# Patient Record
Sex: FEMALE | Race: WHITE | NOT HISPANIC OR LATINO | Employment: PART TIME | URBAN - METROPOLITAN AREA
[De-identification: names, ages, dates, MRNs, and addresses within clinical notes are randomized per-mention and may not be internally consistent; named-entity substitution may affect disease eponyms.]

---

## 2017-02-15 ENCOUNTER — APPOINTMENT (OUTPATIENT)
Dept: GENERAL RADIOLOGY | Facility: HOSPITAL | Age: 37
End: 2017-02-15

## 2017-02-15 PROCEDURE — 71101 X-RAY EXAM UNILAT RIBS/CHEST: CPT | Performed by: FAMILY MEDICINE

## 2017-03-23 ENCOUNTER — OFFICE VISIT (OUTPATIENT)
Dept: FAMILY MEDICINE CLINIC | Facility: CLINIC | Age: 37
End: 2017-03-23

## 2017-03-23 VITALS
RESPIRATION RATE: 16 BRPM | WEIGHT: 200 LBS | OXYGEN SATURATION: 98 % | TEMPERATURE: 98.6 F | DIASTOLIC BLOOD PRESSURE: 70 MMHG | HEART RATE: 62 BPM | BODY MASS INDEX: 28 KG/M2 | HEIGHT: 71 IN | SYSTOLIC BLOOD PRESSURE: 120 MMHG

## 2017-03-23 DIAGNOSIS — R91.8 ABNORMALITY OF LUNG ON CXR: Primary | ICD-10-CM

## 2017-03-23 DIAGNOSIS — Z00.00 LABORATORY EXAMINATION ORDERED AS PART OF A ROUTINE GENERAL MEDICAL EXAMINATION: ICD-10-CM

## 2017-03-23 PROCEDURE — 99213 OFFICE O/P EST LOW 20 MIN: CPT | Performed by: PHYSICIAN ASSISTANT

## 2017-03-23 PROCEDURE — 71020 XR CHEST PA AND LATERAL: CPT | Performed by: PHYSICIAN ASSISTANT

## 2017-03-23 PROCEDURE — 90715 TDAP VACCINE 7 YRS/> IM: CPT | Performed by: PHYSICIAN ASSISTANT

## 2017-03-23 PROCEDURE — 90471 IMMUNIZATION ADMIN: CPT | Performed by: PHYSICIAN ASSISTANT

## 2017-03-23 NOTE — PROGRESS NOTES
Subjective   Sierra Mao is a 36 y.o. female.     History of Present Illness   Sierra Mao 36 y.o. female who presents today for f/u CXR with ribs from urgent care and then saw ortho.  Noted ? Lymph nodes and ?mass in lunges on spine Xray.  I only have report from 10-16-08 of CXR PA and lateral done here and was read with large calcified azygous level benign granuloma and very small lateral right upper love parenchymal granuloma.  Report from Urgent Care 2-15-17 with note of right upper lobe calcified granuloma and large calcified node in right paratracheal region.  She did not have lateral view.  I plan PA and lateral CXR today.  She is smoker.  No wheezing or SOA.  No coughing or weight loss.  No night sweats.  she has a history of There is no problem list on file for this patient.  .      I will update labs and vaccine  Out of pneumovax    X-Ray  Interpretation report in house X-rays that I personally viewed    Relevant Clinical Issues/Diagnoses/Indications:  Abn CXR          Clinical Findings:  I see the hilar node area calcification and tiny nodule right mid upper lobe; no mass, no cardiomeg          Comparative Data:  Report only          Date of Previous X-ray:    Change on current X-ray      The following portions of the patient's history were reviewed and updated as appropriate: allergies, current medications, past family history, past medical history, past social history, past surgical history and problem list.    Review of Systems   Constitutional: Positive for unexpected weight change. Negative for activity change and appetite change.   HENT: Negative for nosebleeds and trouble swallowing.    Eyes: Negative for pain and visual disturbance.   Respiratory: Negative for chest tightness, shortness of breath and wheezing.    Cardiovascular: Negative for chest pain and palpitations.   Gastrointestinal: Negative for abdominal pain and blood in stool.   Endocrine: Negative.    Genitourinary: Negative for  difficulty urinating and hematuria.   Musculoskeletal: Negative for joint swelling.   Skin: Negative for color change and rash.   Allergic/Immunologic: Positive for environmental allergies.   Neurological: Positive for speech difficulty and headaches. Negative for syncope.   Hematological: Negative for adenopathy.   Psychiatric/Behavioral: Negative for agitation and confusion. The patient is nervous/anxious.    All other systems reviewed and are negative.      Objective   Physical Exam   Constitutional: She is oriented to person, place, and time. She appears well-developed and well-nourished. No distress.   HENT:   Head: Normocephalic and atraumatic.   Right Ear: External ear normal.   Left Ear: External ear normal.   Nose: Nose normal.   Mouth/Throat: Oropharynx is clear and moist. No oropharyngeal exudate.   Fluid bilat   Eyes: Conjunctivae and EOM are normal. Pupils are equal, round, and reactive to light. No scleral icterus.   Neck: Normal range of motion. Neck supple. No thyromegaly present.   Cardiovascular: Normal rate, regular rhythm, normal heart sounds and intact distal pulses.    No murmur heard.  Pulmonary/Chest: Effort normal and breath sounds normal. No respiratory distress. She has no wheezes. She has no rales.   Abdominal: Soft.   Musculoskeletal: Normal range of motion. She exhibits no deformity.   Lymphadenopathy:     She has no cervical adenopathy.   Neurological: She is alert and oriented to person, place, and time. Coordination normal.   Skin: Skin is warm and dry.   Psychiatric: She has a normal mood and affect. Her behavior is normal. Judgment and thought content normal.   Nursing note and vitals reviewed.      Assessment/Plan   Sierra was seen today for back pain and follow-up.    Diagnoses and all orders for this visit:    Abnormality of lung on CXR  -     XR Chest PA & Lateral    Laboratory examination ordered as part of a routine general medical examination  -     Tdap Vaccine Greater Than  or Equal To 6yo IM  -     Comprehensive metabolic panel  -     Lipid panel  -     CBC and Differential  -     TSH  -     Vitamin D 25 Hydroxy  -     HIV-1 / O / 2 Ag / Antibody 4th Generation  -     T4, Free  -     Hepatitis Panel, Acute

## 2017-03-23 NOTE — PATIENT INSTRUCTIONS
Stop smoking    Get results on Chest Xray    Low glycemic index diet  Exercise 30 minutes most days of the week  Make sure you get results on any labs or tests we ordered today  We discussed medications and how to take them as prescribed  Sleep 6-8 hours each night if possible  If you have not signed up for [employer], please activate your code ASAP from your After Visit Summary today    LDL goal <100  LDL goal if heart disease <70  HDL goal >60  Triglyceride goal <150  BP goal =<130/80  Fasting glucose <100    Stop smoking

## 2017-03-29 LAB
25(OH)D3+25(OH)D2 SERPL-MCNC: 23.6 NG/ML (ref 30–100)
ALBUMIN SERPL-MCNC: 4.7 G/DL (ref 3.5–5.2)
ALBUMIN/GLOB SERPL: 2.1 G/DL
ALP SERPL-CCNC: 46 U/L (ref 39–117)
ALT SERPL-CCNC: 14 U/L (ref 1–33)
AST SERPL-CCNC: 14 U/L (ref 1–32)
BASOPHILS # BLD AUTO: 0.02 10*3/MM3 (ref 0–0.2)
BASOPHILS NFR BLD AUTO: 0.3 % (ref 0–1.5)
BILIRUB SERPL-MCNC: 0.4 MG/DL (ref 0.1–1.2)
BUN SERPL-MCNC: 10 MG/DL (ref 6–20)
BUN/CREAT SERPL: 11.8 (ref 7–25)
CALCIUM SERPL-MCNC: 9.5 MG/DL (ref 8.6–10.5)
CHLORIDE SERPL-SCNC: 103 MMOL/L (ref 98–107)
CHOLEST SERPL-MCNC: 190 MG/DL (ref 0–200)
CO2 SERPL-SCNC: 25.9 MMOL/L (ref 22–29)
CREAT SERPL-MCNC: 0.85 MG/DL (ref 0.57–1)
EOSINOPHIL # BLD AUTO: 0.22 10*3/MM3 (ref 0–0.7)
EOSINOPHIL NFR BLD AUTO: 2.9 % (ref 0.3–6.2)
ERYTHROCYTE [DISTWIDTH] IN BLOOD BY AUTOMATED COUNT: 13.4 % (ref 11.7–13)
GLOBULIN SER CALC-MCNC: 2.2 GM/DL
GLUCOSE SERPL-MCNC: 161 MG/DL (ref 65–99)
HAV IGM SERPL QL IA: NEGATIVE
HBV CORE IGM SERPL QL IA: NEGATIVE
HBV SURFACE AG SERPL QL IA: NEGATIVE
HCT VFR BLD AUTO: 42.9 % (ref 35.6–45.5)
HCV AB S/CO SERPL IA: 0.1 S/CO RATIO (ref 0–0.9)
HDLC SERPL-MCNC: 42 MG/DL (ref 40–60)
HGB BLD-MCNC: 14.5 G/DL (ref 11.9–15.5)
HIV 1+2 AB+HIV1 P24 AG SERPL QL IA: NON REACTIVE
IMM GRANULOCYTES # BLD: 0 10*3/MM3 (ref 0–0.03)
IMM GRANULOCYTES NFR BLD: 0 % (ref 0–0.5)
LDLC SERPL CALC-MCNC: 119 MG/DL (ref 0–100)
LYMPHOCYTES # BLD AUTO: 2 10*3/MM3 (ref 0.9–4.8)
LYMPHOCYTES NFR BLD AUTO: 26.1 % (ref 19.6–45.3)
MCH RBC QN AUTO: 31.3 PG (ref 26.9–32)
MCHC RBC AUTO-ENTMCNC: 33.8 G/DL (ref 32.4–36.3)
MCV RBC AUTO: 92.5 FL (ref 80.5–98.2)
MONOCYTES # BLD AUTO: 0.47 10*3/MM3 (ref 0.2–1.2)
MONOCYTES NFR BLD AUTO: 6.1 % (ref 5–12)
NEUTROPHILS # BLD AUTO: 4.95 10*3/MM3 (ref 1.9–8.1)
NEUTROPHILS NFR BLD AUTO: 64.6 % (ref 42.7–76)
PLATELET # BLD AUTO: 224 10*3/MM3 (ref 140–500)
POTASSIUM SERPL-SCNC: 4.8 MMOL/L (ref 3.5–5.2)
PROT SERPL-MCNC: 6.9 G/DL (ref 6–8.5)
RBC # BLD AUTO: 4.64 10*6/MM3 (ref 3.9–5.2)
SODIUM SERPL-SCNC: 141 MMOL/L (ref 136–145)
T4 FREE SERPL-MCNC: 1.25 NG/DL (ref 0.93–1.7)
TRIGL SERPL-MCNC: 147 MG/DL (ref 0–150)
TSH SERPL DL<=0.005 MIU/L-ACNC: 3.4 MIU/ML (ref 0.27–4.2)
VLDLC SERPL CALC-MCNC: 29.4 MG/DL (ref 5–40)
WBC # BLD AUTO: 7.66 10*3/MM3 (ref 4.5–10.7)

## 2017-04-01 LAB
HBA1C MFR BLD: 6.24 % (ref 4.8–5.6)
WRITTEN AUTHORIZATION: NORMAL

## 2017-04-10 ENCOUNTER — OFFICE VISIT (OUTPATIENT)
Dept: FAMILY MEDICINE CLINIC | Facility: CLINIC | Age: 37
End: 2017-04-10

## 2017-04-10 VITALS
HEIGHT: 71 IN | OXYGEN SATURATION: 98 % | DIASTOLIC BLOOD PRESSURE: 70 MMHG | SYSTOLIC BLOOD PRESSURE: 110 MMHG | BODY MASS INDEX: 28 KG/M2 | WEIGHT: 200 LBS | HEART RATE: 72 BPM | RESPIRATION RATE: 16 BRPM | TEMPERATURE: 98.8 F

## 2017-04-10 DIAGNOSIS — R73.01 IMPAIRED FASTING GLUCOSE: Primary | ICD-10-CM

## 2017-04-10 PROCEDURE — 99213 OFFICE O/P EST LOW 20 MIN: CPT | Performed by: PHYSICIAN ASSISTANT

## 2017-04-10 RX ORDER — METFORMIN HYDROCHLORIDE 500 MG/1
TABLET, EXTENDED RELEASE ORAL
Qty: 90 TABLET | Refills: 1 | Status: SHIPPED | OUTPATIENT
Start: 2017-04-10 | End: 2017-08-03 | Stop reason: SDUPTHER

## 2017-04-10 NOTE — PROGRESS NOTES
Subjective   Sierra Mao is a 36 y.o. female.     History of Present Illness   Sierra Mao 36 y.o. female who presents today for   Lab Results   Component Value Date    HGBA1C 6.24 (H) 03/28/2017       she has a history of There is no problem list on file for this patient.    Lab Results   Component Value Date    BUN 10 03/28/2017    CREATININE 0.85 03/28/2017    EGFRIFNONA 76 03/28/2017    EGFRIFAFRI 92 03/28/2017    BCR 11.8 03/28/2017    K 4.8 03/28/2017    CO2 25.9 03/28/2017    CALCIUM 9.5 03/28/2017    PROTENTOTREF 6.9 03/28/2017    ALBUMIN 4.70 03/28/2017    LABIL2 2.1 03/28/2017    AST 14 03/28/2017    ALT 14 03/28/2017     Then I added a1C and was impaired.  She does smoke  Mom has DMII    We talked Metformin and MOA; insulin resistance      2013 AHA (American Heart Association) Cholesterol Risk Ratio Score Goal is <=7.5% and your score is:  3.0%    Can still repeat CXR 6mos;  It was neg and node calcified    The following portions of the patient's history were reviewed and updated as appropriate: allergies, current medications, past family history, past medical history, past social history, past surgical history and problem list.    Review of Systems   Constitutional: Negative for activity change, appetite change and unexpected weight change.   HENT: Negative for nosebleeds and trouble swallowing.    Eyes: Negative for pain and visual disturbance.   Respiratory: Negative for chest tightness, shortness of breath and wheezing.    Cardiovascular: Negative for chest pain and palpitations.   Gastrointestinal: Negative for abdominal pain and blood in stool.   Endocrine: Negative.    Genitourinary: Negative for difficulty urinating and hematuria.   Musculoskeletal: Negative for joint swelling.   Skin: Negative for color change and rash.   Allergic/Immunologic: Negative.    Neurological: Negative for syncope and speech difficulty.   Hematological: Negative for adenopathy.   Psychiatric/Behavioral: Negative  for agitation and confusion.   All other systems reviewed and are negative.      Objective   Physical Exam   Constitutional: She is oriented to person, place, and time. She appears well-developed and well-nourished. No distress.   HENT:   Head: Normocephalic and atraumatic.   Eyes: Conjunctivae and EOM are normal. Pupils are equal, round, and reactive to light. Right eye exhibits no discharge. Left eye exhibits no discharge. No scleral icterus.   Neck: Normal range of motion. Neck supple. No tracheal deviation present. No thyromegaly present.   Cardiovascular: Normal rate, regular rhythm, normal heart sounds, intact distal pulses and normal pulses.  Exam reveals no gallop.    No murmur heard.  Pulmonary/Chest: Effort normal and breath sounds normal. No respiratory distress. She has no wheezes. She has no rales.   Musculoskeletal: Normal range of motion.   Neurological: She is alert and oriented to person, place, and time. She exhibits normal muscle tone. Coordination normal.   Skin: Skin is warm. No rash noted. No erythema. No pallor.   Psychiatric: She has a normal mood and affect. Her behavior is normal. Judgment and thought content normal.   Nursing note and vitals reviewed.      Assessment/Plan   Problems Addressed this Visit     None      Visit Diagnoses     Impaired fasting glucose    -  Primary    Relevant Orders    Comprehensive Metabolic Panel    Hemoglobin A1c

## 2017-04-10 NOTE — PATIENT INSTRUCTIONS
Low glycemic index diet  Exercise 30 minutes most days of the week  Make sure you get results on any labs or tests we ordered today  We discussed medications and how to take them as prescribed  Sleep 6-8 hours each night if possible  If you have not signed up for [employer], please activate your code ASAP from your After Visit Summary today    LDL goal <100  LDL goal if heart disease <70  HDL goal >60  Triglyceride goal <150  BP goal =<130/80  Fasting glucose <100    Labs July 5 or after

## 2017-06-14 ENCOUNTER — OFFICE VISIT (OUTPATIENT)
Dept: FAMILY MEDICINE CLINIC | Facility: CLINIC | Age: 37
End: 2017-06-14

## 2017-06-14 VITALS
BODY MASS INDEX: 27.3 KG/M2 | HEIGHT: 71 IN | WEIGHT: 195 LBS | TEMPERATURE: 98.8 F | DIASTOLIC BLOOD PRESSURE: 72 MMHG | SYSTOLIC BLOOD PRESSURE: 110 MMHG | RESPIRATION RATE: 16 BRPM | HEART RATE: 59 BPM | OXYGEN SATURATION: 98 %

## 2017-06-14 DIAGNOSIS — B34.9 VIRAL SYNDROME: Primary | ICD-10-CM

## 2017-06-14 DIAGNOSIS — M54.50 ACUTE BILATERAL LOW BACK PAIN WITHOUT SCIATICA: ICD-10-CM

## 2017-06-14 PROBLEM — J06.9 ACUTE URI: Status: ACTIVE | Noted: 2017-06-14

## 2017-06-14 PROCEDURE — 99213 OFFICE O/P EST LOW 20 MIN: CPT | Performed by: PHYSICIAN ASSISTANT

## 2017-06-14 RX ORDER — TIZANIDINE 4 MG/1
4 TABLET ORAL EVERY 8 HOURS PRN
Qty: 60 TABLET | Refills: 1 | Status: SHIPPED | OUTPATIENT
Start: 2017-06-14 | End: 2017-08-02

## 2017-06-14 NOTE — PROGRESS NOTES
Subjective   Sierra Mao is a 37 y.o. female.     History of Present Illness   Sierra Mao 37 y.o. female who presents for evaluation of ?viral syndrome. Symptoms include dry mouth, coated tongued,sore throat to swallow, fatigue, h/a left temple.  Feeling some better today, no h/a yet this PM.  Has allergy drg also..  Onset of symptoms was 10 days ago, gradually improving since that time. Patient denies shortness of breath, wheezing.   Evaluation to date: none Treatment to date:  Advil.   Was ? Fever at onset with sweats    Clear drg    Holding Metformin  Had tongue sore and is going away    Wait to see if improves  Still having back pain at night and going on for weeks;  Only at night and any bed  Pain to move  No motor or sensory C/o's  No pain down legs  The following portions of the patient's history were reviewed and updated as appropriate: allergies, current medications, past family history, past medical history, past social history, past surgical history and problem list.    Review of Systems   Constitutional: Negative for activity change, appetite change and unexpected weight change.   HENT: Positive for mouth sores, sinus pressure and sore throat. Negative for nosebleeds and trouble swallowing.    Eyes: Negative for pain and visual disturbance.   Respiratory: Negative for chest tightness, shortness of breath and wheezing.    Cardiovascular: Negative for chest pain and palpitations.   Gastrointestinal: Negative for abdominal pain and blood in stool.   Endocrine: Negative.    Genitourinary: Negative for difficulty urinating and hematuria.   Musculoskeletal: Positive for back pain and neck stiffness. Negative for joint swelling.   Skin: Negative for color change and rash.   Allergic/Immunologic: Negative.    Neurological: Positive for headaches. Negative for syncope and speech difficulty.   Hematological: Negative for adenopathy.   Psychiatric/Behavioral: Negative for agitation and confusion.   All other  systems reviewed and are negative.      Objective   Physical Exam   Constitutional: She is oriented to person, place, and time. She appears well-developed and well-nourished.  Non-toxic appearance. No distress.   HENT:   Head: Normocephalic and atraumatic. Hair is normal.   Right Ear: External ear normal. No drainage, swelling or tenderness. Tympanic membrane is retracted.   Left Ear: External ear normal. No drainage, swelling or tenderness. Tympanic membrane is retracted.   Nose: Mucosal edema present. No epistaxis.   Mouth/Throat: Uvula is midline and mucous membranes are normal. No oral lesions. No uvula swelling. Posterior oropharyngeal erythema present. No oropharyngeal exudate.   White coating on tongue   Eyes: Conjunctivae and EOM are normal. Pupils are equal, round, and reactive to light. Right eye exhibits no discharge. Left eye exhibits no discharge. No scleral icterus.   Neck: Normal range of motion. Neck supple.   Cardiovascular: Normal rate, regular rhythm and normal heart sounds.  Exam reveals no gallop.    No murmur heard.  Pulmonary/Chest: Effort normal and breath sounds normal. No stridor. No respiratory distress. She has no wheezes. She has no rales. She exhibits no tenderness.   Abdominal: Soft. There is no tenderness.   Musculoskeletal: Normal range of motion. She exhibits no tenderness.   Lymphadenopathy:     She has no cervical adenopathy.   Neurological: She is alert and oriented to person, place, and time. She displays normal reflexes. She exhibits normal muscle tone. Coordination normal.   Skin: Skin is warm and dry. No rash noted. She is not diaphoretic.   Psychiatric: She has a normal mood and affect. Her behavior is normal. Judgment and thought content normal.   Nursing note and vitals reviewed.      Assessment/Plan   Problems Addressed this Visit     None      Visit Diagnoses     Viral syndrome    -  Primary    Acute bilateral low back pain without sciatica

## 2017-06-14 NOTE — PATIENT INSTRUCTIONS
For throat pain:  Can gargle with 1/2 Maalox and 1/2 Benadryl liquid ---mix, gargle and spit Take Tylenol for fever or aches  Rest as needed  Call not better in 7 days  Increase fluids  Call if worse  Can use generic Afrin nasal spray up to 5 days for nasal congestion  Finish antibiotic course of therapy      Warned patient that this medication can cause drowsiness and impair them operating machinery, including driving a car.  Caution is advised.

## 2017-07-03 ENCOUNTER — RESULTS ENCOUNTER (OUTPATIENT)
Dept: FAMILY MEDICINE CLINIC | Facility: CLINIC | Age: 37
End: 2017-07-03

## 2017-07-03 DIAGNOSIS — R73.01 IMPAIRED FASTING GLUCOSE: ICD-10-CM

## 2017-08-02 ENCOUNTER — OFFICE VISIT (OUTPATIENT)
Dept: FAMILY MEDICINE CLINIC | Facility: CLINIC | Age: 37
End: 2017-08-02

## 2017-08-02 VITALS
BODY MASS INDEX: 26.32 KG/M2 | RESPIRATION RATE: 16 BRPM | TEMPERATURE: 99.2 F | OXYGEN SATURATION: 98 % | HEART RATE: 79 BPM | DIASTOLIC BLOOD PRESSURE: 70 MMHG | HEIGHT: 71 IN | WEIGHT: 188 LBS | SYSTOLIC BLOOD PRESSURE: 110 MMHG

## 2017-08-02 DIAGNOSIS — R73.9 HYPERGLYCEMIA: Primary | ICD-10-CM

## 2017-08-02 DIAGNOSIS — E11.65 TYPE 2 DIABETES MELLITUS WITH HYPERGLYCEMIA, WITHOUT LONG-TERM CURRENT USE OF INSULIN (HCC): ICD-10-CM

## 2017-08-02 PROCEDURE — 99213 OFFICE O/P EST LOW 20 MIN: CPT | Performed by: PHYSICIAN ASSISTANT

## 2017-08-02 NOTE — PROGRESS NOTES
Subjective   Sierra Mao is a 37 y.o. female.     History of Present Illness   Sierra Mao 37 y.o. female who presents today for f/u on glucose.  She saw me a April and labs showed elevated glucose and I added A1C and was:    Lab Results   Component Value Date    HGBA1C 6.24 (H) 03/28/2017   we talked about pre-diabetes and mom is DMII;  She started on Metformin  She has been checking her glucose on a glucometer and lowest was 180 random  Some readings 400s;  Average in 300s  Her weight is down;  Not drinking or voiding more  She increased to two Metformin 9 days ago and tolerating it.    She is down 7lbs and less candy  Less sweets  Not watching calories   she has a history of     Need more labs to eval and go from here    294 here  264 her machine    She could not void    Patient Active Problem List   Diagnosis   (none) - all problems resolved or deleted   .      She had coffee this AM with sugar and cream;; she cannot come back totally fasting for over a week  The following portions of the patient's history were reviewed and updated as appropriate: allergies, current medications, past family history, past medical history, past social history, past surgical history and problem list.    Review of Systems   Constitutional: Positive for unexpected weight change. Negative for activity change and appetite change.   HENT: Positive for tinnitus. Negative for nosebleeds and trouble swallowing.    Eyes: Negative for pain and visual disturbance.   Respiratory: Negative for chest tightness, shortness of breath and wheezing.    Cardiovascular: Negative for chest pain and palpitations.   Gastrointestinal: Negative for abdominal pain and blood in stool.   Endocrine: Negative.    Genitourinary: Negative for difficulty urinating and hematuria.   Musculoskeletal: Positive for back pain and neck stiffness. Negative for joint swelling.   Skin: Negative for color change and rash.   Allergic/Immunologic: Positive for  environmental allergies.   Neurological: Negative for syncope and speech difficulty.   Hematological: Negative for adenopathy.   Psychiatric/Behavioral: Negative for agitation and confusion.   All other systems reviewed and are negative.      Objective   Physical Exam   Constitutional: She is oriented to person, place, and time. She appears well-developed and well-nourished. No distress.   HENT:   Head: Normocephalic and atraumatic.   Eyes: Conjunctivae and EOM are normal. Pupils are equal, round, and reactive to light. Right eye exhibits no discharge. Left eye exhibits no discharge. No scleral icterus.   Neck: Normal range of motion. Neck supple. No tracheal deviation present. No thyromegaly present.   Cardiovascular: Normal rate, regular rhythm, normal heart sounds, intact distal pulses and normal pulses.  Exam reveals no gallop.    No murmur heard.  Pulmonary/Chest: Effort normal and breath sounds normal. No respiratory distress. She has no wheezes. She has no rales.   Musculoskeletal: Normal range of motion.   Neurological: She is alert and oriented to person, place, and time. She exhibits normal muscle tone. Coordination normal.   Skin: Skin is warm. No rash noted. No erythema. No pallor.   Psychiatric: She has a normal mood and affect. Her behavior is normal. Judgment and thought content normal.   Nursing note and vitals reviewed.      Assessment/Plan   Problems Addressed this Visit     None      Visit Diagnoses     Hyperglycemia    -  Primary    Relevant Orders    POCT Glucose

## 2017-08-02 NOTE — PATIENT INSTRUCTIONS
Low glycemic index diet  Exercise 30 minutes most days of the week  Make sure you get results on any labs or tests we ordered today  We discussed medications and how to take them as prescribed  Sleep 6-8 hours each night if possible  If you have not signed up for [email or web address], please activate your code ASAP from your After Visit Summary today    LDL goal <100  LDL goal if heart disease <70  HDL goal >60  Triglyceride goal <150  BP goal =<130/80  Fasting glucose <100

## 2017-08-03 DIAGNOSIS — E11.65 TYPE 2 DIABETES MELLITUS WITH HYPERGLYCEMIA, WITHOUT LONG-TERM CURRENT USE OF INSULIN (HCC): Primary | ICD-10-CM

## 2017-08-03 LAB
ALBUMIN SERPL-MCNC: 5 G/DL (ref 3.5–5.2)
ALBUMIN/GLOB SERPL: 1.9 G/DL
ALP SERPL-CCNC: 45 U/L (ref 39–117)
ALT SERPL-CCNC: 28 U/L (ref 1–33)
AST SERPL-CCNC: 19 U/L (ref 1–32)
BILIRUB SERPL-MCNC: 0.6 MG/DL (ref 0.1–1.2)
BUN SERPL-MCNC: 9 MG/DL (ref 6–20)
BUN/CREAT SERPL: 11.1 (ref 7–25)
C PEPTIDE SERPL-MCNC: 2.1 NG/ML (ref 1.1–4.4)
CALCIUM SERPL-MCNC: 9.5 MG/DL (ref 8.6–10.5)
CHLORIDE SERPL-SCNC: 98 MMOL/L (ref 98–107)
CO2 SERPL-SCNC: 23.1 MMOL/L (ref 22–29)
CREAT SERPL-MCNC: 0.81 MG/DL (ref 0.57–1)
GLOBULIN SER CALC-MCNC: 2.7 GM/DL
GLUCOSE SERPL-MCNC: 259 MG/DL (ref 65–99)
HBA1C MFR BLD: 7.84 % (ref 4.8–5.6)
POTASSIUM SERPL-SCNC: 4.7 MMOL/L (ref 3.5–5.2)
PROT SERPL-MCNC: 7.7 G/DL (ref 6–8.5)
SODIUM SERPL-SCNC: 137 MMOL/L (ref 136–145)

## 2017-08-03 RX ORDER — METFORMIN HYDROCHLORIDE 500 MG/1
TABLET, EXTENDED RELEASE ORAL
Qty: 360 TABLET | Refills: 1 | Status: SHIPPED | OUTPATIENT
Start: 2017-08-03 | End: 2018-02-01 | Stop reason: SDUPTHER

## 2017-08-31 ENCOUNTER — RESULTS ENCOUNTER (OUTPATIENT)
Dept: FAMILY MEDICINE CLINIC | Facility: CLINIC | Age: 37
End: 2017-08-31

## 2017-08-31 DIAGNOSIS — E11.65 TYPE 2 DIABETES MELLITUS WITH HYPERGLYCEMIA, WITHOUT LONG-TERM CURRENT USE OF INSULIN (HCC): ICD-10-CM

## 2018-01-08 ENCOUNTER — PRIOR AUTHORIZATION (OUTPATIENT)
Dept: FAMILY MEDICINE CLINIC | Facility: CLINIC | Age: 38
End: 2018-01-08

## 2018-01-08 NOTE — TELEPHONE ENCOUNTER
PA request for Jardiance Denied; patient has to have documented trial and failure to both plan alternatives Invokana and Farxiga first; please advise patient

## 2018-01-17 ENCOUNTER — OFFICE VISIT (OUTPATIENT)
Dept: FAMILY MEDICINE CLINIC | Facility: CLINIC | Age: 38
End: 2018-01-17

## 2018-01-17 VITALS
HEIGHT: 71 IN | TEMPERATURE: 98.7 F | BODY MASS INDEX: 22.82 KG/M2 | SYSTOLIC BLOOD PRESSURE: 110 MMHG | OXYGEN SATURATION: 100 % | RESPIRATION RATE: 16 BRPM | DIASTOLIC BLOOD PRESSURE: 62 MMHG | HEART RATE: 79 BPM | WEIGHT: 163 LBS

## 2018-01-17 DIAGNOSIS — E78.2 MIXED HYPERLIPIDEMIA: ICD-10-CM

## 2018-01-17 DIAGNOSIS — E11.65 TYPE 2 DIABETES MELLITUS WITH HYPERGLYCEMIA, WITHOUT LONG-TERM CURRENT USE OF INSULIN (HCC): Primary | ICD-10-CM

## 2018-01-17 PROCEDURE — 99214 OFFICE O/P EST MOD 30 MIN: CPT | Performed by: PHYSICIAN ASSISTANT

## 2018-01-17 NOTE — PROGRESS NOTES
Subjective   Sierra Mao is a 37 y.o. female.     History of Present Illness     Sierra Mao 37 y.o. female who presents today for routine follow up check and medication refills.  she has a history of   Patient Active Problem List   Diagnosis   • Type 2 diabetes mellitus with hyperglycemia, without long-term current use of insulin   .  Since the last visit, she has overall felt tired.  She has DMII and is here to discuss recent abnormal labs.  she has been compliant with current medications have reviewed them.  The patient denies medication side effects.    Results for orders placed or performed in visit on 08/02/17   Comprehensive Metabolic Panel   Result Value Ref Range    Glucose 259 (H) 65 - 99 mg/dL    BUN 9 6 - 20 mg/dL    Creatinine 0.81 0.57 - 1.00 mg/dL    eGFR Non African Am 80 >60 mL/min/1.73    eGFR African Am 97 >60 mL/min/1.73    BUN/Creatinine Ratio 11.1 7.0 - 25.0    Sodium 137 136 - 145 mmol/L    Potassium 4.7 3.5 - 5.2 mmol/L    Chloride 98 98 - 107 mmol/L    Total CO2 23.1 22.0 - 29.0 mmol/L    Calcium 9.5 8.6 - 10.5 mg/dL    Total Protein 7.7 6.0 - 8.5 g/dL    Albumin 5.00 3.50 - 5.20 g/dL    Globulin 2.7 gm/dL    A/G Ratio 1.9 g/dL    Total Bilirubin 0.6 0.1 - 1.2 mg/dL    Alkaline Phosphatase 45 39 - 117 U/L    AST (SGOT) 19 1 - 32 U/L    ALT (SGPT) 28 1 - 33 U/L   C-Peptide   Result Value Ref Range    C-Peptide 2.1 1.1 - 4.4 ng/mL   Hemoglobin A1c   Result Value Ref Range    Hemoglobin A1C 7.84 (H) 4.80 - 5.60 %       Eating better; weight is down; not exercising  Weight was 200 in April and now is 163:  Loss of 37 labs    Last A1C 7.84  Glucose about 200;  It can still be 400  Need to get update on labs and if glucose still high, will need to see Endocrine  She still smokes  Has blurred vision  March 2017 A1C 6.24  Lab Results   Component Value Date    HGBA1C 7.84 (H) 08/02/2017     I did talk about starting a statin and recommend this; she wants to wait on labs  She is having polyuria  and polydipsia    Glucose this   She is on 3 Metformin daily  Trouble with concentrating    The following portions of the patient's history were reviewed and updated as appropriate: allergies, current medications, past family history, past medical history, past social history, past surgical history and problem list.    Review of Systems   Constitutional: Positive for fatigue and unexpected weight change. Negative for activity change and appetite change.   HENT: Negative for nosebleeds and trouble swallowing.    Eyes: Negative for pain and visual disturbance.   Respiratory: Negative for chest tightness, shortness of breath and wheezing.    Cardiovascular: Negative for chest pain and palpitations.   Gastrointestinal: Negative for abdominal pain and blood in stool.   Endocrine: Positive for polydipsia and polyuria.   Genitourinary: Negative for difficulty urinating and hematuria.   Musculoskeletal: Positive for back pain. Negative for joint swelling.   Skin: Negative for color change and rash.   Allergic/Immunologic: Negative.    Neurological: Positive for dizziness and light-headedness. Negative for syncope and speech difficulty.   Hematological: Negative for adenopathy.   Psychiatric/Behavioral: Positive for decreased concentration. Negative for agitation and confusion.   All other systems reviewed and are negative.      Objective   Physical Exam   Constitutional: She is oriented to person, place, and time. She appears well-developed and well-nourished. No distress.   HENT:   Head: Normocephalic and atraumatic.   Eyes: Conjunctivae and EOM are normal. Pupils are equal, round, and reactive to light. Right eye exhibits no discharge. Left eye exhibits no discharge. No scleral icterus.   Neck: Normal range of motion. Neck supple. No tracheal deviation present. No thyromegaly present.   Cardiovascular: Normal rate, regular rhythm, normal heart sounds, intact distal pulses and normal pulses.  Exam reveals no gallop.     No murmur heard.  Pulmonary/Chest: Effort normal and breath sounds normal. No respiratory distress. She has no wheezes. She has no rales.   Musculoskeletal: Normal range of motion.   Neurological: She is alert and oriented to person, place, and time. She exhibits normal muscle tone. Coordination normal.   Skin: Skin is warm. No rash noted. No erythema. No pallor.   Psychiatric: She has a normal mood and affect. Her behavior is normal. Judgment and thought content normal.   Nursing note and vitals reviewed.      Assessment/Plan   Sierra was seen today for weight loss.    Diagnoses and all orders for this visit:    Type 2 diabetes mellitus with hyperglycemia, without long-term current use of insulin  -     Comprehensive metabolic panel  -     Lipid panel  -     CBC and Differential  -     TSH  -     T4, Free  -     MicroAlbumin, Urine, Random - Urine, Clean Catch  -     Vitamin B12  -     Folate  -     Hemoglobin A1c  -     Vitamin D 25 Hydroxy    Mixed hyperlipidemia  -     Comprehensive metabolic panel  -     Lipid panel  -     CBC and Differential  -     TSH  -     T4, Free  -     MicroAlbumin, Urine, Random - Urine, Clean Catch  -     Vitamin B12  -     Folate  -     Hemoglobin A1c  -     Vitamin D 25 Hydroxy

## 2018-01-17 NOTE — PATIENT INSTRUCTIONS
Low glycemic index diet  Exercise 30 minutes most days of the week  Make sure you get results on any labs or tests we ordered today  We discussed medications and how to take them as prescribed  Sleep 6-8 hours each night if possible  If you have not signed up for [employer], please activate your code ASAP from your After Visit Summary today    LDL goal <100  LDL goal if heart disease <70  HDL goal >60  Triglyceride goal <150  BP goal =<130/80  Fasting glucose <100

## 2018-01-18 LAB
25(OH)D3+25(OH)D2 SERPL-MCNC: 33.4 NG/ML (ref 30–100)
ALBUMIN SERPL-MCNC: 4.9 G/DL (ref 3.5–5.2)
ALBUMIN/GLOB SERPL: 1.8 G/DL
ALP SERPL-CCNC: 50 U/L (ref 39–117)
ALT SERPL-CCNC: 18 U/L (ref 1–33)
AST SERPL-CCNC: 25 U/L (ref 1–32)
BASOPHILS # BLD AUTO: 0.03 10*3/MM3 (ref 0–0.2)
BASOPHILS NFR BLD AUTO: 0.3 % (ref 0–1.5)
BILIRUB SERPL-MCNC: 0.5 MG/DL (ref 0.1–1.2)
BUN SERPL-MCNC: 9 MG/DL (ref 6–20)
BUN/CREAT SERPL: 11.7 (ref 7–25)
CALCIUM SERPL-MCNC: 9.5 MG/DL (ref 8.6–10.5)
CHLORIDE SERPL-SCNC: 95 MMOL/L (ref 98–107)
CHOLEST SERPL-MCNC: 212 MG/DL (ref 0–200)
CO2 SERPL-SCNC: 19.8 MMOL/L (ref 22–29)
CREAT SERPL-MCNC: 0.77 MG/DL (ref 0.57–1)
EOSINOPHIL # BLD AUTO: 0.27 10*3/MM3 (ref 0–0.7)
EOSINOPHIL NFR BLD AUTO: 2.6 % (ref 0.3–6.2)
ERYTHROCYTE [DISTWIDTH] IN BLOOD BY AUTOMATED COUNT: 14 % (ref 11.7–13)
FOLATE SERPL-MCNC: >20 NG/ML (ref 4.78–24.2)
GLOBULIN SER CALC-MCNC: 2.8 GM/DL
GLUCOSE SERPL-MCNC: 172 MG/DL (ref 65–99)
HBA1C MFR BLD: 8.65 % (ref 4.8–5.6)
HCT VFR BLD AUTO: 48.5 % (ref 35.6–45.5)
HDLC SERPL-MCNC: 40 MG/DL (ref 40–60)
HGB BLD-MCNC: 16.3 G/DL (ref 11.9–15.5)
IMM GRANULOCYTES # BLD: 0.02 10*3/MM3 (ref 0–0.03)
IMM GRANULOCYTES NFR BLD: 0.2 % (ref 0–0.5)
LDLC SERPL CALC-MCNC: 136 MG/DL (ref 0–100)
LYMPHOCYTES # BLD AUTO: 2 10*3/MM3 (ref 0.9–4.8)
LYMPHOCYTES NFR BLD AUTO: 19 % (ref 19.6–45.3)
MCH RBC QN AUTO: 31.7 PG (ref 26.9–32)
MCHC RBC AUTO-ENTMCNC: 33.6 G/DL (ref 32.4–36.3)
MCV RBC AUTO: 94.4 FL (ref 80.5–98.2)
MICROALBUMIN UR-MCNC: <3 UG/ML
MONOCYTES # BLD AUTO: 0.66 10*3/MM3 (ref 0.2–1.2)
MONOCYTES NFR BLD AUTO: 6.3 % (ref 5–12)
NEUTROPHILS # BLD AUTO: 7.54 10*3/MM3 (ref 1.9–8.1)
NEUTROPHILS NFR BLD AUTO: 71.6 % (ref 42.7–76)
PLATELET # BLD AUTO: 231 10*3/MM3 (ref 140–500)
POTASSIUM SERPL-SCNC: 5.3 MMOL/L (ref 3.5–5.2)
PROT SERPL-MCNC: 7.7 G/DL (ref 6–8.5)
RBC # BLD AUTO: 5.14 10*6/MM3 (ref 3.9–5.2)
SODIUM SERPL-SCNC: 138 MMOL/L (ref 136–145)
T4 FREE SERPL-MCNC: 1.29 NG/DL (ref 0.93–1.7)
TRIGL SERPL-MCNC: 180 MG/DL (ref 0–150)
TSH SERPL DL<=0.005 MIU/L-ACNC: 2.14 MIU/ML (ref 0.27–4.2)
VIT B12 SERPL-MCNC: 928 PG/ML (ref 211–946)
VLDLC SERPL CALC-MCNC: 36 MG/DL (ref 5–40)
WBC # BLD AUTO: 10.52 10*3/MM3 (ref 4.5–10.7)

## 2018-02-01 ENCOUNTER — OFFICE VISIT (OUTPATIENT)
Dept: ENDOCRINOLOGY | Age: 38
End: 2018-02-01

## 2018-02-01 VITALS
DIASTOLIC BLOOD PRESSURE: 64 MMHG | SYSTOLIC BLOOD PRESSURE: 108 MMHG | WEIGHT: 161.8 LBS | HEIGHT: 71 IN | RESPIRATION RATE: 16 BRPM | BODY MASS INDEX: 22.65 KG/M2

## 2018-02-01 DIAGNOSIS — R53.82 CHRONIC FATIGUE: ICD-10-CM

## 2018-02-01 DIAGNOSIS — E78.5 DYSLIPIDEMIA: ICD-10-CM

## 2018-02-01 DIAGNOSIS — E55.9 VITAMIN D DEFICIENCY: ICD-10-CM

## 2018-02-01 DIAGNOSIS — E11.9 TYPE 2 DIABETES MELLITUS WITHOUT COMPLICATION, WITHOUT LONG-TERM CURRENT USE OF INSULIN (HCC): Primary | ICD-10-CM

## 2018-02-01 PROCEDURE — 99204 OFFICE O/P NEW MOD 45 MIN: CPT | Performed by: INTERNAL MEDICINE

## 2018-02-01 RX ORDER — ROSUVASTATIN CALCIUM 20 MG/1
20 TABLET, COATED ORAL DAILY
Qty: 30 TABLET | Refills: 5 | Status: SHIPPED | OUTPATIENT
Start: 2018-02-01 | End: 2018-07-10 | Stop reason: SINTOL

## 2018-02-01 RX ORDER — METFORMIN HYDROCHLORIDE 500 MG/1
TABLET, EXTENDED RELEASE ORAL
Qty: 360 TABLET | Refills: 1 | Status: SHIPPED | OUTPATIENT
Start: 2018-02-01 | End: 2018-10-22 | Stop reason: SDUPTHER

## 2018-02-01 RX ORDER — DAPAGLIFLOZIN 10 MG/1
TABLET, FILM COATED ORAL
Qty: 30 TABLET | Refills: 5 | Status: SHIPPED | OUTPATIENT
Start: 2018-02-01 | End: 2018-08-15 | Stop reason: SDUPTHER

## 2018-02-01 RX ORDER — ERGOCALCIFEROL 1.25 MG/1
50000 CAPSULE ORAL WEEKLY
Qty: 13 CAPSULE | Refills: 3 | Status: SHIPPED | OUTPATIENT
Start: 2018-02-01 | End: 2019-02-01

## 2018-02-01 RX ORDER — BROMOCRIPTINE MESYLATE 0.8 MG/1
TABLET ORAL
Qty: 180 TABLET | Refills: 3 | Status: SHIPPED | OUTPATIENT
Start: 2018-02-01 | End: 2019-02-08

## 2018-02-01 RX ORDER — DULAGLUTIDE 1.5 MG/.5ML
1 INJECTION, SOLUTION SUBCUTANEOUS WEEKLY
Qty: 4 PEN | Refills: 5 | Status: SHIPPED | OUTPATIENT
Start: 2018-02-01 | End: 2018-05-15

## 2018-02-01 NOTE — PROGRESS NOTES
Subjective   Sierra Mao is a 37 y.o. female seen as a new patient for DM2, hyperlipidemia. She is checking BG twice a day. No BG readings. She is having weight loss of almost 40 lbs since 05/2017 and vision changes.     History of Present Illness this is a 37-year-old female known patient with type II diabetes since May 2017 and still blood glucose is not under control.  Very strong family history of type II diabetes and also history of thyroid problem in her father.  She also has lost close to 40 pounds since her diagnosis.  She complains of being very tired and listless and every time blood glucose goes up she notices blurriness of her eyesight.       No Known Allergies    Current Outpatient Prescriptions:   •  Empagliflozin (JARDIANCE) 10 MG tablet, Take  by mouth., Disp: , Rfl:   •  metFORMIN ER (GLUCOPHAGE-XR) 500 MG 24 hr tablet, 4 PO daily with food and MVI for DMII, Disp: 360 tablet, Rfl: 1      The following portions of the patient's history were reviewed and updated as appropriate: allergies, current medications, past family history, past medical history, past social history, past surgical history and problem list.    Review of Systems   Constitutional: Positive for unexpected weight change.   HENT: Negative.    Eyes: Positive for visual disturbance.   Respiratory: Negative.    Cardiovascular: Negative.    Gastrointestinal: Negative.    Endocrine: Negative.    Genitourinary: Negative.    Musculoskeletal: Negative.    Skin: Negative.    Allergic/Immunologic: Negative.    Neurological: Negative.    Hematological: Negative.    Psychiatric/Behavioral: Negative.        Objective   Physical Exam   Constitutional: She is oriented to person, place, and time. She appears well-developed and well-nourished. No distress.   HENT:   Head: Normocephalic and atraumatic.   Right Ear: External ear normal.   Left Ear: External ear normal.   Nose: Nose normal.   Mouth/Throat: Oropharynx is clear and moist. No  oropharyngeal exudate.   Eyes: Conjunctivae and EOM are normal. Pupils are equal, round, and reactive to light. Right eye exhibits no discharge. Left eye exhibits no discharge. No scleral icterus.   Neck: Normal range of motion. Neck supple. No JVD present. No tracheal deviation present. No thyromegaly present.   Cardiovascular: Normal rate, regular rhythm, normal heart sounds, intact distal pulses and normal pulses.  Exam reveals no gallop and no friction rub.    No murmur heard.  Pulmonary/Chest: Effort normal and breath sounds normal. No stridor. No respiratory distress. She has no wheezes. She has no rales. She exhibits no tenderness.   Abdominal: Soft. Bowel sounds are normal. She exhibits no distension and no mass. There is no tenderness. There is no rebound and no guarding. No hernia.   Musculoskeletal: Normal range of motion. She exhibits no edema, tenderness or deformity.   Lymphadenopathy:     She has no cervical adenopathy.   Neurological: She is alert and oriented to person, place, and time. She has normal reflexes. She displays normal reflexes. No cranial nerve deficit. She exhibits normal muscle tone. Coordination normal.   Skin: Skin is warm and dry. No rash noted. She is not diaphoretic. No erythema. No pallor.   Psychiatric: She has a normal mood and affect. Her behavior is normal. Judgment and thought content normal.   Nursing note and vitals reviewed.        Assessment/Plan   Diagnoses and all orders for this visit:    Type 2 diabetes mellitus without complication, without long-term current use of insulin  -     Comprehensive Metabolic Panel  -     C-Peptide  -     ACTH  -     Cortisol  -     Glutamic Acid Decarboxylase  -     T3, Free; Future  -     T4 & TSH ([facility]); Future  -     T4, Free; Future  -     Thyroglobulin With Anti-TG; Future  -     Uric Acid; Future  -     Vitamin D 25 Hydroxy; Future  -     Comprehensive Metabolic Panel; Future  -     C-Peptide; Future  -     Hemoglobin A1c;  Future  -     Lipid Panel; Future  -     MicroAlbumin, Urine, Random - Urine, Clean Catch; Future    Dyslipidemia  -     Comprehensive Metabolic Panel  -     C-Peptide  -     ACTH  -     Cortisol  -     Glutamic Acid Decarboxylase  -     T3, Free; Future  -     T4 & TSH ([facility]); Future  -     T4, Free; Future  -     Thyroglobulin With Anti-TG; Future  -     Uric Acid; Future  -     Vitamin D 25 Hydroxy; Future  -     Comprehensive Metabolic Panel; Future  -     C-Peptide; Future  -     Hemoglobin A1c; Future  -     Lipid Panel; Future  -     MicroAlbumin, Urine, Random - Urine, Clean Catch; Future    Vitamin D deficiency  -     Comprehensive Metabolic Panel  -     C-Peptide  -     ACTH  -     Cortisol  -     Glutamic Acid Decarboxylase  -     T3, Free; Future  -     T4 & TSH ([facility]); Future  -     T4, Free; Future  -     Thyroglobulin With Anti-TG; Future  -     Uric Acid; Future  -     Vitamin D 25 Hydroxy; Future  -     Comprehensive Metabolic Panel; Future  -     C-Peptide; Future  -     Hemoglobin A1c; Future  -     Lipid Panel; Future  -     MicroAlbumin, Urine, Random - Urine, Clean Catch; Future    Chronic fatigue  -     Comprehensive Metabolic Panel  -     C-Peptide  -     ACTH  -     Cortisol  -     Glutamic Acid Decarboxylase  -     T3, Free; Future  -     T4 & TSH ([facility]); Future  -     T4, Free; Future  -     Thyroglobulin With Anti-TG; Future  -     Uric Acid; Future  -     Vitamin D 25 Hydroxy; Future  -     Comprehensive Metabolic Panel; Future  -     C-Peptide; Future  -     Hemoglobin A1c; Future  -     Lipid Panel; Future  -     MicroAlbumin, Urine, Random - Urine, Clean Catch; Future    Other orders  -     TRULICITY 1.5 MG/0.5ML solution pen-injector; Inject 1 pen under the skin 1 (One) Time Per Week.  -     FARXIGA 10 MG tablet; 1 tablet every morning  -     CYCLOSET 0.8 MG tablet; 1 tablet Q AM for 7 days, each week as one tablet until 6 every morning.  -     rosuvastatin (CRESTOR) 20 MG  tablet; Take 1 tablet by mouth Daily.  -     metFORMIN ER (GLUCOPHAGE-XR) 500 MG 24 hr tablet; 4 PO daily with food and MVI for DMII  -     ergocalciferol (DRISDOL) 51319 units capsule; Take 1 capsule by mouth 1 (One) Time Per Week.              in summary I saw and examined this 37-year-old female for above-mentioned problems.  I reviewed her laboratory evaluations of 03/28/2017 as well as 08/02/2017 and 01/17/2018 and at this time we will go ahead and ask her to continue her metformin and will start her on Trulicity 0.05 mg once a week for 2 weeks and then increase it to 1.5 mg weekly.  Additionally am increasing her  Farxiga to 10 mg every morning and is start her on Cycloset 0.8 mg every morning I also gave her dosing card as to how increase her dose.  Finally I am is starting her on Crestor 20 mg daily and vitamin D 50,000 units once weekly.  This office visit including 50% of the time spent on patient counseling lasted 60 minutes.  She will see Ms. Graciacodie Morgan in 4 months or sooner if needed with laboratory evaluation prior to each office visit.

## 2018-02-06 LAB
ACTH PLAS-MCNC: 10.9 PG/ML (ref 7.2–63.3)
ALBUMIN SERPL-MCNC: 4.7 G/DL (ref 3.5–5.2)
ALBUMIN/GLOB SERPL: 1.7 G/DL
ALP SERPL-CCNC: 44 U/L (ref 39–117)
ALT SERPL-CCNC: 16 U/L (ref 1–33)
AST SERPL-CCNC: 13 U/L (ref 1–32)
BILIRUB SERPL-MCNC: 0.4 MG/DL (ref 0.1–1.2)
BUN SERPL-MCNC: 12 MG/DL (ref 6–20)
BUN/CREAT SERPL: 17.4 (ref 7–25)
C PEPTIDE SERPL-MCNC: 1.6 NG/ML (ref 1.1–4.4)
CALCIUM SERPL-MCNC: 9.6 MG/DL (ref 8.6–10.5)
CHLORIDE SERPL-SCNC: 96 MMOL/L (ref 98–107)
CO2 SERPL-SCNC: 20.5 MMOL/L (ref 22–29)
CORTIS SERPL-MCNC: 6.8 UG/DL
CREAT SERPL-MCNC: 0.69 MG/DL (ref 0.57–1)
GAD65 AB SER IA-ACNC: 822.7 U/ML (ref 0–5)
GFR SERPLBLD CREATININE-BSD FMLA CKD-EPI: 117 ML/MIN/1.73
GFR SERPLBLD CREATININE-BSD FMLA CKD-EPI: 96 ML/MIN/1.73
GLOBULIN SER CALC-MCNC: 2.7 GM/DL
GLUCOSE SERPL-MCNC: 359 MG/DL (ref 65–99)
POTASSIUM SERPL-SCNC: 4.5 MMOL/L (ref 3.5–5.2)
PROT SERPL-MCNC: 7.4 G/DL (ref 6–8.5)
SODIUM SERPL-SCNC: 135 MMOL/L (ref 136–145)

## 2018-03-26 ENCOUNTER — OFFICE VISIT (OUTPATIENT)
Dept: FAMILY MEDICINE CLINIC | Facility: CLINIC | Age: 38
End: 2018-03-26

## 2018-03-26 VITALS
HEIGHT: 71 IN | TEMPERATURE: 98.6 F | SYSTOLIC BLOOD PRESSURE: 109 MMHG | OXYGEN SATURATION: 96 % | RESPIRATION RATE: 18 BRPM | WEIGHT: 149 LBS | HEART RATE: 102 BPM | BODY MASS INDEX: 20.86 KG/M2 | DIASTOLIC BLOOD PRESSURE: 71 MMHG

## 2018-03-26 DIAGNOSIS — N63.12 MASS OF UPPER INNER QUADRANT OF RIGHT BREAST: Primary | ICD-10-CM

## 2018-03-26 PROCEDURE — 99213 OFFICE O/P EST LOW 20 MIN: CPT | Performed by: NURSE PRACTITIONER

## 2018-03-26 NOTE — PROGRESS NOTES
Subjective   Sierra Mao is a 37 y.o. female.     History of Present Illness   Patient presents to office for right breast mass she first noticed 4 days ago.  States she felt it while washing in the shower and that it is not tender. She is uncertain how long it has been present as she does not do regular self breast exams and has lost 50 lbs recently.  Denies nipple retraction or discharge. Denies skin changes.  Has not noticed any other similar masses. Has never had mammogram. Denies first degree relative with breast cancer.   The following portions of the patient's history were reviewed and updated as appropriate: allergies, current medications, past family history, past medical history, past social history, past surgical history and problem list.    Review of Systems   Respiratory: Negative for shortness of breath.    Cardiovascular: Negative for chest pain and palpitations.   Skin: Negative for color change and rash.   Psychiatric/Behavioral: Negative for behavioral problems.       Objective   Physical Exam   Constitutional: She is oriented to person, place, and time. She appears well-developed and well-nourished.   Pulmonary/Chest: Effort normal. Right breast exhibits mass. Right breast exhibits no inverted nipple, no nipple discharge, no skin change and no tenderness. Left breast exhibits no inverted nipple, no mass, no nipple discharge, no skin change and no tenderness. There is no breast swelling.       approx 1.5 cm firm, non fluctuant, non tender mass noted to upper inner quadrant of right breast.    Genitourinary: No breast tenderness, discharge or bleeding.   Neurological: She is alert and oriented to person, place, and time.   Psychiatric: She has a normal mood and affect. Judgment normal.   Nursing note and vitals reviewed.      Assessment/Plan   Sierra was seen today for breast mass.    Diagnoses and all orders for this visit:    Mass of upper inner quadrant of right breast  -     Mammo Diagnostic  Bilateral With CAD  -     US breast right limited

## 2018-04-04 ENCOUNTER — HOSPITAL ENCOUNTER (OUTPATIENT)
Dept: MAMMOGRAPHY | Facility: HOSPITAL | Age: 38
Discharge: HOME | End: 2018-04-04
Admitting: NURSE PRACTITIONER

## 2018-04-04 ENCOUNTER — HOSPITAL ENCOUNTER (OUTPATIENT)
Dept: ULTRASOUND IMAGING | Facility: HOSPITAL | Age: 38
Discharge: HOME | End: 2018-04-04

## 2018-04-04 PROCEDURE — 76642 ULTRASOUND BREAST LIMITED: CPT

## 2018-04-04 PROCEDURE — 77066 DX MAMMO INCL CAD BI: CPT

## 2018-04-09 ENCOUNTER — TELEPHONE (OUTPATIENT)
Dept: FAMILY MEDICINE CLINIC | Facility: CLINIC | Age: 38
End: 2018-04-09

## 2018-04-09 DIAGNOSIS — N63.10 BREAST MASS, RIGHT: Primary | ICD-10-CM

## 2018-04-09 DIAGNOSIS — R92.8 ABNORMAL ULTRASOUND OF BREAST: ICD-10-CM

## 2018-04-16 ENCOUNTER — HOSPITAL ENCOUNTER (OUTPATIENT)
Dept: ULTRASOUND IMAGING | Facility: HOSPITAL | Age: 38
Discharge: HOME | End: 2018-04-16
Admitting: NURSE PRACTITIONER

## 2018-04-16 VITALS
BODY MASS INDEX: 20.02 KG/M2 | WEIGHT: 143 LBS | HEART RATE: 94 BPM | HEIGHT: 71 IN | OXYGEN SATURATION: 100 % | TEMPERATURE: 99.7 F | SYSTOLIC BLOOD PRESSURE: 116 MMHG | DIASTOLIC BLOOD PRESSURE: 67 MMHG | RESPIRATION RATE: 16 BRPM

## 2018-04-16 DIAGNOSIS — IMO0002 MASS: ICD-10-CM

## 2018-04-16 PROCEDURE — 88305 TISSUE EXAM BY PATHOLOGIST: CPT | Performed by: NURSE PRACTITIONER

## 2018-04-16 PROCEDURE — A4648 IMPLANTABLE TISSUE MARKER: HCPCS

## 2018-04-16 RX ORDER — LIDOCAINE HYDROCHLORIDE 10 MG/ML
10 INJECTION, SOLUTION INFILTRATION; PERINEURAL ONCE
Status: COMPLETED | OUTPATIENT
Start: 2018-04-16 | End: 2018-04-16

## 2018-04-16 RX ORDER — LIDOCAINE HYDROCHLORIDE AND EPINEPHRINE 10; 10 MG/ML; UG/ML
10 INJECTION, SOLUTION INFILTRATION; PERINEURAL ONCE
Status: COMPLETED | OUTPATIENT
Start: 2018-04-16 | End: 2018-04-16

## 2018-04-16 RX ADMIN — LIDOCAINE HYDROCHLORIDE 10 ML: 10 INJECTION, SOLUTION INFILTRATION; PERINEURAL at 10:50

## 2018-04-16 RX ADMIN — LIDOCAINE HYDROCHLORIDE AND EPINEPHRINE 10 ML: 10; 10 INJECTION, SOLUTION INFILTRATION; PERINEURAL at 11:11

## 2018-04-16 NOTE — NURSING NOTE
Education sheet and consent reviewed and signed.  Patient having a lot of back pain which is chronic and usually managed with Ibuprophen 800 mg every morning.  Unable to use due to biopsy.

## 2018-04-16 NOTE — NURSING NOTE
"Discharge instructions reviewed and signed.  Ice pack in bra.  Patient tearful.  No pain just, \"mentally drained\" per patient.  VSS and sight dry.  "

## 2018-04-17 LAB
CYTO UR: NORMAL
LAB AP CASE REPORT: NORMAL
LAB AP CLINICAL INFORMATION: NORMAL
Lab: NORMAL
PATH REPORT.FINAL DX SPEC: NORMAL
PATH REPORT.GROSS SPEC: NORMAL

## 2018-05-25 ENCOUNTER — RESULTS ENCOUNTER (OUTPATIENT)
Dept: ENDOCRINOLOGY | Age: 38
End: 2018-05-25

## 2018-05-25 ENCOUNTER — TELEPHONE (OUTPATIENT)
Dept: FAMILY MEDICINE CLINIC | Facility: CLINIC | Age: 38
End: 2018-05-25

## 2018-05-25 DIAGNOSIS — E11.9 TYPE 2 DIABETES MELLITUS WITHOUT COMPLICATION, WITHOUT LONG-TERM CURRENT USE OF INSULIN (HCC): ICD-10-CM

## 2018-05-25 DIAGNOSIS — E78.5 DYSLIPIDEMIA: ICD-10-CM

## 2018-05-25 DIAGNOSIS — R53.82 CHRONIC FATIGUE: ICD-10-CM

## 2018-05-25 DIAGNOSIS — E55.9 VITAMIN D DEFICIENCY: ICD-10-CM

## 2018-05-25 NOTE — TELEPHONE ENCOUNTER
Pt aware of appointment time and date.   Can you add them to Dr. Swain' schedule? It won't let me overbook.   Thank you!

## 2018-05-25 NOTE — TELEPHONE ENCOUNTER
----- Message from Stephania Swain MD sent at 5/25/2018  1:23 PM EDT -----  Regarding: appt  Could you get pt in for appt with me 5/31/18 at 10 AM please?

## 2018-05-31 ENCOUNTER — OFFICE VISIT (OUTPATIENT)
Dept: FAMILY MEDICINE CLINIC | Facility: CLINIC | Age: 38
End: 2018-05-31

## 2018-05-31 VITALS
OXYGEN SATURATION: 98 % | SYSTOLIC BLOOD PRESSURE: 110 MMHG | HEART RATE: 90 BPM | BODY MASS INDEX: 19.33 KG/M2 | WEIGHT: 138.1 LBS | HEIGHT: 71 IN | TEMPERATURE: 98.2 F | RESPIRATION RATE: 18 BRPM | DIASTOLIC BLOOD PRESSURE: 60 MMHG

## 2018-05-31 DIAGNOSIS — R73.9 HYPERGLYCEMIA: Primary | ICD-10-CM

## 2018-05-31 DIAGNOSIS — R41.840 POOR CONCENTRATION: ICD-10-CM

## 2018-05-31 DIAGNOSIS — R63.4 WEIGHT LOSS: ICD-10-CM

## 2018-05-31 DIAGNOSIS — E11.65 TYPE 2 DIABETES MELLITUS WITH HYPERGLYCEMIA, WITHOUT LONG-TERM CURRENT USE OF INSULIN (HCC): ICD-10-CM

## 2018-05-31 LAB
GLUCOSE BLDC GLUCOMTR-MCNC: 302 MG/DL (ref 70–130)
HBA1C MFR BLD: 10.6 %

## 2018-05-31 PROCEDURE — 83036 HEMOGLOBIN GLYCOSYLATED A1C: CPT | Performed by: INTERNAL MEDICINE

## 2018-05-31 PROCEDURE — 82962 GLUCOSE BLOOD TEST: CPT | Performed by: INTERNAL MEDICINE

## 2018-05-31 PROCEDURE — 99215 OFFICE O/P EST HI 40 MIN: CPT | Performed by: INTERNAL MEDICINE

## 2018-06-02 LAB
25(OH)D3+25(OH)D2 SERPL-MCNC: 37.1 NG/ML (ref 30–100)
ALBUMIN SERPL-MCNC: 4.8 G/DL (ref 3.5–5.2)
ALBUMIN/GLOB SERPL: 2.1 G/DL
ALP SERPL-CCNC: 44 U/L (ref 39–117)
ALT SERPL-CCNC: 13 U/L (ref 1–33)
APPEARANCE UR: CLEAR
AST SERPL-CCNC: 9 U/L (ref 1–32)
BACTERIA #/AREA URNS HPF: NORMAL /HPF
BASOPHILS # BLD AUTO: 0.02 10*3/MM3 (ref 0–0.2)
BASOPHILS NFR BLD AUTO: 0.2 % (ref 0–1.5)
BILIRUB SERPL-MCNC: 0.3 MG/DL (ref 0.1–1.2)
BILIRUB UR QL STRIP: NEGATIVE
BUN SERPL-MCNC: 12 MG/DL (ref 6–20)
BUN/CREAT SERPL: 19 (ref 7–25)
CALCIUM SERPL-MCNC: 9.8 MG/DL (ref 8.6–10.5)
CHLORIDE SERPL-SCNC: 95 MMOL/L (ref 98–107)
CO2 SERPL-SCNC: 18.5 MMOL/L (ref 22–29)
COLOR UR: YELLOW
CREAT SERPL-MCNC: 0.63 MG/DL (ref 0.57–1)
EOSINOPHIL # BLD AUTO: 0.15 10*3/MM3 (ref 0–0.7)
EOSINOPHIL NFR BLD AUTO: 1.8 % (ref 0.3–6.2)
EPI CELLS #/AREA URNS HPF: NORMAL /HPF
ERYTHROCYTE [DISTWIDTH] IN BLOOD BY AUTOMATED COUNT: 14 % (ref 11.7–13)
GFR SERPLBLD CREATININE-BSD FMLA CKD-EPI: 107 ML/MIN/1.73
GFR SERPLBLD CREATININE-BSD FMLA CKD-EPI: 130 ML/MIN/1.73
GLOBULIN SER CALC-MCNC: 2.3 GM/DL
GLUCOSE SERPL-MCNC: 236 MG/DL (ref 65–99)
GLUCOSE UR QL: ABNORMAL
HBA1C MFR BLD: 10.08 % (ref 4.8–5.6)
HCT VFR BLD AUTO: 43 % (ref 35.6–45.5)
HGB BLD-MCNC: 14.6 G/DL (ref 11.9–15.5)
HGB UR QL STRIP: NEGATIVE
IMM GRANULOCYTES # BLD: 0.02 10*3/MM3 (ref 0–0.03)
IMM GRANULOCYTES NFR BLD: 0.2 % (ref 0–0.5)
KETONES UR QL STRIP: ABNORMAL
LEUKOCYTE ESTERASE UR QL STRIP: NEGATIVE
LYMPHOCYTES # BLD AUTO: 1.5 10*3/MM3 (ref 0.9–4.8)
LYMPHOCYTES NFR BLD AUTO: 18.4 % (ref 19.6–45.3)
MCH RBC QN AUTO: 32.1 PG (ref 26.9–32)
MCHC RBC AUTO-ENTMCNC: 34 G/DL (ref 32.4–36.3)
MCV RBC AUTO: 94.5 FL (ref 80.5–98.2)
MICRO URNS: ABNORMAL
MICRO URNS: ABNORMAL
MONOCYTES # BLD AUTO: 0.6 10*3/MM3 (ref 0.2–1.2)
MONOCYTES NFR BLD AUTO: 7.4 % (ref 5–12)
MUCOUS THREADS URNS QL MICRO: PRESENT /HPF
NEUTROPHILS # BLD AUTO: 5.89 10*3/MM3 (ref 1.9–8.1)
NEUTROPHILS NFR BLD AUTO: 72.2 % (ref 42.7–76)
NITRITE UR QL STRIP: NEGATIVE
PH UR STRIP: 5 [PH] (ref 5–7.5)
PLATELET # BLD AUTO: 207 10*3/MM3 (ref 140–500)
POTASSIUM SERPL-SCNC: 4.4 MMOL/L (ref 3.5–5.2)
PROT SERPL-MCNC: 7.1 G/DL (ref 6–8.5)
PROT UR QL STRIP: NEGATIVE
RBC # BLD AUTO: 4.55 10*6/MM3 (ref 3.9–5.2)
RBC #/AREA URNS HPF: NORMAL /HPF
SODIUM SERPL-SCNC: 134 MMOL/L (ref 136–145)
SP GR UR: >=1.03 (ref 1–1.03)
T3FREE SERPL-MCNC: 2.1 PG/ML (ref 2–4.4)
T4 FREE SERPL-MCNC: 1.09 NG/DL (ref 0.93–1.7)
THYROGLOB AB SERPL-ACNC: <1 IU/ML (ref 0–0.9)
THYROPEROXIDASE AB SERPL-ACNC: 15 IU/ML (ref 0–34)
TSH RECEP AB SER-ACNC: <0.5 IU/L (ref 0–1.75)
TSH SERPL DL<=0.005 MIU/L-ACNC: 2.57 MIU/ML (ref 0.27–4.2)
TSI ACT/NOR SER: <0.1 IU/L (ref 0–0.55)
URINALYSIS REFLEX: ABNORMAL
UROBILINOGEN UR STRIP-MCNC: 0.2 MG/DL (ref 0.2–1)
WBC # BLD AUTO: 8.16 10*3/MM3 (ref 4.5–10.7)
WBC #/AREA URNS HPF: NORMAL /HPF

## 2018-06-08 ENCOUNTER — TELEPHONE (OUTPATIENT)
Dept: FAMILY MEDICINE CLINIC | Facility: CLINIC | Age: 38
End: 2018-06-08

## 2018-06-08 NOTE — TELEPHONE ENCOUNTER
If her premeal blood sugars are less than 180, we will stick with the tresiba and the oral medications until she sees endo in a few weeks.  Are her ketones improving?  Has she gained any weight this week?

## 2018-06-08 NOTE — TELEPHONE ENCOUNTER
Pt has not picked up the tresiba- she said that she was going to get that today or this weekend.   Can you please send this to [facility]

## 2018-06-08 NOTE — TELEPHONE ENCOUNTER
Has gained some weight- up to 144,   Ketones in the middle of the test strip- not nearly as high as they were

## 2018-06-08 NOTE — TELEPHONE ENCOUNTER
tresiba sent to [facility].  Has she been taking the tresiba?  Also what dosage is she now on and what are her am FBS and meal time sugars?

## 2018-06-08 NOTE — TELEPHONE ENCOUNTER
24units-tresiba    Fasting blood sugar was under 150's  But still having spikes after eating the highest its been 472( and just one time) usually staying through 200-300

## 2018-06-09 NOTE — TELEPHONE ENCOUNTER
That's 6 pound weight gain which is good.  Course our scales are different but sounds like she is improving.  I would focus on PREMEAL sugars at this time and FBS in the am and we will base her insulin need on those numbers not the POST MEAL readings.

## 2018-06-22 ENCOUNTER — TELEPHONE (OUTPATIENT)
Dept: FAMILY MEDICINE CLINIC | Facility: CLINIC | Age: 38
End: 2018-06-22

## 2018-06-22 NOTE — TELEPHONE ENCOUNTER
Pharmacy said that patient needs rx for needles for the tresiba sent to [facility] so insurance will cover it 100%

## 2018-06-22 NOTE — TELEPHONE ENCOUNTER
Can you look on the carton and tell me the size of the tresiba needles and I will send it in for her.

## 2018-06-26 NOTE — TELEPHONE ENCOUNTER
There isn't an order for 32 gauge.  Can you have the pharmacy fax us a refill request or I can hand write it but there will be a name to the needle usually.

## 2018-07-10 ENCOUNTER — OFFICE VISIT (OUTPATIENT)
Dept: FAMILY MEDICINE CLINIC | Facility: CLINIC | Age: 38
End: 2018-07-10

## 2018-07-10 VITALS
OXYGEN SATURATION: 97 % | SYSTOLIC BLOOD PRESSURE: 110 MMHG | HEART RATE: 74 BPM | RESPIRATION RATE: 18 BRPM | WEIGHT: 145.3 LBS | HEIGHT: 71 IN | DIASTOLIC BLOOD PRESSURE: 60 MMHG | BODY MASS INDEX: 20.34 KG/M2

## 2018-07-10 DIAGNOSIS — R41.840 CONCENTRATION DEFICIT: Primary | ICD-10-CM

## 2018-07-10 DIAGNOSIS — E11.65 TYPE 2 DIABETES MELLITUS WITH HYPERGLYCEMIA, WITHOUT LONG-TERM CURRENT USE OF INSULIN (HCC): ICD-10-CM

## 2018-07-10 LAB — HBA1C MFR BLD: 8.1 %

## 2018-07-10 PROCEDURE — 83036 HEMOGLOBIN GLYCOSYLATED A1C: CPT | Performed by: INTERNAL MEDICINE

## 2018-07-10 PROCEDURE — 36416 COLLJ CAPILLARY BLOOD SPEC: CPT | Performed by: INTERNAL MEDICINE

## 2018-07-10 PROCEDURE — 99214 OFFICE O/P EST MOD 30 MIN: CPT | Performed by: INTERNAL MEDICINE

## 2018-07-10 NOTE — PROGRESS NOTES
Subjective   Sierra Mao is a 38 y.o. female who comes in today for   Chief Complaint   Patient presents with   • Diabetes   .    History of Present Illness   FBS in 140's at  Highest and sometimes lower.  Had a 56 one morning that woke her up with shaking. Came up with eating fruit.  When checks her sugars before meals are around 130.  Post prandial blood sugar around 220 about an hour after she eats.  No more ketones in urine and that is making her feel better and appetite is good.  She has gained 7 pounds back from her weight loss.  Sees Dr. Arnold next week.  Brain fog is still there.  Notices that she is forgetful and loses focus and zones out while people are talking to her.  Can't sit still and trying to listen and will start taking notes and then she notices that she is making a list of other things she needs to do.  Wanting to get this checked.  Isn't drinking as much as was and only one time nocturia vs every 2 hours.  She is on 24 units tresiba per day and is affordable for her.    The following portions of the patient's history were reviewed and updated as appropriate: allergies, current medications, past family history, past medical history, past social history, past surgical history and problem list.    Review of Systems   Constitutional: Negative for unexpected weight change (has gained weight back).   Psychiatric/Behavioral: Positive for decreased concentration.       Vitals:    07/10/18 1603   BP: 110/60   Pulse: 74   Resp: 18   SpO2: 97%       Objective   Physical Exam   Constitutional: She is oriented to person, place, and time. She appears well-developed and well-nourished.   HENT:   Head: Normocephalic and atraumatic.   Right Ear: External ear normal.   Left Ear: External ear normal.   Mouth/Throat: Oropharynx is clear and moist.   Eyes: Conjunctivae are normal.   Neck: Neck supple.   Cardiovascular: Normal rate, regular rhythm and normal heart sounds.    No bruits   Pulmonary/Chest: Effort  normal and breath sounds normal. No respiratory distress. She has no wheezes. She has no rales.   Abdominal: Soft. Bowel sounds are normal. She exhibits no distension and no mass. There is no tenderness.   Lymphadenopathy:     She has no cervical adenopathy.   Neurological: She is alert and oriented to person, place, and time.   Skin: Skin is warm.   Psychiatric: She has a normal mood and affect. Her behavior is normal. Judgment and thought content normal.   Nursing note and vitals reviewed.      Assessment/Plan   Sierra was seen today for diabetes.    Diagnoses and all orders for this visit:    Concentration deficit  -     Ambulatory Referral to Psychology    Type 2 diabetes mellitus with hyperglycemia, without long-term current use of insulin (CMS/[facility])  -     POCT glycated hemoglobin, total      a1c is down 2% in 6 weeks.  F/u endo next week  HL untreated at this time.  She wants to avoid labs with me due to cost and will let me know what FLP looks like next week with endo  Will try lipitor next and not restart crestor due to myalgias  Get her eye exam record from optometrist.  Per her report, her eyes were neg in the spring  Refer to psych for ADD testing  Continue tresiba, MTF and Farxiga for now             I have asked for the patient to return to clinic in 6month(s).

## 2018-08-15 RX ORDER — DAPAGLIFLOZIN 10 MG/1
TABLET, FILM COATED ORAL
Qty: 30 TABLET | Refills: 4 | Status: SHIPPED | OUTPATIENT
Start: 2018-08-15 | End: 2019-02-08

## 2018-10-22 RX ORDER — METFORMIN HYDROCHLORIDE 500 MG/1
TABLET, EXTENDED RELEASE ORAL
Qty: 360 TABLET | Refills: 0 | Status: SHIPPED | OUTPATIENT
Start: 2018-10-22 | End: 2019-02-08

## 2018-10-22 RX ORDER — METFORMIN HYDROCHLORIDE 500 MG/1
TABLET, EXTENDED RELEASE ORAL
Qty: 360 TABLET | Refills: 0 | OUTPATIENT
Start: 2018-10-22

## 2019-01-25 RX ORDER — DAPAGLIFLOZIN 10 MG/1
TABLET, FILM COATED ORAL
Qty: 90 TABLET | Refills: 3 | OUTPATIENT
Start: 2019-01-25

## 2019-01-25 RX ORDER — DAPAGLIFLOZIN 10 MG/1
1 TABLET, FILM COATED ORAL EVERY MORNING
Qty: 30 TABLET | Refills: 4 | OUTPATIENT
Start: 2019-01-25

## 2019-02-08 ENCOUNTER — OFFICE VISIT (OUTPATIENT)
Dept: FAMILY MEDICINE CLINIC | Facility: CLINIC | Age: 39
End: 2019-02-08

## 2019-02-08 VITALS
WEIGHT: 145 LBS | HEART RATE: 75 BPM | DIASTOLIC BLOOD PRESSURE: 62 MMHG | SYSTOLIC BLOOD PRESSURE: 114 MMHG | OXYGEN SATURATION: 100 % | HEIGHT: 71 IN | TEMPERATURE: 98.4 F | BODY MASS INDEX: 20.3 KG/M2

## 2019-02-08 DIAGNOSIS — R41.840 INATTENTION: Primary | ICD-10-CM

## 2019-02-08 DIAGNOSIS — F41.9 ANXIETY: ICD-10-CM

## 2019-02-08 PROCEDURE — 99214 OFFICE O/P EST MOD 30 MIN: CPT | Performed by: INTERNAL MEDICINE

## 2019-02-08 RX ORDER — CITALOPRAM 20 MG/1
20 TABLET ORAL DAILY
Qty: 90 TABLET | Refills: 0 | Status: SHIPPED | OUTPATIENT
Start: 2019-02-08 | End: 2019-05-08 | Stop reason: SDUPTHER

## 2019-02-08 RX ORDER — INSULIN ASPART 100 [IU]/ML
INJECTION, SOLUTION INTRAVENOUS; SUBCUTANEOUS
COMMUNITY
Start: 2019-01-17 | End: 2019-10-29 | Stop reason: HOSPADM

## 2019-02-08 NOTE — PROGRESS NOTES
Subjective   Sierra Mao is a 38 y.o. female who comes in today for   Chief Complaint   Patient presents with   • Labs Only     Discuss lab results    • Anxiety   • Fatigue   .    History of Present Illness   Here for fu on anxiety and fatigue.  Last year was dx with DM and was between a type 1 and 2.  Recently stopped farxiga and metformin and started her on fast acting insulin novolog and has always been on tresiba at night.  Had the psychological testing done in Nov and f/u with her in January 2019.  ADHD was ruled out and dysthymia with anxious tendencies was diagnosed.  He recommended psychotherapy and yoga.  She later had an ER visit due to anxiety attack.  Thought she was having a heart attack.  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  Still feels like she is struggling with forgetting where she puts things but states she is somewhat ocd about the way her house and desk need to be.  Remotes have to go a certain way or it will bother her.  She has things organized so so and has to be that way before she can move onto next task.  Some of these issues are causing trouble with relationships at home and work.    Trouble reading and comprehending.  Feels like isn't good at task completion and that is getting worse.  Forgetful.    The following portions of the patient's history were reviewed and updated as appropriate: allergies, current medications, past family history, past medical history, past social history, past surgical history and problem list.    Review of Systems   Constitutional: Negative for unexpected weight change.   Psychiatric/Behavioral: Positive for agitation and decreased concentration. Negative for confusion, dysphoric mood and sleep disturbance. The patient is nervous/anxious.        Vitals:    02/08/19 0950   BP: 114/62   Pulse: 75   Temp: 98.4 °F (36.9 °C)   SpO2: 100%       Objective   Physical Exam   Constitutional: She is oriented to  person, place, and time. She appears well-developed and well-nourished.   HENT:   Head: Normocephalic and atraumatic.   Right Ear: External ear normal.   Left Ear: External ear normal.   Mouth/Throat: Oropharynx is clear and moist.   Eyes: Conjunctivae are normal.   Neck: Neck supple.   Cardiovascular: Normal rate, regular rhythm and normal heart sounds.   No bruits   Pulmonary/Chest: Effort normal and breath sounds normal. No respiratory distress. She has no wheezes. She has no rales.   Abdominal: Soft. Bowel sounds are normal. She exhibits no distension and no mass. There is no tenderness.   Lymphadenopathy:     She has no cervical adenopathy.   Neurological: She is alert and oriented to person, place, and time.   Skin: Skin is warm.   Psychiatric: She has a normal mood and affect. Her behavior is normal. Judgment and thought content normal.   Nursing note and vitals reviewed.        Current Outpatient Medications:   •  insulin degludec (TRESIBA FLEXTOUCH) 100 UNIT/ML solution pen-injector injection, Inject 20 Units under the skin Daily., Disp: 3 mL, Rfl: 3  •  NOVOLOG FLEXPEN 100 UNIT/ML solution pen-injector sc pen, , Disp: , Rfl:   •  citalopram (CELEXA) 20 MG tablet, Take 1 tablet by mouth Daily., Disp: 90 tablet, Rfl: 0    Assessment/Plan   Sierra was seen today for labs only, anxiety and fatigue.    Diagnoses and all orders for this visit:    Inattention  -     Ambulatory Referral to Psychology    Anxiety    Other orders  -     citalopram (CELEXA) 20 MG tablet; Take 1 tablet by mouth Daily.    Sierra is not at peace with the psychologist's assessment in the fall.  She denies depression or dysthymia.  She admits to anxiety and that ER visit was related to anxiety however she feels it is related to her distractability, poor task completion and OCD type tendencies with the house and her office.  I did relay that OCD is a form of anxiety and that anxiety can cause inattention and forgetfulness.  I think she  would benefit from a second opinion and therefore I have sent her to another psychologist.  I can't prescribe stimulants to her at this time without a proper diagnosis.  I do want her to try celexa and if it greatly reduces her sx's, we can stop there.  RTC 8 weeks    Total time spent coordinating care and reviewing psych records, over 25 minutes           I have asked for the patient to return to clinic in 6month(s).

## 2019-03-29 ENCOUNTER — OFFICE VISIT (OUTPATIENT)
Dept: FAMILY MEDICINE CLINIC | Facility: CLINIC | Age: 39
End: 2019-03-29

## 2019-03-29 VITALS
HEART RATE: 83 BPM | BODY MASS INDEX: 19.65 KG/M2 | HEIGHT: 71 IN | OXYGEN SATURATION: 98 % | TEMPERATURE: 98.2 F | WEIGHT: 140.4 LBS | SYSTOLIC BLOOD PRESSURE: 116 MMHG | DIASTOLIC BLOOD PRESSURE: 72 MMHG

## 2019-03-29 DIAGNOSIS — F41.9 ANXIETY: Primary | ICD-10-CM

## 2019-03-29 DIAGNOSIS — H65.02 ACUTE SEROUS OTITIS MEDIA OF LEFT EAR, RECURRENCE NOT SPECIFIED: ICD-10-CM

## 2019-03-29 PROCEDURE — 99214 OFFICE O/P EST MOD 30 MIN: CPT | Performed by: INTERNAL MEDICINE

## 2019-03-29 RX ORDER — AMOXICILLIN 875 MG/1
875 TABLET, COATED ORAL 2 TIMES DAILY
Qty: 20 TABLET | Refills: 0 | Status: SHIPPED | OUTPATIENT
Start: 2019-03-29 | End: 2019-07-09

## 2019-03-29 RX ORDER — FLUTICASONE PROPIONATE 50 MCG
2 SPRAY, SUSPENSION (ML) NASAL DAILY
Qty: 16 G | Refills: 0 | Status: SHIPPED | OUTPATIENT
Start: 2019-03-29 | End: 2021-09-07

## 2019-03-29 RX ORDER — FLASH GLUCOSE SENSOR
KIT MISCELLANEOUS
COMMUNITY
Start: 2019-02-13 | End: 2019-07-09

## 2019-03-29 RX ORDER — FLASH GLUCOSE SCANNING READER
EACH MISCELLANEOUS
COMMUNITY
Start: 2019-02-13 | End: 2019-07-09

## 2019-03-29 NOTE — PROGRESS NOTES
Subjective   Sierra Mao is a 38 y.o. female who comes in today for   Chief Complaint   Patient presents with   • Med Management     checking on meds   • Earache     Left ear drainage.   .    History of Present Illness   Here for f/u on celexa 20mg for 6 weeks for treatment of anxiety.  She has some OCD tendencies as well as some focus and memory challenges.  Overall she  Feels a little bit happier and less anxious if things don't go exactly right.  Hasn't had her second psych evaluation yet due to cost and was hoping that celexa helped.  She has not noticed an improvement in her memory or focus.  She also states she still has some of the OCD tendencies of making sure everything is in the right place in her living room as well as in her office.  She does seem much less anxious today in the exam room.    The following portions of the patient's history were reviewed and updated as appropriate: allergies, current medications, past family history, past medical history, past social history, past surgical history and problem list.    Review of Systems   Constitutional: Negative for unexpected weight change.   HENT:        Her ear on the left feels like it might be clogged or like there is pressure.   Psychiatric/Behavioral:        Less anxiety       Vitals:    03/29/19 0819   BP: 116/72   Pulse: 83   Temp: 98.2 °F (36.8 °C)   SpO2: 98%       Objective   Physical Exam   Constitutional: She is oriented to person, place, and time. She appears well-developed and well-nourished.   HENT:   Head: Normocephalic and atraumatic.   Right Ear: External ear normal.   Mouth/Throat: Oropharynx is clear and moist.   Dull left TM with apparent fluid behind the membrane   Eyes: Conjunctivae are normal.   Neck: Neck supple.   Cardiovascular: Normal rate, regular rhythm and normal heart sounds.   No bruits   Pulmonary/Chest: Effort normal and breath sounds normal. No respiratory distress. She has no wheezes. She has no rales.   Abdominal:  Soft. Bowel sounds are normal. She exhibits no distension and no mass. There is no tenderness.   Lymphadenopathy:     She has no cervical adenopathy.   Neurological: She is alert and oriented to person, place, and time.   Skin: Skin is warm.   Psychiatric: She has a normal mood and affect. Her behavior is normal. Judgment and thought content normal.   Nursing note and vitals reviewed.        Current Outpatient Medications:   •  citalopram (CELEXA) 20 MG tablet, Take 1 tablet by mouth Daily., Disp: 90 tablet, Rfl: 0  •  Continuous Blood Gluc  (FREESTYLE CUATE 14 DAY READER) device, , Disp: , Rfl:   •  Continuous Blood Gluc Sensor (FREESTYLE CUATE 14 DAY SENSOR) misc, , Disp: , Rfl:   •  insulin degludec (TRESIBA FLEXTOUCH) 100 UNIT/ML solution pen-injector injection, Inject 20 Units under the skin Daily., Disp: 3 mL, Rfl: 3  •  NOVOLOG FLEXPEN 100 UNIT/ML solution pen-injector sc pen, , Disp: , Rfl:   •  amoxicillin (AMOXIL) 875 MG tablet, Take 1 tablet by mouth 2 (Two) Times a Day., Disp: 20 tablet, Rfl: 0  •  fluticasone (FLONASE) 50 MCG/ACT nasal spray, 2 sprays into the nostril(s) as directed by provider Daily., Disp: 16 g, Rfl: 0    Assessment/Plan   Sierra was seen today for med management and earache.    Diagnoses and all orders for this visit:    Anxiety    Other orders  -     amoxicillin (AMOXIL) 875 MG tablet; Take 1 tablet by mouth 2 (Two) Times a Day.  -     fluticasone (FLONASE) 50 MCG/ACT nasal spray; 2 sprays into the nostril(s) as directed by provider Daily.        mucinex D 12 hour once in am  flonase qd  Amoxil if needed and symptoms not improved in 3-5 days.  Increase celexa to 30 mg then 40 mg qd and if no improvement, then consider straterra or intuniv.  Based on my limited exam and evaluation she seems much less anxious and there is no apparent irritability.  I am going to see if hot increase in the Celexa will help her focus and memory.  If this does not seem to help her, I think the  next step would be to try a non-stimulant since she has not had her evaluation confirming ADD by a psychologist.  Just of note the first psychology evaluation said that she did not have ADD           I have asked for the patient to return to clinic in 6month(s).

## 2019-05-08 RX ORDER — CITALOPRAM 20 MG/1
20 TABLET ORAL DAILY
Qty: 90 TABLET | Refills: 0 | Status: SHIPPED | OUTPATIENT
Start: 2019-05-08 | End: 2019-08-29 | Stop reason: SDUPTHER

## 2019-05-29 RX ORDER — INSULIN DEGLUDEC INJECTION 100 U/ML
INJECTION, SOLUTION SUBCUTANEOUS
Qty: 3 ML | Refills: 2 | Status: SHIPPED | OUTPATIENT
Start: 2019-05-29 | End: 2019-10-29 | Stop reason: HOSPADM

## 2019-05-29 NOTE — TELEPHONE ENCOUNTER
Patient is needing this insulin. PT states you gave her this not her endocrinologist. She already got her fast acting insulin.

## 2019-07-09 ENCOUNTER — OFFICE VISIT (OUTPATIENT)
Dept: FAMILY MEDICINE CLINIC | Facility: CLINIC | Age: 39
End: 2019-07-09

## 2019-07-09 VITALS
TEMPERATURE: 98.7 F | HEART RATE: 87 BPM | WEIGHT: 141.5 LBS | DIASTOLIC BLOOD PRESSURE: 54 MMHG | SYSTOLIC BLOOD PRESSURE: 104 MMHG | OXYGEN SATURATION: 99 % | BODY MASS INDEX: 19.81 KG/M2 | HEIGHT: 71 IN

## 2019-07-09 DIAGNOSIS — R92.2 DENSE BREAST TISSUE: Primary | ICD-10-CM

## 2019-07-09 DIAGNOSIS — Z01.419 ENCOUNTER FOR GYNECOLOGICAL EXAMINATION WITH PAPANICOLAOU SMEAR OF CERVIX: ICD-10-CM

## 2019-07-09 DIAGNOSIS — Z00.00 WELL ADULT EXAM: ICD-10-CM

## 2019-07-09 PROBLEM — R92.30 DENSE BREAST TISSUE: Status: ACTIVE | Noted: 2019-07-09

## 2019-07-09 PROCEDURE — 99395 PREV VISIT EST AGE 18-39: CPT | Performed by: INTERNAL MEDICINE

## 2019-07-09 NOTE — PROGRESS NOTES
Subjective   Sierra Mao is a 39 y.o. female who comes in today for   Chief Complaint   Patient presents with   • Gynecologic Exam     Pap smear   .    History of Present Illness   Here for CPE with pap smear.  Diagnostic mammo and u/ done in 4/2018 and right breast biopsy was done to an area on mammo.  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  Dr. miramontes is endo and did labs recently.  Would prefer to avoid labs today.   Cycles are heavy but regular.  Heavy cycles for 5 days or so.  Stopped the BCP about 2 years ago and her cycles got  Heavier then.   had a vasectomy.  She does feel better taking the Celexa 20 mg daily.  She thinks this has helped her anxiety.  She continues to feel that she may have ADD despite the psychological testing that said she did not.  She does have attention issues and focus issues.  She was not able to afford a second opinion with a different psychologist.  She does see her endocrinologist regularly.  The following portions of the patient's history were reviewed and updated as appropriate: allergies, current medications, past family history, past medical history, past social history, past surgical history and problem list.    Review of Systems   Endocrine:        Sugars running higher than goal due to being without her short acting insulin    Genitourinary:        Heavy cycles but are montly.  Normal cbc 10/2018   Psychiatric/Behavioral: The patient is nervous/anxious (anxiety is improved with celexa).        Vitals:    07/09/19 1002   BP: 104/54   Pulse: 87   Temp: 98.7 °F (37.1 °C)   SpO2: 99%       Objective   Physical Exam   Constitutional: She is oriented to person, place, and time. She appears well-developed and well-nourished.   HENT:   Head: Normocephalic and atraumatic.   Right Ear: External ear normal.   Left Ear: External ear normal.   Mouth/Throat: Oropharynx is clear and moist.   Eyes: Conjunctivae are normal.   Neck: Neck supple.   Cardiovascular: Normal rate, regular  rhythm and normal heart sounds.   No bruits   Pulmonary/Chest: Effort normal and breath sounds normal. No respiratory distress. She has no wheezes. She has no rales. Right breast exhibits no inverted nipple, no mass, no nipple discharge, no skin change and no tenderness. Left breast exhibits no inverted nipple, no mass, no nipple discharge, no skin change and no tenderness.   Very dense breast tissue bilaterally without focal nodule.  Exam is difficult due to density of breast tissue   Abdominal: Soft. Bowel sounds are normal. She exhibits no distension and no mass. There is no tenderness.   Genitourinary: Rectum normal and vagina normal. Rectal exam shows guaiac negative stool. Pelvic exam was performed with patient in the knee-chest position. No labial fusion. There is no rash, tenderness, lesion or injury on the right labia. There is no rash, tenderness, lesion or injury on the left labia. Uterus is not deviated, not enlarged, not fixed and not tender. Cervix exhibits no motion tenderness, no discharge and no friability. Right adnexum displays no mass, no tenderness and no fullness. Left adnexum displays no mass, no tenderness and no fullness.   Lymphadenopathy:     She has no cervical adenopathy.   Neurological: She is alert and oriented to person, place, and time.   Skin: Skin is warm and dry.   Psychiatric: She has a normal mood and affect. Her behavior is normal. Judgment and thought content normal.   Nursing note and vitals reviewed.        Current Outpatient Medications:   •  citalopram (CELEXA) 20 MG tablet, Take 1 tablet by mouth Daily., Disp: 90 tablet, Rfl: 0  •  fluticasone (FLONASE) 50 MCG/ACT nasal spray, 2 sprays into the nostril(s) as directed by provider Daily., Disp: 16 g, Rfl: 0  •  NOVOLOG FLEXPEN 100 UNIT/ML solution pen-injector sc pen, , Disp: , Rfl:   •  TRESIBA FLEXTOUCH 100 UNIT/ML solution pen-injector injection, INJECT 20 UNITS SUBCUTANEOUSLY DAILY, Disp: 3 mL, Rfl: 2    Assessment/Plan    Sierra was seen today for gynecologic exam.    Diagnoses and all orders for this visit:    Dense breast tissue  -     Mammo Diagnostic Bilateral With CAD; Future  -     US breast bilateral limited; Future    Encounter for gynecological examination with Papanicolaou smear of cervix    Well adult exam    She has had labs with her endocrinologist.  I do not know that she has had a CBC and I am concerned that she could be anemic based on her heavy cycles.  I have asked her to get an outpatient CBC with an iron level next week if when she sees her endocrinologist on Friday of this week they tell her that they have not checked this at their office.  I like to get a diagnostic mammogram and ultrasound due to the breast density.  She does have a family history on her father's side of breast cancer and on her mom's side of uterine cancer.  She also had breast biopsy last year in 2018 due to fibroadenoma  She will continue the Celexa 20 mg for anxiety.  I have suggested that she follow-up with Dr. Sandro Yap who is a psychiatrist in [facility] that takes insurance.  I have found that he is able to assess for possible ADD based on history and therefore she may not need further psychological testing.               I have asked for the patient to return to clinic in 6month(s).

## 2019-07-10 LAB
CONV .: NORMAL
CYTOLOGIST CVX/VAG CYTO: NORMAL
CYTOLOGY CVX/VAG DOC CYTO: NORMAL
CYTOLOGY CVX/VAG DOC THIN PREP: NORMAL
DX ICD CODE: NORMAL
HIV 1 & 2 AB SER-IMP: NORMAL
OTHER STN SPEC: NORMAL
STAT OF ADQ CVX/VAG CYTO-IMP: NORMAL

## 2019-07-16 ENCOUNTER — APPOINTMENT (OUTPATIENT)
Dept: MAMMOGRAPHY | Facility: HOSPITAL | Age: 39
End: 2019-07-16

## 2019-07-16 ENCOUNTER — HOSPITAL ENCOUNTER (OUTPATIENT)
Dept: ULTRASOUND IMAGING | Facility: HOSPITAL | Age: 39
End: 2019-07-16

## 2019-07-25 DIAGNOSIS — D72.829 LEUKOCYTOSIS, UNSPECIFIED TYPE: Primary | ICD-10-CM

## 2019-08-03 ENCOUNTER — RESULTS ENCOUNTER (OUTPATIENT)
Dept: FAMILY MEDICINE CLINIC | Facility: CLINIC | Age: 39
End: 2019-08-03

## 2019-08-03 DIAGNOSIS — D72.829 LEUKOCYTOSIS, UNSPECIFIED TYPE: ICD-10-CM

## 2019-08-03 LAB
BASOPHILS # BLD AUTO: 0.07 10*3/MM3 (ref 0–0.2)
BASOPHILS NFR BLD AUTO: 0.5 % (ref 0–1.5)
EOSINOPHIL # BLD AUTO: 0.22 10*3/MM3 (ref 0–0.4)
EOSINOPHIL NFR BLD AUTO: 1.6 % (ref 0.3–6.2)
ERYTHROCYTE [DISTWIDTH] IN BLOOD BY AUTOMATED COUNT: 13.2 % (ref 12.3–15.4)
HCT VFR BLD AUTO: 45.5 % (ref 34–46.6)
HGB BLD-MCNC: 14.6 G/DL (ref 12–15.9)
IMM GRANULOCYTES # BLD AUTO: 0.05 10*3/MM3 (ref 0–0.05)
IMM GRANULOCYTES NFR BLD AUTO: 0.4 % (ref 0–0.5)
LYMPHOCYTES # BLD AUTO: 1.58 10*3/MM3 (ref 0.7–3.1)
LYMPHOCYTES NFR BLD AUTO: 11.5 % (ref 19.6–45.3)
MCH RBC QN AUTO: 30.6 PG (ref 26.6–33)
MCHC RBC AUTO-ENTMCNC: 32.1 G/DL (ref 31.5–35.7)
MCV RBC AUTO: 95.4 FL (ref 79–97)
MONOCYTES # BLD AUTO: 0.83 10*3/MM3 (ref 0.1–0.9)
MONOCYTES NFR BLD AUTO: 6 % (ref 5–12)
NEUTROPHILS # BLD AUTO: 11.01 10*3/MM3 (ref 1.7–7)
NEUTROPHILS NFR BLD AUTO: 80 % (ref 42.7–76)
NRBC BLD AUTO-RTO: 0 /100 WBC (ref 0–0.2)
PLATELET # BLD AUTO: 286 10*3/MM3 (ref 140–450)
RBC # BLD AUTO: 4.77 10*6/MM3 (ref 3.77–5.28)
WBC # BLD AUTO: 13.76 10*3/MM3 (ref 3.4–10.8)

## 2019-08-07 DIAGNOSIS — M25.541 ARTHRALGIA OF BOTH HANDS: Primary | ICD-10-CM

## 2019-08-07 DIAGNOSIS — D72.829 LEUKOCYTOSIS, UNSPECIFIED TYPE: Primary | ICD-10-CM

## 2019-08-07 DIAGNOSIS — M25.542 ARTHRALGIA OF BOTH HANDS: Primary | ICD-10-CM

## 2019-08-07 LAB
Lab: NORMAL
SPECIMEN STATUS: NORMAL
WRITTEN AUTHORIZATION: NORMAL

## 2019-08-30 RX ORDER — CITALOPRAM 20 MG/1
TABLET ORAL
Qty: 90 TABLET | Refills: 1 | Status: SHIPPED | OUTPATIENT
Start: 2019-08-30 | End: 2020-02-24 | Stop reason: SDUPTHER

## 2019-09-18 ENCOUNTER — APPOINTMENT (OUTPATIENT)
Dept: OTHER | Facility: HOSPITAL | Age: 39
End: 2019-09-18

## 2019-09-18 ENCOUNTER — CONSULT (OUTPATIENT)
Dept: ONCOLOGY | Facility: CLINIC | Age: 39
End: 2019-09-18

## 2019-09-18 VITALS
WEIGHT: 146.1 LBS | TEMPERATURE: 97.9 F | DIASTOLIC BLOOD PRESSURE: 71 MMHG | RESPIRATION RATE: 16 BRPM | HEIGHT: 70 IN | SYSTOLIC BLOOD PRESSURE: 108 MMHG | BODY MASS INDEX: 20.92 KG/M2 | HEART RATE: 78 BPM | OXYGEN SATURATION: 100 %

## 2019-09-18 DIAGNOSIS — R53.82 CHRONIC FATIGUE: Primary | ICD-10-CM

## 2019-09-18 DIAGNOSIS — D72.9 NEUTROPHILIA: ICD-10-CM

## 2019-09-18 DIAGNOSIS — E55.9 VITAMIN D DEFICIENCY: Primary | ICD-10-CM

## 2019-09-18 DIAGNOSIS — M19.049 ARTHRITIS OF FINGER: ICD-10-CM

## 2019-09-18 LAB
BASOPHILS # BLD AUTO: 0.05 10*3/MM3 (ref 0–0.2)
BASOPHILS NFR BLD AUTO: 0.6 % (ref 0–1.5)
CHROMATIN AB SERPL-ACNC: <10 IU/ML (ref 0–14)
DEPRECATED RDW RBC AUTO: 43.1 FL (ref 37–54)
EOSINOPHIL # BLD AUTO: 0.41 10*3/MM3 (ref 0–0.4)
EOSINOPHIL NFR BLD AUTO: 5.1 % (ref 0.3–6.2)
ERYTHROCYTE [DISTWIDTH] IN BLOOD BY AUTOMATED COUNT: 13.2 % (ref 12.3–15.4)
ERYTHROCYTE [SEDIMENTATION RATE] IN BLOOD: 10 MM/HR (ref 0–20)
FERRITIN SERPL-MCNC: 47 NG/ML (ref 13–150)
HCT VFR BLD AUTO: 42.8 % (ref 34–46.6)
HGB BLD-MCNC: 15.1 G/DL (ref 12–15.9)
IMM GRANULOCYTES # BLD AUTO: 0.03 10*3/MM3 (ref 0–0.05)
IMM GRANULOCYTES NFR BLD AUTO: 0.4 % (ref 0–0.5)
IRON 24H UR-MRATE: 43 MCG/DL (ref 37–145)
IRON SATN MFR SERPL: 12 % (ref 20–50)
LYMPHOCYTES # BLD AUTO: 2.06 10*3/MM3 (ref 0.7–3.1)
LYMPHOCYTES NFR BLD AUTO: 25.7 % (ref 19.6–45.3)
MCH RBC QN AUTO: 31.1 PG (ref 26.6–33)
MCHC RBC AUTO-ENTMCNC: 35.3 G/DL (ref 31.5–35.7)
MCV RBC AUTO: 88.2 FL (ref 79–97)
MONOCYTES # BLD AUTO: 0.68 10*3/MM3 (ref 0.1–0.9)
MONOCYTES NFR BLD AUTO: 8.5 % (ref 5–12)
NEUTROPHILS # BLD AUTO: 4.79 10*3/MM3 (ref 1.7–7)
NEUTROPHILS NFR BLD AUTO: 59.7 % (ref 42.7–76)
NRBC BLD AUTO-RTO: 0 /100 WBC (ref 0–0.2)
PLATELET # BLD AUTO: 231 10*3/MM3 (ref 140–450)
PMV BLD AUTO: 9.8 FL (ref 6–12)
RBC # BLD AUTO: 4.85 10*6/MM3 (ref 3.77–5.28)
TIBC SERPL-MCNC: 361 MCG/DL (ref 298–536)
TRANSFERRIN SERPL-MCNC: 242 MG/DL (ref 200–360)
VIT B12 BLD-MCNC: 867 PG/ML (ref 211–946)
WBC NRBC COR # BLD: 8.02 10*3/MM3 (ref 3.4–10.8)

## 2019-09-18 PROCEDURE — 82607 VITAMIN B-12: CPT | Performed by: INTERNAL MEDICINE

## 2019-09-18 PROCEDURE — 99243 OFF/OP CNSLTJ NEW/EST LOW 30: CPT | Performed by: INTERNAL MEDICINE

## 2019-09-18 PROCEDURE — 85025 COMPLETE CBC W/AUTO DIFF WBC: CPT | Performed by: INTERNAL MEDICINE

## 2019-09-18 PROCEDURE — 86038 ANTINUCLEAR ANTIBODIES: CPT | Performed by: INTERNAL MEDICINE

## 2019-09-18 PROCEDURE — 84466 ASSAY OF TRANSFERRIN: CPT | Performed by: INTERNAL MEDICINE

## 2019-09-18 PROCEDURE — 36415 COLL VENOUS BLD VENIPUNCTURE: CPT

## 2019-09-18 PROCEDURE — 83921 ORGANIC ACID SINGLE QUANT: CPT | Performed by: INTERNAL MEDICINE

## 2019-09-18 PROCEDURE — 82728 ASSAY OF FERRITIN: CPT | Performed by: INTERNAL MEDICINE

## 2019-09-18 PROCEDURE — 85651 RBC SED RATE NONAUTOMATED: CPT | Performed by: INTERNAL MEDICINE

## 2019-09-18 PROCEDURE — 86431 RHEUMATOID FACTOR QUANT: CPT | Performed by: INTERNAL MEDICINE

## 2019-09-18 PROCEDURE — 83540 ASSAY OF IRON: CPT | Performed by: INTERNAL MEDICINE

## 2019-09-18 NOTE — PROGRESS NOTES
Subjective     REASON FOR CONSULTATION:  Provide an opinion on any further workup or treatment on:    Leukocytosis                       REQUESTING PHYSICIAN: Stephania Swain MD    RECORDS OBTAINED: Records of the patients history including those obtained from the referring provider were reviewed and summarized in detail.    HISTORY OF PRESENT ILLNESS:    Sierra Mao is a 39 y.o. patient who was referred for evaluation of leukocytosis.  Patient was diagnosed with diabetes 2 years ago.  It was initially diagnosed as type II but was found to represent type I and she is currently on insulin.  Patient had labs through her endocrinologist on 7/12/2019.  She was noted to have a WBC count of 13,800 with a neutrophil count of 10,600.  Labs were repeated on 8/2/2019 by her primary care physician showing persistence of the leukocytosis and neutrophilia.  WBC count was 13,700 and neutrophil count was 11,000.  Patient reports having symptoms of ear infection since June 2019.  She reports having allergies.  She was not placed on steroids recently.     Patient reports fatigue.  Started around the time she was diagnosed with diabetes.  She would feel tired despite having her blood glucose in the normal range    REVIEW OF SYSTEMS:  Review of Systems   Constitutional: Positive for fatigue and unexpected weight change. Negative for chills and fever.   HENT: Negative for mouth sores, nosebleeds, sore throat and voice change.    Eyes: Negative for visual disturbance.   Respiratory: Negative for cough and shortness of breath.    Cardiovascular: Negative for chest pain and leg swelling.   Gastrointestinal: Positive for constipation. Negative for abdominal pain, blood in stool, diarrhea, nausea and vomiting.        Hemorrhoids   Endocrine: Positive for cold intolerance.   Genitourinary: Negative for dysuria, frequency and hematuria.   Musculoskeletal: Positive for back pain and joint swelling. Negative for arthralgias.   Skin:  "Negative for rash.   Neurological: Positive for numbness and headaches. Negative for dizziness.   Hematological: Negative for adenopathy. Does not bruise/bleed easily.   Psychiatric/Behavioral: Negative for dysphoric mood. The patient is not nervous/anxious.        Past Medical History:   Diagnosis Date   • Anxiety    • Chronic fatigue    • Hyperlipidemia    • Leukocytosis    • Menorrhagia with regular cycle    • Seasonal allergies    • Type 2 diabetes mellitus (CMS/HCC)    • Vitamin D deficiency        Past Surgical History:   Procedure Laterality Date   • APPENDECTOMY         Social History     Socioeconomic History   • Marital status: Single     Spouse name: Not on file   • Number of children: Not on file   • Years of education: Not on file   • Highest education level: Not on file   Occupational History     Employer: [employer]   Tobacco Use   • Smoking status: Former Smoker     Packs/day: 0.50     Years: 12.00     Pack years: 6.00     Types: Cigarettes   • Smokeless tobacco: Never Used   Substance and Sexual Activity   • Alcohol use: Yes     Comment: social   • Drug use: No       Cancer-related family history includes Cancer in her maternal grandmother.    MEDICATIONS:    Current Outpatient Medications:   •  citalopram (CeleXA) 20 MG tablet, TAKE ONE TABLET BY MOUTH DAILY, Disp: 90 tablet, Rfl: 1  •  fluticasone (FLONASE) 50 MCG/ACT nasal spray, 2 sprays into the nostril(s) as directed by provider Daily., Disp: 16 g, Rfl: 0  •  NOVOLOG FLEXPEN 100 UNIT/ML solution pen-injector sc pen, , Disp: , Rfl:   •  TRESIBA FLEXTOUCH 100 UNIT/ML solution pen-injector injection, INJECT 20 UNITS SUBCUTANEOUSLY DAILY, Disp: 3 mL, Rfl: 2     ALLERGIES:  No Known Allergies     Objective   VITAL SIGNS:  Vitals:    09/18/19 0857   BP: 108/71   Pulse: 78   Resp: 16   Temp: 97.9 °F (36.6 °C)   TempSrc: Oral   SpO2: 100%   Weight: 66.3 kg (146 lb 1.6 oz)   Height: 178 cm (70.08\")  Comment: new ht   PainSc: 0-No pain       Wt " Readings from Last 3 Encounters:   09/18/19 66.3 kg (146 lb 1.6 oz)   07/09/19 64.2 kg (141 lb 8 oz)   03/29/19 63.7 kg (140 lb 6.4 oz)       PHYSICAL EXAMINATION  GENERAL:  The patient appears in good general condition, not in acute distress.  SKIN: Warm and dry. No skin rashes. No ecchymosis.  HEAD:  Normocephalic.  EYES:  No Jaundice. No Pallor. Pupils equal. EOMI.  NECK:  Supple with Good ROM.  Prominent submandibular salivary glands.  No Thyromegaly. No Masses.  LYMPHATICS:  No cervical or supraclavicular lymphadenopathy.  CHEST: Normal respiratory effort. Lungs clear to auscultation.   ABDOMEN:  Soft. No tenderness. No Hepatomegaly. No Splenomegaly. No masses.  EXTREMITIES:  No clubbing. No deforming arthritis in the hands.  Mild swelling of the metacarpophalangeal joints.    NEUROLOGICAL:  No Focal neurological deficits.         RESULT REVIEW:   Results from last 7 days   Lab Units 09/18/19  0844   WBC 10*3/mm3 8.02   NEUTROS ABS 10*3/mm3 4.79   HEMOGLOBIN g/dL 15.1   HEMATOCRIT % 42.8   PLATELETS 10*3/mm3 231       Lab Results   Component Value Date    FOLATE >20.00 01/17/2018     Lab Results   Component Value Date    [id number] 928 01/17/2018          Assessment/Plan   1.  Leukocytosis with neutrophilia identified on 7/12/2019.  Labs were repeated on 8/2/2019 and showed persistence of the leukocytosis and neutrophilia.  Repeat labs today showed normalization of count.  The differential count is normal with the exception of the eosinophil count that is minimally elevated at 0.41.  This is most consistent with a reactive process likely secondary to ear infection she has been dealing with since June 2019.  I reassured the patient about the results.    2.  Persistent fatigue.  She had B12 and folate in January 2018 that were normal.  The fatigue may be attributed to underlying iron deficiency or or possibly development of B12 deficiency.    3.  Arthritis in the fingers.  She has been having pain and stiffness  in the fingers especially the thumbs laterally.  She has stiffness in the morning that improves later in the day.    PLAN:    1.  I did not recommend additional work-up for the leukocytosis and neutrophilia.    2.  I will obtain ferritin, iron panel, repeat B12 and methylmalonic acid levels to evaluate the fatigue.    3.  I will obtain rheumatoid factor, FABRIZIO and ESR.    We will contact her with the results of the above labs. I did not schedule the patient for follow-up at our office.   She has an appointment with her primary care physician in the near future.        Richy Walker MD  09/18/19

## 2019-09-19 LAB — ANA SER QL: NEGATIVE

## 2019-09-22 LAB
Lab: NORMAL
METHYLMALONATE SERPL-SCNC: 114 NMOL/L (ref 0–378)

## 2019-09-23 ENCOUNTER — TELEPHONE (OUTPATIENT)
Dept: ONCOLOGY | Facility: CLINIC | Age: 39
End: 2019-09-23

## 2019-09-23 NOTE — TELEPHONE ENCOUNTER
Called and left message for the patient to call the office back to inform her iron is low.  Please ask her to start OTC ferrous sulfate 325 mg once a day and to take it for 6 months.  Testing for lupus and for rheumatoid arthritis was negative per Dr. Walker.    ----- Message from Richy Walker MD sent at 9/23/2019 11:16 AM EDT -----  Please inform patient that he iron is low. Please ask her to start OTC ferrous sulfate 325 mg once daily and to take it for 6 months. Testing for lupus and for rheumatoid arthritis was negative.    Thank you

## 2019-09-23 NOTE — TELEPHONE ENCOUNTER
Called and inform the patient that her iron is low.  Please ask her to start OTC ferrous sulfate 325 mg once a day and to take it for 6 months.  Testing for lupus and for rheumatoid arthritis was negative per Denise Garcia. Pt v/u      ----- Message from Richy Walker MD sent at 9/23/2019 11:16 AM EDT -----  Please inform patient that he iron is low. Please ask her to start OTC ferrous sulfate 325 mg once daily and to take it for 6 months. Testing for lupus and for rheumatoid arthritis was negative.    Thank you

## 2019-10-26 ENCOUNTER — APPOINTMENT (OUTPATIENT)
Dept: GENERAL RADIOLOGY | Facility: HOSPITAL | Age: 39
End: 2019-10-26

## 2019-10-26 ENCOUNTER — HOSPITAL ENCOUNTER (INPATIENT)
Facility: HOSPITAL | Age: 39
LOS: 3 days | Discharge: HOME | End: 2019-10-29
Attending: EMERGENCY MEDICINE | Admitting: INTERNAL MEDICINE

## 2019-10-26 DIAGNOSIS — E87.5 HYPERKALEMIA: ICD-10-CM

## 2019-10-26 DIAGNOSIS — E11.10 DIABETIC KETOACIDOSIS WITHOUT COMA ASSOCIATED WITH TYPE 2 DIABETES MELLITUS (HCC): Primary | ICD-10-CM

## 2019-10-26 DIAGNOSIS — E86.0 DEHYDRATION: ICD-10-CM

## 2019-10-26 DIAGNOSIS — D72.829 LEUKOCYTOSIS, UNSPECIFIED TYPE: ICD-10-CM

## 2019-10-26 LAB
ALBUMIN SERPL-MCNC: 5.3 G/DL (ref 3.5–5.2)
ALBUMIN/GLOB SERPL: 1.7 G/DL
ALP SERPL-CCNC: 86 U/L (ref 39–117)
ALT SERPL W P-5'-P-CCNC: 23 U/L (ref 1–33)
AMPHET+METHAMPHET UR QL: POSITIVE
ANION GAP SERPL CALCULATED.3IONS-SCNC: 32.8 MMOL/L (ref 5–15)
AST SERPL-CCNC: 19 U/L (ref 1–32)
BACTERIA UR QL AUTO: ABNORMAL /HPF
BARBITURATES UR QL SCN: NEGATIVE
BASOPHILS # BLD AUTO: 0.24 10*3/MM3 (ref 0–0.2)
BASOPHILS NFR BLD AUTO: 0.8 % (ref 0–1.5)
BENZODIAZ UR QL SCN: NEGATIVE
BILIRUB SERPL-MCNC: 0.3 MG/DL (ref 0.2–1.2)
BILIRUB UR QL STRIP: NEGATIVE
BUN BLD-MCNC: 17 MG/DL (ref 6–20)
BUN/CREAT SERPL: 14.7 (ref 7–25)
CALCIUM SPEC-SCNC: 9 MG/DL (ref 8.6–10.5)
CANNABINOIDS SERPL QL: NEGATIVE
CHLORIDE SERPL-SCNC: 93 MMOL/L (ref 98–107)
CLARITY UR: CLEAR
CO2 SERPL-SCNC: 5.2 MMOL/L (ref 22–29)
COCAINE UR QL: NEGATIVE
COLOR UR: YELLOW
CREAT BLD-MCNC: 1.16 MG/DL (ref 0.57–1)
DEPRECATED RDW RBC AUTO: 42.7 FL (ref 37–54)
EOSINOPHIL # BLD AUTO: 0.04 10*3/MM3 (ref 0–0.4)
EOSINOPHIL NFR BLD AUTO: 0.1 % (ref 0.3–6.2)
ERYTHROCYTE [DISTWIDTH] IN BLOOD BY AUTOMATED COUNT: 12.2 % (ref 12.3–15.4)
GFR SERPL CREATININE-BSD FRML MDRD: 52 ML/MIN/1.73
GLOBULIN UR ELPH-MCNC: 3.1 GM/DL
GLUCOSE BLD-MCNC: 517 MG/DL (ref 65–99)
GLUCOSE BLDC GLUCOMTR-MCNC: 318 MG/DL (ref 70–130)
GLUCOSE BLDC GLUCOMTR-MCNC: 331 MG/DL (ref 70–130)
GLUCOSE BLDC GLUCOMTR-MCNC: 347 MG/DL (ref 70–130)
GLUCOSE BLDC GLUCOMTR-MCNC: 486 MG/DL (ref 70–130)
GLUCOSE UR STRIP-MCNC: ABNORMAL MG/DL
HBA1C MFR BLD: 11.6 % (ref 4.8–5.6)
HCG SERPL QL: NEGATIVE
HCT VFR BLD AUTO: 50.6 % (ref 34–46.6)
HGB BLD-MCNC: 16.6 G/DL (ref 12–15.9)
HGB UR QL STRIP.AUTO: ABNORMAL
HOLD SPECIMEN: NORMAL
HOLD SPECIMEN: NORMAL
HYALINE CASTS UR QL AUTO: ABNORMAL /LPF
IMM GRANULOCYTES # BLD AUTO: 0.7 10*3/MM3 (ref 0–0.05)
IMM GRANULOCYTES NFR BLD AUTO: 2.5 % (ref 0–0.5)
KETONES UR QL STRIP: ABNORMAL
LEUKOCYTE ESTERASE UR QL STRIP.AUTO: NEGATIVE
LIPASE SERPL-CCNC: 51 U/L (ref 13–60)
LYMPHOCYTES # BLD AUTO: 2.14 10*3/MM3 (ref 0.7–3.1)
LYMPHOCYTES NFR BLD AUTO: 7.5 % (ref 19.6–45.3)
MAGNESIUM SERPL-MCNC: 2.4 MG/DL (ref 1.6–2.6)
MCH RBC QN AUTO: 31.3 PG (ref 26.6–33)
MCHC RBC AUTO-ENTMCNC: 32.8 G/DL (ref 31.5–35.7)
MCV RBC AUTO: 95.3 FL (ref 79–97)
METHADONE UR QL SCN: NEGATIVE
MONOCYTES # BLD AUTO: 1.62 10*3/MM3 (ref 0.1–0.9)
MONOCYTES NFR BLD AUTO: 5.7 % (ref 5–12)
NEUTROPHILS # BLD AUTO: 23.63 10*3/MM3 (ref 1.7–7)
NEUTROPHILS NFR BLD AUTO: 83.4 % (ref 42.7–76)
NITRITE UR QL STRIP: NEGATIVE
NRBC BLD AUTO-RTO: 0 /100 WBC (ref 0–0.2)
OPIATES UR QL: NEGATIVE
OSMOLALITY SERPL: 333 MOSM/KG (ref 275–300)
OXYCODONE UR QL SCN: NEGATIVE
PH UR STRIP.AUTO: <=5 [PH] (ref 5–8)
PHOSPHATE SERPL-MCNC: 7 MG/DL (ref 2.5–4.5)
PLATELET # BLD AUTO: 420 10*3/MM3 (ref 140–450)
PMV BLD AUTO: 9.6 FL (ref 6–12)
POTASSIUM BLD-SCNC: 6.1 MMOL/L (ref 3.5–5.2)
PROCALCITONIN SERPL-MCNC: 0.16 NG/ML (ref 0.1–0.25)
PROT SERPL-MCNC: 8.4 G/DL (ref 6–8.5)
PROT UR QL STRIP: ABNORMAL
RBC # BLD AUTO: 5.31 10*6/MM3 (ref 3.77–5.28)
RBC # UR: ABNORMAL /HPF
REF LAB TEST METHOD: ABNORMAL
SODIUM BLD-SCNC: 131 MMOL/L (ref 136–145)
SP GR UR STRIP: 1.02 (ref 1–1.03)
SQUAMOUS #/AREA URNS HPF: ABNORMAL /HPF
TROPONIN T SERPL-MCNC: <0.01 NG/ML (ref 0–0.03)
UROBILINOGEN UR QL STRIP: ABNORMAL
WBC NRBC COR # BLD: 28.37 10*3/MM3 (ref 3.4–10.8)
WBC UR QL AUTO: ABNORMAL /HPF
WHOLE BLOOD HOLD SPECIMEN: NORMAL
WHOLE BLOOD HOLD SPECIMEN: NORMAL

## 2019-10-26 PROCEDURE — 83690 ASSAY OF LIPASE: CPT | Performed by: PHYSICIAN ASSISTANT

## 2019-10-26 PROCEDURE — 83735 ASSAY OF MAGNESIUM: CPT | Performed by: INTERNAL MEDICINE

## 2019-10-26 PROCEDURE — 80307 DRUG TEST PRSMV CHEM ANLYZR: CPT | Performed by: NURSE PRACTITIONER

## 2019-10-26 PROCEDURE — 94799 UNLISTED PULMONARY SVC/PX: CPT

## 2019-10-26 PROCEDURE — 36600 WITHDRAWAL OF ARTERIAL BLOOD: CPT

## 2019-10-26 PROCEDURE — 83930 ASSAY OF BLOOD OSMOLALITY: CPT | Performed by: INTERNAL MEDICINE

## 2019-10-26 PROCEDURE — 93005 ELECTROCARDIOGRAM TRACING: CPT | Performed by: NURSE PRACTITIONER

## 2019-10-26 PROCEDURE — 93010 ELECTROCARDIOGRAM REPORT: CPT | Performed by: INTERNAL MEDICINE

## 2019-10-26 PROCEDURE — 82962 GLUCOSE BLOOD TEST: CPT

## 2019-10-26 PROCEDURE — 82010 KETONE BODYS QUAN: CPT | Performed by: PHYSICIAN ASSISTANT

## 2019-10-26 PROCEDURE — 84145 PROCALCITONIN (PCT): CPT | Performed by: INTERNAL MEDICINE

## 2019-10-26 PROCEDURE — 80053 COMPREHEN METABOLIC PANEL: CPT | Performed by: PHYSICIAN ASSISTANT

## 2019-10-26 PROCEDURE — 25010000002 MORPHINE PER 10 MG: Performed by: NURSE PRACTITIONER

## 2019-10-26 PROCEDURE — 81001 URINALYSIS AUTO W/SCOPE: CPT | Performed by: PHYSICIAN ASSISTANT

## 2019-10-26 PROCEDURE — 82803 BLOOD GASES ANY COMBINATION: CPT

## 2019-10-26 PROCEDURE — 25010000002 ONDANSETRON PER 1 MG: Performed by: NURSE PRACTITIONER

## 2019-10-26 PROCEDURE — 99284 EMERGENCY DEPT VISIT MOD MDM: CPT

## 2019-10-26 PROCEDURE — 83036 HEMOGLOBIN GLYCOSYLATED A1C: CPT | Performed by: INTERNAL MEDICINE

## 2019-10-26 PROCEDURE — 36415 COLL VENOUS BLD VENIPUNCTURE: CPT

## 2019-10-26 PROCEDURE — 71045 X-RAY EXAM CHEST 1 VIEW: CPT

## 2019-10-26 PROCEDURE — 84703 CHORIONIC GONADOTROPIN ASSAY: CPT | Performed by: PHYSICIAN ASSISTANT

## 2019-10-26 PROCEDURE — 85025 COMPLETE CBC W/AUTO DIFF WBC: CPT | Performed by: PHYSICIAN ASSISTANT

## 2019-10-26 PROCEDURE — 84100 ASSAY OF PHOSPHORUS: CPT | Performed by: INTERNAL MEDICINE

## 2019-10-26 PROCEDURE — 84484 ASSAY OF TROPONIN QUANT: CPT | Performed by: NURSE PRACTITIONER

## 2019-10-26 RX ORDER — SODIUM CHLORIDE AND POTASSIUM CHLORIDE 300; 900 MG/100ML; MG/100ML
250 INJECTION, SOLUTION INTRAVENOUS CONTINUOUS PRN
Status: DISCONTINUED | OUTPATIENT
Start: 2019-10-26 | End: 2019-10-28

## 2019-10-26 RX ORDER — ONDANSETRON 2 MG/ML
4 INJECTION INTRAMUSCULAR; INTRAVENOUS ONCE
Status: COMPLETED | OUTPATIENT
Start: 2019-10-26 | End: 2019-10-26

## 2019-10-26 RX ORDER — SODIUM CHLORIDE 9 MG/ML
10 INJECTION, SOLUTION INTRAVENOUS CONTINUOUS PRN
Status: DISCONTINUED | OUTPATIENT
Start: 2019-10-26 | End: 2019-10-29 | Stop reason: HOSPADM

## 2019-10-26 RX ORDER — POTASSIUM CHLORIDE 7.45 MG/ML
10 INJECTION INTRAVENOUS
Status: DISCONTINUED | OUTPATIENT
Start: 2019-10-26 | End: 2019-10-29 | Stop reason: HOSPADM

## 2019-10-26 RX ORDER — SODIUM CHLORIDE 0.9 % (FLUSH) 0.9 %
10 SYRINGE (ML) INJECTION ONCE AS NEEDED
Status: DISCONTINUED | OUTPATIENT
Start: 2019-10-26 | End: 2019-10-29 | Stop reason: HOSPADM

## 2019-10-26 RX ORDER — SODIUM CHLORIDE 9 MG/ML
250 INJECTION, SOLUTION INTRAVENOUS CONTINUOUS PRN
Status: DISCONTINUED | OUTPATIENT
Start: 2019-10-26 | End: 2019-10-28

## 2019-10-26 RX ORDER — DEXTROSE, SODIUM CHLORIDE, AND POTASSIUM CHLORIDE 5; .45; .15 G/100ML; G/100ML; G/100ML
250 INJECTION INTRAVENOUS CONTINUOUS PRN
Status: DISCONTINUED | OUTPATIENT
Start: 2019-10-26 | End: 2019-10-28

## 2019-10-26 RX ORDER — POTASSIUM CHLORIDE 750 MG/1
40 CAPSULE, EXTENDED RELEASE ORAL AS NEEDED
Status: DISCONTINUED | OUTPATIENT
Start: 2019-10-26 | End: 2019-10-29 | Stop reason: HOSPADM

## 2019-10-26 RX ORDER — MAGNESIUM SULFATE HEPTAHYDRATE 40 MG/ML
2 INJECTION, SOLUTION INTRAVENOUS AS NEEDED
Status: DISCONTINUED | OUTPATIENT
Start: 2019-10-26 | End: 2019-10-29 | Stop reason: HOSPADM

## 2019-10-26 RX ORDER — MAGNESIUM SULFATE HEPTAHYDRATE 40 MG/ML
4 INJECTION, SOLUTION INTRAVENOUS AS NEEDED
Status: DISCONTINUED | OUTPATIENT
Start: 2019-10-26 | End: 2019-10-29 | Stop reason: HOSPADM

## 2019-10-26 RX ORDER — DEXTROSE, SODIUM CHLORIDE, AND POTASSIUM CHLORIDE 5; .45; .3 G/100ML; G/100ML; G/100ML
250 INJECTION INTRAVENOUS CONTINUOUS PRN
Status: DISCONTINUED | OUTPATIENT
Start: 2019-10-26 | End: 2019-10-28

## 2019-10-26 RX ORDER — DEXTROSE MONOHYDRATE 25 G/50ML
12.5 INJECTION, SOLUTION INTRAVENOUS AS NEEDED
Status: DISCONTINUED | OUTPATIENT
Start: 2019-10-26 | End: 2019-10-29 | Stop reason: HOSPADM

## 2019-10-26 RX ORDER — POTASSIUM CHLORIDE 1.5 G/1.77G
40 POWDER, FOR SOLUTION ORAL AS NEEDED
Status: DISCONTINUED | OUTPATIENT
Start: 2019-10-26 | End: 2019-10-29 | Stop reason: HOSPADM

## 2019-10-26 RX ORDER — SODIUM CHLORIDE AND POTASSIUM CHLORIDE 150; 450 MG/100ML; MG/100ML
250 INJECTION, SOLUTION INTRAVENOUS CONTINUOUS PRN
Status: DISCONTINUED | OUTPATIENT
Start: 2019-10-26 | End: 2019-10-28

## 2019-10-26 RX ORDER — SODIUM CHLORIDE 450 MG/100ML
250 INJECTION, SOLUTION INTRAVENOUS CONTINUOUS PRN
Status: DISCONTINUED | OUTPATIENT
Start: 2019-10-26 | End: 2019-10-28

## 2019-10-26 RX ORDER — MORPHINE SULFATE 2 MG/ML
4 INJECTION, SOLUTION INTRAMUSCULAR; INTRAVENOUS ONCE
Status: COMPLETED | OUTPATIENT
Start: 2019-10-26 | End: 2019-10-26

## 2019-10-26 RX ORDER — DEXTROSE AND SODIUM CHLORIDE 5; .45 G/100ML; G/100ML
250 INJECTION, SOLUTION INTRAVENOUS CONTINUOUS PRN
Status: DISCONTINUED | OUTPATIENT
Start: 2019-10-26 | End: 2019-10-28

## 2019-10-26 RX ORDER — SODIUM CHLORIDE AND POTASSIUM CHLORIDE 150; 900 MG/100ML; MG/100ML
250 INJECTION, SOLUTION INTRAVENOUS CONTINUOUS PRN
Status: DISCONTINUED | OUTPATIENT
Start: 2019-10-26 | End: 2019-10-28

## 2019-10-26 RX ADMIN — SODIUM CHLORIDE 8.8 UNITS/HR: 9 INJECTION, SOLUTION INTRAVENOUS at 21:30

## 2019-10-26 RX ADMIN — SODIUM CHLORIDE 6 UNITS/HR: 9 INJECTION, SOLUTION INTRAVENOUS at 22:15

## 2019-10-26 RX ADMIN — SODIUM CHLORIDE, POTASSIUM CHLORIDE, SODIUM LACTATE AND CALCIUM CHLORIDE 1000 ML: 600; 310; 30; 20 INJECTION, SOLUTION INTRAVENOUS at 20:05

## 2019-10-26 RX ADMIN — SODIUM CHLORIDE 1000 ML: 9 INJECTION, SOLUTION INTRAVENOUS at 20:25

## 2019-10-26 RX ADMIN — SODIUM CHLORIDE 2000 ML: 9 INJECTION, SOLUTION INTRAVENOUS at 22:18

## 2019-10-26 RX ADMIN — MORPHINE SULFATE 4 MG: 2 INJECTION, SOLUTION INTRAMUSCULAR; INTRAVENOUS at 20:23

## 2019-10-26 RX ADMIN — ONDANSETRON 4 MG: 2 INJECTION INTRAMUSCULAR; INTRAVENOUS at 20:22

## 2019-10-26 RX ADMIN — SODIUM CHLORIDE 1000 ML: 9 INJECTION, SOLUTION INTRAVENOUS at 21:02

## 2019-10-27 LAB
ANION GAP SERPL CALCULATED.3IONS-SCNC: 15.8 MMOL/L (ref 5–15)
ANION GAP SERPL CALCULATED.3IONS-SCNC: 23.5 MMOL/L (ref 5–15)
ANION GAP SERPL CALCULATED.3IONS-SCNC: 7.3 MMOL/L (ref 5–15)
ANION GAP SERPL CALCULATED.3IONS-SCNC: 8.8 MMOL/L (ref 5–15)
ANION GAP SERPL CALCULATED.3IONS-SCNC: 9.5 MMOL/L (ref 5–15)
ANION GAP SERPL CALCULATED.3IONS-SCNC: 9.5 MMOL/L (ref 5–15)
BASOPHILS # BLD AUTO: 0.07 10*3/MM3 (ref 0–0.2)
BASOPHILS NFR BLD AUTO: 0.3 % (ref 0–1.5)
BUN BLD-MCNC: 10 MG/DL (ref 6–20)
BUN BLD-MCNC: 14 MG/DL (ref 6–20)
BUN BLD-MCNC: 4 MG/DL (ref 6–20)
BUN BLD-MCNC: 5 MG/DL (ref 6–20)
BUN BLD-MCNC: 8 MG/DL (ref 6–20)
BUN BLD-MCNC: 9 MG/DL (ref 6–20)
BUN/CREAT SERPL: 12.5 (ref 7–25)
BUN/CREAT SERPL: 14.3 (ref 7–25)
BUN/CREAT SERPL: 14.8 (ref 7–25)
BUN/CREAT SERPL: 15.6 (ref 7–25)
BUN/CREAT SERPL: 8.2 (ref 7–25)
BUN/CREAT SERPL: 8.6 (ref 7–25)
CALCIUM SPEC-SCNC: 6 MG/DL (ref 8.6–10.5)
CALCIUM SPEC-SCNC: 6.6 MG/DL (ref 8.6–10.5)
CALCIUM SPEC-SCNC: 6.9 MG/DL (ref 8.6–10.5)
CALCIUM SPEC-SCNC: 6.9 MG/DL (ref 8.6–10.5)
CALCIUM SPEC-SCNC: 7.1 MG/DL (ref 8.6–10.5)
CALCIUM SPEC-SCNC: 7.2 MG/DL (ref 8.6–10.5)
CHLORIDE SERPL-SCNC: 109 MMOL/L (ref 98–107)
CHLORIDE SERPL-SCNC: 111 MMOL/L (ref 98–107)
CHLORIDE SERPL-SCNC: 113 MMOL/L (ref 98–107)
CHLORIDE SERPL-SCNC: 114 MMOL/L (ref 98–107)
CHLORIDE SERPL-SCNC: 114 MMOL/L (ref 98–107)
CHLORIDE SERPL-SCNC: 116 MMOL/L (ref 98–107)
CO2 SERPL-SCNC: 12.5 MMOL/L (ref 22–29)
CO2 SERPL-SCNC: 13.5 MMOL/L (ref 22–29)
CO2 SERPL-SCNC: 14.2 MMOL/L (ref 22–29)
CO2 SERPL-SCNC: 14.7 MMOL/L (ref 22–29)
CO2 SERPL-SCNC: 4.5 MMOL/L (ref 22–29)
CO2 SERPL-SCNC: 7.2 MMOL/L (ref 22–29)
CREAT BLD-MCNC: 0.49 MG/DL (ref 0.57–1)
CREAT BLD-MCNC: 0.58 MG/DL (ref 0.57–1)
CREAT BLD-MCNC: 0.61 MG/DL (ref 0.57–1)
CREAT BLD-MCNC: 0.64 MG/DL (ref 0.57–1)
CREAT BLD-MCNC: 0.7 MG/DL (ref 0.57–1)
CREAT BLD-MCNC: 0.9 MG/DL (ref 0.57–1)
DEPRECATED RDW RBC AUTO: 40.5 FL (ref 37–54)
EOSINOPHIL # BLD AUTO: 0 10*3/MM3 (ref 0–0.4)
EOSINOPHIL NFR BLD AUTO: 0 % (ref 0.3–6.2)
ERYTHROCYTE [DISTWIDTH] IN BLOOD BY AUTOMATED COUNT: 12.3 % (ref 12.3–15.4)
ETHANOL BLD-MCNC: <10 MG/DL (ref 0–10)
ETHANOL UR QL: <0.01 %
GFR SERPL CREATININE-BSD FRML MDRD: 104 ML/MIN/1.73
GFR SERPL CREATININE-BSD FRML MDRD: 110 ML/MIN/1.73
GFR SERPL CREATININE-BSD FRML MDRD: 116 ML/MIN/1.73
GFR SERPL CREATININE-BSD FRML MDRD: 141 ML/MIN/1.73
GFR SERPL CREATININE-BSD FRML MDRD: 70 ML/MIN/1.73
GFR SERPL CREATININE-BSD FRML MDRD: 94 ML/MIN/1.73
GLUCOSE BLD-MCNC: 110 MG/DL (ref 65–99)
GLUCOSE BLD-MCNC: 139 MG/DL (ref 65–99)
GLUCOSE BLD-MCNC: 165 MG/DL (ref 65–99)
GLUCOSE BLD-MCNC: 195 MG/DL (ref 65–99)
GLUCOSE BLD-MCNC: 249 MG/DL (ref 65–99)
GLUCOSE BLD-MCNC: 264 MG/DL (ref 65–99)
GLUCOSE BLDC GLUCOMTR-MCNC: 106 MG/DL (ref 70–130)
GLUCOSE BLDC GLUCOMTR-MCNC: 109 MG/DL (ref 70–130)
GLUCOSE BLDC GLUCOMTR-MCNC: 111 MG/DL (ref 70–130)
GLUCOSE BLDC GLUCOMTR-MCNC: 114 MG/DL (ref 70–130)
GLUCOSE BLDC GLUCOMTR-MCNC: 114 MG/DL (ref 70–130)
GLUCOSE BLDC GLUCOMTR-MCNC: 116 MG/DL (ref 70–130)
GLUCOSE BLDC GLUCOMTR-MCNC: 125 MG/DL (ref 70–130)
GLUCOSE BLDC GLUCOMTR-MCNC: 132 MG/DL (ref 70–130)
GLUCOSE BLDC GLUCOMTR-MCNC: 138 MG/DL (ref 70–130)
GLUCOSE BLDC GLUCOMTR-MCNC: 139 MG/DL (ref 70–130)
GLUCOSE BLDC GLUCOMTR-MCNC: 145 MG/DL (ref 70–130)
GLUCOSE BLDC GLUCOMTR-MCNC: 147 MG/DL (ref 70–130)
GLUCOSE BLDC GLUCOMTR-MCNC: 151 MG/DL (ref 70–130)
GLUCOSE BLDC GLUCOMTR-MCNC: 157 MG/DL (ref 70–130)
GLUCOSE BLDC GLUCOMTR-MCNC: 175 MG/DL (ref 70–130)
GLUCOSE BLDC GLUCOMTR-MCNC: 192 MG/DL (ref 70–130)
GLUCOSE BLDC GLUCOMTR-MCNC: 193 MG/DL (ref 70–130)
GLUCOSE BLDC GLUCOMTR-MCNC: 195 MG/DL (ref 70–130)
GLUCOSE BLDC GLUCOMTR-MCNC: 199 MG/DL (ref 70–130)
GLUCOSE BLDC GLUCOMTR-MCNC: 215 MG/DL (ref 70–130)
GLUCOSE BLDC GLUCOMTR-MCNC: 220 MG/DL (ref 70–130)
GLUCOSE BLDC GLUCOMTR-MCNC: 243 MG/DL (ref 70–130)
GLUCOSE BLDC GLUCOMTR-MCNC: 245 MG/DL (ref 70–130)
GLUCOSE BLDC GLUCOMTR-MCNC: >599 MG/DL (ref 70–130)
HCT VFR BLD AUTO: 35.2 % (ref 34–46.6)
HGB BLD-MCNC: 12.2 G/DL (ref 12–15.9)
IMM GRANULOCYTES # BLD AUTO: 0.23 10*3/MM3 (ref 0–0.05)
IMM GRANULOCYTES NFR BLD AUTO: 1 % (ref 0–0.5)
LYMPHOCYTES # BLD AUTO: 2.56 10*3/MM3 (ref 0.7–3.1)
LYMPHOCYTES NFR BLD AUTO: 11.3 % (ref 19.6–45.3)
MAGNESIUM SERPL-MCNC: 1.6 MG/DL (ref 1.6–2.6)
MAGNESIUM SERPL-MCNC: 2.1 MG/DL (ref 1.6–2.6)
MAGNESIUM SERPL-MCNC: 2.3 MG/DL (ref 1.6–2.6)
MAGNESIUM SERPL-MCNC: 2.6 MG/DL (ref 1.6–2.6)
MAGNESIUM SERPL-MCNC: 3 MG/DL (ref 1.6–2.6)
MAGNESIUM SERPL-MCNC: 3 MG/DL (ref 1.6–2.6)
MCH RBC QN AUTO: 31.6 PG (ref 26.6–33)
MCHC RBC AUTO-ENTMCNC: 34.7 G/DL (ref 31.5–35.7)
MCV RBC AUTO: 91.2 FL (ref 79–97)
MONOCYTES # BLD AUTO: 1.58 10*3/MM3 (ref 0.1–0.9)
MONOCYTES NFR BLD AUTO: 7 % (ref 5–12)
NEUTROPHILS # BLD AUTO: 18.28 10*3/MM3 (ref 1.7–7)
NEUTROPHILS NFR BLD AUTO: 80.4 % (ref 42.7–76)
NRBC BLD AUTO-RTO: 0 /100 WBC (ref 0–0.2)
PHOSPHATE SERPL-MCNC: 1.2 MG/DL (ref 2.5–4.5)
PHOSPHATE SERPL-MCNC: 1.7 MG/DL (ref 2.5–4.5)
PHOSPHATE SERPL-MCNC: 1.9 MG/DL (ref 2.5–4.5)
PHOSPHATE SERPL-MCNC: 2.3 MG/DL (ref 2.5–4.5)
PHOSPHATE SERPL-MCNC: 2.3 MG/DL (ref 2.5–4.5)
PHOSPHATE SERPL-MCNC: 2.4 MG/DL (ref 2.5–4.5)
PLATELET # BLD AUTO: 216 10*3/MM3 (ref 140–450)
PMV BLD AUTO: 9.4 FL (ref 6–12)
POTASSIUM BLD-SCNC: 3.6 MMOL/L (ref 3.5–5.2)
POTASSIUM BLD-SCNC: 3.8 MMOL/L (ref 3.5–5.2)
POTASSIUM BLD-SCNC: 4.1 MMOL/L (ref 3.5–5.2)
POTASSIUM BLD-SCNC: 4.3 MMOL/L (ref 3.5–5.2)
POTASSIUM BLD-SCNC: 4.3 MMOL/L (ref 3.5–5.2)
POTASSIUM BLD-SCNC: 5.6 MMOL/L (ref 3.5–5.2)
RBC # BLD AUTO: 3.86 10*6/MM3 (ref 3.77–5.28)
SODIUM BLD-SCNC: 132 MMOL/L (ref 136–145)
SODIUM BLD-SCNC: 134 MMOL/L (ref 136–145)
SODIUM BLD-SCNC: 135 MMOL/L (ref 136–145)
SODIUM BLD-SCNC: 136 MMOL/L (ref 136–145)
SODIUM BLD-SCNC: 139 MMOL/L (ref 136–145)
SODIUM BLD-SCNC: 142 MMOL/L (ref 136–145)
WBC NRBC COR # BLD: 22.72 10*3/MM3 (ref 3.4–10.8)

## 2019-10-27 PROCEDURE — 83735 ASSAY OF MAGNESIUM: CPT | Performed by: INTERNAL MEDICINE

## 2019-10-27 PROCEDURE — 93005 ELECTROCARDIOGRAM TRACING: CPT | Performed by: INTERNAL MEDICINE

## 2019-10-27 PROCEDURE — 84100 ASSAY OF PHOSPHORUS: CPT | Performed by: INTERNAL MEDICINE

## 2019-10-27 PROCEDURE — 99254 IP/OBS CNSLTJ NEW/EST MOD 60: CPT | Performed by: INTERNAL MEDICINE

## 2019-10-27 PROCEDURE — 80048 BASIC METABOLIC PNL TOTAL CA: CPT | Performed by: INTERNAL MEDICINE

## 2019-10-27 PROCEDURE — 93010 ELECTROCARDIOGRAM REPORT: CPT | Performed by: INTERNAL MEDICINE

## 2019-10-27 PROCEDURE — 85025 COMPLETE CBC W/AUTO DIFF WBC: CPT | Performed by: INTERNAL MEDICINE

## 2019-10-27 PROCEDURE — 80307 DRUG TEST PRSMV CHEM ANLYZR: CPT | Performed by: NURSE PRACTITIONER

## 2019-10-27 PROCEDURE — 82962 GLUCOSE BLOOD TEST: CPT

## 2019-10-27 PROCEDURE — 25010000003 POTASSIUM CHLORIDE 10 MEQ/100ML SOLUTION: Performed by: INTERNAL MEDICINE

## 2019-10-27 RX ORDER — ACETAMINOPHEN 325 MG/1
650 TABLET ORAL EVERY 6 HOURS PRN
Status: DISCONTINUED | OUTPATIENT
Start: 2019-10-27 | End: 2019-10-29 | Stop reason: HOSPADM

## 2019-10-27 RX ORDER — CITALOPRAM 20 MG/1
20 TABLET ORAL DAILY
Status: DISCONTINUED | OUTPATIENT
Start: 2019-10-27 | End: 2019-10-29 | Stop reason: HOSPADM

## 2019-10-27 RX ADMIN — SODIUM CHLORIDE 2.4 UNITS/HR: 9 INJECTION, SOLUTION INTRAVENOUS at 04:11

## 2019-10-27 RX ADMIN — POTASSIUM CHLORIDE 10 MEQ: 7.46 INJECTION, SOLUTION INTRAVENOUS at 22:18

## 2019-10-27 RX ADMIN — POTASSIUM CHLORIDE, DEXTROSE MONOHYDRATE AND SODIUM CHLORIDE 250 ML/HR: 150; 5; 450 INJECTION, SOLUTION INTRAVENOUS at 05:38

## 2019-10-27 RX ADMIN — POTASSIUM CHLORIDE 10 MEQ: 7.46 INJECTION, SOLUTION INTRAVENOUS at 20:47

## 2019-10-27 RX ADMIN — SODIUM CHLORIDE 250 ML/HR: 4.5 INJECTION, SOLUTION INTRAVENOUS at 01:08

## 2019-10-27 RX ADMIN — SODIUM CHLORIDE 10.2 UNITS/HR: 9 INJECTION, SOLUTION INTRAVENOUS at 08:55

## 2019-10-27 RX ADMIN — POTASSIUM CHLORIDE, DEXTROSE MONOHYDRATE AND SODIUM CHLORIDE 250 ML/HR: 150; 5; 450 INJECTION, SOLUTION INTRAVENOUS at 18:12

## 2019-10-27 RX ADMIN — POTASSIUM CHLORIDE 10 MEQ: 7.46 INJECTION, SOLUTION INTRAVENOUS at 23:21

## 2019-10-27 RX ADMIN — SODIUM CHLORIDE 3.6 UNITS/HR: 9 INJECTION, SOLUTION INTRAVENOUS at 06:08

## 2019-10-27 RX ADMIN — ACETAMINOPHEN 650 MG: 325 TABLET, FILM COATED ORAL at 16:48

## 2019-10-27 RX ADMIN — POTASSIUM PHOSPHATE, MONOBASIC AND POTASSIUM PHOSPHATE, DIBASIC 45 MMOL: 224; 236 INJECTION, SOLUTION, CONCENTRATE INTRAVENOUS at 06:14

## 2019-10-27 RX ADMIN — POTASSIUM CHLORIDE, DEXTROSE MONOHYDRATE AND SODIUM CHLORIDE 250 ML/HR: 150; 5; 450 INJECTION, SOLUTION INTRAVENOUS at 09:50

## 2019-10-27 RX ADMIN — POTASSIUM PHOSPHATE, MONOBASIC AND POTASSIUM PHOSPHATE, DIBASIC 30 MMOL: 224; 236 INJECTION, SOLUTION, CONCENTRATE INTRAVENOUS at 21:24

## 2019-10-27 RX ADMIN — POTASSIUM CHLORIDE, DEXTROSE MONOHYDRATE AND SODIUM CHLORIDE 250 ML/HR: 150; 5; 450 INJECTION, SOLUTION INTRAVENOUS at 13:55

## 2019-10-27 RX ADMIN — CITALOPRAM 20 MG: 20 TABLET, FILM COATED ORAL at 08:27

## 2019-10-27 RX ADMIN — POTASSIUM CHLORIDE, DEXTROSE MONOHYDRATE AND SODIUM CHLORIDE 250 ML/HR: 150; 5; 450 INJECTION, SOLUTION INTRAVENOUS at 22:18

## 2019-10-27 RX ADMIN — MAGNESIUM SULFATE HEPTAHYDRATE 4 G: 40 INJECTION, SOLUTION INTRAVENOUS at 06:09

## 2019-10-28 LAB
ANION GAP SERPL CALCULATED.3IONS-SCNC: 6.6 MMOL/L (ref 5–15)
ANION GAP SERPL CALCULATED.3IONS-SCNC: 6.8 MMOL/L (ref 5–15)
ANION GAP SERPL CALCULATED.3IONS-SCNC: 9.4 MMOL/L (ref 5–15)
ARTERIAL PATENCY WRIST A: POSITIVE
ATMOSPHERIC PRESS: 741.3 MMHG
BASE EXCESS BLDA CALC-SCNC: -25.1 MMOL/L (ref 0–2)
BDY SITE: ABNORMAL
BUN BLD-MCNC: 3 MG/DL (ref 6–20)
BUN BLD-MCNC: 4 MG/DL (ref 6–20)
BUN BLD-MCNC: 4 MG/DL (ref 6–20)
BUN/CREAT SERPL: 6.8 (ref 7–25)
BUN/CREAT SERPL: 7.8 (ref 7–25)
BUN/CREAT SERPL: 8.3 (ref 7–25)
CALCIUM SPEC-SCNC: 7.2 MG/DL (ref 8.6–10.5)
CALCIUM SPEC-SCNC: 7.3 MG/DL (ref 8.6–10.5)
CALCIUM SPEC-SCNC: 7.4 MG/DL (ref 8.6–10.5)
CHLORIDE SERPL-SCNC: 110 MMOL/L (ref 98–107)
CHLORIDE SERPL-SCNC: 114 MMOL/L (ref 98–107)
CHLORIDE SERPL-SCNC: 115 MMOL/L (ref 98–107)
CHOLEST SERPL-MCNC: 121 MG/DL (ref 0–200)
CO2 SERPL-SCNC: 16.2 MMOL/L (ref 22–29)
CO2 SERPL-SCNC: 16.6 MMOL/L (ref 22–29)
CO2 SERPL-SCNC: 19.4 MMOL/L (ref 22–29)
CREAT BLD-MCNC: 0.44 MG/DL (ref 0.57–1)
CREAT BLD-MCNC: 0.48 MG/DL (ref 0.57–1)
CREAT BLD-MCNC: 0.51 MG/DL (ref 0.57–1)
GFR SERPL CREATININE-BSD FRML MDRD: 135 ML/MIN/1.73
GFR SERPL CREATININE-BSD FRML MDRD: 145 ML/MIN/1.73
GFR SERPL CREATININE-BSD FRML MDRD: >150 ML/MIN/1.73
GLUCOSE BLD-MCNC: 116 MG/DL (ref 65–99)
GLUCOSE BLD-MCNC: 122 MG/DL (ref 65–99)
GLUCOSE BLD-MCNC: 131 MG/DL (ref 65–99)
GLUCOSE BLDC GLUCOMTR-MCNC: 107 MG/DL (ref 70–130)
GLUCOSE BLDC GLUCOMTR-MCNC: 115 MG/DL (ref 70–130)
GLUCOSE BLDC GLUCOMTR-MCNC: 116 MG/DL (ref 70–130)
GLUCOSE BLDC GLUCOMTR-MCNC: 124 MG/DL (ref 70–130)
GLUCOSE BLDC GLUCOMTR-MCNC: 127 MG/DL (ref 70–130)
GLUCOSE BLDC GLUCOMTR-MCNC: 127 MG/DL (ref 70–130)
GLUCOSE BLDC GLUCOMTR-MCNC: 129 MG/DL (ref 70–130)
GLUCOSE BLDC GLUCOMTR-MCNC: 134 MG/DL (ref 70–130)
GLUCOSE BLDC GLUCOMTR-MCNC: 229 MG/DL (ref 70–130)
GLUCOSE BLDC GLUCOMTR-MCNC: 244 MG/DL (ref 70–130)
GLUCOSE BLDC GLUCOMTR-MCNC: 302 MG/DL (ref 70–130)
HCO3 BLDA-SCNC: 3.2 MMOL/L (ref 22–28)
HDLC SERPL-MCNC: 36 MG/DL (ref 40–60)
LDLC SERPL CALC-MCNC: 72 MG/DL (ref 0–100)
LDLC/HDLC SERPL: 2.01 {RATIO}
MAGNESIUM SERPL-MCNC: 2.1 MG/DL (ref 1.6–2.6)
MAGNESIUM SERPL-MCNC: 2.3 MG/DL (ref 1.6–2.6)
MAGNESIUM SERPL-MCNC: 2.3 MG/DL (ref 1.6–2.6)
MODALITY: ABNORMAL
PCO2 BLDA: 11.5 MM HG (ref 35–45)
PH BLDA: 7.05 PH UNITS (ref 7.35–7.45)
PHOSPHATE SERPL-MCNC: 2.2 MG/DL (ref 2.5–4.5)
PHOSPHATE SERPL-MCNC: 2.3 MG/DL (ref 2.5–4.5)
PHOSPHATE SERPL-MCNC: 2.6 MG/DL (ref 2.5–4.5)
PO2 BLDA: 134.6 MM HG (ref 80–100)
POTASSIUM BLD-SCNC: 3.9 MMOL/L (ref 3.5–5.2)
POTASSIUM BLD-SCNC: 4.2 MMOL/L (ref 3.5–5.2)
POTASSIUM BLD-SCNC: 4.2 MMOL/L (ref 3.5–5.2)
SAO2 % BLDCOA: 97.6 % (ref 92–99)
SODIUM BLD-SCNC: 133 MMOL/L (ref 136–145)
SODIUM BLD-SCNC: 140 MMOL/L (ref 136–145)
SODIUM BLD-SCNC: 141 MMOL/L (ref 136–145)
T4 FREE SERPL-MCNC: 0.98 NG/DL (ref 0.93–1.7)
TOTAL RATE: 25 BREATHS/MINUTE
TRIGL SERPL-MCNC: 63 MG/DL (ref 0–150)
TSH SERPL DL<=0.05 MIU/L-ACNC: 3.64 UIU/ML (ref 0.27–4.2)
VLDLC SERPL-MCNC: 12.6 MG/DL (ref 5–40)

## 2019-10-28 PROCEDURE — 80048 BASIC METABOLIC PNL TOTAL CA: CPT | Performed by: INTERNAL MEDICINE

## 2019-10-28 PROCEDURE — 25010000003 POTASSIUM CHLORIDE 10 MEQ/100ML SOLUTION: Performed by: INTERNAL MEDICINE

## 2019-10-28 PROCEDURE — 86337 INSULIN ANTIBODIES: CPT | Performed by: INTERNAL MEDICINE

## 2019-10-28 PROCEDURE — 63710000001 INSULIN LISPRO (HUMAN) PER 5 UNITS: Performed by: INTERNAL MEDICINE

## 2019-10-28 PROCEDURE — 86341 ISLET CELL ANTIBODY: CPT | Performed by: INTERNAL MEDICINE

## 2019-10-28 PROCEDURE — 80061 LIPID PANEL: CPT | Performed by: INTERNAL MEDICINE

## 2019-10-28 PROCEDURE — 84439 ASSAY OF FREE THYROXINE: CPT | Performed by: INTERNAL MEDICINE

## 2019-10-28 PROCEDURE — 84681 ASSAY OF C-PEPTIDE: CPT | Performed by: INTERNAL MEDICINE

## 2019-10-28 PROCEDURE — 83735 ASSAY OF MAGNESIUM: CPT | Performed by: INTERNAL MEDICINE

## 2019-10-28 PROCEDURE — 84100 ASSAY OF PHOSPHORUS: CPT | Performed by: INTERNAL MEDICINE

## 2019-10-28 PROCEDURE — 63710000001 INSULIN GLARGINE PER 5 UNITS: Performed by: INTERNAL MEDICINE

## 2019-10-28 PROCEDURE — 99232 SBSQ HOSP IP/OBS MODERATE 35: CPT | Performed by: INTERNAL MEDICINE

## 2019-10-28 PROCEDURE — 82962 GLUCOSE BLOOD TEST: CPT

## 2019-10-28 PROCEDURE — 84443 ASSAY THYROID STIM HORMONE: CPT | Performed by: INTERNAL MEDICINE

## 2019-10-28 RX ORDER — INSULIN GLARGINE 100 [IU]/ML
20 INJECTION, SOLUTION SUBCUTANEOUS EVERY MORNING
Status: DISCONTINUED | OUTPATIENT
Start: 2019-10-28 | End: 2019-10-29

## 2019-10-28 RX ORDER — INSULIN GLARGINE 100 [IU]/ML
25 INJECTION, SOLUTION SUBCUTANEOUS ONCE
Status: COMPLETED | OUTPATIENT
Start: 2019-10-28 | End: 2019-10-28

## 2019-10-28 RX ADMIN — INSULIN LISPRO 4 UNITS: 100 INJECTION, SOLUTION INTRAVENOUS; SUBCUTANEOUS at 17:02

## 2019-10-28 RX ADMIN — INSULIN LISPRO 6 UNITS: 100 INJECTION, SOLUTION INTRAVENOUS; SUBCUTANEOUS at 12:00

## 2019-10-28 RX ADMIN — ACETAMINOPHEN 650 MG: 325 TABLET, FILM COATED ORAL at 04:08

## 2019-10-28 RX ADMIN — POTASSIUM CHLORIDE, DEXTROSE MONOHYDRATE AND SODIUM CHLORIDE 250 ML/HR: 150; 5; 450 INJECTION, SOLUTION INTRAVENOUS at 02:31

## 2019-10-28 RX ADMIN — INSULIN GLARGINE 20 UNITS: 100 INJECTION, SOLUTION SUBCUTANEOUS at 09:58

## 2019-10-28 RX ADMIN — INSULIN GLARGINE 25 UNITS: 100 INJECTION, SOLUTION SUBCUTANEOUS at 21:13

## 2019-10-28 RX ADMIN — POTASSIUM CHLORIDE, DEXTROSE MONOHYDRATE AND SODIUM CHLORIDE 250 ML/HR: 150; 5; 450 INJECTION, SOLUTION INTRAVENOUS at 06:41

## 2019-10-28 RX ADMIN — INSULIN LISPRO 6 UNITS: 100 INJECTION, SOLUTION INTRAVENOUS; SUBCUTANEOUS at 17:02

## 2019-10-28 RX ADMIN — POTASSIUM CHLORIDE 10 MEQ: 7.46 INJECTION, SOLUTION INTRAVENOUS at 00:29

## 2019-10-28 RX ADMIN — INSULIN LISPRO 5 UNITS: 100 INJECTION, SOLUTION INTRAVENOUS; SUBCUTANEOUS at 21:13

## 2019-10-28 RX ADMIN — INSULIN LISPRO 8 UNITS: 100 INJECTION, SOLUTION INTRAVENOUS; SUBCUTANEOUS at 21:15

## 2019-10-28 RX ADMIN — SODIUM CHLORIDE 4 UNITS/HR: 9 INJECTION, SOLUTION INTRAVENOUS at 05:08

## 2019-10-28 RX ADMIN — ACETAMINOPHEN 650 MG: 325 TABLET, FILM COATED ORAL at 12:03

## 2019-10-29 VITALS
SYSTOLIC BLOOD PRESSURE: 138 MMHG | HEART RATE: 80 BPM | RESPIRATION RATE: 18 BRPM | HEIGHT: 71 IN | TEMPERATURE: 98.8 F | OXYGEN SATURATION: 99 % | WEIGHT: 149.25 LBS | BODY MASS INDEX: 20.9 KG/M2 | DIASTOLIC BLOOD PRESSURE: 72 MMHG

## 2019-10-29 PROBLEM — IMO0001 IDDM (INSULIN DEPENDENT DIABETES MELLITUS): Status: ACTIVE | Noted: 2019-10-29

## 2019-10-29 PROBLEM — E11.65 TYPE 2 DIABETES MELLITUS WITH HYPERGLYCEMIA, WITHOUT LONG-TERM CURRENT USE OF INSULIN (HCC): Status: RESOLVED | Noted: 2018-01-17 | Resolved: 2019-10-29

## 2019-10-29 PROBLEM — E11.9 TYPE 2 DIABETES MELLITUS WITHOUT COMPLICATION, WITHOUT LONG-TERM CURRENT USE OF INSULIN: Status: RESOLVED | Noted: 2018-02-01 | Resolved: 2019-10-29

## 2019-10-29 LAB
C PEPTIDE SERPL-MCNC: <0.1 NG/ML (ref 1.1–4.4)
GLUCOSE BLDC GLUCOMTR-MCNC: 153 MG/DL (ref 70–130)
GLUCOSE BLDC GLUCOMTR-MCNC: 186 MG/DL (ref 70–130)
GLUCOSE BLDC GLUCOMTR-MCNC: 198 MG/DL (ref 70–130)
GLUCOSE BLDC GLUCOMTR-MCNC: 215 MG/DL (ref 70–130)
GLUCOSE BLDC GLUCOMTR-MCNC: 83 MG/DL (ref 70–130)

## 2019-10-29 PROCEDURE — 99232 SBSQ HOSP IP/OBS MODERATE 35: CPT | Performed by: INTERNAL MEDICINE

## 2019-10-29 PROCEDURE — 90686 IIV4 VACC NO PRSV 0.5 ML IM: CPT | Performed by: INTERNAL MEDICINE

## 2019-10-29 PROCEDURE — 63710000001 INSULIN LISPRO (HUMAN) PER 5 UNITS: Performed by: INTERNAL MEDICINE

## 2019-10-29 PROCEDURE — 25010000002 INFLUENZA VAC SPLIT QUAD 0.5 ML SUSPENSION PREFILLED SYRINGE: Performed by: INTERNAL MEDICINE

## 2019-10-29 PROCEDURE — G0008 ADMIN INFLUENZA VIRUS VAC: HCPCS | Performed by: INTERNAL MEDICINE

## 2019-10-29 PROCEDURE — 82962 GLUCOSE BLOOD TEST: CPT

## 2019-10-29 PROCEDURE — 63710000001 INSULIN GLARGINE PER 5 UNITS: Performed by: INTERNAL MEDICINE

## 2019-10-29 RX ORDER — INSULIN GLARGINE 100 [IU]/ML
20 INJECTION, SOLUTION SUBCUTANEOUS NIGHTLY
Qty: 600 UNITS | Refills: 0 | Status: SHIPPED | OUTPATIENT
Start: 2019-10-29 | End: 2019-11-28

## 2019-10-29 RX ORDER — INSULIN GLARGINE 100 [IU]/ML
30 INJECTION, SOLUTION SUBCUTANEOUS EVERY MORNING
Status: DISCONTINUED | OUTPATIENT
Start: 2019-10-30 | End: 2019-10-29 | Stop reason: HOSPADM

## 2019-10-29 RX ORDER — INSULIN GLARGINE 100 [IU]/ML
30 INJECTION, SOLUTION SUBCUTANEOUS EVERY MORNING
Qty: 900 UNITS | Refills: 0 | Status: SHIPPED | OUTPATIENT
Start: 2019-10-30 | End: 2019-11-29

## 2019-10-29 RX ORDER — INSULIN GLARGINE 100 [IU]/ML
20 INJECTION, SOLUTION SUBCUTANEOUS NIGHTLY
Status: DISCONTINUED | OUTPATIENT
Start: 2019-10-29 | End: 2019-10-29 | Stop reason: HOSPADM

## 2019-10-29 RX ADMIN — INSULIN LISPRO 2 UNITS: 100 INJECTION, SOLUTION INTRAVENOUS; SUBCUTANEOUS at 12:08

## 2019-10-29 RX ADMIN — INSULIN GLARGINE 20 UNITS: 100 INJECTION, SOLUTION SUBCUTANEOUS at 08:13

## 2019-10-29 RX ADMIN — INSULIN LISPRO 4 UNITS: 100 INJECTION, SOLUTION INTRAVENOUS; SUBCUTANEOUS at 08:12

## 2019-10-29 RX ADMIN — INFLUENZA A VIRUS A/BRISBANE/02/2018 IVR-190 (H1N1) ANTIGEN (PROPIOLACTONE INACTIVATED), INFLUENZA A VIRUS A/KANSAS/14/2017 X-327 (H3N2) ANTIGEN (PROPIOLACTONE INACTIVATED), INFLUENZA B VIRUS B/MARYLAND/15/2016 ANTIGEN (PROPIOLACTONE INACTIVATED), INFLUENZA B VIRUS B/PHUKET/3073/2013 BVR-1B ANTIGEN (PROPIOLACTONE INACTIVATED) 0.5 ML: 15; 15; 15; 15 INJECTION, SUSPENSION INTRAMUSCULAR at 12:09

## 2019-10-29 RX ADMIN — CITALOPRAM 20 MG: 20 TABLET, FILM COATED ORAL at 09:36

## 2019-10-29 RX ADMIN — INSULIN LISPRO 6 UNITS: 100 INJECTION, SOLUTION INTRAVENOUS; SUBCUTANEOUS at 08:11

## 2019-10-29 RX ADMIN — ACETAMINOPHEN 650 MG: 325 TABLET, FILM COATED ORAL at 08:35

## 2019-10-29 RX ADMIN — INSULIN LISPRO 8 UNITS: 100 INJECTION, SOLUTION INTRAVENOUS; SUBCUTANEOUS at 12:07

## 2019-10-30 LAB — PANC ISLET CELL AB TITR SER: NEGATIVE {TITER}

## 2019-10-31 ENCOUNTER — TELEPHONE (OUTPATIENT)
Dept: FAMILY MEDICINE CLINIC | Facility: CLINIC | Age: 39
End: 2019-10-31

## 2019-10-31 LAB
B-OH-BUTYR SERPL-MCNC: >100 MCG/ML
GAD65 AB SER-ACNC: 441.7 U/ML (ref 0–5)

## 2019-11-03 LAB — INSULIN AB SER-ACNC: <5 UU/ML

## 2020-02-24 RX ORDER — CITALOPRAM 20 MG/1
20 TABLET ORAL DAILY
Qty: 90 TABLET | Refills: 1 | Status: SHIPPED | OUTPATIENT
Start: 2020-02-24 | End: 2020-06-16 | Stop reason: SDUPTHER

## 2020-06-15 RX ORDER — CITALOPRAM 20 MG/1
20 TABLET ORAL DAILY
Qty: 90 TABLET | Refills: 1 | Status: CANCELLED | OUTPATIENT
Start: 2020-06-15

## 2020-06-16 RX ORDER — CITALOPRAM 20 MG/1
20 TABLET ORAL DAILY
Qty: 90 TABLET | Refills: 1 | Status: SHIPPED | OUTPATIENT
Start: 2020-06-16 | End: 2020-12-02

## 2020-12-02 RX ORDER — CITALOPRAM 20 MG/1
TABLET ORAL
Qty: 90 TABLET | Refills: 0 | Status: SHIPPED | OUTPATIENT
Start: 2020-12-02 | End: 2021-01-29 | Stop reason: DRUGHIGH

## 2021-01-27 ENCOUNTER — TELEPHONE (OUTPATIENT)
Dept: FAMILY MEDICINE CLINIC | Facility: CLINIC | Age: 41
End: 2021-01-27

## 2021-01-27 RX ORDER — CITALOPRAM 20 MG/1
20 TABLET ORAL DAILY
Qty: 90 TABLET | Refills: 0 | Status: CANCELLED | OUTPATIENT
Start: 2021-01-27

## 2021-01-29 ENCOUNTER — OFFICE VISIT (OUTPATIENT)
Dept: FAMILY MEDICINE CLINIC | Facility: CLINIC | Age: 41
End: 2021-01-29

## 2021-01-29 VITALS
TEMPERATURE: 97.7 F | OXYGEN SATURATION: 100 % | SYSTOLIC BLOOD PRESSURE: 102 MMHG | BODY MASS INDEX: 23.6 KG/M2 | HEART RATE: 96 BPM | HEIGHT: 71 IN | WEIGHT: 168.6 LBS | DIASTOLIC BLOOD PRESSURE: 71 MMHG

## 2021-01-29 DIAGNOSIS — F41.9 ANXIETY: Primary | ICD-10-CM

## 2021-01-29 DIAGNOSIS — H60.503 ACUTE OTITIS EXTERNA OF BOTH EARS, UNSPECIFIED TYPE: ICD-10-CM

## 2021-01-29 DIAGNOSIS — L98.9 LESION OF SKIN OF FOOT: ICD-10-CM

## 2021-01-29 PROCEDURE — 99214 OFFICE O/P EST MOD 30 MIN: CPT | Performed by: NURSE PRACTITIONER

## 2021-01-29 RX ORDER — CITALOPRAM 40 MG/1
40 TABLET ORAL DAILY
COMMUNITY
End: 2021-01-29 | Stop reason: SDUPTHER

## 2021-01-29 RX ORDER — CITALOPRAM 40 MG/1
40 TABLET ORAL DAILY
Qty: 90 TABLET | Refills: 0 | Status: SHIPPED | OUTPATIENT
Start: 2021-01-29 | End: 2021-05-07

## 2021-01-29 RX ORDER — PEN NEEDLE, DIABETIC 32GX 5/32"
1 NEEDLE, DISPOSABLE MISCELLANEOUS 4 TIMES DAILY
COMMUNITY
Start: 2020-10-19

## 2021-01-29 RX ORDER — INSULIN ASPART 100 [IU]/ML
6 INJECTION, SOLUTION INTRAVENOUS; SUBCUTANEOUS AS NEEDED
COMMUNITY
Start: 2021-01-27

## 2021-01-29 RX ORDER — INSULIN GLARGINE 100 [IU]/ML
51 INJECTION, SOLUTION SUBCUTANEOUS DAILY
COMMUNITY
Start: 2021-01-28 | End: 2021-08-05 | Stop reason: ALTCHOICE

## 2021-01-29 RX ORDER — CITALOPRAM 20 MG/1
20 TABLET ORAL DAILY
Qty: 90 TABLET | Refills: 0 | Status: CANCELLED | OUTPATIENT
Start: 2021-01-29

## 2021-02-16 ENCOUNTER — OFFICE VISIT (OUTPATIENT)
Dept: FAMILY MEDICINE CLINIC | Facility: CLINIC | Age: 41
End: 2021-02-16

## 2021-02-16 DIAGNOSIS — F41.9 ANXIETY: Primary | ICD-10-CM

## 2021-02-16 DIAGNOSIS — E10.65 TYPE 1 DIABETES MELLITUS WITH HYPERGLYCEMIA (HCC): ICD-10-CM

## 2021-02-16 PROCEDURE — 99442 PR PHYS/QHP TELEPHONE EVALUATION 11-20 MIN: CPT | Performed by: INTERNAL MEDICINE

## 2021-02-16 NOTE — PROGRESS NOTES
Chief Complaint  Diabetes    Subjective          Sierra Mao presents to [facility]  History of Present Illness  You have chosen to receive care through a telephone visit. Do you consent to use a telephone visit for your medical care today? Yes    Here today for telemed visit for [facility] paperwork completion.  She works for a  and working after hours and from home to avoid Covid.  Her employer has asked her to apply for unemployment.  She has type 1 diabetes adult onset and has a lot of anxiety related to Covid.  She is asking for a formal letter stating that you are a diabetic.  Last a1c was 9% and is followed by Dr Arnold her endocrinologist.  Diabetes is not well controlled somewhat related to anxiety and stress.         Objective   Vital Signs:   There were no vitals taken for this visit.    Physical Exam  Constitutional:       Appearance: Normal appearance.   Neurological:      General: No focal deficit present.      Mental Status: She is alert and oriented to person, place, and time.   Psychiatric:         Mood and Affect: Mood normal.         Behavior: Behavior normal.         Thought Content: Thought content normal.         Judgment: Judgment normal.        Result Review :                 Assessment and Plan    Diagnoses and all orders for this visit:    1. Anxiety (Primary)    2. Type 1 diabetes mellitus with hyperglycemia (CMS/[facility])        Follow Up   Return in about 6 months (around 8/16/2021).  Patient was given instructions and counseling regarding her condition or for health maintenance advice. Please see specific information pulled into the AVS if appropriate.     I will write a formal letter stating that she is a type I diabetic with elevated blood sugars and A1c.  I will also state that because of her diabetes she is in a higher risk category due to Covid exposures with her current occupation.  Total time coordinating care for this office visit including time  trying to reach patient documenting and writing the letter what is 20 minutes

## 2021-05-06 DIAGNOSIS — F41.9 ANXIETY: ICD-10-CM

## 2021-05-07 RX ORDER — CITALOPRAM 40 MG/1
TABLET ORAL
Qty: 90 TABLET | Refills: 0 | Status: SHIPPED | OUTPATIENT
Start: 2021-05-07 | End: 2021-08-23 | Stop reason: SDUPTHER

## 2021-08-05 ENCOUNTER — OFFICE VISIT (OUTPATIENT)
Dept: FAMILY MEDICINE CLINIC | Facility: CLINIC | Age: 41
End: 2021-08-05

## 2021-08-05 VITALS
BODY MASS INDEX: 22.92 KG/M2 | SYSTOLIC BLOOD PRESSURE: 105 MMHG | WEIGHT: 163.7 LBS | HEART RATE: 102 BPM | OXYGEN SATURATION: 98 % | DIASTOLIC BLOOD PRESSURE: 63 MMHG | TEMPERATURE: 97.5 F | HEIGHT: 71 IN

## 2021-08-05 DIAGNOSIS — N63.10 BREAST MASS, RIGHT: Primary | ICD-10-CM

## 2021-08-05 DIAGNOSIS — R59.1 LYMPHADENOPATHY OF HEAD AND NECK: ICD-10-CM

## 2021-08-05 PROCEDURE — 99214 OFFICE O/P EST MOD 30 MIN: CPT | Performed by: NURSE PRACTITIONER

## 2021-08-05 RX ORDER — INSULIN GLARGINE 100 [IU]/ML
40 INJECTION, SOLUTION SUBCUTANEOUS NIGHTLY
COMMUNITY
Start: 2021-05-18

## 2021-08-05 NOTE — PROGRESS NOTES
"Chief Complaint  Swollen Glands (c/o swollen glands in R side of neck, R breast, R armpit, noticed x3wks ago)    Subjective          Sierra Mao presents to [facility]  History of Present Illness pt known to me for previous acute visit.  Today is new problem.      Reports she noted a right breast mass noted 3 weeks ago  And then noticed axillary lump at same time and became concerned.  Mass is not painful, no nipple discharge.  Has not done regular BSE.  In retrospect she thinks the mass might have been there longer-maybe 6 weeks.     Then 1 week ago she noticed a right neck mass.  No sore throat.  Does have some mild drainage.     Has had a lump in breast previously and had to have bx which was negative.  No follow ups since.      Admit poorly controlled T1DM.     Thinks dads sister had breast cancer but not sure.     Denies fever, chills, unexpected weight changes, sore throat.  Has not noticed sinus drainage.  No cough, dyspnea.     Not had covid vaccine.     Objective   Vital Signs:   /63   Pulse 102   Temp 97.5 °F (36.4 °C)   Ht 180.3 cm (71\")   Wt 74.3 kg (163 lb 11.2 oz)   SpO2 98%   BMI 22.83 kg/m²     Physical Exam  Vitals and nursing note reviewed.   Constitutional:       General: She is not in acute distress.     Appearance: She is well-developed. She is not diaphoretic.   HENT:      Head: Normocephalic and atraumatic.   Eyes:      General:         Right eye: No discharge.         Left eye: No discharge.      Conjunctiva/sclera: Conjunctivae normal.   Cardiovascular:      Rate and Rhythm: Normal rate and regular rhythm.      Heart sounds: Normal heart sounds.   Pulmonary:      Effort: Pulmonary effort is normal.      Breath sounds: Normal breath sounds.   Chest:      Breasts: Breasts are asymmetrical.         Right: Mass present. No nipple discharge, skin change or tenderness.         Left: No mass, nipple discharge, skin change or tenderness.          " Comments: appr 4-5 cm x 1-2 mass at appr 1100 position which is hard and irregular-no erythema    Not able to appreciate right axillary LAD and pt was not able to palpate nodule she previously felt  Abdominal:      General: Bowel sounds are normal.      Palpations: Abdomen is soft.      Tenderness: There is no abdominal tenderness.   Musculoskeletal:         General: No deformity.      Cervical back: Neck supple. No tenderness.      Comments: Gait smooth and steady   Lymphadenopathy:      Cervical: Cervical adenopathy (right anterior cervical) present.      Upper Body:      Right upper body: No supraclavicular, axillary or pectoral adenopathy.      Left upper body: No supraclavicular, axillary or pectoral adenopathy.   Skin:     General: Skin is warm and dry.   Neurological:      Mental Status: She is alert and oriented to person, place, and time.        Result Review :                 Assessment and Plan    Diagnoses and all orders for this visit:    1. Breast mass, right (Primary)  -     Mammo diagnostic digital tomosynthesis bilateral w CAD; Future  -     US breast right limited; Future    2. Lymphadenopathy of head and neck  -     CBC & Differential  -     Comprehensive Metabolic Panel      Have ordered an urgent diagnostic mammo and US to eval breast mass. Reviewed previous and appears she had bx in 2018 showing fibroadenoma.  Pt did not have f/u since.  She is not good historian of timeline for this breast mass.      I think her enlarged lymph node of neck is due to some drainage.  Will just get some baseline labs today.  Discussed monitoring of lymph node in neck and parameters for re-eval.  I have asked her to schedule f/u with PCP in next month or so.  I am hoping to get mammo done next week.           Follow Up   Return if symptoms worsen or fail to improve.  Patient was given instructions and counseling regarding her condition or for health maintenance advice. Please see specific information pulled into  the AVS if appropriate.

## 2021-08-06 DIAGNOSIS — D72.828 CHRONIC NEUTROPHILIA: Primary | ICD-10-CM

## 2021-08-06 LAB
ALBUMIN SERPL-MCNC: 4.4 G/DL (ref 3.5–5.2)
ALBUMIN/GLOB SERPL: 2 G/DL
ALP SERPL-CCNC: 52 U/L (ref 39–117)
ALT SERPL-CCNC: 15 U/L (ref 1–33)
AST SERPL-CCNC: 19 U/L (ref 1–32)
BASOPHILS # BLD AUTO: 0.05 10*3/MM3 (ref 0–0.2)
BASOPHILS NFR BLD AUTO: 0.5 % (ref 0–1.5)
BILIRUB SERPL-MCNC: 0.4 MG/DL (ref 0–1.2)
BUN SERPL-MCNC: 11 MG/DL (ref 6–20)
BUN/CREAT SERPL: 14.1 (ref 7–25)
CALCIUM SERPL-MCNC: 9.5 MG/DL (ref 8.6–10.5)
CHLORIDE SERPL-SCNC: 103 MMOL/L (ref 98–107)
CO2 SERPL-SCNC: 27.6 MMOL/L (ref 22–29)
CREAT SERPL-MCNC: 0.78 MG/DL (ref 0.57–1)
EOSINOPHIL # BLD AUTO: 0.29 10*3/MM3 (ref 0–0.4)
EOSINOPHIL NFR BLD AUTO: 2.8 % (ref 0.3–6.2)
ERYTHROCYTE [DISTWIDTH] IN BLOOD BY AUTOMATED COUNT: 12.6 % (ref 12.3–15.4)
GLOBULIN SER CALC-MCNC: 2.2 GM/DL
GLUCOSE SERPL-MCNC: 272 MG/DL (ref 65–99)
HCT VFR BLD AUTO: 40.7 % (ref 34–46.6)
HGB BLD-MCNC: 13.6 G/DL (ref 12–15.9)
IMM GRANULOCYTES # BLD AUTO: 0.03 10*3/MM3 (ref 0–0.05)
IMM GRANULOCYTES NFR BLD AUTO: 0.3 % (ref 0–0.5)
LYMPHOCYTES # BLD AUTO: 1.56 10*3/MM3 (ref 0.7–3.1)
LYMPHOCYTES NFR BLD AUTO: 14.9 % (ref 19.6–45.3)
MCH RBC QN AUTO: 30.6 PG (ref 26.6–33)
MCHC RBC AUTO-ENTMCNC: 33.4 G/DL (ref 31.5–35.7)
MCV RBC AUTO: 91.5 FL (ref 79–97)
MONOCYTES # BLD AUTO: 0.88 10*3/MM3 (ref 0.1–0.9)
MONOCYTES NFR BLD AUTO: 8.4 % (ref 5–12)
NEUTROPHILS # BLD AUTO: 7.69 10*3/MM3 (ref 1.7–7)
NEUTROPHILS NFR BLD AUTO: 73.1 % (ref 42.7–76)
NRBC BLD AUTO-RTO: 0 /100 WBC (ref 0–0.2)
PLATELET # BLD AUTO: 292 10*3/MM3 (ref 140–450)
POTASSIUM SERPL-SCNC: 4.6 MMOL/L (ref 3.5–5.2)
PROT SERPL-MCNC: 6.6 G/DL (ref 6–8.5)
RBC # BLD AUTO: 4.45 10*6/MM3 (ref 3.77–5.28)
SODIUM SERPL-SCNC: 138 MMOL/L (ref 136–145)
WBC # BLD AUTO: 10.5 10*3/MM3 (ref 3.4–10.8)

## 2021-08-10 ENCOUNTER — TELEPHONE (OUTPATIENT)
Dept: FAMILY MEDICINE CLINIC | Facility: CLINIC | Age: 41
End: 2021-08-10

## 2021-08-10 NOTE — TELEPHONE ENCOUNTER
Pt called returning phone call doesn't know who called but pt is wanting to speak to someone about discussing her lab orders.

## 2021-08-12 ENCOUNTER — APPOINTMENT (OUTPATIENT)
Dept: MAMMOGRAPHY | Facility: HOSPITAL | Age: 41
End: 2021-08-12

## 2021-08-13 LAB
BASOPHILS # BLD AUTO: 0 X10E3/UL (ref 0–0.2)
BASOPHILS NFR BLD AUTO: 0 %
EOSINOPHIL # BLD AUTO: 0.3 X10E3/UL (ref 0–0.4)
EOSINOPHIL NFR BLD AUTO: 4 %
ERYTHROCYTE [DISTWIDTH] IN BLOOD BY AUTOMATED COUNT: 12.3 % (ref 11.7–15.4)
HCT VFR BLD AUTO: 37.3 % (ref 34–46.6)
HGB BLD-MCNC: 12.3 G/DL (ref 11.1–15.9)
IMM GRANULOCYTES # BLD AUTO: 0 X10E3/UL (ref 0–0.1)
IMM GRANULOCYTES NFR BLD AUTO: 0 %
LYMPHOCYTES # BLD AUTO: 1.5 X10E3/UL (ref 0.7–3.1)
LYMPHOCYTES NFR BLD AUTO: 19 %
MCH RBC QN AUTO: 30.7 PG (ref 26.6–33)
MCHC RBC AUTO-ENTMCNC: 33 G/DL (ref 31.5–35.7)
MCV RBC AUTO: 93 FL (ref 79–97)
MONOCYTES # BLD AUTO: 0.5 X10E3/UL (ref 0.1–0.9)
MONOCYTES NFR BLD AUTO: 6 %
NEUTROPHILS # BLD AUTO: 5.5 X10E3/UL (ref 1.4–7)
NEUTROPHILS NFR BLD AUTO: 71 %
PATH INTERP BLD-IMP: NORMAL
PATH REV BLD -IMP: NORMAL
PATHOLOGIST NAME: NORMAL
PLATELET # BLD AUTO: 213 X10E3/UL (ref 150–450)
RBC # BLD AUTO: 4.01 X10E6/UL (ref 3.77–5.28)
WBC # BLD AUTO: 7.8 X10E3/UL (ref 3.4–10.8)

## 2021-08-18 ENCOUNTER — HOSPITAL ENCOUNTER (OUTPATIENT)
Dept: ULTRASOUND IMAGING | Facility: HOSPITAL | Age: 41
Discharge: HOME | End: 2021-08-18

## 2021-08-18 ENCOUNTER — HOSPITAL ENCOUNTER (OUTPATIENT)
Dept: MAMMOGRAPHY | Facility: HOSPITAL | Age: 41
Discharge: HOME | End: 2021-08-18

## 2021-08-18 DIAGNOSIS — N63.10 BREAST MASS, RIGHT: ICD-10-CM

## 2021-08-18 PROCEDURE — G0279 TOMOSYNTHESIS, MAMMO: HCPCS

## 2021-08-18 PROCEDURE — 77066 DX MAMMO INCL CAD BI: CPT

## 2021-08-18 PROCEDURE — 76642 ULTRASOUND BREAST LIMITED: CPT

## 2021-08-19 DIAGNOSIS — N63.0 BREAST MASS: Primary | ICD-10-CM

## 2021-08-19 DIAGNOSIS — R92.8 ABNORMAL MAMMOGRAM: Primary | ICD-10-CM

## 2021-08-21 ENCOUNTER — APPOINTMENT (OUTPATIENT)
Dept: MRI IMAGING | Facility: HOSPITAL | Age: 41
End: 2021-08-21

## 2021-08-23 DIAGNOSIS — F41.9 ANXIETY: ICD-10-CM

## 2021-08-24 RX ORDER — CITALOPRAM 40 MG/1
40 TABLET ORAL DAILY
Qty: 90 TABLET | Refills: 0 | Status: SHIPPED | OUTPATIENT
Start: 2021-08-24 | End: 2021-12-06 | Stop reason: SDUPTHER

## 2021-09-07 ENCOUNTER — HOSPITAL ENCOUNTER (OUTPATIENT)
Dept: ULTRASOUND IMAGING | Facility: HOSPITAL | Age: 41
Discharge: HOME | End: 2021-09-07

## 2021-09-07 ENCOUNTER — HOSPITAL ENCOUNTER (OUTPATIENT)
Dept: MAMMOGRAPHY | Facility: HOSPITAL | Age: 41
Discharge: HOME | End: 2021-09-07

## 2021-09-07 VITALS
DIASTOLIC BLOOD PRESSURE: 54 MMHG | OXYGEN SATURATION: 96 % | HEART RATE: 73 BPM | RESPIRATION RATE: 16 BRPM | SYSTOLIC BLOOD PRESSURE: 107 MMHG

## 2021-09-07 VITALS
SYSTOLIC BLOOD PRESSURE: 98 MMHG | RESPIRATION RATE: 16 BRPM | BODY MASS INDEX: 22.26 KG/M2 | WEIGHT: 159 LBS | OXYGEN SATURATION: 99 % | HEIGHT: 71 IN | DIASTOLIC BLOOD PRESSURE: 64 MMHG | TEMPERATURE: 98.2 F | HEART RATE: 68 BPM

## 2021-09-07 DIAGNOSIS — R59.0 ENLARGED LYMPH NODES IN ARMPIT: ICD-10-CM

## 2021-09-07 DIAGNOSIS — N63.0 BREAST MASS: ICD-10-CM

## 2021-09-07 DIAGNOSIS — Z98.890 STATUS POST BIOPSY: ICD-10-CM

## 2021-09-07 PROCEDURE — 77065 DX MAMMO INCL CAD UNI: CPT

## 2021-09-07 PROCEDURE — 76942 ECHO GUIDE FOR BIOPSY: CPT

## 2021-09-07 PROCEDURE — 25010000003 LIDOCAINE 1 % SOLUTION: Performed by: RADIOLOGY

## 2021-09-07 PROCEDURE — 88360 TUMOR IMMUNOHISTOCHEM/MANUAL: CPT | Performed by: STUDENT IN AN ORGANIZED HEALTH CARE EDUCATION/TRAINING PROGRAM

## 2021-09-07 PROCEDURE — 88342 IMHCHEM/IMCYTCHM 1ST ANTB: CPT | Performed by: STUDENT IN AN ORGANIZED HEALTH CARE EDUCATION/TRAINING PROGRAM

## 2021-09-07 PROCEDURE — 88341 IMHCHEM/IMCYTCHM EA ADD ANTB: CPT | Performed by: STUDENT IN AN ORGANIZED HEALTH CARE EDUCATION/TRAINING PROGRAM

## 2021-09-07 PROCEDURE — 25010000003 LIDOCAINE 1 % SOLUTION: Performed by: STUDENT IN AN ORGANIZED HEALTH CARE EDUCATION/TRAINING PROGRAM

## 2021-09-07 PROCEDURE — A4648 IMPLANTABLE TISSUE MARKER: HCPCS

## 2021-09-07 PROCEDURE — 88305 TISSUE EXAM BY PATHOLOGIST: CPT | Performed by: STUDENT IN AN ORGANIZED HEALTH CARE EDUCATION/TRAINING PROGRAM

## 2021-09-07 RX ORDER — LIDOCAINE HYDROCHLORIDE AND EPINEPHRINE 10; 10 MG/ML; UG/ML
20 INJECTION, SOLUTION INFILTRATION; PERINEURAL ONCE
Status: COMPLETED | OUTPATIENT
Start: 2021-09-07 | End: 2021-09-07

## 2021-09-07 RX ORDER — LIDOCAINE HYDROCHLORIDE 10 MG/ML
1 INJECTION, SOLUTION INFILTRATION; PERINEURAL ONCE
Status: COMPLETED | OUTPATIENT
Start: 2021-09-07 | End: 2021-09-07

## 2021-09-07 RX ORDER — LIDOCAINE HYDROCHLORIDE AND EPINEPHRINE 10; 10 MG/ML; UG/ML
10 INJECTION, SOLUTION INFILTRATION; PERINEURAL ONCE
Status: COMPLETED | OUTPATIENT
Start: 2021-09-07 | End: 2021-09-07

## 2021-09-07 RX ORDER — LIDOCAINE HYDROCHLORIDE 10 MG/ML
10 INJECTION, SOLUTION INFILTRATION; PERINEURAL ONCE
Status: COMPLETED | OUTPATIENT
Start: 2021-09-07 | End: 2021-09-07

## 2021-09-07 RX ADMIN — LIDOCAINE HYDROCHLORIDE,EPINEPHRINE BITARTRATE 3 ML: 10; .01 INJECTION, SOLUTION INFILTRATION; PERINEURAL at 14:30

## 2021-09-07 RX ADMIN — LIDOCAINE HYDROCHLORIDE 6 ML: 10 INJECTION, SOLUTION INFILTRATION; PERINEURAL at 14:05

## 2021-09-07 RX ADMIN — LIDOCAINE HYDROCHLORIDE,EPINEPHRINE BITARTRATE 20 ML: 10; .01 INJECTION, SOLUTION INFILTRATION; PERINEURAL at 14:05

## 2021-09-07 RX ADMIN — LIDOCAINE HYDROCHLORIDE 7 ML: 10; .005 INJECTION, SOLUTION EPIDURAL; INFILTRATION; INTRACAUDAL; PERINEURAL at 13:04

## 2021-09-07 RX ADMIN — LIDOCAINE HYDROCHLORIDE 1 ML: 10 INJECTION, SOLUTION INFILTRATION; PERINEURAL at 13:03

## 2021-09-07 NOTE — NURSING NOTE
Patient returned from clip placement. Post clip placement mammo. Completed. Discharge instructions discussed with understanding voiced by patient. Copies provided to patient. No distress noted. To home via private vehicle.

## 2021-09-07 NOTE — NURSING NOTE
Ultrasound biopsy site x1 to outer left breast clear with Exofin dry and intact. No firmness or swelling noted at or around biopsy site. Ultrasound biopsy site x1 to outer right breast and x1 to right axilla with Exofin dry & intact to each site. No new fimness or swelling noted at/around either site. Stereotactic biopsy site to left mid upper breast remains unchanged. Denies pain. Ice packs with protective coverings applied to biopsy sites. Returned to Ultrasound Dept. For clip placement to right ultrasound biopsy site as initial clip at time of biopsy didn't deploy.

## 2021-09-07 NOTE — NURSING NOTE
Biopsy site to left mid upper breast clear with Dermabond dry and intact. No firmness or swelling noted at or around biopsy site. Denies pain. Awaiting Ultrasound biopsy. Ultrasound aware.

## 2021-09-09 NOTE — PROGRESS NOTES
General Surgery History and Physical Exam     Summary:    41 y.o. lady with a new diagnosis of right breast invasive ductal carcinoma (11:00, 1 cm from the nipple), poorly differentiated, grade 3 with positive right axillary lymph node for metastatic mammary carcinoma. Ki-67 65%. ER low positive (1-10%), AL-, her2-; essentially TNBC.     A multidisciplinary plan has been formulated for the patient:    (1) Breast Surgical Oncology:  -Follow up [employer] 9 panel genetic testing. I will call her with results.   -MRI to evaluate anatomy as well as for surgical planning; scheduled for 9/23/2021.   -Staging scans ordered including CT neck to further evaluate right supraclavicular adenopathy.   -Nurse navigator consult.   -Surgical plan: port placement in next 1-2 weeks for initiation of neoadjuvant chemotherapy.     (2) Medical Oncology:  -Refer to Dr. De La O. ECHO ordered.     (3) Radiation Oncology:  -Will refer postoperatively for evaluation for radiation therapy as needed.    Referring Provider: No ref. provider found    Chief Complaint: breast mass    History of Present Illness: Ms. Sierra Mao is a 41 y.o. year old lady, seen at the request of No ref. provider found for a new diagnosis of right breast cancer.      This was initially detected as an imaging abnormality. She has had 1 prior mammograms in 2018 for a mass in the similar area as current. She had a biopsy at that time that was benign. Her work-up is detailed in the oncologic history below.     She denies any other breast lumps, pain, skin changes, or nipple discharge. She complains of knots over her right clavicle and in her right axilla. She denies any family history of breast cancer. She believes her grandmother may have had ovarian cancer.    Workup of Current Diagnosis:    8/18/2021 Diagnostic Mammogram with Ultrasound:   A triangular skin marker overlies the area of clinical concern in the upper-outer quadrant of the right breast. There is an area  of asymmetric increased density that has developed in the middle third upper-outer quadrant of the right breast when compared to the prior examination. Additionally, there are pleomorphic microcalcifications in the posterior one third upper outer quadrant of the right breast in the axillary tail region that have developed since the prior examination. There are also microcalcifications seen in the middle third and anterior one third of the left breast at the 12 o'clock position that has developed since the prior examination. The callus cases in the middle third at the 12 o'clock position are in a cluster whereas those in the anterior one third are more loosely grouped and have a punctate monomorphic appearance. No architectural distortion in either breast is appreciated. There is no evidence for nipple retraction or skin thickening.  In the right breast at the site of palpable concern which corresponds to the 11 o'clock position on the order of a centimeter from the nipple there is an irregular hypoechoic mass that measures on the order of 4 cm in greatest dimension.  In the right breast at the 10 o'clock position on the order of 6 cm from the nipple there is an irregular heterogenous mass that measures on the order of 1.6 cm in greatest dimension.  In the right breast at the 10 o'clock position on the order of 8 cm from the nipple there is an irregular heterogenous hypoechoic lesion with internal echogenic foci that corresponds to the location of the calcifications seen mammographically. The lesion measures 1.8 cm in greatest dimension.  There are multiple right axillary lymph nodes that demonstrate thickened cortices and rounded morphology, the largest of which measures on the order of 2 cm in greatest dimension.    In the left breast at the 2 o'clock position on the order of 6 cm from the nipple there is a 1.0 cm heterogenous hypoechoic masslike region.   Impression:   1. Three irregular heterogenous hypoechoic  masses are seen in the right breast at the 10 o'clock and 11 o'clock positions. This is seen in conjunction with abnormal appearing right axillary lymph nodes. Correlation with an ultrasound-guided biopsy of 2 of the lesions, namely  one at the 11 o'clock axis that measures on the order 4 cm in greatest dimension and corresponds to the site of palpable concern as well as the lesion at the 10 o'clock axis in the axillary tail that corresponds to location of the microcalcifications is recommended. Also,  ultrasound-guided biopsy of one of the abnormal appearing right axillary lymph nodes is recommended.  2. There is a 1 cm heterogenous hypoechoic lesion in the left breast at the 2 o'clock position on the order of 6 cm from the nipple. Correlation with an ultrasound-guided left breast biopsy is recommended.  3. There are microcalcifications seen in the middle third of the left breast at the 12 o'clock position. Correlation with a stereotactic left breast biopsy of the calcifications is recommended.  BI-RADS Category 5: Highly suggestive of malignancy. Appropriate action should be taken.    9/7/2021 Pathology:   Final Diagnosis   1. Right Breast, 11:00, 1 cm FN, U/S-Guided Core Needle Biopsy for a Mass:               A. INVASIVE DUCTAL CARCINOMA, Poorly differentiated; [place] Histologic Grade III/III       (tubule score = 3, nuclear score = 3, mitoses score = 2), measuring at least 13 mm.                B. Negative for in situ carcinoma in this sample.               C. Focus suspicious for lymphovascular space invasion.               D. See Biomarker Template #1 and Comment.     2. Lymph Node, Right Axilla, U/S-Guided Core Needle Biopsy:               A. METASTATIC MAMMARY CARCINOMA.               B. Metastatic focus measures 9 mm.               C. Negative for extranodal extension in this sample.               D. See Biomarker Template #2 and Comment.     3. Left Breast, 2:00, 6 cm FN, U/S-Guided Core Needle Biopsy  for a Mass:               A. Cluster of apocrine cysts.                 4. Left Breast, 12:00, Stereotactic-Guided Core Needle Biopsy for Calcifications:                 A. Apocrine cysts with polarizable calcium oxylate crystals.     Gynecologic History:   . P:1 AB:0  Age at first childbirth: 24  Lactation/How lon  Age at menarche: 14  Age at menopause: Not applicable  Total years of oral contraceptive use: 20 years  Total years of hormone replacement therapy: None    Past Medical History:   • Type I DM    Past Surgical History:    • Appendectomy    Family History:    • As above    Social History:  • Denies tobacco use  • Occasional alcohol use    Allergies:   No Known Allergies    Medications:      Current Outpatient Medications:   •  citalopram (CeleXA) 40 MG tablet, Take 1 tablet by mouth Daily., Disp: 90 tablet, Rfl: 0  •  Insulin Glargine (BASAGLAR KWIKPEN) 100 UNIT/ML injection pen, Inject 40 Units under the skin into the appropriate area as directed Every Night., Disp: , Rfl:   •  Insulin Pen Needle (BD Pen Needle Kaley U/F) 32G X 4 MM misc, Inject 1 each under the skin into the appropriate area as directed 4 (Four) Times a Day., Disp: , Rfl:   •  NovoLOG FlexPen 100 UNIT/ML solution pen-injector sc pen, Inject  under the skin into the appropriate area as directed As Needed. Sliding scale as needed., Disp: , Rfl:     Laboratory Values:    Labs from 2021 reviewed    Review of Systems:   Influenza-like illness: no fever, no  cough, no  sore throat, no  body aches, no loss of sense of taste or smell, no known exposure to person with Covid-19.  Constitutional: Negative for fevers or chills  HENT: Negative for hearing loss or runny nose  Eyes: Negative for vision changes or scleral icterus  Respiratory: Negative for cough or shortness of breath  Cardiovascular: Negative for chest pain or heart palpitations  Gastrointestinal: Negative for abdominal pain, nausea, vomiting, constipation, melena, or  hematochezia  Genitourinary: Negative for hematuria or dysuria  Musculoskeletal: Negative for joint swelling or gait instability  Neurologic: Negative for tremors or seizures  Psychiatric: Negative for suicidal ideations or depression  All other systems reviewed and negative    Physical Exam:   • ECO - Asymptomatic  • Constitutional: Well-developed well-nourished, no acute distress  • Eyes: Conjunctiva normal, sclera nonicteric  • ENMT: Hearing grossly normal, oral mucosa moist  • Neck: Supple, no palpable mass, trachea midline  • Respiratory: Clear to auscultation, normal inspiratory effort  • Cardiovascular: Regular rate, no peripheral edema, no jugular venous distention  • Breast: symmetric  o Right: No visible abnormalities on inspection while seated, with arms raised or hands on hips. No skin changes, or nipple abnormalities. Palpable mass in the upper outer quadrant of the right breast, palpable right axillary adenopathy. Palpable right supraclavicular adenopathy; firm nodules.  o Left: No visible abnormalities on inspection while seated, with arms raised or hands on hips. No masses, skin changes, or nipple abnormalities.  o Biopsy site appreciated in right and left breast, otherwise no skin changes.   o No clinical chest wall involvement.  • Gastrointestinal: Soft, nontender  • Skin:  Warm, dry, no rash on visualized skin surfaces  • Musculoskeletal: Symmetric strength, normal gait  • Psychiatric: Alert and oriented ×3, normal affect     Discussion:  I had an extensive discussion with the patient and her family about the nature of her breast cancer diagnosis. We reviewed the components of breast tissue including ducts and lobules. We reviewed her pathology report in detail. We reviewed breast cancer histology, including stage, grade, ER/PA receptors, HER2 receptors and how this applies to her diagnosis. We reviewed the basics of systemic and local/regional management of breast cancer.     We discussed  that most breast cancer is not hereditary, however given her age, this may play a role in her case. I believe genetic testing is warranted and could affect surgical decision making.      We discussed with triple negative cancer, standard chemotherapy is the only option for systemic treatment. We also discussed, that given her stage, chemotherapy is recommended and can occur either before or after surgery.     We discussed that in her case, systemic treatment would involve endocrine therapy and possibly chemotherapy. She will be referred to medical oncology postoperatively to discuss this further.     NIMA JOHNSON M.D.  General and Endoscopic Surgery  [facility]    [address], [address]  [place], [place], [address]  P: [phone]  F: [phone]

## 2021-09-13 ENCOUNTER — PREP FOR SURGERY (OUTPATIENT)
Dept: OTHER | Facility: HOSPITAL | Age: 41
End: 2021-09-13

## 2021-09-13 ENCOUNTER — OFFICE VISIT (OUTPATIENT)
Dept: SURGERY | Facility: CLINIC | Age: 41
End: 2021-09-13

## 2021-09-13 VITALS — HEIGHT: 71 IN | BODY MASS INDEX: 22.54 KG/M2 | WEIGHT: 161 LBS

## 2021-09-13 DIAGNOSIS — Z17.1 MALIGNANT NEOPLASM OF CENTRAL PORTION OF RIGHT BREAST IN FEMALE, ESTROGEN RECEPTOR NEGATIVE (HCC): Primary | ICD-10-CM

## 2021-09-13 DIAGNOSIS — C50.111 MALIGNANT NEOPLASM OF CENTRAL PORTION OF RIGHT BREAST IN FEMALE, ESTROGEN RECEPTOR NEGATIVE (HCC): Primary | ICD-10-CM

## 2021-09-13 PROCEDURE — 99204 OFFICE O/P NEW MOD 45 MIN: CPT | Performed by: STUDENT IN AN ORGANIZED HEALTH CARE EDUCATION/TRAINING PROGRAM

## 2021-09-16 ENCOUNTER — TELEPHONE (OUTPATIENT)
Dept: ONCOLOGY | Facility: CLINIC | Age: 41
End: 2021-09-16

## 2021-09-16 NOTE — TELEPHONE ENCOUNTER
I received a referral from Dr. Price.    I called and left a message for the patient introducing myself and requested a return call.

## 2021-09-17 ENCOUNTER — APPOINTMENT (OUTPATIENT)
Dept: CARDIOLOGY | Facility: HOSPITAL | Age: 41
End: 2021-09-17

## 2021-09-20 ENCOUNTER — TELEPHONE (OUTPATIENT)
Dept: SURGERY | Facility: CLINIC | Age: 41
End: 2021-09-20

## 2021-09-21 ENCOUNTER — TELEPHONE (OUTPATIENT)
Dept: SURGERY | Facility: CLINIC | Age: 41
End: 2021-09-21

## 2021-09-21 NOTE — TELEPHONE ENCOUNTER
Patient called to report severe pain under her right arm s/p rt breast & lymph node biopsy on 9/7. This started 2 days ago but she initially had pain in the right breast. She describes the pain as stabbing/throbbing and that it is worse when she lays down. Pt states she has tried taking ibuprofen but has not had much relief. She wanted to know what could be causing this and asked if it is normal. Please advise.

## 2021-09-22 ENCOUNTER — APPOINTMENT (OUTPATIENT)
Dept: NUCLEAR MEDICINE | Facility: HOSPITAL | Age: 41
End: 2021-09-22

## 2021-09-22 ENCOUNTER — APPOINTMENT (OUTPATIENT)
Dept: CT IMAGING | Facility: HOSPITAL | Age: 41
End: 2021-09-22

## 2021-09-23 ENCOUNTER — HOSPITAL ENCOUNTER (OUTPATIENT)
Dept: CT IMAGING | Facility: HOSPITAL | Age: 41
Discharge: HOME | End: 2021-09-23

## 2021-09-23 ENCOUNTER — HOSPITAL ENCOUNTER (OUTPATIENT)
Dept: MRI IMAGING | Facility: HOSPITAL | Age: 41
Discharge: HOME | End: 2021-09-23

## 2021-09-23 DIAGNOSIS — C50.111 MALIGNANT NEOPLASM OF CENTRAL PORTION OF RIGHT BREAST IN FEMALE, ESTROGEN RECEPTOR NEGATIVE (HCC): ICD-10-CM

## 2021-09-23 DIAGNOSIS — N63.0 BREAST MASS: ICD-10-CM

## 2021-09-23 DIAGNOSIS — Z17.1 MALIGNANT NEOPLASM OF CENTRAL PORTION OF RIGHT BREAST IN FEMALE, ESTROGEN RECEPTOR NEGATIVE (HCC): ICD-10-CM

## 2021-09-23 LAB — CREAT BLDA-MCNC: 0.6 MG/DL (ref 0.6–1.3)

## 2021-09-23 PROCEDURE — A9577 INJ MULTIHANCE: HCPCS | Performed by: STUDENT IN AN ORGANIZED HEALTH CARE EDUCATION/TRAINING PROGRAM

## 2021-09-23 PROCEDURE — 77049 MRI BREAST C-+ W/CAD BI: CPT

## 2021-09-23 PROCEDURE — 25010000002 IOPAMIDOL 61 % SOLUTION: Performed by: STUDENT IN AN ORGANIZED HEALTH CARE EDUCATION/TRAINING PROGRAM

## 2021-09-23 PROCEDURE — 0 GADOBENATE DIMEGLUMINE 529 MG/ML SOLUTION: Performed by: STUDENT IN AN ORGANIZED HEALTH CARE EDUCATION/TRAINING PROGRAM

## 2021-09-23 PROCEDURE — 82565 ASSAY OF CREATININE: CPT

## 2021-09-23 PROCEDURE — 0 DIATRIZOATE MEGLUMINE & SODIUM PER 1 ML: Performed by: STUDENT IN AN ORGANIZED HEALTH CARE EDUCATION/TRAINING PROGRAM

## 2021-09-23 PROCEDURE — 70491 CT SOFT TISSUE NECK W/DYE: CPT

## 2021-09-23 PROCEDURE — 74177 CT ABD & PELVIS W/CONTRAST: CPT

## 2021-09-23 PROCEDURE — 71260 CT THORAX DX C+: CPT

## 2021-09-23 RX ADMIN — IOPAMIDOL 85 ML: 612 INJECTION, SOLUTION INTRAVENOUS at 13:04

## 2021-09-23 RX ADMIN — GADOBENATE DIMEGLUMINE 15 ML: 529 INJECTION, SOLUTION INTRAVENOUS at 20:07

## 2021-09-23 RX ADMIN — DIATRIZOATE MEGLUMINE AND DIATRIZOATE SODIUM 30 ML: 600; 100 SOLUTION ORAL; RECTAL at 12:03

## 2021-09-24 ENCOUNTER — HOSPITAL ENCOUNTER (OUTPATIENT)
Dept: NUCLEAR MEDICINE | Facility: HOSPITAL | Age: 41
Discharge: HOME | End: 2021-09-24

## 2021-09-24 DIAGNOSIS — C50.111 MALIGNANT NEOPLASM OF CENTRAL PORTION OF RIGHT BREAST IN FEMALE, ESTROGEN RECEPTOR NEGATIVE (HCC): ICD-10-CM

## 2021-09-24 DIAGNOSIS — Z17.1 MALIGNANT NEOPLASM OF CENTRAL PORTION OF RIGHT BREAST IN FEMALE, ESTROGEN RECEPTOR NEGATIVE (HCC): ICD-10-CM

## 2021-09-24 LAB — CREAT BLDA-MCNC: 0.7 MG/DL (ref 0.6–1.3)

## 2021-09-24 PROCEDURE — 78306 BONE IMAGING WHOLE BODY: CPT

## 2021-09-24 PROCEDURE — 0 TECHNETIUM MEDRONATE KIT: Performed by: STUDENT IN AN ORGANIZED HEALTH CARE EDUCATION/TRAINING PROGRAM

## 2021-09-24 PROCEDURE — A9503 TC99M MEDRONATE: HCPCS | Performed by: STUDENT IN AN ORGANIZED HEALTH CARE EDUCATION/TRAINING PROGRAM

## 2021-09-24 RX ORDER — TC 99M MEDRONATE 20 MG/10ML
21.8 INJECTION, POWDER, LYOPHILIZED, FOR SOLUTION INTRAVENOUS
Status: COMPLETED | OUTPATIENT
Start: 2021-09-24 | End: 2021-09-24

## 2021-09-24 RX ADMIN — Medication 21.8 MILLICURIE: at 10:56

## 2021-09-27 ENCOUNTER — CONSULT (OUTPATIENT)
Dept: ONCOLOGY | Facility: CLINIC | Age: 41
End: 2021-09-27

## 2021-09-27 ENCOUNTER — LAB (OUTPATIENT)
Dept: LAB | Facility: HOSPITAL | Age: 41
End: 2021-09-27

## 2021-09-27 VITALS
HEIGHT: 71 IN | HEART RATE: 83 BPM | OXYGEN SATURATION: 95 % | TEMPERATURE: 98.6 F | SYSTOLIC BLOOD PRESSURE: 132 MMHG | RESPIRATION RATE: 18 BRPM | WEIGHT: 157.6 LBS | BODY MASS INDEX: 22.06 KG/M2 | DIASTOLIC BLOOD PRESSURE: 76 MMHG

## 2021-09-27 DIAGNOSIS — R59.1 LYMPHADENOPATHY OF HEAD AND NECK: ICD-10-CM

## 2021-09-27 DIAGNOSIS — Z17.1 MALIGNANT NEOPLASM OF CENTRAL PORTION OF RIGHT BREAST IN FEMALE, ESTROGEN RECEPTOR NEGATIVE (HCC): ICD-10-CM

## 2021-09-27 DIAGNOSIS — C50.111 MALIGNANT NEOPLASM OF CENTRAL PORTION OF RIGHT BREAST IN FEMALE, ESTROGEN RECEPTOR NEGATIVE (HCC): ICD-10-CM

## 2021-09-27 DIAGNOSIS — Z17.1 MALIGNANT NEOPLASM OF CENTRAL PORTION OF RIGHT BREAST IN FEMALE, ESTROGEN RECEPTOR NEGATIVE (HCC): Primary | ICD-10-CM

## 2021-09-27 DIAGNOSIS — E10.9 TYPE 1 DIABETES MELLITUS WITHOUT COMPLICATION (HCC): ICD-10-CM

## 2021-09-27 DIAGNOSIS — C50.111 MALIGNANT NEOPLASM OF CENTRAL PORTION OF RIGHT BREAST IN FEMALE, ESTROGEN RECEPTOR NEGATIVE (HCC): Primary | ICD-10-CM

## 2021-09-27 LAB
ALBUMIN SERPL-MCNC: 4.3 G/DL (ref 3.5–5.2)
ALBUMIN/GLOB SERPL: 1.6 G/DL (ref 1.1–2.4)
ALP SERPL-CCNC: 53 U/L (ref 38–116)
ALT SERPL W P-5'-P-CCNC: 11 U/L (ref 0–33)
ANION GAP SERPL CALCULATED.3IONS-SCNC: 12.1 MMOL/L (ref 5–15)
AST SERPL-CCNC: 15 U/L (ref 0–32)
BASOPHILS # BLD AUTO: 0.03 10*3/MM3 (ref 0–0.2)
BASOPHILS NFR BLD AUTO: 0.3 % (ref 0–1.5)
BILIRUB SERPL-MCNC: 0.3 MG/DL (ref 0.2–1.2)
BUN SERPL-MCNC: 15 MG/DL (ref 6–20)
BUN/CREAT SERPL: 23.4 (ref 7.3–30)
CALCIUM SPEC-SCNC: 9.5 MG/DL (ref 8.5–10.2)
CHLORIDE SERPL-SCNC: 98 MMOL/L (ref 98–107)
CO2 SERPL-SCNC: 26.9 MMOL/L (ref 22–29)
CREAT SERPL-MCNC: 0.64 MG/DL (ref 0.6–1.1)
DEPRECATED RDW RBC AUTO: 46.1 FL (ref 37–54)
EOSINOPHIL # BLD AUTO: 0.36 10*3/MM3 (ref 0–0.4)
EOSINOPHIL NFR BLD AUTO: 4.1 % (ref 0.3–6.2)
ERYTHROCYTE [DISTWIDTH] IN BLOOD BY AUTOMATED COUNT: 13.6 % (ref 12.3–15.4)
GFR SERPL CREATININE-BSD FRML MDRD: 103 ML/MIN/1.73
GLOBULIN UR ELPH-MCNC: 2.7 GM/DL (ref 1.8–3.5)
GLUCOSE SERPL-MCNC: 350 MG/DL (ref 74–124)
HCT VFR BLD AUTO: 41.3 % (ref 34–46.6)
HGB BLD-MCNC: 13.5 G/DL (ref 12–15.9)
IMM GRANULOCYTES # BLD AUTO: 0.04 10*3/MM3 (ref 0–0.05)
IMM GRANULOCYTES NFR BLD AUTO: 0.5 % (ref 0–0.5)
LYMPHOCYTES # BLD AUTO: 1.59 10*3/MM3 (ref 0.7–3.1)
LYMPHOCYTES NFR BLD AUTO: 17.9 % (ref 19.6–45.3)
MCH RBC QN AUTO: 30.3 PG (ref 26.6–33)
MCHC RBC AUTO-ENTMCNC: 32.7 G/DL (ref 31.5–35.7)
MCV RBC AUTO: 92.6 FL (ref 79–97)
MONOCYTES # BLD AUTO: 0.64 10*3/MM3 (ref 0.1–0.9)
MONOCYTES NFR BLD AUTO: 7.2 % (ref 5–12)
NEUTROPHILS NFR BLD AUTO: 6.2 10*3/MM3 (ref 1.7–7)
NEUTROPHILS NFR BLD AUTO: 70 % (ref 42.7–76)
NRBC BLD AUTO-RTO: 0 /100 WBC (ref 0–0.2)
PLATELET # BLD AUTO: 293 10*3/MM3 (ref 140–450)
PMV BLD AUTO: 9.5 FL (ref 6–12)
POTASSIUM SERPL-SCNC: 4.4 MMOL/L (ref 3.5–4.7)
PROT SERPL-MCNC: 7 G/DL (ref 6.3–8)
RBC # BLD AUTO: 4.46 10*6/MM3 (ref 3.77–5.28)
SODIUM SERPL-SCNC: 137 MMOL/L (ref 134–145)
WBC # BLD AUTO: 8.86 10*3/MM3 (ref 3.4–10.8)

## 2021-09-27 PROCEDURE — 99205 OFFICE O/P NEW HI 60 MIN: CPT | Performed by: INTERNAL MEDICINE

## 2021-09-27 PROCEDURE — 85025 COMPLETE CBC W/AUTO DIFF WBC: CPT

## 2021-09-27 PROCEDURE — 99417 PROLNG OP E/M EACH 15 MIN: CPT | Performed by: INTERNAL MEDICINE

## 2021-09-27 PROCEDURE — 80053 COMPREHEN METABOLIC PANEL: CPT

## 2021-09-27 PROCEDURE — 36415 COLL VENOUS BLD VENIPUNCTURE: CPT

## 2021-09-27 NOTE — PROGRESS NOTES
Subjective   Sierra Mao is a 41 y.o. female. Referred by Dr. Price for right breast invasive ductal carcinoma triple negative    History of Present Illness     Ms. Mao is a 41-year-old Premenopausal  lady who palpated a mass in her right breast. She had a mass in a similar area in 2018 which was biopsied and benign. Further evaluation was performed.    08/18/2021-bilateral diagnostic mammogram-parenchyma is heterogeneously dense. In the palpable area of concern in the upper outer quadrant of the right breast there is an area of asymmetry with pleomorphic microcalcifications in the posterior one third of the upper outer quadrant of the right breast in the axillary region. There is also microcalcifications in the middle one third and anterior one third of the left breast at 12 o'clock position which are new. Architectural distortion in either breast.    Ultrasound-  Right breast at the site of palpable concern, at 11 o'clock, 1 cm from the nipple there is an irregular hypoechoic mass which measures up to 4 cm.  10 o'clock, 6 cm from the nipple there is an irregular mass which measures up to 1.6 cm  At 10 o'clock, 8 cm from the nipple there is a irregular mass which measures 1.8 cm.  Multiple right axillary lymph nodes demonstrate thickened cortices and rounded morphology largest of which measures 2 cm.  Left breast at 2 o'clock, 6 cm from the nipple there is a 1 cm heterogeneous hypoechoic masslike region.    09/07/2021-  1.right breast 11 o'clock, 1 cm from the nipple-invasive ductal carcinoma, poorly differentiated, grade 3, ER low +1 to 10%, weak, OK negative less than 1%, HER-2 negative.  2.right axillary lymph node-metastatic mammary carcinoma measuring 9 mm. ER negative, OK +5% three, HER-2 negative  3.left breast 2 o'clock, 6 cm from the nipple-cluster of apocrine cyst  4.left breast 12 o'clock-apocrine cyst with polarizable calcium oxalate crystals.    Bilateral breast MRI  09/23/20213060-crsgky-braifr malignancy of the right breast occupying the middle and the posterior one thirds from 11 o'clock to 1 o'clock position and measuring up to 7.1 cm in greatest dimension. The lesion abuts the pectoralis fascia but there is no evidence of direct invasion. There are portions of the lesion that extended to the subpatellar soft tissues but there is no abnormal enhancement of the skin or skin thickening.  Bulky right axillary lymphadenopathy noted.  2.4 cm irregular mass in the lower outer quadrant of the right breast centered at 8 o'clock position which is separate from the known malignancy suspicious for additional malignancy.  Ultrasound and biopsy of this lesion recommended.  No findings suspicious for malignancy in the left breast.    09/23/2021-CT of the chest abdomen and pelvis and soft tissue neck-multiple mild to moderately enlarged subpectoral and right axillary lymph nodes consistent with metastatic disease. The subpectoral lymphadenopathy extends into the right supraclavicular region as well. There is no evidence of metastatic disease elsewhere within the neck. No evidence of metastatic disease in the chest abdomen or pelvis. 5 cm right ovarian cyst is noted. Suggest follow-up ultrasound in 6 weeks for further evaluation.    09/24/2021-bone scan without any evidence of metastatic disease.    Mr. Mao reports that she initially felt good right breast mass in May 2021.  Starting July 2021 she had palpated something abnormal in her right neck.  Since noticing the mass in May 2021 she feels like the mass in the right breast has doubled in size.  She also feels like the right axillary lymph nodes have enlarged in size.    She had a 70 pound weight loss in 2019 due to poorly controlled diabetes.  She was initially diagnosed with a type 2 diabetes and subsequently determined to be type I and switch to insulin.  She had admission to the hospital due to DKA.    She is unvaccinated for COVID-19.   She previously worked as a  however currently not working.    Family history significant for ovarian cancer in grandmother and bladder cancer in her father.    The following portions of the patient's history were reviewed and updated as appropriate: allergies, current medications, past family history, past medical history, past social history, past surgical history and problem list.    Past Medical History:   Diagnosis Date   • Anxiety    • Arthritis    • Breast cancer (CMS/HCC) 2021    Right breast invasive ductal carcinoma ER low positive, SC negative, POY5mhz negative, mercedes to lymph node (biopsy positive)   • Chronic fatigue    • Hyperlipidemia    • Leukocytosis    • Menorrhagia with regular cycle    • Seasonal allergies    • Type I diabetes mellitus (CMS/[facility])    • Vitamin D deficiency         Past Surgical History:   Procedure Laterality Date   • APPENDECTOMY N/A    • BREAST BIOPSY Right 2018   • BREAST BIOPSY Bilateral 2021   • US GUIDED LYMPH NODE BIOPSY  2021    Dr. Carol Terrazas, [facility]        Family History   Problem Relation Age of Onset   • Diabetes Mother    • Cervical cancer Maternal Grandmother         cervical/uterine    • Diabetes Maternal Grandfather    • Lymphoma Paternal Aunt 60   • Breast cancer Neg Hx         Social History     Socioeconomic History   • Marital status: Single     Spouse name: Not on file   • Number of children: Not on file   • Years of education: Not on file   • Highest education level: Not on file   Tobacco Use   • Smoking status: Former Smoker     Packs/day: 0.50     Years: 8.00     Pack years: 4.00     Types: Cigarettes     Start date: 1995     Quit date: 2003     Years since quittin.7   • Smokeless tobacco: Never Used   • Tobacco comment: teen / young adult   Vaping Use   • Vaping Use: Never used   Substance and Sexual Activity   • Alcohol use: Yes     Alcohol/week: 1.0 standard drinks     Types: 1 Standard drinks or  equivalent per week     Comment: social   • Drug use: No   • Sexual activity: Yes     Partners: Male     Birth control/protection: None     Comment:         OB History        1    Para   1    Term   1            AB        Living           SAB        TAB        Ectopic        Molar        Multiple        Live Births                 Age of menarche-14  Age at first live childbirth-24   one para one  zero  Oral contraceptive pill use for 20 years  She is premenopausal    No Known Allergies         Review of Systems      Objective   not currently breastfeeding.   Physical Exam  Vitals reviewed.   Constitutional:       Appearance: Normal appearance. She is normal weight.   HENT:      Head: Normocephalic and atraumatic.      Right Ear: External ear normal.      Left Ear: External ear normal.      Nose: Nose normal.      Mouth/Throat:      Mouth: Mucous membranes are moist.      Pharynx: Oropharynx is clear.   Eyes:      Extraocular Movements: Extraocular movements intact.      Conjunctiva/sclera: Conjunctivae normal.      Pupils: Pupils are equal, round, and reactive to light.   Cardiovascular:      Rate and Rhythm: Normal rate and regular rhythm.      Pulses: Normal pulses.      Heart sounds: Normal heart sounds.   Pulmonary:      Effort: Pulmonary effort is normal.      Breath sounds: Normal breath sounds.   Abdominal:      General: Abdomen is flat. Bowel sounds are normal.   Musculoskeletal:         General: Normal range of motion.      Cervical back: Normal range of motion.   Skin:     General: Skin is warm and dry.   Neurological:      General: No focal deficit present.      Mental Status: She is alert and oriented to person, place, and time. Mental status is at baseline.   Psychiatric:         Mood and Affect: Mood normal.         Behavior: Behavior normal.         Thought Content: Thought content normal.         Judgment: Judgment normal.       Breast Exam: Right breast-On  inspection there is a fullness in the upper outer quadrant.  On palpation there is a 8x 7 cm mass in the upper outer quadrant.  There is bulky right axillary lymphadenopathy.  There is also palpable supraclavicular lymphadenopathy.  Left breast appears normal on inspection.  No palpable abnormalities of the breast or the axilla.  There is a firm palpable nodular lesions on the right side of the neck anterior to the sternocleidomastoid.  These are maybe 1 cm on left and concerning for lymphadenopathy.    Hospital Outpatient Visit on 09/23/2021   Component Date Value Ref Range Status   • Creatinine 09/23/2021 0.60  0.60 - 1.30 mg/dL Final    Serial Number: [id number]:  [id number]   Hospital Outpatient Visit on 09/23/2021   Component Date Value Ref Range Status   • Creatinine 09/23/2021 0.70  0.60 - 1.30 mg/dL Final    Serial Number: [id number]:  [id number]   Hospital Outpatient Visit on 09/07/2021   Component Date Value Ref Range Status   • Case Report 09/09/2021    Final                    Value:Surgical Pathology Report                         Case: [id number]                                  Authorizing Provider:  Mariposa Price MD        Collected:           09/07/2021 02:10 PM          Ordering Location:     [facility]  Received:            09/07/2021 03:27 PM                                                                                                            Pathologist:           Onelia Pedersen MD                                                          Specimens:   1) - Breast, Right, right breast 11:00 1cm fn, not for calcs                                        2) - Axilla, Right, right axillary lymp node, not for calcs                                         3) - Breast, Left, left breast mass 2:00 6cm fn, not for calcs                                      4) - Breast, Left, left breast stereotactic biopsy, performed by dr arana, for                    calcs, formalin time 1315,  face clock 12 oclock, # cores 12, specimen removed 1305                                            ,placed in formalin @1315 for calcs                                                       • Final Diagnosis 09/09/2021    Final                    Value:This result contains rich text formatting which cannot be displayed here.   • Synoptic Checklist 09/09/2021    Final                    Value:Breast Biomarker Reporting Template  (BREAST: BIOMARKER REPORTING TEMPLATE - 1)                                                        Protocol posted: 2/26/2020                                                           Test(s) Performed:                                     Estrogen Receptor (ER) Status:    Low Positive (1-10% of cells with nuclear positivity)                                    Percentage of Cells with Nuclear Positivity:    2                                    Average Intensity of Staining:    Weak                                    Status of Internal Controls:    Internal control cells present and stain as expected                                  Test Type:    Food and Drug Administration (FDA) cleared (test / vendor): [place]                                  Primary Antibody:    SP1                                  Scoring System:    Jacqui                                    Proportion Score:    2                                    Intensity Score:    1                                    Total Jacqui Score:    3                                Test(s) Performed:                                     Progesterone Receptor (PgR) Status:    Negative (less than 1%)                                    :    Internal control cells present and stain as expected                                  Test Type:    Food and Drug Administration (FDA) cleared (test / vendor): [place]                                  Primary Antibody:    1E2                                  Scoring System:    Jacqui                                     Proportion Score:    0                                    Intensity Score:    0                                    Total Jacqui Score:    0                                Test(s) Performed:                                     HER2 by Immunohistochemistry:    Negative (Score 0)                                  Test Type:    Food and Drug Administration (FDA) cleared (test / vendor): [employer]                                  Primary Antibody:    4B5                                Test(s) Performed:    Ki-67                                  Percentage of Cells with Nuclear Positivity:    65 %                                 Primary Antibody:    30-9                                Cold Ischemia and Fixation Times:    Meet requirements specified in latest version of the ASCO / CAP Guidelines                                Cold Ischemia Time (minutes):    0 min                               Fixation Time (hours):    6.75 hours                               Testing Performed on Block Number(s):    1A                                                         METHODS                               Fixative:    Formalin                                Image Analysis:    Not performed                             Breast Biomarker Reporting Template  (BREAST: BIOMARKER REPORTING TEMPLATE - 2)                                                        Protocol posted: 2/26/2020                                                           Test(s) Performed:                                     Estrogen Receptor (ER) Status:    Negative (less than 1%)                                    :    Internal control cells absent                                  Test Type:    Food and Drug Administration (FDA) cleared (test / vendor): [employer]                                  Primary Antibody:    SP1                                  Scoring System:    Jacqui                                    Proportion Score:    0                                     Intensity Score:    0                                    Total Jacqui Score:    0                                Test(s) Performed:                                     Progesterone Receptor (PgR) Status:    Positive                                    Percentage of Cells with Nuclear Positivity:    5 %                                   Average Intensity of Staining:    Weak                                  Test Type:    Food and Drug Administration (FDA) cleared (test / vendor): [place]                                  Primary Antibody:    1E2                                  Scoring System:    Jacqui                                    Proportion Score:    2                                    Intensity Score:    1                                    Total Jacqui Score:    3                                Test(s) Performed:                                     HER2 by Immunohistochemistry:    Negative (Score 0)                                  Test Type:    Food and Drug Administration (FDA) cleared (test / vendor): [place]                                  Primary Antibody:    4B5                                Cold Ischemia and Fixation Times:    Meet requirements specified in latest version of the ASCO / CAP Guidelines                                Cold Ischemia Time (minutes):    0 min                               Fixation Time (hours):    6.75 hours                               Testing Performed on Block Number(s):    2A                                                         METHODS                               Fixative:    Formalin                                Image Analysis:    Not performed      • Comment 09/09/2021    Final                    Value:This result contains rich text formatting which cannot be displayed here.   • Gross Description 09/09/2021    Final                    Value:This result contains rich text formatting which cannot be displayed here.   • Special Stains 09/09/2021    Final                     Value:This result contains rich text formatting which cannot be displayed here.   • Microscopic Description 09/09/2021    Final                    Value:This result contains rich text formatting which cannot be displayed here.   • Case Report 09/07/2021    Final                    Value:Surgical Pathology Report                         Case: [id number]                                  Authorizing Provider:  Mariposa Price MD        Collected:           09/07/2021 02:10 PM          Ordering Location:     [facility]  Received:            09/07/2021 03:27 PM                                                                                                            Pathologist:           Onelia Pedersen MD                                                          Specimens:   1) - Breast, Right, right breast 11:00 1cm fn, not for calcs                                        2) - Axilla, Right, right axillary lymp node, not for calcs                                         3) - Breast, Left, left breast mass 2:00 6cm fn, not for calcs                                      4) - Breast, Left, left breast stereotactic biopsy, performed by dr arana, for                    calcs, formalin time 1315, face clock 12 oclock, # cores 12, specimen removed 1305                                            ,placed in formalin @1315 for calcs                                                       • Final Diagnosis 09/07/2021    Final                    Value:This result contains rich text formatting which cannot be displayed here.   • Synoptic Checklist 09/07/2021    Final                    Value:Breast Biomarker Reporting Template  (BREAST: BIOMARKER REPORTING TEMPLATE - 1)                                                        Protocol posted: 2/26/2020                                                           Test(s) Performed:                                     Estrogen Receptor (ER) Status:    Low  Positive (1-10% of cells with nuclear positivity)                                    Percentage of Cells with Nuclear Positivity:    2                                    Average Intensity of Staining:    Weak                                    Status of Internal Controls:    Internal control cells present and stain as expected                                  Test Type:    Food and Drug Administration (FDA) cleared (test / vendor): [employer]                                  Primary Antibody:    SP1                                  Scoring System:    Jacqui                                    Proportion Score:    2                                    Intensity Score:    1                                    Total Jacqui Score:    3                                Test(s) Performed:                                     Progesterone Receptor (PgR) Status:    Negative (less than 1%)                                    :    Internal control cells present and stain as expected                                  Test Type:    Food and Drug Administration (FDA) cleared (test / vendor): [employer]                                  Primary Antibody:    1E2                                  Scoring System:    Jacqui                                    Proportion Score:    0                                    Intensity Score:    0                                    Total Jacqui Score:    0                                Test(s) Performed:                                     HER2 by Immunohistochemistry:    Negative (Score 0)                                  Test Type:    Food and Drug Administration (FDA) cleared (test / vendor): [place]                                  Primary Antibody:    4B5                                Test(s) Performed:    Ki-67                                  Percentage of Cells with Nuclear Positivity:    65 %                                 Primary Antibody:    30-9                                Cold Ischemia and  Fixation Times:    Meet requirements specified in latest version of the ASCO / CAP Guidelines                                Cold Ischemia Time (minutes):    0 min                               Fixation Time (hours):    6.75 hours                               Testing Performed on Block Number(s):    1A                                                         METHODS                               Fixative:    Formalin                                Image Analysis:    Not performed                             Breast Biomarker Reporting Template  (BREAST: BIOMARKER REPORTING TEMPLATE - 2)                                                        Protocol posted: 2/26/2020                                                           Test(s) Performed:                                     Estrogen Receptor (ER) Status:    Negative (less than 1%)                                    :    Internal control cells absent                                  Test Type:    Food and Drug Administration (FDA) cleared (test / vendor): [employer]                                  Primary Antibody:    SP1                                  Scoring System:    Jacqui                                    Proportion Score:    0                                    Intensity Score:    0                                    Total Jacqui Score:    0                                Test(s) Performed:                                     Progesterone Receptor (PgR) Status:    Positive                                    Percentage of Cells with Nuclear Positivity:    5 %                                   Average Intensity of Staining:    Weak                                  Test Type:    Food and Drug Administration (FDA) cleared (test / vendor): [employer]                                  Primary Antibody:    1E2                                  Scoring System:    Jacqui                                    Proportion Score:    2                                     Intensity Score:    1                                    Total Jacqui Score:    3                                Test(s) Performed:                                     HER2 by Immunohistochemistry:    Negative (Score 0)                                  Test Type:    Food and Drug Administration (FDA) cleared (test / vendor): [place]                                  Primary Antibody:    4B5                                Cold Ischemia and Fixation Times:    Meet requirements specified in latest version of the ASCO / CAP Guidelines                                Cold Ischemia Time (minutes):    0 min                               Fixation Time (hours):    6.75 hours                               Testing Performed on Block Number(s):    2A                                                         METHODS                               Fixative:    Formalin                                Image Analysis:    Not performed      • Comment 09/07/2021    Final                    Value:This result contains rich text formatting which cannot be displayed here.   • Gross Description 09/07/2021    Final                    Value:This result contains rich text formatting which cannot be displayed here.   • Special Stains 09/07/2021    Final                    Value:This result contains rich text formatting which cannot be displayed here.   • Microscopic Description 09/07/2021    Final                    Value:This result contains rich text formatting which cannot be displayed here.        CT Soft Tissue Neck With Contrast    Result Date: 9/23/2021  Multiple mild to moderately enlarged subpectoral and right axillary lymph nodes consistent with metastatic disease. The subpectoral lymphadenopathy extends into the right supraclavicular region, as well. There is no evidence of metastatic disease elsewhere within the neck, chest, abdomen or pelvis. A 5 cm diameter right ovarian cyst is noted. Suggest a follow-up pelvic ultrasound in 6 weeks  for further evaluation.  This report was finalized on 9/23/2021 7:37 PM by Dr. Alexander White M.D.      CT Chest With Contrast Diagnostic    Result Date: 9/23/2021  Multiple mild to moderately enlarged subpectoral and right axillary lymph nodes consistent with metastatic disease. The subpectoral lymphadenopathy extends into the right supraclavicular region, as well. There is no evidence of metastatic disease elsewhere within the neck, chest, abdomen or pelvis. A 5 cm diameter right ovarian cyst is noted. Suggest a follow-up pelvic ultrasound in 6 weeks for further evaluation.  This report was finalized on 9/23/2021 7:37 PM by Dr. Alexander White M.D.      Mammo Diagnostic Right With CAD    Result Date: 9/9/2021   1. Stereotactic/Tomosynthesis guided core needle biopsy of a group of calcifications at 12:00 in the left breast, marked with a bowtie biopsy clip, which is displaced approximately 1 cm from residual calcifications, as above. Pathology is benign and concordant with the imaging assessment.  2. Ultrasound-guided core needle biopsy of a palpable mass at 11:00, 1 cm from the nipple in the right breast, marked with a heart-shaped clip. Pathology is malignant and concordant with the imaging assessment. Oncologic and surgical management are recommended for biopsy-proven right breast malignancy, as well as for additional previously noted masses at 10:00 in the right breast, which likely reflect the same process. Contrast-enhanced breast MRI is recommended to evaluate extent of disease.  3. Ultrasound-guided core needle biopsy of a representative abnormal right axillary lymph node, marked with a twirl clip. Pathology is malignant and concordant with the imaging assessment. Oncological and surgical management are recommended for the biopsy-proven metastatic right axillary lymph node and additional morphologically abnormal right axillary lymph nodes.  4. Ultrasound-guided core needle biopsy of a 1 cm mass at 2:00, 6 cm  from the nipple in the left breast, marked with a dumbbell-shaped clip. Pathology is benign and concordant with the imaging assessment.  5. The patient reports multiple palpable lumps above her right clavicle. Further evaluation with targeted ultrasound or CT is recommended. This area would likely not be adequately assessed on breast MRI.  This report was finalized on 9/9/2021 3:20 PM by Dr. Carol Terrazas M.D.      CT Abdomen Pelvis With Contrast    Result Date: 9/23/2021  Multiple mild to moderately enlarged subpectoral and right axillary lymph nodes consistent with metastatic disease. The subpectoral lymphadenopathy extends into the right supraclavicular region, as well. There is no evidence of metastatic disease elsewhere within the neck, chest, abdomen or pelvis. A 5 cm diameter right ovarian cyst is noted. Suggest a follow-up pelvic ultrasound in 6 weeks for further evaluation.  This report was finalized on 9/23/2021 7:37 PM by Dr. Alexander White M.D.      MRI Breast Bilateral With & Without Contrast    Result Date: 9/24/2021  1. Biopsy-proven malignancy in the right breast occupying the middle and posterior one thirds from the 11 o'clock through 1 o'clock positions and measuring up to 7.1 cm in greatest dimension. The heart shaped metallic clip is within the anterior aspect of the lesion. The lesion abuts the pectoral fascia but there is no evidence for direct invasion of the pectoral muscle. Also, there are portions of the lesion that extend to the subtalar soft tissues but there is no abnormal enhancement of the skin or skin thickening. Evidence of bulky right axillary adenopathy is noted. 2. There is a 2.4 cm irregular mass in the lower outer quadrant of the right breast centered at the 8 o'clock position that is separate from the known malignancy. This is suspicious for additional malignancy. Targeted sonography and biopsy of the lesion should be considered. 3. There are no findings suspicious for  malignancy in the left breast.  BI-RADS category 6: Known malignancy.  This report was finalized on 9/24/2021 5:02 PM by Dr. Vicente Carpenter M.D.      US Guided Lymph Node Biopsy    Result Date: 9/9/2021   1. Stereotactic/Tomosynthesis guided core needle biopsy of a group of calcifications at 12:00 in the left breast, marked with a bowtie biopsy clip, which is displaced approximately 1 cm from residual calcifications, as above. Pathology is benign and concordant with the imaging assessment.  2. Ultrasound-guided core needle biopsy of a palpable mass at 11:00, 1 cm from the nipple in the right breast, marked with a heart-shaped clip. Pathology is malignant and concordant with the imaging assessment. Oncologic and surgical management are recommended for biopsy-proven right breast malignancy, as well as for additional previously noted masses at 10:00 in the right breast, which likely reflect the same process. Contrast-enhanced breast MRI is recommended to evaluate extent of disease.  3. Ultrasound-guided core needle biopsy of a representative abnormal right axillary lymph node, marked with a twirl clip. Pathology is malignant and concordant with the imaging assessment. Oncological and surgical management are recommended for the biopsy-proven metastatic right axillary lymph node and additional morphologically abnormal right axillary lymph nodes.  4. Ultrasound-guided core needle biopsy of a 1 cm mass at 2:00, 6 cm from the nipple in the left breast, marked with a dumbbell-shaped clip. Pathology is benign and concordant with the imaging assessment.  5. The patient reports multiple palpable lumps above her right clavicle. Further evaluation with targeted ultrasound or CT is recommended. This area would likely not be adequately assessed on breast MRI.  This report was finalized on 9/9/2021 3:20 PM by Dr. Carol Terrazas M.D.      Mammo Stereotactic Breast Biopsy Initial With & Without Device    Result Date: 9/9/2021   1.  Stereotactic/Tomosynthesis guided core needle biopsy of a group of calcifications at 12:00 in the left breast, marked with a bowtie biopsy clip, which is displaced approximately 1 cm from residual calcifications, as above. Pathology is benign and concordant with the imaging assessment.  2. Ultrasound-guided core needle biopsy of a palpable mass at 11:00, 1 cm from the nipple in the right breast, marked with a heart-shaped clip. Pathology is malignant and concordant with the imaging assessment. Oncologic and surgical management are recommended for biopsy-proven right breast malignancy, as well as for additional previously noted masses at 10:00 in the right breast, which likely reflect the same process. Contrast-enhanced breast MRI is recommended to evaluate extent of disease.  3. Ultrasound-guided core needle biopsy of a representative abnormal right axillary lymph node, marked with a twirl clip. Pathology is malignant and concordant with the imaging assessment. Oncological and surgical management are recommended for the biopsy-proven metastatic right axillary lymph node and additional morphologically abnormal right axillary lymph nodes.  4. Ultrasound-guided core needle biopsy of a 1 cm mass at 2:00, 6 cm from the nipple in the left breast, marked with a dumbbell-shaped clip. Pathology is benign and concordant with the imaging assessment.  5. The patient reports multiple palpable lumps above her right clavicle. Further evaluation with targeted ultrasound or CT is recommended. This area would likely not be adequately assessed on breast MRI.  This report was finalized on 9/9/2021 3:20 PM by Dr. Carol Terrazas M.D.      US Guided Breast Biopsy With & Without Device initial    Result Date: 9/9/2021   1. Stereotactic/Tomosynthesis guided core needle biopsy of a group of calcifications at 12:00 in the left breast, marked with a bowtie biopsy clip, which is displaced approximately 1 cm from residual calcifications, as  above. Pathology is benign and concordant with the imaging assessment.  2. Ultrasound-guided core needle biopsy of a palpable mass at 11:00, 1 cm from the nipple in the right breast, marked with a heart-shaped clip. Pathology is malignant and concordant with the imaging assessment. Oncologic and surgical management are recommended for biopsy-proven right breast malignancy, as well as for additional previously noted masses at 10:00 in the right breast, which likely reflect the same process. Contrast-enhanced breast MRI is recommended to evaluate extent of disease.  3. Ultrasound-guided core needle biopsy of a representative abnormal right axillary lymph node, marked with a twirl clip. Pathology is malignant and concordant with the imaging assessment. Oncological and surgical management are recommended for the biopsy-proven metastatic right axillary lymph node and additional morphologically abnormal right axillary lymph nodes.  4. Ultrasound-guided core needle biopsy of a 1 cm mass at 2:00, 6 cm from the nipple in the left breast, marked with a dumbbell-shaped clip. Pathology is benign and concordant with the imaging assessment.  5. The patient reports multiple palpable lumps above her right clavicle. Further evaluation with targeted ultrasound or CT is recommended. This area would likely not be adequately assessed on breast MRI.  This report was finalized on 9/9/2021 3:20 PM by Dr. Carol Terrazas M.D.      US Guided Breast Biopsy With & Without Device initial    Result Date: 9/9/2021   1. Stereotactic/Tomosynthesis guided core needle biopsy of a group of calcifications at 12:00 in the left breast, marked with a bowtie biopsy clip, which is displaced approximately 1 cm from residual calcifications, as above. Pathology is benign and concordant with the imaging assessment.  2. Ultrasound-guided core needle biopsy of a palpable mass at 11:00, 1 cm from the nipple in the right breast, marked with a heart-shaped clip.  Pathology is malignant and concordant with the imaging assessment. Oncologic and surgical management are recommended for biopsy-proven right breast malignancy, as well as for additional previously noted masses at 10:00 in the right breast, which likely reflect the same process. Contrast-enhanced breast MRI is recommended to evaluate extent of disease.  3. Ultrasound-guided core needle biopsy of a representative abnormal right axillary lymph node, marked with a twirl clip. Pathology is malignant and concordant with the imaging assessment. Oncological and surgical management are recommended for the biopsy-proven metastatic right axillary lymph node and additional morphologically abnormal right axillary lymph nodes.  4. Ultrasound-guided core needle biopsy of a 1 cm mass at 2:00, 6 cm from the nipple in the left breast, marked with a dumbbell-shaped clip. Pathology is benign and concordant with the imaging assessment.  5. The patient reports multiple palpable lumps above her right clavicle. Further evaluation with targeted ultrasound or CT is recommended. This area would likely not be adequately assessed on breast MRI.  This report was finalized on 9/9/2021 3:20 PM by Dr. Carol Terrazas M.D.       CT of the chest abdomen pelvis-images independently reviewed and interpreted by me in detail summarized in the HPI  Bone scan-images independently reviewed and interpreted by me in detail summarized in the HPI    CT neck-images independently reviewed and interpreted by me in detail summarized in HPI.  I also discussed the CT neck with Dr. Alexander Blevins with radiology regarding the right side lymphadenopathy.    Assessment/Plan        *Invasive ductal carcinoma of the right breast  · Clinical T3 N3 cMx  · On exam there is some firm nodular areas in the right neck concerning for metastatic disease although there is no abnormal enhancement noted on the CT of the neck.  · We will proceed with obtaining a PET/CT to assess this  further.  · If PET/CT does not show any evidence of uptake in the right cervical region then the clinical stage would be stage IIIc and the treatment intent would be curative.  · However if there is uptake in the right cervical region we would need a biopsy to confirm metastatic disease and in that event it would be stage IV.  · The tumor is grade 3, poorly differentiated, ER 1 to 10% weak, WI negative, HER-2 negative, Ki-67 65%  Discussed at length the details of imaging and pathology report.Discussed the origin of breast cancer from the ducts and the lobules and the histological type of breast cancer based on site of origin. Discussed the tumor size, lymph node status and stage of the cancer. Explained the presence of DCIS. Discussed the receptor status including ER, WI and her-2 jake and their significance in determining the biology and treatment. Also discussed the importance of grade and ki-67.   The steps of curative intent breast cancer treatment have been explained, including surgery, possible chemotherapy, possible radiation and possible endocrine therapy.   Discussed imaging with CT C/A/P and bone scan and possible staging  · Echocardiogram will be ordered to establish baseline cardiac function pre chemotherapy.  · Discussed neoadjuvant chemotherapy and the benefit of the same including assessing denovo response to chemotherapy, significant PCR, determining adjuvant therapy, improving surgical outcomes  · Explained that following neoadjuvant chemotherapy, neck step would be surgery and then adjuvant radiation and may be adjuvant chemotherapy depending upon the response  · We discussed the role of immunotherapy in the treatment of triple negative breast cancer.  · Although the tumor is weakly ER positive especially I would consider this triple negative   · We discussed chemotherapy with neoadjuvant carboplatin and Taxol with Keytruda followed by dose of Adriamycin and cyclophosphamide with Keytruda and  continue Keytruda adjuvantly.  · Explained the increased pathological complete response rate noted with adding Keytruda  · Adverse effects of chemo immunotherapy including but not limited to myelosuppression, increased risk of infection, nausea, vomiting, fatigue, altered taste, alopecia, cardiotoxicity, risk of leukemia, neuropathy, worsening of diabetes control, risk of colitis, pneumonitis, endocrinopathies, hepatitis, cutaneous toxicity of immunotherapy and neurological toxicity of immunotherapy discussed.  · 1 concern is that she has type 1 diabetes.  Unsure if immunotherapy would be a good idea in her.  · She was previously seen by Dr. Urbina with [facility].   · Currently does not have an endocrinologist  · I will refer her to  at [facility] to be evaluated as soon as possible.  · I will discuss with her about the safety of immunotherapy    *Diabetes  · Type I  · Poorly controlled  · Blood sugar today 350  · She will be referred to endocrinology ASAP    *?  Cervical lymphadenopathy  · Discussed with Dr. Blevins and feels like the lymph nodes appear symmetric on both sides  · Will obtain PET/CT for further evaluation    *Anxiety  · Patient was extremely overwhelmed at the visit after learning the stage and the treatment  · She was tearful  · Currently on citalopram  · I will refer her to supportive oncology for further management of her symptoms    *Cardiac health-echocardiogram will be scheduled    *Port placement-scheduled for 10/4/2021    *Follow-up-1 week    92 minutes spent on the encounter including reviewing the medical records, face-to-face time, documentation on the same day, coordination of care including discussing with radiology discussing with Dr. Price with surgery.

## 2021-09-28 ENCOUNTER — HOSPITAL ENCOUNTER (OUTPATIENT)
Dept: ULTRASOUND IMAGING | Facility: HOSPITAL | Age: 41
Discharge: HOME | End: 2021-09-28
Admitting: INTERNAL MEDICINE

## 2021-09-28 ENCOUNTER — TELEPHONE (OUTPATIENT)
Dept: ONCOLOGY | Facility: CLINIC | Age: 41
End: 2021-09-28

## 2021-09-28 VITALS
HEART RATE: 83 BPM | DIASTOLIC BLOOD PRESSURE: 61 MMHG | RESPIRATION RATE: 16 BRPM | WEIGHT: 157 LBS | SYSTOLIC BLOOD PRESSURE: 141 MMHG | HEIGHT: 71 IN | OXYGEN SATURATION: 99 % | TEMPERATURE: 98 F | BODY MASS INDEX: 21.98 KG/M2

## 2021-09-28 DIAGNOSIS — R59.1 LYMPHADENOPATHY OF HEAD AND NECK: ICD-10-CM

## 2021-09-28 DIAGNOSIS — Z17.1 MALIGNANT NEOPLASM OF CENTRAL PORTION OF RIGHT BREAST IN FEMALE, ESTROGEN RECEPTOR NEGATIVE (HCC): ICD-10-CM

## 2021-09-28 DIAGNOSIS — C50.111 MALIGNANT NEOPLASM OF CENTRAL PORTION OF RIGHT BREAST IN FEMALE, ESTROGEN RECEPTOR NEGATIVE (HCC): ICD-10-CM

## 2021-09-28 PROCEDURE — 88305 TISSUE EXAM BY PATHOLOGIST: CPT | Performed by: INTERNAL MEDICINE

## 2021-09-28 PROCEDURE — 88342 IMHCHEM/IMCYTCHM 1ST ANTB: CPT | Performed by: INTERNAL MEDICINE

## 2021-09-28 PROCEDURE — 88360 TUMOR IMMUNOHISTOCHEM/MANUAL: CPT | Performed by: INTERNAL MEDICINE

## 2021-09-28 PROCEDURE — 76942 ECHO GUIDE FOR BIOPSY: CPT

## 2021-09-28 PROCEDURE — 88341 IMHCHEM/IMCYTCHM EA ADD ANTB: CPT | Performed by: INTERNAL MEDICINE

## 2021-09-28 PROCEDURE — 25010000003 LIDOCAINE 1 % SOLUTION: Performed by: RADIOLOGY

## 2021-09-28 PROCEDURE — 88173 CYTOPATH EVAL FNA REPORT: CPT | Performed by: INTERNAL MEDICINE

## 2021-09-28 RX ORDER — LIDOCAINE HYDROCHLORIDE 10 MG/ML
10 INJECTION, SOLUTION INFILTRATION; PERINEURAL ONCE
Status: COMPLETED | OUTPATIENT
Start: 2021-09-28 | End: 2021-09-28

## 2021-09-28 RX ADMIN — LIDOCAINE HYDROCHLORIDE 3 ML: 10 INJECTION, SOLUTION INFILTRATION; PERINEURAL at 13:27

## 2021-09-28 NOTE — TELEPHONE ENCOUNTER
Clinical Case Management/[place]:    OSW received referral from Ritu Doan who reported that Dr. De La O saw patient and wanted to refer to supportive oncology. Per chart review, Dr. De La O noticed patient was anxious after learning about the grade of her new diagnosis of malignant neoplasm of central portion of right breast in female, estrogen receptor negative.     OSW called patient to introduce self/role, check in, and assess for needs. Patient was unable to answer. OSW left a message with contact information requesting a return call.     RIKY Covarrubias, CSW  Oncology Social Worker   [facility]

## 2021-09-29 ENCOUNTER — TELEPHONE (OUTPATIENT)
Dept: ONCOLOGY | Facility: CLINIC | Age: 41
End: 2021-09-29

## 2021-09-29 ENCOUNTER — HOSPITAL ENCOUNTER (OUTPATIENT)
Dept: CARDIOLOGY | Facility: HOSPITAL | Age: 41
Discharge: HOME | End: 2021-09-29
Admitting: STUDENT IN AN ORGANIZED HEALTH CARE EDUCATION/TRAINING PROGRAM

## 2021-09-29 ENCOUNTER — TELEPHONE (OUTPATIENT)
Dept: INTERVENTIONAL RADIOLOGY/VASCULAR | Facility: HOSPITAL | Age: 41
End: 2021-09-29

## 2021-09-29 VITALS
BODY MASS INDEX: 21.98 KG/M2 | DIASTOLIC BLOOD PRESSURE: 70 MMHG | WEIGHT: 157 LBS | SYSTOLIC BLOOD PRESSURE: 100 MMHG | HEIGHT: 71 IN | HEART RATE: 86 BPM

## 2021-09-29 DIAGNOSIS — C50.111 MALIGNANT NEOPLASM OF CENTRAL PORTION OF RIGHT BREAST IN FEMALE, ESTROGEN RECEPTOR NEGATIVE (HCC): ICD-10-CM

## 2021-09-29 DIAGNOSIS — Z17.1 MALIGNANT NEOPLASM OF CENTRAL PORTION OF RIGHT BREAST IN FEMALE, ESTROGEN RECEPTOR NEGATIVE (HCC): ICD-10-CM

## 2021-09-29 LAB
CYTO UR: NORMAL
LAB AP CASE REPORT: NORMAL
LAB AP CASE REPORT: NORMAL
LAB AP CLINICAL INFORMATION: NORMAL
LAB AP DIAGNOSIS COMMENT: NORMAL
LAB AP DIAGNOSIS COMMENT: NORMAL
LAB AP NON-GYN SPECIMEN ADEQUACY: NORMAL
LAB AP SPECIAL STAINS: NORMAL
LAB AP SYNOPTIC CHECKLIST: NORMAL
PATH REPORT.FINAL DX SPEC: NORMAL
PATH REPORT.FINAL DX SPEC: NORMAL
PATH REPORT.GROSS SPEC: NORMAL
PATH REPORT.GROSS SPEC: NORMAL

## 2021-09-29 PROCEDURE — 93356 MYOCRD STRAIN IMG SPCKL TRCK: CPT

## 2021-09-29 PROCEDURE — 25010000002 PERFLUTREN (DEFINITY) 8.476 MG IN SODIUM CHLORIDE (PF) 0.9 % 10 ML INJECTION: Performed by: STUDENT IN AN ORGANIZED HEALTH CARE EDUCATION/TRAINING PROGRAM

## 2021-09-29 PROCEDURE — 93356 MYOCRD STRAIN IMG SPCKL TRCK: CPT | Performed by: INTERNAL MEDICINE

## 2021-09-29 PROCEDURE — 93306 TTE W/DOPPLER COMPLETE: CPT | Performed by: INTERNAL MEDICINE

## 2021-09-29 PROCEDURE — 93306 TTE W/DOPPLER COMPLETE: CPT

## 2021-09-29 RX ADMIN — PERFLUTREN 1.5 ML: 6.52 INJECTION, SUSPENSION INTRAVENOUS at 09:09

## 2021-09-29 NOTE — TELEPHONE ENCOUNTER
Contacted [facility], advised her that Dr De La O spoke with the radiologist yesterday prior to the biopsy and recommended PET scan as well.  This was ordered and message sent to scheduling to make appt for PET ASAP.  Also gave her my direct line and encouraged her to call me with any questions or concerns throughout her treatment. She was thankful for this information.

## 2021-09-30 ENCOUNTER — MDT ASSESSMENT (OUTPATIENT)
Dept: OTHER | Facility: HOSPITAL | Age: 41
End: 2021-09-30

## 2021-09-30 LAB
AORTIC ARCH: 2.7 CM
ASCENDING AORTA: 3.4 CM
BH CV ECHO MEAS - ACS: 2.1 CM
BH CV ECHO MEAS - AO MAX PG (FULL): 1.6 MMHG
BH CV ECHO MEAS - AO MAX PG: 5.6 MMHG
BH CV ECHO MEAS - AO MEAN PG (FULL): 0.83 MMHG
BH CV ECHO MEAS - AO MEAN PG: 3.3 MMHG
BH CV ECHO MEAS - AO ROOT AREA (BSA CORRECTED): 1.7
BH CV ECHO MEAS - AO ROOT AREA: 8.2 CM^2
BH CV ECHO MEAS - AO ROOT DIAM: 3.2 CM
BH CV ECHO MEAS - AO V2 MAX: 118.3 CM/SEC
BH CV ECHO MEAS - AO V2 MEAN: 84.9 CM/SEC
BH CV ECHO MEAS - AO V2 VTI: 21.3 CM
BH CV ECHO MEAS - ASC AORTA: 3.4 CM
BH CV ECHO MEAS - AVA(I,A): 2.5 CM^2
BH CV ECHO MEAS - AVA(I,D): 2.5 CM^2
BH CV ECHO MEAS - AVA(V,A): 2.5 CM^2
BH CV ECHO MEAS - AVA(V,D): 2.5 CM^2
BH CV ECHO MEAS - BSA(HAYCOCK): 1.9 M^2
BH CV ECHO MEAS - BSA: 1.9 M^2
BH CV ECHO MEAS - BZI_BMI: 21.9 KILOGRAMS/M^2
BH CV ECHO MEAS - BZI_METRIC_HEIGHT: 180.3 CM
BH CV ECHO MEAS - BZI_METRIC_WEIGHT: 71.2 KG
BH CV ECHO MEAS - EDV(MOD-SP2): 101 ML
BH CV ECHO MEAS - EDV(MOD-SP4): 101 ML
BH CV ECHO MEAS - EDV(TEICH): 75.9 ML
BH CV ECHO MEAS - EF(CUBED): 75 %
BH CV ECHO MEAS - EF(MOD-BP): 57.6 %
BH CV ECHO MEAS - EF(MOD-SP2): 57.4 %
BH CV ECHO MEAS - EF(MOD-SP4): 59.4 %
BH CV ECHO MEAS - EF(TEICH): 67.3 %
BH CV ECHO MEAS - EF_3D-VOL: 57 %
BH CV ECHO MEAS - ESV(MOD-SP2): 43 ML
BH CV ECHO MEAS - ESV(MOD-SP4): 41 ML
BH CV ECHO MEAS - ESV(TEICH): 24.8 ML
BH CV ECHO MEAS - FS: 37 %
BH CV ECHO MEAS - IVS/LVPW: 0.9
BH CV ECHO MEAS - IVSD: 0.86 CM
BH CV ECHO MEAS - LA 3D VOL INDEX: 13
BH CV ECHO MEAS - LAT PEAK E' VEL: 21.1 CM/SEC
BH CV ECHO MEAS - LV DIASTOLIC VOL/BSA (35-75): 53.1 ML/M^2
BH CV ECHO MEAS - LV MASS(C)D: 118 GRAMS
BH CV ECHO MEAS - LV MASS(C)DI: 62 GRAMS/M^2
BH CV ECHO MEAS - LV MAX PG: 4 MMHG
BH CV ECHO MEAS - LV MEAN PG: 2.4 MMHG
BH CV ECHO MEAS - LV SYSTOLIC VOL/BSA (12-30): 21.5 ML/M^2
BH CV ECHO MEAS - LV V1 MAX: 99.4 CM/SEC
BH CV ECHO MEAS - LV V1 MEAN: 73.7 CM/SEC
BH CV ECHO MEAS - LV V1 VTI: 17.7 CM
BH CV ECHO MEAS - LVIDD: 4.1 CM
BH CV ECHO MEAS - LVIDS: 2.6 CM
BH CV ECHO MEAS - LVLD AP2: 8 CM
BH CV ECHO MEAS - LVLD AP4: 7.9 CM
BH CV ECHO MEAS - LVLS AP2: 6.2 CM
BH CV ECHO MEAS - LVLS AP4: 6.6 CM
BH CV ECHO MEAS - LVOT AREA (M): 2.8 CM^2
BH CV ECHO MEAS - LVOT AREA: 3 CM^2
BH CV ECHO MEAS - LVOT DIAM: 1.9 CM
BH CV ECHO MEAS - LVPWD: 0.96 CM
BH CV ECHO MEAS - MED PEAK E' VEL: 14.1 CM/SEC
BH CV ECHO MEAS - MR MAX PG: 76.3 MMHG
BH CV ECHO MEAS - MR MAX VEL: 436.8 CM/SEC
BH CV ECHO MEAS - MV A DUR: 0.09 SEC
BH CV ECHO MEAS - MV A MAX VEL: 58.3 CM/SEC
BH CV ECHO MEAS - MV DEC SLOPE: 460.1 CM/SEC^2
BH CV ECHO MEAS - MV DEC TIME: 0.2 SEC
BH CV ECHO MEAS - MV E MAX VEL: 70.3 CM/SEC
BH CV ECHO MEAS - MV E/A: 1.2
BH CV ECHO MEAS - MV MAX PG: 2.9 MMHG
BH CV ECHO MEAS - MV MEAN PG: 1.1 MMHG
BH CV ECHO MEAS - MV P1/2T MAX VEL: 85.6 CM/SEC
BH CV ECHO MEAS - MV P1/2T: 54.5 MSEC
BH CV ECHO MEAS - MV V2 MAX: 84.7 CM/SEC
BH CV ECHO MEAS - MV V2 MEAN: 47.2 CM/SEC
BH CV ECHO MEAS - MV V2 VTI: 20.2 CM
BH CV ECHO MEAS - MVA P1/2T LCG: 2.6 CM^2
BH CV ECHO MEAS - MVA(P1/2T): 4 CM^2
BH CV ECHO MEAS - MVA(VTI): 2.6 CM^2
BH CV ECHO MEAS - PA ACC TIME: 0.11 SEC
BH CV ECHO MEAS - PA MAX PG (FULL): 1.4 MMHG
BH CV ECHO MEAS - PA MAX PG: 3.4 MMHG
BH CV ECHO MEAS - PA PR(ACCEL): 27.9 MMHG
BH CV ECHO MEAS - PA V2 MAX: 92.3 CM/SEC
BH CV ECHO MEAS - PULM A REVS DUR: 0.11 SEC
BH CV ECHO MEAS - PULM A REVS VEL: 42.8 CM/SEC
BH CV ECHO MEAS - PULM DIAS VEL: 53.6 CM/SEC
BH CV ECHO MEAS - PULM S/D: 1
BH CV ECHO MEAS - PULM SYS VEL: 56.1 CM/SEC
BH CV ECHO MEAS - PVA(V,A): 2 CM^2
BH CV ECHO MEAS - PVA(V,D): 2 CM^2
BH CV ECHO MEAS - QP/QS: 0.61
BH CV ECHO MEAS - RAP SYSTOLE: 3 MMHG
BH CV ECHO MEAS - RV MAX PG: 2 MMHG
BH CV ECHO MEAS - RV MEAN PG: 1.3 MMHG
BH CV ECHO MEAS - RV V1 MAX: 70.7 CM/SEC
BH CV ECHO MEAS - RV V1 MEAN: 54.1 CM/SEC
BH CV ECHO MEAS - RV V1 VTI: 12.6 CM
BH CV ECHO MEAS - RVOT AREA: 2.6 CM^2
BH CV ECHO MEAS - RVOT DIAM: 1.8 CM
BH CV ECHO MEAS - RVSP: 15 MMHG
BH CV ECHO MEAS - SI(AO): 91.5 ML/M^2
BH CV ECHO MEAS - SI(CUBED): 28 ML/M^2
BH CV ECHO MEAS - SI(LVOT): 27.7 ML/M^2
BH CV ECHO MEAS - SI(MOD-SP2): 30.5 ML/M^2
BH CV ECHO MEAS - SI(MOD-SP4): 31.5 ML/M^2
BH CV ECHO MEAS - SI(TEICH): 26.9 ML/M^2
BH CV ECHO MEAS - SUP REN AO DIAM: 1.7 CM
BH CV ECHO MEAS - SV(AO): 174.1 ML
BH CV ECHO MEAS - SV(CUBED): 53.2 ML
BH CV ECHO MEAS - SV(LVOT): 52.8 ML
BH CV ECHO MEAS - SV(MOD-SP2): 58 ML
BH CV ECHO MEAS - SV(MOD-SP4): 60 ML
BH CV ECHO MEAS - SV(RVOT): 32.3 ML
BH CV ECHO MEAS - SV(TEICH): 51.1 ML
BH CV ECHO MEAS - TAPSE (>1.6): 2.7 CM
BH CV ECHO MEAS - TR MAX VEL: 170.1 CM/SEC
BH CV ECHO MEASUREMENTS AVERAGE E/E' RATIO: 3.99
BH CV XLRA - RV BASE: 3.2 CM
BH CV XLRA - RV LENGTH: 6.8 CM
BH CV XLRA - RV MID: 2.7 CM
BH CV XLRA - TDI S': 15.3 CM/SEC
LEFT ATRIUM VOLUME INDEX: 12 ML/M2
MAXIMAL PREDICTED HEART RATE: 180 BPM
SINUS: 3.5 CM
STJ: 2.6 CM
STRESS TARGET HR: 153 BPM

## 2021-10-01 ENCOUNTER — HOSPITAL ENCOUNTER (OUTPATIENT)
Dept: PET IMAGING | Facility: HOSPITAL | Age: 41
Discharge: HOME | End: 2021-10-01

## 2021-10-01 ENCOUNTER — PRE-ADMISSION TESTING (OUTPATIENT)
Dept: PREADMISSION TESTING | Facility: HOSPITAL | Age: 41
End: 2021-10-01

## 2021-10-01 VITALS
HEIGHT: 71 IN | SYSTOLIC BLOOD PRESSURE: 121 MMHG | DIASTOLIC BLOOD PRESSURE: 69 MMHG | WEIGHT: 158 LBS | BODY MASS INDEX: 22.12 KG/M2 | RESPIRATION RATE: 16 BRPM | HEART RATE: 79 BPM | TEMPERATURE: 97.4 F | OXYGEN SATURATION: 100 %

## 2021-10-01 DIAGNOSIS — C50.111 MALIGNANT NEOPLASM OF CENTRAL PORTION OF RIGHT BREAST IN FEMALE, ESTROGEN RECEPTOR NEGATIVE (HCC): ICD-10-CM

## 2021-10-01 DIAGNOSIS — E10.22 TYPE 1 DIABETES MELLITUS WITH STAGE 3A CHRONIC KIDNEY DISEASE (HCC): Primary | ICD-10-CM

## 2021-10-01 DIAGNOSIS — Z17.1 MALIGNANT NEOPLASM OF CENTRAL PORTION OF RIGHT BREAST IN FEMALE, ESTROGEN RECEPTOR NEGATIVE (HCC): ICD-10-CM

## 2021-10-01 DIAGNOSIS — N18.31 TYPE 1 DIABETES MELLITUS WITH STAGE 3A CHRONIC KIDNEY DISEASE (HCC): Primary | ICD-10-CM

## 2021-10-01 LAB
ALBUMIN UR-MCNC: <1.2 MG/DL
ANION GAP SERPL CALCULATED.3IONS-SCNC: 10.6 MMOL/L (ref 5–15)
BUN SERPL-MCNC: 10 MG/DL (ref 6–20)
BUN/CREAT SERPL: 17.2 (ref 7–25)
CALCIUM SPEC-SCNC: 8.8 MG/DL (ref 8.6–10.5)
CHLORIDE SERPL-SCNC: 100 MMOL/L (ref 98–107)
CO2 SERPL-SCNC: 24.4 MMOL/L (ref 22–29)
CREAT SERPL-MCNC: 0.58 MG/DL (ref 0.57–1)
DEPRECATED RDW RBC AUTO: 44 FL (ref 37–54)
ERYTHROCYTE [DISTWIDTH] IN BLOOD BY AUTOMATED COUNT: 12.9 % (ref 12.3–15.4)
GFR SERPL CREATININE-BSD FRML MDRD: 115 ML/MIN/1.73
GLUCOSE BLDC GLUCOMTR-MCNC: 181 MG/DL (ref 70–130)
GLUCOSE SERPL-MCNC: 291 MG/DL (ref 65–99)
HCG SERPL QL: NEGATIVE
HCT VFR BLD AUTO: 38 % (ref 34–46.6)
HGB BLD-MCNC: 12.5 G/DL (ref 12–15.9)
MCH RBC QN AUTO: 30.6 PG (ref 26.6–33)
MCHC RBC AUTO-ENTMCNC: 32.9 G/DL (ref 31.5–35.7)
MCV RBC AUTO: 92.9 FL (ref 79–97)
PLATELET # BLD AUTO: 241 10*3/MM3 (ref 140–450)
PMV BLD AUTO: 9.6 FL (ref 6–12)
POTASSIUM SERPL-SCNC: 4.2 MMOL/L (ref 3.5–5.2)
QT INTERVAL: 407 MS
RBC # BLD AUTO: 4.09 10*6/MM3 (ref 3.77–5.28)
SARS-COV-2 RNA PNL SPEC NAA+PROBE: NOT DETECTED
SODIUM SERPL-SCNC: 135 MMOL/L (ref 136–145)
VIT B12 BLD-MCNC: 426 PG/ML (ref 211–946)
WBC # BLD AUTO: 7.9 10*3/MM3 (ref 3.4–10.8)

## 2021-10-01 PROCEDURE — 0 FLUDEOXYGLUCOSE F18 SOLUTION: Performed by: INTERNAL MEDICINE

## 2021-10-01 PROCEDURE — 36415 COLL VENOUS BLD VENIPUNCTURE: CPT

## 2021-10-01 PROCEDURE — 82043 UR ALBUMIN QUANTITATIVE: CPT

## 2021-10-01 PROCEDURE — 85027 COMPLETE CBC AUTOMATED: CPT

## 2021-10-01 PROCEDURE — A9552 F18 FDG: HCPCS | Performed by: INTERNAL MEDICINE

## 2021-10-01 PROCEDURE — 82962 GLUCOSE BLOOD TEST: CPT

## 2021-10-01 PROCEDURE — 82607 VITAMIN B-12: CPT

## 2021-10-01 PROCEDURE — 87635 SARS-COV-2 COVID-19 AMP PRB: CPT

## 2021-10-01 PROCEDURE — 80048 BASIC METABOLIC PNL TOTAL CA: CPT

## 2021-10-01 PROCEDURE — 86376 MICROSOMAL ANTIBODY EACH: CPT

## 2021-10-01 PROCEDURE — 84703 CHORIONIC GONADOTROPIN ASSAY: CPT

## 2021-10-01 PROCEDURE — 93005 ELECTROCARDIOGRAM TRACING: CPT

## 2021-10-01 PROCEDURE — 93010 ELECTROCARDIOGRAM REPORT: CPT | Performed by: INTERNAL MEDICINE

## 2021-10-01 PROCEDURE — C9803 HOPD COVID-19 SPEC COLLECT: HCPCS

## 2021-10-01 PROCEDURE — 78815 PET IMAGE W/CT SKULL-THIGH: CPT

## 2021-10-01 RX ADMIN — FLUDEOXYGLUCOSE F18 1 DOSE: 300 INJECTION INTRAVENOUS at 12:04

## 2021-10-01 NOTE — DISCHARGE INSTRUCTIONS
Take the following medications the morning of surgery:    CITALOPRAM    ARRIVE AT 11:30      If you are on prescription narcotic pain medication to control your pain you may also take that medication the morning of surgery.    General Instructions:  • Do not eat solid food after midnight the night before surgery.  • You may drink clear liquids day of surgery but must stop at least one hour before your hospital arrival time.  • It is beneficial for you to have a clear drink that contains carbohydrates the day of surgery.  We suggest a 12 to 20 ounce bottle of Gatorade or Powerade for non-diabetic patients or a 12 to 20 ounce bottle of G2 or Powerade Zero for diabetic patients. (Pediatric patients, are not advised to drink a 12 to 20 ounce carbohydrate drink)    Clear liquids are liquids you can see through.  Nothing red in color.     Plain water                               Sports drinks  Sodas                                   Gelatin (Jell-O)  Fruit juices without pulp such as white grape juice and apple juice  Popsicles that contain no fruit or yogurt  Tea or coffee (no cream or milk added)  Gatorade / Powerade  G2 / Powerade Zero    •   • Patients who avoid smoking, chewing tobacco and alcohol for 4 weeks prior to surgery have a reduced risk of post-operative complications.  Quit smoking as many days before surgery as you can.  • Do not smoke, use chewing tobacco or drink alcohol the day of surgery.   • If applicable bring your C-PAP/ BI-PAP machine.  • Bring any papers given to you in the doctor’s office.  • Wear clean comfortable clothes.  • Do not wear contact lenses, false eyelashes or make-up.  Bring a case for your glasses.   • Bring crutches or walker if applicable.  • Remove all piercings.  Leave jewelry and any other valuables at home.  • Hair extensions with metal clips must be removed prior to surgery.  • The Pre-Admission Testing nurse will instruct you to bring medications if unable to obtain an  accurate list in Pre-Admission Testing.            Preventing a Surgical Site Infection:  • For 2 to 3 days before surgery, avoid shaving with a razor because the razor can irritate skin and make it easier to develop an infection.    • Any areas of open skin can increase the risk of a post-operative wound infection by allowing bacteria to enter and travel throughout the body.  Notify your surgeon if you have any skin wounds / rashes even if it is not near the expected surgical site.  The area will need assessed to determine if surgery should be delayed until it is healed.  • The night prior to surgery shower using a fresh bar of anti-bacterial soap (such as Dial) and clean washcloth.  Sleep in a clean bed with clean clothing.  Do not allow pets to sleep with you.  • Shower on the morning of surgery using a fresh bar of anti-bacterial soap (such as Dial) and clean washcloth.  Dry with a clean towel and dress in clean clothing.  • Ask your surgeon if you will be receiving antibiotics prior to surgery.  • Make sure you, your family, and all healthcare providers clean their hands with soap and water or an alcohol based hand  before caring for you or your wound.    Day of surgery:  Your arrival time is approximately two hours before your scheduled surgery time.  Upon arrival, a Pre-op nurse and Anesthesiologist will review your health history, obtain vital signs, and answer questions you may have.  The only belongings needed at this time will be a list of your home medications and if applicable your C-PAP/BI-PAP machine.  A Pre-op nurse will start an IV and you may receive medication in preparation for surgery, including something to help you relax.     Please be aware that surgery does come with discomfort.  We want to make every effort to control your discomfort so please discuss any uncontrolled symptoms with your nurse.   Your doctor will most likely have prescribed pain medications.      If you are going  home after surgery you will receive individualized written care instructions before being discharged.  A responsible adult must drive you to and from the hospital on the day of your surgery and stay with you for 24 hours.  Discharge prescriptions can be filled by the hospital pharmacy during regular pharmacy hours.  If you are having surgery late in the day/evening your prescription may be e-prescribed to your pharmacy.  Please verify your pharmacy hours or chose a 24 hour pharmacy to avoid not having access to your prescription because your pharmacy has closed for the day.    If you are staying overnight following surgery, you will be transported to your hospital room following the recovery period.  [facility] has all private rooms.    If you have any questions please call Pre-Admission Testing at [phone].  Deductibles and co-payments are collected on the day of service. Please be prepared to pay the required co-pay, deductible or deposit on the day of service as defined by your plan.    Patient Education for Self-Quarantine Process    • Following your COVID testing, we strongly recommend that you wear a mask when you are with other people and practice social distancing.   • Limit your activities to only required outings.  • Wash your hands with soap and water frequently for at least 20 seconds.   • Avoid touching your eyes, nose and mouth with unwashed hands.  • Do not share anything - utensils, drinking glasses, food from the same bowl.   • Sanitize household surfaces daily. Include all high touch areas (door handles, light switches, phones, countertops, etc.)    Call your surgeon immediately if you experience any of the following symptoms:  • Sore Throat  • Shortness of Breath or difficulty breathing  • Cough  • Chills  • Body soreness or muscle pain  • Headache  • Fever  • New loss of taste or smell  • Do not arrive for your surgery ill.  Your procedure will need to be rescheduled to  another time.  You will need to call your physician before the day of surgery to avoid any unnecessary exposure to hospital staff as well as other patients.    CHLORHEXIDINE CLOTH INSTRUCTIONS  The morning of surgery follow these instructions using the Chlorhexidine cloths you've been given.  These steps reduce bacteria on the body.  Do not use the cloths near your eyes, ears mouth, genitalia or on open wounds.  Throw the cloths away after use but do not try to flush them down a toilet.      • Open and remove one cloth at a time from the package.    • Leave the cloth unfolded and begin the bathing.  • Massage the skin with the cloths using gentle pressure to remove bacteria.  Do not scrub harshly.   • Follow the steps below with one 2% CHG cloth per area (6 total cloths).  • One cloth for neck, shoulders and chest.  • One cloth for both arms, hands, fingers and underarms (do underarms last).  • One cloth for the abdomen followed by groin.  • One cloth for right leg and foot including between the toes.  • One cloth for left leg and foot including between the toes.  • The last cloth is to be used for the back of the neck, back and buttocks.    Allow the CHG to air dry 3 minutes on the skin which will give it time to work and decrease the chance of irritation.  The skin may feel sticky until it is dry.  Do not rinse with water or any other liquid or you will lose the beneficial effects of the CHG.  If mild skin irritation occurs, do rinse the skin to remove the CHG.  Report this to the nurse at time of admission.  Do not apply lotions, creams, ointments, deodorants or perfumes after using the clothes. Dress in clean clothes before coming to the hospital.

## 2021-10-02 LAB — THYROPEROXIDASE AB SERPL-ACNC: 10 IU/ML (ref 0–34)

## 2021-10-04 ENCOUNTER — TELEPHONE (OUTPATIENT)
Dept: ONCOLOGY | Facility: CLINIC | Age: 41
End: 2021-10-04

## 2021-10-04 ENCOUNTER — OFFICE VISIT (OUTPATIENT)
Dept: ONCOLOGY | Facility: CLINIC | Age: 41
End: 2021-10-04

## 2021-10-04 ENCOUNTER — APPOINTMENT (OUTPATIENT)
Dept: LAB | Facility: HOSPITAL | Age: 41
End: 2021-10-04

## 2021-10-04 VITALS
HEART RATE: 87 BPM | OXYGEN SATURATION: 100 % | DIASTOLIC BLOOD PRESSURE: 61 MMHG | BODY MASS INDEX: 21.98 KG/M2 | RESPIRATION RATE: 16 BRPM | WEIGHT: 157 LBS | SYSTOLIC BLOOD PRESSURE: 111 MMHG | TEMPERATURE: 97.3 F | HEIGHT: 71 IN

## 2021-10-04 DIAGNOSIS — C50.111 MALIGNANT NEOPLASM OF CENTRAL PORTION OF RIGHT BREAST IN FEMALE, ESTROGEN RECEPTOR NEGATIVE (HCC): Primary | ICD-10-CM

## 2021-10-04 DIAGNOSIS — Z17.1 MALIGNANT NEOPLASM OF CENTRAL PORTION OF RIGHT BREAST IN FEMALE, ESTROGEN RECEPTOR NEGATIVE (HCC): Primary | ICD-10-CM

## 2021-10-04 PROCEDURE — 99215 OFFICE O/P EST HI 40 MIN: CPT | Performed by: INTERNAL MEDICINE

## 2021-10-04 RX ORDER — LORAZEPAM 1 MG/1
1 TABLET ORAL NIGHTLY PRN
Qty: 10 TABLET | Refills: 0 | Status: SHIPPED | OUTPATIENT
Start: 2021-10-04 | End: 2021-11-08 | Stop reason: SDUPTHER

## 2021-10-04 NOTE — PROGRESS NOTES
Subjective   Sierra Mao is a 41 y.o. female. Referred by Dr. Price for right breast invasive ductal carcinoma triple negative    History of Present Illness     Ms. Mao is a 41-year-old Premenopausal  lady who palpated a mass in her right breast. She had a mass in a similar area in 2018 which was biopsied and benign. Further evaluation was performed.    08/18/2021-bilateral diagnostic mammogram-parenchyma is heterogeneously dense. In the palpable area of concern in the upper outer quadrant of the right breast there is an area of asymmetry with pleomorphic microcalcifications in the posterior one third of the upper outer quadrant of the right breast in the axillary region. There is also microcalcifications in the middle one third and anterior one third of the left breast at 12 o'clock position which are new. Architectural distortion in either breast.    Ultrasound-  Right breast at the site of palpable concern, at 11 o'clock, 1 cm from the nipple there is an irregular hypoechoic mass which measures up to 4 cm.  10 o'clock, 6 cm from the nipple there is an irregular mass which measures up to 1.6 cm  At 10 o'clock, 8 cm from the nipple there is a irregular mass which measures 1.8 cm.  Multiple right axillary lymph nodes demonstrate thickened cortices and rounded morphology largest of which measures 2 cm.  Left breast at 2 o'clock, 6 cm from the nipple there is a 1 cm heterogeneous hypoechoic masslike region.    09/07/2021-  1.right breast 11 o'clock, 1 cm from the nipple-invasive ductal carcinoma, poorly differentiated, grade 3, ER low +1 to 10%, weak, KY negative less than 1%, HER-2 negative.  2.right axillary lymph node-metastatic mammary carcinoma measuring 9 mm. ER negative, KY +5% three, HER-2 negative  3.left breast 2 o'clock, 6 cm from the nipple-cluster of apocrine cyst  4.left breast 12 o'clock-apocrine cyst with polarizable calcium oxalate crystals.    Bilateral breast MRI  09/23/[id number] malignancy of the right breast occupying the middle and the posterior one thirds from 11 o'clock to 1 o'clock position and measuring up to 7.1 cm in greatest dimension. The lesion abuts the pectoralis fascia but there is no evidence of direct invasion. There are portions of the lesion that extended to the subpatellar soft tissues but there is no abnormal enhancement of the skin or skin thickening.  Bulky right axillary lymphadenopathy noted.  2.4 cm irregular mass in the lower outer quadrant of the right breast centered at 8 o'clock position which is separate from the known malignancy suspicious for additional malignancy.  Ultrasound and biopsy of this lesion recommended.  No findings suspicious for malignancy in the left breast.    09/23/2021-CT of the chest abdomen and pelvis and soft tissue neck-multiple mild to moderately enlarged subpectoral and right axillary lymph nodes consistent with metastatic disease. The subpectoral lymphadenopathy extends into the right supraclavicular region as well. There is no evidence of metastatic disease elsewhere within the neck. No evidence of metastatic disease in the chest abdomen or pelvis. 5 cm right ovarian cyst is noted. Suggest follow-up ultrasound in 6 weeks for further evaluation.    09/24/2021-bone scan without any evidence of metastatic disease.    Mr. Mao reports that she initially felt good right breast mass in May 2021.  Starting July 2021 she had palpated something abnormal in her right neck.  Since noticing the mass in May 2021 she feels like the mass in the right breast has doubled in size.  She also feels like the right axillary lymph nodes have enlarged in size.    She had a 70 pound weight loss in 2019 due to poorly controlled diabetes.  She was initially diagnosed with a type 2 diabetes and subsequently determined to be type I and switch to insulin.  She had admission to the hospital due to DKA.    She is unvaccinated for COVID-19.   She previously worked as a  however currently not working.    Family history significant for ovarian cancer in grandmother and bladder cancer in her father.    Interval history  Ms. Mao presents to the clinic today for follow-up.  She had a cervical lymph node biopsy and also had a PET/CT.  She is here to review the results of the same.  She was also seen by endocrinology at [place], .    The following portions of the patient's history were reviewed and updated as appropriate: allergies, current medications, past family history, past medical history, past social history, past surgical history and problem list.    Past Medical History:   Diagnosis Date   • Anxiety    • Arthritis    • Breast cancer (HCC) 09/07/2021    Right breast invasive ductal carcinoma ER low positive, TN negative, [id number] negative, mercedes to lymph node (biopsy positive)   • Chronic fatigue    • Diabetes mellitus with stage 3 chronic kidney disease (HCC)    • Hyperlipidemia    • Leukocytosis    • Menorrhagia with regular cycle    • Seasonal allergies    • Type I diabetes mellitus (HCC)    • Vitamin D deficiency         Past Surgical History:   Procedure Laterality Date   • APPENDECTOMY N/A 1995   • BREAST BIOPSY Right 2018   • BREAST BIOPSY Bilateral 09/07/2021   • US GUIDED LYMPH NODE BIOPSY  9/7/2021    Dr. Carol Terrazas, [facility]   • US GUIDED LYMPH NODE BIOPSY  9/28/2021        Family History   Problem Relation Age of Onset   • Diabetes Mother    • Cervical cancer Maternal Grandmother         cervical/uterine    • Diabetes Maternal Grandfather    • Lymphoma Paternal Aunt 60   • Breast cancer Neg Hx    • Malig Hyperthermia Neg Hx         Social History     Socioeconomic History   • Marital status:      Spouse name: Not on file   • Number of children: Not on file   • Years of education: Not on file   • Highest education level: Not on file   Tobacco Use   • Smoking status: Former Smoker     Packs/day: 0.50      "Years: 8.00     Pack years: 4.00     Types: Cigarettes     Start date: 1995     Quit date: 2003     Years since quittin.7   • Smokeless tobacco: Never Used   • Tobacco comment: teen / young adult   Vaping Use   • Vaping Use: Never used   Substance and Sexual Activity   • Alcohol use: Yes     Alcohol/week: 1.0 standard drinks     Types: 1 Standard drinks or equivalent per week     Comment: social   • Drug use: No   • Sexual activity: Defer     Partners: Male     Birth control/protection: None     Comment:         OB History        1    Para   1    Term   1            AB        Living           SAB        TAB        Ectopic        Molar        Multiple        Live Births                 Age of menarche-14  Age at first live childbirth-24   one para one  zero  Oral contraceptive pill use for 20 years  She is premenopausal    No Known Allergies         Review of Systems   Constitutional: Negative.    HENT: Negative.    Eyes: Negative.    Respiratory: Negative.    Cardiovascular: Negative.    Gastrointestinal: Negative.    Endocrine: Negative.    Genitourinary: Negative.  Positive for breast lump.   Musculoskeletal: Negative.    Allergic/Immunologic: Negative.    Neurological: Negative.    Hematological: Negative.    Psychiatric/Behavioral: The patient is nervous/anxious.          Objective   Blood pressure 111/61, pulse 87, temperature 97.3 °F (36.3 °C), temperature source Temporal, resp. rate 16, height 180.3 cm (70.98\"), weight 71.2 kg (157 lb), last menstrual period 2021, SpO2 100 %, not currently breastfeeding.   Physical Exam  Vitals reviewed.   Constitutional:       Appearance: Normal appearance. She is normal weight.   HENT:      Head: Normocephalic and atraumatic.      Right Ear: External ear normal.      Left Ear: External ear normal.      Nose: Nose normal.      Mouth/Throat:      Mouth: Mucous membranes are moist.      Pharynx: Oropharynx is clear. "   Eyes:      Extraocular Movements: Extraocular movements intact.      Conjunctiva/sclera: Conjunctivae normal.      Pupils: Pupils are equal, round, and reactive to light.   Cardiovascular:      Rate and Rhythm: Normal rate and regular rhythm.      Pulses: Normal pulses.      Heart sounds: Normal heart sounds.   Pulmonary:      Effort: Pulmonary effort is normal.      Breath sounds: Normal breath sounds.   Abdominal:      General: Abdomen is flat. Bowel sounds are normal.   Musculoskeletal:         General: Normal range of motion.      Cervical back: Normal range of motion.   Skin:     General: Skin is warm and dry.   Neurological:      General: No focal deficit present.      Mental Status: She is alert and oriented to person, place, and time. Mental status is at baseline.   Psychiatric:         Mood and Affect: Mood normal.         Behavior: Behavior normal.         Thought Content: Thought content normal.         Judgment: Judgment normal.       Breast Exam: Right breast-On inspection there is a fullness in the upper outer quadrant.  On palpation there is a 8x 7 cm mass in the upper outer quadrant.  There is bulky right axillary lymphadenopathy.  There is also palpable supraclavicular lymphadenopathy.  Left breast appears normal on inspection.  No palpable abnormalities of the breast or the axilla.  There are firm palpable nodular lesions on the right side of the neck anterior to the sternocleidomastoid.  These are maybe 1 cm  Most consistent with LAD.     Hospital Outpatient Visit on 10/01/2021   Component Date Value Ref Range Status   • Glucose 10/01/2021 181* 70 - 130 mg/dL Final    Meter: [id number] : [id number] Radhames Sarah RT   Pre-Admission Testing on 10/01/2021   Component Date Value Ref Range Status   • QT Interval 10/01/2021 407  ms Final   • WBC 10/01/2021 7.90  3.40 - 10.80 10*3/mm3 Final   • RBC 10/01/2021 4.09  3.77 - 5.28 10*6/mm3 Final   • Hemoglobin 10/01/2021 12.5  12.0 - 15.9 g/dL Final   •  Hematocrit 10/01/2021 38.0  34.0 - 46.6 % Final   • MCV 10/01/2021 92.9  79.0 - 97.0 fL Final   • MCH 10/01/2021 30.6  26.6 - 33.0 pg Final   • MCHC 10/01/2021 32.9  31.5 - 35.7 g/dL Final   • RDW 10/01/2021 12.9  12.3 - 15.4 % Final   • RDW-SD 10/01/2021 44.0  37.0 - 54.0 fl Final   • MPV 10/01/2021 9.6  6.0 - 12.0 fL Final   • Platelets 10/01/2021 241  140 - 450 10*3/mm3 Final   • Glucose 10/01/2021 291* 65 - 99 mg/dL Final   • BUN 10/01/2021 10  6 - 20 mg/dL Final   • Creatinine 10/01/2021 0.58  0.57 - 1.00 mg/dL Final   • Sodium 10/01/2021 135* 136 - 145 mmol/L Final   • Potassium 10/01/2021 4.2  3.5 - 5.2 mmol/L Final   • Chloride 10/01/2021 100  98 - 107 mmol/L Final   • CO2 10/01/2021 24.4  22.0 - 29.0 mmol/L Final   • Calcium 10/01/2021 8.8  8.6 - 10.5 mg/dL Final   • eGFR Non African Amer 10/01/2021 115  >60 mL/min/1.73 Final   • BUN/Creatinine Ratio 10/01/2021 17.2  7.0 - 25.0 Final   • Anion Gap 10/01/2021 10.6  5.0 - 15.0 mmol/L Final   • HCG Qualitative 10/01/2021 Negative  Negative Final   • Thyroid Peroxidase Antibody 10/01/2021 10  0 - 34 IU/mL Final   • Vitamin B-12 10/01/2021 426  211 - 946 pg/mL Final   • COVID19 10/01/2021 Not Detected  Not Detected - Ref. Range Final   • Microalbumin, Urine 10/01/2021 <1.2  mg/dL Final   Hospital Outpatient Visit on 09/29/2021   Component Date Value Ref Range Status   • Sinus 09/29/2021 3.5  cm Final   • STJ 09/29/2021 2.6  cm Final   • Ascending aorta 09/29/2021 3.4  cm Final   • LA Volume Index 09/29/2021 12.0  mL/m2 Final   • Aortic arch 09/29/2021 2.7  cm Final   • Abdo Ao Diam 09/29/2021 1.7  cm Final   • 3D vol index 09/29/2021 13.0   Final   • EF_3D-VOL 09/29/2021 57  % Final   • BSA 09/29/2021 1.9  m^2 Final   • IVSd 09/29/2021 0.86  cm Final   • LVIDd 09/29/2021 4.1  cm Final   • LVIDs 09/29/2021 2.6  cm Final   • LVPWd 09/29/2021 0.96  cm Final   • IVS/LVPW 09/29/2021 0.9   Final   • FS 09/29/2021 37.0  % Final   • EDV(Teich) 09/29/2021 75.9  ml Final    • ESV(Teich) 09/29/2021 24.8  ml Final   • EF(Teich) 09/29/2021 67.3  % Final   • EF(cubed) 09/29/2021 75.0  % Final   • LV mass(C)d 09/29/2021 118.0  grams Final   • LV mass(C)dI 09/29/2021 62.0  grams/m^2 Final   • SV(Teich) 09/29/2021 51.1  ml Final   • SI(Teich) 09/29/2021 26.9  ml/m^2 Final   • SV(cubed) 09/29/2021 53.2  ml Final   • SI(cubed) 09/29/2021 28.0  ml/m^2 Final   • Ao root diam 09/29/2021 3.2  cm Final   • Ao root area 09/29/2021 8.2  cm^2 Final   • ACS 09/29/2021 2.1  cm Final   • asc Aorta Diam 09/29/2021 3.4  cm Final   • LVOT diam 09/29/2021 1.9  cm Final   • LVOT area 09/29/2021 3.0  cm^2 Final   • LVOT area(traced) 09/29/2021 2.8  cm^2 Final   • RVOT diam 09/29/2021 1.8  cm Final   • RVOT area 09/29/2021 2.6  cm^2 Final   • LVLd ap4 09/29/2021 7.9  cm Final   • EDV(MOD-sp4) 09/29/2021 101.0  ml Final   • LVLs ap4 09/29/2021 6.6  cm Final   • ESV(MOD-sp4) 09/29/2021 41.0  ml Final   • EF(MOD-sp4) 09/29/2021 59.4  % Final   • LVLd ap2 09/29/2021 8.0  cm Final   • EDV(MOD-sp2) 09/29/2021 101.0  ml Final   • LVLs ap2 09/29/2021 6.2  cm Final   • ESV(MOD-sp2) 09/29/2021 43.0  ml Final   • EF(MOD-sp2) 09/29/2021 57.4  % Final   • SV(MOD-sp4) 09/29/2021 60.0  ml Final   • SI(MOD-sp4) 09/29/2021 31.5  ml/m^2 Final   • SV(MOD-sp2) 09/29/2021 58.0  ml Final   • SI(MOD-sp2) 09/29/2021 30.5  ml/m^2 Final   • Ao root area (BSA corrected) 09/29/2021 1.7   Final   • LV Corey Vol (BSA corrected) 09/29/2021 53.1  ml/m^2 Final   • LV Sys Vol (BSA corrected) 09/29/2021 21.5  ml/m^2 Final   • TAPSE (>1.6) 09/29/2021 2.7  cm Final   • EF(MOD-bp) 09/29/2021 57.6  % Final   • MV A dur 09/29/2021 0.09  sec Final   • MV E max lit 09/29/2021 70.3  cm/sec Final   • MV A max lit 09/29/2021 58.3  cm/sec Final   • MV E/A 09/29/2021 1.2   Final   • MV V2 max 09/29/2021 84.7  cm/sec Final   • MV max PG 09/29/2021 2.9  mmHg Final   • MV V2 mean 09/29/2021 47.2  cm/sec Final   • MV mean PG 09/29/2021 1.1  mmHg Final   • MV V2  VTI 09/29/2021 20.2  cm Final   • MVA(VTI) 09/29/2021 2.6  cm^2 Final   • MV P1/2t max juan alberto 09/29/2021 85.6  cm/sec Final   • MV P1/2t 09/29/2021 54.5  msec Final   • MVA(P1/2t) 09/29/2021 4.0  cm^2 Final   • MV dec slope 09/29/2021 460.1  cm/sec^2 Final   • MV dec time 09/29/2021 0.2  sec Final   • Ao pk juan alberto 09/29/2021 118.3  cm/sec Final   • Ao max PG 09/29/2021 5.6  mmHg Final   • Ao max PG (full) 09/29/2021 1.6  mmHg Final   • Ao V2 mean 09/29/2021 84.9  cm/sec Final   • Ao mean PG 09/29/2021 3.3  mmHg Final   • Ao mean PG (full) 09/29/2021 0.83  mmHg Final   • Ao V2 VTI 09/29/2021 21.3  cm Final   • COREY(I,A) 09/29/2021 2.5  cm^2 Final   • COREY(I,D) 09/29/2021 2.5  cm^2 Final   • COREY(V,A) 09/29/2021 2.5  cm^2 Final   • COREY(V,D) 09/29/2021 2.5  cm^2 Final   • LV V1 max PG 09/29/2021 4.0  mmHg Final   • LV V1 mean PG 09/29/2021 2.4  mmHg Final   • LV V1 max 09/29/2021 99.4  cm/sec Final   • LV V1 mean 09/29/2021 73.7  cm/sec Final   • LV V1 VTI 09/29/2021 17.7  cm Final   • MR max juan alberto 09/29/2021 436.8  cm/sec Final   • MR max PG 09/29/2021 76.3  mmHg Final   • SV(Ao) 09/29/2021 174.1  ml Final   • SI(Ao) 09/29/2021 91.5  ml/m^2 Final   • SV(LVOT) 09/29/2021 52.8  ml Final   • SV(RVOT) 09/29/2021 32.3  ml Final   • SI(LVOT) 09/29/2021 27.7  ml/m^2 Final   • PA V2 max 09/29/2021 92.3  cm/sec Final   • PA max PG 09/29/2021 3.4  mmHg Final   • PA max PG (full) 09/29/2021 1.4  mmHg Final   • BH CV ECHO HOMERO - PVA(V,A) 09/29/2021 2.0  cm^2 Final   • BH CV ECHO HOMERO - PVA(V,D) 09/29/2021 2.0  cm^2 Final   • PA acc time 09/29/2021 0.11  sec Final   • RV V1 max PG 09/29/2021 2.0  mmHg Final   • RV V1 mean PG 09/29/2021 1.3  mmHg Final   • RV V1 max 09/29/2021 70.7  cm/sec Final   • RV V1 mean 09/29/2021 54.1  cm/sec Final   • RV V1 VTI 09/29/2021 12.6  cm Final   • TR max juan alberto 09/29/2021 170.1  cm/sec Final   • PA pr(Accel) 09/29/2021 27.9  mmHg Final   • Pulm Sys Juan Alberto 09/29/2021 56.1  cm/sec Final   • Pulm Corey Juan Alberto 09/29/2021  53.6  cm/sec Final   • Pulm S/D 09/29/2021 1.0   Final   • Qp/Qs 09/29/2021 0.61   Final   • Pulm A Revs Dur 09/29/2021 0.11  sec Final   • Pulm A Revs Juan Alberto 09/29/2021 42.8  cm/sec Final   • MVA P1/2T LCG 09/29/2021 2.6  cm^2 Final   • RV Base 09/29/2021 3.2  cm Final   • RV Length 09/29/2021 6.8  cm Final   • RV Mid 09/29/2021 2.7  cm Final   • Lat Peak E' Juan Alberto 09/29/2021 21.1  cm/sec Final   • Med Peak E' Juan Alberto 09/29/2021 14.1  cm/sec Final   • RV S' 09/29/2021 15.3  cm/sec Final   • BH CV ECHO HOMERO - BZI_BMI 09/29/2021 21.9  kilograms/m^2 Final   • BH CV ECHO HOMERO - BSA([place]) 09/29/2021 1.9  m^2 Final   • BH CV ECHO HOMERO - BZI_METRIC_WEIGHT 09/29/2021 71.2  kg Final   • BH CV ECHO HOMERO - BZI_METRIC_HEIGHT 09/29/2021 180.3  cm Final   • Avg E/e' ratio 09/29/2021 3.99   Final   • RAP systole 09/29/2021 3  mmHg Final   • RVSP(TR) 09/29/2021 15  mmHg Final   • Target HR (85%) 09/29/2021 153  bpm Final   • Max. Pred. HR (100%) 09/29/2021 180  bpm Final   Hospital Outpatient Visit on 09/28/2021   Component Date Value Ref Range Status   • Case Report 09/28/2021    Final                    Value:Surgical Pathology Report                         Case: [id number]                                  Authorizing Provider:  Didi De La O MD       Collected:           09/28/2021 01:11 PM          Ordering Location:     [facility]  Received:            09/28/2021 01:41 PM                                                                                                            Pathologist:           Onelia Pedersen MD                                                          Specimen:    Lymph Node, right cervical neck L N                                                       • Clinical Information 09/28/2021    Final                    Value:This result contains rich text formatting which cannot be displayed here.   • Final Diagnosis 09/28/2021    Final                    Value:This result contains rich text  formatting which cannot be displayed here.   • Synoptic Checklist 09/28/2021    Final                    Value:Breast Biomarker Reporting Template  (BREAST: BIOMARKER REPORTING TEMPLATE - All Specimens)                                                        Protocol posted: 2/26/2020                                                           Test(s) Performed:                                     Estrogen Receptor (ER) Status:    Negative (less than 1%)                                    :    Internal control cells absent                                  Test Type:    Food and Drug Administration (FDA) cleared (test / vendor): [facility]                                  Primary Antibody:    SP1                                  Scoring System:    Jacqui                                    Proportion Score:    0                                    Intensity Score:    0                                    Total Jacqui Score:    0                                Test(s) Performed:                                     Progesterone Receptor (PgR) Status:    Negative (less than 1%)                                    :    Internal control cells absent                                  Test Type:    Food and Drug Administration (FDA) cleared (test / vendor): [facility]                                  Primary Antibody:    1E2                                  Scoring System:    Jacqui                                    Proportion Score:    0                                    Intensity Score:    0                                    Total Jacqui Score:    0                                Test(s) Performed:                                     HER2 by Immunohistochemistry:    Negative (Score 1+)                                  Test Type:    Food and Drug Administration (FDA) cleared (test / vendor): [facility]                                  Primary Antibody:    4B5                                Cold Ischemia and Fixation Times:    Cannot be  determined: Unknown cold ischemia time                                Fixation Time (hours):    8 hours                               Testing Performed on Block Number(s):    1A                                                         METHODS                               Fixative:    Formalin                                Image Analysis:    Not performed      • Comment 09/28/2021    Final                    Value:This result contains rich text formatting which cannot be displayed here.   • Gross Description 09/28/2021    Final                    Value:This result contains rich text formatting which cannot be displayed here.   • Special Stains 09/28/2021    Final                    Value:This result contains rich text formatting which cannot be displayed here.   • Case Report 09/28/2021    Final                    Value:Medical Cytology Report                           Case: [id number]                                 Authorizing Provider:  Didi De La O MD       Collected:           09/28/2021 01:00 PM          Ordering Location:     [facility]  Received:            09/28/2021 01:34 PM                                                                                                            Pathologist:           Onelia Pedersen MD                                                          Specimen:    Lymph Node, Right Neck Node                                                               • Final Diagnosis 09/28/2021    Final                    Value:This result contains rich text formatting which cannot be displayed here.   • Comment 09/28/2021    Final                    Value:This result contains rich text formatting which cannot be displayed here.   • Specimen Adequacy 09/28/2021 FNA immediate cytologic evaluation, satisfactory   Final   • Gross Description 09/28/2021    Final                    Value:This result contains rich text formatting which cannot be displayed here.   • Microscopic  Description 09/28/2021    Final                    Value:This result contains rich text formatting which cannot be displayed here.   Lab on 09/27/2021   Component Date Value Ref Range Status   • Glucose 09/27/2021 350* 74 - 124 mg/dL Final   • BUN 09/27/2021 15  6 - 20 mg/dL Final   • Creatinine 09/27/2021 0.64  0.60 - 1.10 mg/dL Final   • Sodium 09/27/2021 137  134 - 145 mmol/L Final   • Potassium 09/27/2021 4.4  3.5 - 4.7 mmol/L Final   • Chloride 09/27/2021 98  98 - 107 mmol/L Final   • CO2 09/27/2021 26.9  22.0 - 29.0 mmol/L Final   • Calcium 09/27/2021 9.5  8.5 - 10.2 mg/dL Final   • Total Protein 09/27/2021 7.0  6.3 - 8.0 g/dL Final   • Albumin 09/27/2021 4.30  3.50 - 5.20 g/dL Final   • ALT (SGPT) 09/27/2021 11  0 - 33 U/L Final   • AST (SGOT) 09/27/2021 15  0 - 32 U/L Final   • Alkaline Phosphatase 09/27/2021 53  38 - 116 U/L Final   • Total Bilirubin 09/27/2021 0.3  0.2 - 1.2 mg/dL Final   • eGFR Non  Amer 09/27/2021 103  >60 mL/min/1.73 Final   • Globulin 09/27/2021 2.7  1.8 - 3.5 gm/dL Final   • A/G Ratio 09/27/2021 1.6  1.1 - 2.4 g/dL Final   • BUN/Creatinine Ratio 09/27/2021 23.4  7.3 - 30.0 Final   • Anion Gap 09/27/2021 12.1  5.0 - 15.0 mmol/L Final   • WBC 09/27/2021 8.86  3.40 - 10.80 10*3/mm3 Final   • RBC 09/27/2021 4.46  3.77 - 5.28 10*6/mm3 Final   • Hemoglobin 09/27/2021 13.5  12.0 - 15.9 g/dL Final   • Hematocrit 09/27/2021 41.3  34.0 - 46.6 % Final   • MCV 09/27/2021 92.6  79.0 - 97.0 fL Final   • MCH 09/27/2021 30.3  26.6 - 33.0 pg Final   • MCHC 09/27/2021 32.7  31.5 - 35.7 g/dL Final   • RDW 09/27/2021 13.6  12.3 - 15.4 % Final   • RDW-SD 09/27/2021 46.1  37.0 - 54.0 fl Final   • MPV 09/27/2021 9.5  6.0 - 12.0 fL Final   • Platelets 09/27/2021 293  140 - 450 10*3/mm3 Final   • Neutrophil % 09/27/2021 70.0  42.7 - 76.0 % Final   • Lymphocyte % 09/27/2021 17.9* 19.6 - 45.3 % Final   • Monocyte % 09/27/2021 7.2  5.0 - 12.0 % Final   • Eosinophil % 09/27/2021 4.1  0.3 - 6.2 % Final   •  Basophil % 09/27/2021 0.3  0.0 - 1.5 % Final   • Immature Grans % 09/27/2021 0.5  0.0 - 0.5 % Final   • Neutrophils, Absolute 09/27/2021 6.20  1.70 - 7.00 10*3/mm3 Final   • Lymphocytes, Absolute 09/27/2021 1.59  0.70 - 3.10 10*3/mm3 Final   • Monocytes, Absolute 09/27/2021 0.64  0.10 - 0.90 10*3/mm3 Final   • Eosinophils, Absolute 09/27/2021 0.36  0.00 - 0.40 10*3/mm3 Final   • Basophils, Absolute 09/27/2021 0.03  0.00 - 0.20 10*3/mm3 Final   • Immature Grans, Absolute 09/27/2021 0.04  0.00 - 0.05 10*3/mm3 Final   • nRBC 09/27/2021 0.0  0.0 - 0.2 /100 WBC Final   Hospital Outpatient Visit on 09/23/2021   Component Date Value Ref Range Status   • Creatinine 09/23/2021 0.60  0.60 - 1.30 mg/dL Final    Serial Number: [id number]:  [id number]   Hospital Outpatient Visit on 09/23/2021   Component Date Value Ref Range Status   • Creatinine 09/23/2021 0.70  0.60 - 1.30 mg/dL Final    Serial Number: [id number]:  [id number]   Hospital Outpatient Visit on 09/07/2021   Component Date Value Ref Range Status   • Case Report 09/09/2021    Final                    Value:Surgical Pathology Report                         Case: [id number]                                  Authorizing Provider:  Mariposa Price MD        Collected:           09/07/2021 02:10 PM          Ordering Location:     [facility]  Received:            09/07/2021 03:27 PM                                                                                                            Pathologist:           Onelia Pedersen MD                                                          Specimens:   1) - Breast, Right, right breast 11:00 1cm fn, not for calcs                                        2) - Axilla, Right, right axillary lymp node, not for calcs                                         3) - Breast, Left, left breast mass 2:00 6cm fn, not for calcs                                      4) - Breast, Left, left breast stereotactic biopsy, performed by  dr arana, for                    calcs, formalin time 1315, face clock 12 oclock, # cores 12, specimen removed 1305                                            ,placed in formalin @1315 for calcs                                                       • Final Diagnosis 09/09/2021    Final                    Value:This result contains rich text formatting which cannot be displayed here.   • Synoptic Checklist 09/09/2021    Final                    Value:Breast Biomarker Reporting Template  (BREAST: BIOMARKER REPORTING TEMPLATE - 1)                                                        Protocol posted: 2/26/2020                                                           Test(s) Performed:                                     Estrogen Receptor (ER) Status:    Low Positive (1-10% of cells with nuclear positivity)                                    Percentage of Cells with Nuclear Positivity:    2                                    Average Intensity of Staining:    Weak                                    Status of Internal Controls:    Internal control cells present and stain as expected                                  Test Type:    Food and Drug Administration (FDA) cleared (test / vendor): [place]                                  Primary Antibody:    SP1                                  Scoring System:    Jacqui                                    Proportion Score:    2                                    Intensity Score:    1                                    Total Jacqui Score:    3                                Test(s) Performed:                                     Progesterone Receptor (PgR) Status:    Negative (less than 1%)                                    :    Internal control cells present and stain as expected                                  Test Type:    Food and Drug Administration (FDA) cleared (test / vendor): [place]                                  Primary Antibody:    1E2                                   Scoring System:    Jacqui                                    Proportion Score:    0                                    Intensity Score:    0                                    Total Jacqui Score:    0                                Test(s) Performed:                                     HER2 by Immunohistochemistry:    Negative (Score 0)                                  Test Type:    Food and Drug Administration (FDA) cleared (test / vendor): [employer]                                  Primary Antibody:    4B5                                Test(s) Performed:    Ki-67                                  Percentage of Cells with Nuclear Positivity:    65 %                                 Primary Antibody:    30-9                                Cold Ischemia and Fixation Times:    Meet requirements specified in latest version of the ASCO / CAP Guidelines                                Cold Ischemia Time (minutes):    0 min                               Fixation Time (hours):    6.75 hours                               Testing Performed on Block Number(s):    1A                                                         METHODS                               Fixative:    Formalin                                Image Analysis:    Not performed                             Breast Biomarker Reporting Template  (BREAST: BIOMARKER REPORTING TEMPLATE - 2)                                                        Protocol posted: 2/26/2020                                                           Test(s) Performed:                                     Estrogen Receptor (ER) Status:    Negative (less than 1%)                                    :    Internal control cells absent                                  Test Type:    Food and Drug Administration (FDA) cleared (test / vendor): [employer]                                  Primary Antibody:    SP1                                  Scoring System:    Jacqui                                     Proportion Score:    0                                    Intensity Score:    0                                    Total Jacqui Score:    0                                Test(s) Performed:                                     Progesterone Receptor (PgR) Status:    Positive                                    Percentage of Cells with Nuclear Positivity:    5 %                                   Average Intensity of Staining:    Weak                                  Test Type:    Food and Drug Administration (FDA) cleared (test / vendor): [employer]                                  Primary Antibody:    1E2                                  Scoring System:    Jacqui                                    Proportion Score:    2                                    Intensity Score:    1                                    Total Jacqui Score:    3                                Test(s) Performed:                                     HER2 by Immunohistochemistry:    Negative (Score 0)                                  Test Type:    Food and Drug Administration (FDA) cleared (test / vendor): [employer]                                  Primary Antibody:    4B5                                Cold Ischemia and Fixation Times:    Meet requirements specified in latest version of the ASCO / CAP Guidelines                                Cold Ischemia Time (minutes):    0 min                               Fixation Time (hours):    6.75 hours                               Testing Performed on Block Number(s):    2A                                                         METHODS                               Fixative:    Formalin                                Image Analysis:    Not performed      • Comment 09/09/2021    Final                    Value:This result contains rich text formatting which cannot be displayed here.   • Gross Description 09/09/2021    Final                    Value:This result contains rich text formatting which cannot be  displayed here.   • Special Stains 09/09/2021    Final                    Value:This result contains rich text formatting which cannot be displayed here.   • Microscopic Description 09/09/2021    Final                    Value:This result contains rich text formatting which cannot be displayed here.   • Case Report 09/07/2021    Final                    Value:Surgical Pathology Report                         Case: [id number]                                  Authorizing Provider:  Mariposa Price MD        Collected:           09/07/2021 02:10 PM          Ordering Location:     [facility]  Received:            09/07/2021 03:27 PM                                                                                                            Pathologist:           Onelia Pedersen MD                                                          Specimens:   1) - Breast, Right, right breast 11:00 1cm fn, not for calcs                                        2) - Axilla, Right, right axillary lymp node, not for calcs                                         3) - Breast, Left, left breast mass 2:00 6cm fn, not for calcs                                      4) - Breast, Left, left breast stereotactic biopsy, performed by dr arana, for                    calcs, formalin time 1315, face clock 12 oclock, # cores 12, specimen removed 1305                                            ,placed in formalin @1315 for calcs                                                       • Final Diagnosis 09/07/2021    Final                    Value:This result contains rich text formatting which cannot be displayed here.   • Synoptic Checklist 09/07/2021    Final                    Value:Breast Biomarker Reporting Template  (BREAST: BIOMARKER REPORTING TEMPLATE - 1)                                                        Protocol posted: 2/26/2020                                                           Test(s) Performed:                                      Estrogen Receptor (ER) Status:    Low Positive (1-10% of cells with nuclear positivity)                                    Percentage of Cells with Nuclear Positivity:    2                                    Average Intensity of Staining:    Weak                                    Status of Internal Controls:    Internal control cells present and stain as expected                                  Test Type:    Food and Drug Administration (FDA) cleared (test / vendor): [facility]                                  Primary Antibody:    SP1                                  Scoring System:    Jacqui                                    Proportion Score:    2                                    Intensity Score:    1                                    Total Jacqui Score:    3                                Test(s) Performed:                                     Progesterone Receptor (PgR) Status:    Negative (less than 1%)                                    :    Internal control cells present and stain as expected                                  Test Type:    Food and Drug Administration (FDA) cleared (test / vendor): [facility]                                  Primary Antibody:    1E2                                  Scoring System:    Jacqui                                    Proportion Score:    0                                    Intensity Score:    0                                    Total Jacqui Score:    0                                Test(s) Performed:                                     HER2 by Immunohistochemistry:    Negative (Score 0)                                  Test Type:    Food and Drug Administration (FDA) cleared (test / vendor): [facility]                                  Primary Antibody:    4B5                                Test(s) Performed:    Ki-67                                  Percentage of Cells with Nuclear Positivity:    65 %                                 Primary Antibody:     30-9                                Cold Ischemia and Fixation Times:    Meet requirements specified in latest version of the ASCO / CAP Guidelines                                Cold Ischemia Time (minutes):    0 min                               Fixation Time (hours):    6.75 hours                               Testing Performed on Block Number(s):    1A                                                         METHODS                               Fixative:    Formalin                                Image Analysis:    Not performed                             Breast Biomarker Reporting Template  (BREAST: BIOMARKER REPORTING TEMPLATE - 2)                                                        Protocol posted: 2/26/2020                                                           Test(s) Performed:                                     Estrogen Receptor (ER) Status:    Negative (less than 1%)                                    :    Internal control cells absent                                  Test Type:    Food and Drug Administration (FDA) cleared (test / vendor): [employer]                                  Primary Antibody:    SP1                                  Scoring System:    Jacqui                                    Proportion Score:    0                                    Intensity Score:    0                                    Total Jacqui Score:    0                                Test(s) Performed:                                     Progesterone Receptor (PgR) Status:    Positive                                    Percentage of Cells with Nuclear Positivity:    5 %                                   Average Intensity of Staining:    Weak                                  Test Type:    Food and Drug Administration (FDA) cleared (test / vendor): [employer]                                  Primary Antibody:    1E2                                  Scoring System:    Jacqui                                     Proportion Score:    2                                    Intensity Score:    1                                    Total Jacqui Score:    3                                Test(s) Performed:                                     HER2 by Immunohistochemistry:    Negative (Score 0)                                  Test Type:    Food and Drug Administration (FDA) cleared (test / vendor): [facility]                                  Primary Antibody:    4B5                                Cold Ischemia and Fixation Times:    Meet requirements specified in latest version of the ASCO / CAP Guidelines                                Cold Ischemia Time (minutes):    0 min                               Fixation Time (hours):    6.75 hours                               Testing Performed on Block Number(s):    2A                                                         METHODS                               Fixative:    Formalin                                Image Analysis:    Not performed      • Comment 09/07/2021    Final                    Value:This result contains rich text formatting which cannot be displayed here.   • Gross Description 09/07/2021    Final                    Value:This result contains rich text formatting which cannot be displayed here.   • Special Stains 09/07/2021    Final                    Value:This result contains rich text formatting which cannot be displayed here.   • Microscopic Description 09/07/2021    Final                    Value:This result contains rich text formatting which cannot be displayed here.        CT Soft Tissue Neck With Contrast    Result Date: 9/23/2021  Multiple mild to moderately enlarged subpectoral and right axillary lymph nodes consistent with metastatic disease. The subpectoral lymphadenopathy extends into the right supraclavicular region, as well. There is no evidence of metastatic disease elsewhere within the neck, chest, abdomen or pelvis. A 5 cm diameter right ovarian cyst  is noted. Suggest a follow-up pelvic ultrasound in 6 weeks for further evaluation.  This report was finalized on 9/23/2021 7:37 PM by Dr. Alexander White M.D.      CT Chest With Contrast Diagnostic    Result Date: 9/23/2021  Multiple mild to moderately enlarged subpectoral and right axillary lymph nodes consistent with metastatic disease. The subpectoral lymphadenopathy extends into the right supraclavicular region, as well. There is no evidence of metastatic disease elsewhere within the neck, chest, abdomen or pelvis. A 5 cm diameter right ovarian cyst is noted. Suggest a follow-up pelvic ultrasound in 6 weeks for further evaluation.  This report was finalized on 9/23/2021 7:37 PM by Dr. Alexander White M.D.      Mammo Diagnostic Right With CAD    Result Date: 9/9/2021   1. Stereotactic/Tomosynthesis guided core needle biopsy of a group of calcifications at 12:00 in the left breast, marked with a bowtie biopsy clip, which is displaced approximately 1 cm from residual calcifications, as above. Pathology is benign and concordant with the imaging assessment.  2. Ultrasound-guided core needle biopsy of a palpable mass at 11:00, 1 cm from the nipple in the right breast, marked with a heart-shaped clip. Pathology is malignant and concordant with the imaging assessment. Oncologic and surgical management are recommended for biopsy-proven right breast malignancy, as well as for additional previously noted masses at 10:00 in the right breast, which likely reflect the same process. Contrast-enhanced breast MRI is recommended to evaluate extent of disease.  3. Ultrasound-guided core needle biopsy of a representative abnormal right axillary lymph node, marked with a twirl clip. Pathology is malignant and concordant with the imaging assessment. Oncological and surgical management are recommended for the biopsy-proven metastatic right axillary lymph node and additional morphologically abnormal right axillary lymph nodes.  4.  Ultrasound-guided core needle biopsy of a 1 cm mass at 2:00, 6 cm from the nipple in the left breast, marked with a dumbbell-shaped clip. Pathology is benign and concordant with the imaging assessment.  5. The patient reports multiple palpable lumps above her right clavicle. Further evaluation with targeted ultrasound or CT is recommended. This area would likely not be adequately assessed on breast MRI.  This report was finalized on 9/9/2021 3:20 PM by Dr. Carol Terrazas M.D.      CT Abdomen Pelvis With Contrast    Result Date: 9/23/2021  Multiple mild to moderately enlarged subpectoral and right axillary lymph nodes consistent with metastatic disease. The subpectoral lymphadenopathy extends into the right supraclavicular region, as well. There is no evidence of metastatic disease elsewhere within the neck, chest, abdomen or pelvis. A 5 cm diameter right ovarian cyst is noted. Suggest a follow-up pelvic ultrasound in 6 weeks for further evaluation.  This report was finalized on 9/23/2021 7:37 PM by Dr. Alexander White M.D.      MRI Breast Bilateral With & Without Contrast    Result Date: 9/24/2021  1. Biopsy-proven malignancy in the right breast occupying the middle and posterior one thirds from the 11 o'clock through 1 o'clock positions and measuring up to 7.1 cm in greatest dimension. The heart shaped metallic clip is within the anterior aspect of the lesion. The lesion abuts the pectoral fascia but there is no evidence for direct invasion of the pectoral muscle. Also, there are portions of the lesion that extend to the subtalar soft tissues but there is no abnormal enhancement of the skin or skin thickening. Evidence of bulky right axillary adenopathy is noted. 2. There is a 2.4 cm irregular mass in the lower outer quadrant of the right breast centered at the 8 o'clock position that is separate from the known malignancy. This is suspicious for additional malignancy. Targeted sonography and biopsy of the lesion  should be considered. 3. There are no findings suspicious for malignancy in the left breast.  BI-RADS category 6: Known malignancy.  This report was finalized on 9/24/2021 5:02 PM by Dr. Vicente Carpenter M.D.      US Guided Lymph Node Biopsy    Result Date: 9/29/2021   Successful ultrasound guided FNA and core biopsy of right cervical lymph node.  This report was finalized on 9/29/2021 8:07 PM by Dr. Eulalio Kwok M.D.      US Guided Lymph Node Biopsy    Result Date: 9/9/2021   1. Stereotactic/Tomosynthesis guided core needle biopsy of a group of calcifications at 12:00 in the left breast, marked with a bowtie biopsy clip, which is displaced approximately 1 cm from residual calcifications, as above. Pathology is benign and concordant with the imaging assessment.  2. Ultrasound-guided core needle biopsy of a palpable mass at 11:00, 1 cm from the nipple in the right breast, marked with a heart-shaped clip. Pathology is malignant and concordant with the imaging assessment. Oncologic and surgical management are recommended for biopsy-proven right breast malignancy, as well as for additional previously noted masses at 10:00 in the right breast, which likely reflect the same process. Contrast-enhanced breast MRI is recommended to evaluate extent of disease.  3. Ultrasound-guided core needle biopsy of a representative abnormal right axillary lymph node, marked with a twirl clip. Pathology is malignant and concordant with the imaging assessment. Oncological and surgical management are recommended for the biopsy-proven metastatic right axillary lymph node and additional morphologically abnormal right axillary lymph nodes.  4. Ultrasound-guided core needle biopsy of a 1 cm mass at 2:00, 6 cm from the nipple in the left breast, marked with a dumbbell-shaped clip. Pathology is benign and concordant with the imaging assessment.  5. The patient reports multiple palpable lumps above her right clavicle. Further evaluation with  targeted ultrasound or CT is recommended. This area would likely not be adequately assessed on breast MRI.  This report was finalized on 9/9/2021 3:20 PM by Dr. Carol Terrazas M.D.      Mammo Stereotactic Breast Biopsy Initial With & Without Device    Result Date: 9/9/2021   1. Stereotactic/Tomosynthesis guided core needle biopsy of a group of calcifications at 12:00 in the left breast, marked with a bowtie biopsy clip, which is displaced approximately 1 cm from residual calcifications, as above. Pathology is benign and concordant with the imaging assessment.  2. Ultrasound-guided core needle biopsy of a palpable mass at 11:00, 1 cm from the nipple in the right breast, marked with a heart-shaped clip. Pathology is malignant and concordant with the imaging assessment. Oncologic and surgical management are recommended for biopsy-proven right breast malignancy, as well as for additional previously noted masses at 10:00 in the right breast, which likely reflect the same process. Contrast-enhanced breast MRI is recommended to evaluate extent of disease.  3. Ultrasound-guided core needle biopsy of a representative abnormal right axillary lymph node, marked with a twirl clip. Pathology is malignant and concordant with the imaging assessment. Oncological and surgical management are recommended for the biopsy-proven metastatic right axillary lymph node and additional morphologically abnormal right axillary lymph nodes.  4. Ultrasound-guided core needle biopsy of a 1 cm mass at 2:00, 6 cm from the nipple in the left breast, marked with a dumbbell-shaped clip. Pathology is benign and concordant with the imaging assessment.  5. The patient reports multiple palpable lumps above her right clavicle. Further evaluation with targeted ultrasound or CT is recommended. This area would likely not be adequately assessed on breast MRI.  This report was finalized on 9/9/2021 3:20 PM by Dr. Carol Terrazas M.D.      NM PET/CT Skull Base to Mid  Thigh    Result Date: 10/4/2021  1. There are 2 separate hypermetabolic lesions within the right breast. The right axillary, interpectoral, supraclavicular, and right neck lymphadenopathy is likely metastatic. The hypermetabolic left hilar lymphadenopathy is suspicious for metastatic lymphadenopathy as well and there is a hypermetabolic right femoral neck lesion which likely represents a metastasis. 2. There is no hypermetabolic lymphadenopathy or evidence for visceral metastases within the abdomen or pelvis.      US Guided Breast Biopsy With & Without Device initial    Result Date: 9/9/2021   1. Stereotactic/Tomosynthesis guided core needle biopsy of a group of calcifications at 12:00 in the left breast, marked with a bowtie biopsy clip, which is displaced approximately 1 cm from residual calcifications, as above. Pathology is benign and concordant with the imaging assessment.  2. Ultrasound-guided core needle biopsy of a palpable mass at 11:00, 1 cm from the nipple in the right breast, marked with a heart-shaped clip. Pathology is malignant and concordant with the imaging assessment. Oncologic and surgical management are recommended for biopsy-proven right breast malignancy, as well as for additional previously noted masses at 10:00 in the right breast, which likely reflect the same process. Contrast-enhanced breast MRI is recommended to evaluate extent of disease.  3. Ultrasound-guided core needle biopsy of a representative abnormal right axillary lymph node, marked with a twirl clip. Pathology is malignant and concordant with the imaging assessment. Oncological and surgical management are recommended for the biopsy-proven metastatic right axillary lymph node and additional morphologically abnormal right axillary lymph nodes.  4. Ultrasound-guided core needle biopsy of a 1 cm mass at 2:00, 6 cm from the nipple in the left breast, marked with a dumbbell-shaped clip. Pathology is benign and concordant with the  imaging assessment.  5. The patient reports multiple palpable lumps above her right clavicle. Further evaluation with targeted ultrasound or CT is recommended. This area would likely not be adequately assessed on breast MRI.  This report was finalized on 9/9/2021 3:20 PM by Dr. Carol Terrazas M.D.      US Guided Breast Biopsy With & Without Device initial    Result Date: 9/9/2021   1. Stereotactic/Tomosynthesis guided core needle biopsy of a group of calcifications at 12:00 in the left breast, marked with a bowtie biopsy clip, which is displaced approximately 1 cm from residual calcifications, as above. Pathology is benign and concordant with the imaging assessment.  2. Ultrasound-guided core needle biopsy of a palpable mass at 11:00, 1 cm from the nipple in the right breast, marked with a heart-shaped clip. Pathology is malignant and concordant with the imaging assessment. Oncologic and surgical management are recommended for biopsy-proven right breast malignancy, as well as for additional previously noted masses at 10:00 in the right breast, which likely reflect the same process. Contrast-enhanced breast MRI is recommended to evaluate extent of disease.  3. Ultrasound-guided core needle biopsy of a representative abnormal right axillary lymph node, marked with a twirl clip. Pathology is malignant and concordant with the imaging assessment. Oncological and surgical management are recommended for the biopsy-proven metastatic right axillary lymph node and additional morphologically abnormal right axillary lymph nodes.  4. Ultrasound-guided core needle biopsy of a 1 cm mass at 2:00, 6 cm from the nipple in the left breast, marked with a dumbbell-shaped clip. Pathology is benign and concordant with the imaging assessment.  5. The patient reports multiple palpable lumps above her right clavicle. Further evaluation with targeted ultrasound or CT is recommended. This area would likely not be adequately assessed on breast  MRI.  This report was finalized on 9/9/2021 3:20 PM by Dr. Carol Terrazas M.D.       CT of the chest abdomen pelvis-images independently reviewed and interpreted by me in detail summarized in the HPI  Bone scan-images independently reviewed and interpreted by me in detail summarized in the HPI    CT neck-images independently reviewed and interpreted by me in detail summarized in HPI.  I also discussed the CT neck with Dr. Alexander Blevins with radiology regarding the right side lymphadenopathy.     9/28/2021-right cervical lymph node biopsy-pathology consistent with metastatic mammary carcinoma.    10/1/2021-PET/CT-there are 2 separate hypermetabolic lesions within the right breast.  1 measuring 6 cm and the other measuring 1.3 cm.  The SUV of 6 cm lesion of 5.6 and small lesion of 4.8.  Hypermetabolic right axillary, infra pectoral, right supraclavicular lymphadenopathy noted.  In addition there are hypermetabolic nodes at the right thoracic inlet, right base of the neck and right posterior cervical triangle.  1.6 x 0.8 cm right posterior cervical triangle node with an SUV of 3.9.  Right axillary lymphadenopathy with lymphedema 6.4.  Hypermetabolic left hilar lymphadenopathy measuring 2 x 1.1 cm with an SUV of 5.8.  No hypermetabolic activity in the liver or evidence of metastatic disease in the abdomen or pelvis.  Hypermetabolic lesion in the right femoral neck measuring 1.5 cm.  SUV 5.4.    PET/CT images independently reviewed and interpreted by me in detail summarized above.  Images also reviewed with patient.    Assessment/Plan       *Invasive ductal carcinoma of the right breast  · Clinical T3 N3 cM1, stage IV  · The tumor is grade 3, poorly differentiated, ER 1 to 10% weak, OK negative, HER-2 negative, Ki-67 65%  · Cervical lymph node biopsy consistent with metastatic mammary carcinoma.  · PET/CT the abnormal uptake noted in the left hilar lymph node, right femoral neck, cervical lymph nodes.  · Since the cervical  lymph node biopsy is positive as well as PET/CT with several abnormal findings I do believe that this is stage IV disease.  · Discussed case at multidisciplinary conference on 9/28/2021 and the initial plan was to proceed with neoadjuvant chemotherapy followed by surgery and radiation to the positive cervical nodes.  PET/CT results were not available at that time.  · The PET/CT showing other areas of metastatic disease unfortunately we will not be able to proceed with curative intent treatment.  · Explained to the patient today that treatment intent at this time would be palliative  · Explained that the disease is incurable  · On hearing this news member was overwhelmed and she decided to cancel the procedure for the port echo scheduled for later in the day.  · We briefly discussed treatment with Taxol and possible immunotherapy depending upon the PD-L1 marker.  · Caris testing will be sent.  · I explained to her that we will arrange for a video visit down the line to discuss with respect to treatment of she came to the liver by herself today and not in the state to process any information that has been provided to her.    *Diabetes  · Type I  · Poorly controlled  · Referred to endocrinology  ·  notes reviewed     * Cervical lymphadenopathy  · Biopsied and pathology consistent with metastatic breast cancer    *Anxiety  · She was extremely anxious today at the visit  · She was tearful after learning about the stage IV breast cancer  · She was already referred to supportive oncology  · I will send in Ativan for 5 days to help with anxiety  · We again contacted supportive oncology to arrange for follow-up as soon as possible      *Port placement-scheduled for 10/4/2021, however patient would like to reschedule    *[id number] will arrange for a follow-up video visit to discuss definitive treatment    Patient is being seen for new diagnosis of metastatic breast cancer which is terminal and  life-threatening.  Newly diagnosed with metastatic breast cancer due to abnormal PET/CT.  We discussed palliative intent chemotherapy.

## 2021-10-04 NOTE — TELEPHONE ENCOUNTER
"Clinical Case Management/[place]:    Patient is a 41 year old woman with a recent diagnosis of malignant neoplasm of central portion of right breast in female, estrogen receptor negative. OSW received an additional referral today to complete an initial assessment prior to patient seeing psychiatric nurse practitioner, Tenisha Villasenor due to patient \"receiving bad news\" during her appointment today.     OSW called patient to introduce self/role, check in, and assess for needs. Patient was tearful over the phone and requested for this OSW to call back tomorrow because she was getting ready to talk with her . OSW provided patient with direct contact number to reach OSW if patient was ready to talk this afternoon. Patient stated that she took down this OSW's number and verbalized understanding that she could call today if she was in need of support. Patient requested for this OSW to call back tomorrow (10/5/21) afternoon. OSW will honor this request.     Sierra Umaña, RIKY, CSW  Oncology Social Worker   [facility]    "

## 2021-10-05 ENCOUNTER — APPOINTMENT (OUTPATIENT)
Dept: PREADMISSION TESTING | Facility: HOSPITAL | Age: 41
End: 2021-10-05

## 2021-10-06 ENCOUNTER — TELEPHONE (OUTPATIENT)
Dept: ONCOLOGY | Facility: CLINIC | Age: 41
End: 2021-10-06

## 2021-10-06 ENCOUNTER — TELEPHONE (OUTPATIENT)
Dept: ONCOLOGY | Facility: HOSPITAL | Age: 41
End: 2021-10-06

## 2021-10-06 ENCOUNTER — APPOINTMENT (OUTPATIENT)
Dept: CARDIOLOGY | Facility: HOSPITAL | Age: 41
End: 2021-10-06

## 2021-10-06 NOTE — TELEPHONE ENCOUNTER
Clinical Case Management/[place]:    OSW was unable to contact patient on 10/5/2021 as planned but made an attempt on this day, 10/6/2021, to reach patient over the phone. The call went directly to [facility] and this OSW left a message with direct contact information. OSW to try again on 10/7/2021.    RIKY Covarrubias, CSW  Oncology Social Worker   [facility]

## 2021-10-07 ENCOUNTER — TELEPHONE (OUTPATIENT)
Dept: SURGERY | Facility: CLINIC | Age: 41
End: 2021-10-07

## 2021-10-07 ENCOUNTER — TELEPHONE (OUTPATIENT)
Dept: ONCOLOGY | Facility: CLINIC | Age: 41
End: 2021-10-07

## 2021-10-07 NOTE — TELEPHONE ENCOUNTER
Clinical Case Management/[place]:    Patient is a 41 year old woman with newly diagnosed metastatic breast cancer. This was OSW's 4th attempt to reach patient per referral by Dr. De La O however patient was unable to answer the call. OSW left a message with direct contact information and office hours for patient to call back. OSW will let Dr. De La O, Tenisha Villasenor (psychiatric APRN), and Dr. Denise Carroll know that this 4th attempt to reach patient was made.     KATHI CovarrubiasW, CSW  Oncology Social Worker   [facility]

## 2021-10-08 ENCOUNTER — TELEPHONE (OUTPATIENT)
Dept: ONCOLOGY | Facility: CLINIC | Age: 41
End: 2021-10-08

## 2021-10-08 ENCOUNTER — TELEPHONE (OUTPATIENT)
Dept: PSYCHIATRY | Facility: HOSPITAL | Age: 41
End: 2021-10-08

## 2021-10-08 NOTE — TELEPHONE ENCOUNTER
PTS  JENNIFER OSMAN RETURNING CALL TO DR GRANADO, HAD CALLED THIS MORNING AND MISSED THE CALL.       WARM TRANSFERRED WITH JAYESH IN CLINICAL TO FURTHER ASSIST.

## 2021-10-08 NOTE — TELEPHONE ENCOUNTER
----- Message from Sil Pederson RN sent at 10/8/2021  8:23 AM EDT -----  Please change Monday appt to in person  Add chemo ed on Tuesday for taxol  Schedule first taxol on Wednesday    Please call her and let her know times

## 2021-10-08 NOTE — TELEPHONE ENCOUNTER
[facility]    Call placed to patient regarding referral to [facility] clinic, failed attempts at contact by social work team.  Visit scheduled for Monday, October 11 while patient in clinic to see Dr. De La O.

## 2021-10-11 ENCOUNTER — DOCUMENTATION (OUTPATIENT)
Dept: PSYCHIATRY | Facility: HOSPITAL | Age: 41
End: 2021-10-11

## 2021-10-11 ENCOUNTER — TELEMEDICINE (OUTPATIENT)
Dept: ONCOLOGY | Facility: CLINIC | Age: 41
End: 2021-10-11

## 2021-10-11 ENCOUNTER — TELEPHONE (OUTPATIENT)
Dept: PSYCHIATRY | Facility: HOSPITAL | Age: 41
End: 2021-10-11

## 2021-10-11 ENCOUNTER — APPOINTMENT (OUTPATIENT)
Dept: LAB | Facility: HOSPITAL | Age: 41
End: 2021-10-11

## 2021-10-11 VITALS
RESPIRATION RATE: 16 BRPM | BODY MASS INDEX: 22.05 KG/M2 | OXYGEN SATURATION: 98 % | SYSTOLIC BLOOD PRESSURE: 130 MMHG | HEART RATE: 107 BPM | DIASTOLIC BLOOD PRESSURE: 80 MMHG | WEIGHT: 157.5 LBS | HEIGHT: 71 IN | TEMPERATURE: 98.4 F

## 2021-10-11 DIAGNOSIS — R59.1 LYMPHADENOPATHY OF HEAD AND NECK: ICD-10-CM

## 2021-10-11 DIAGNOSIS — Z17.1 MALIGNANT NEOPLASM OF CENTRAL PORTION OF RIGHT BREAST IN FEMALE, ESTROGEN RECEPTOR NEGATIVE (HCC): Primary | ICD-10-CM

## 2021-10-11 DIAGNOSIS — C50.111 MALIGNANT NEOPLASM OF CENTRAL PORTION OF RIGHT BREAST IN FEMALE, ESTROGEN RECEPTOR NEGATIVE (HCC): Primary | ICD-10-CM

## 2021-10-11 PROBLEM — C79.51 BONE METASTASIS: Status: ACTIVE | Noted: 2021-10-11

## 2021-10-11 PROCEDURE — 99215 OFFICE O/P EST HI 40 MIN: CPT | Performed by: INTERNAL MEDICINE

## 2021-10-12 ENCOUNTER — TELEMEDICINE (OUTPATIENT)
Dept: ONCOLOGY | Facility: CLINIC | Age: 41
End: 2021-10-12

## 2021-10-12 DIAGNOSIS — Z17.1 MALIGNANT NEOPLASM OF CENTRAL PORTION OF RIGHT BREAST IN FEMALE, ESTROGEN RECEPTOR NEGATIVE (HCC): Primary | ICD-10-CM

## 2021-10-12 DIAGNOSIS — C79.51 BONE METASTASIS: ICD-10-CM

## 2021-10-12 DIAGNOSIS — C50.111 MALIGNANT NEOPLASM OF CENTRAL PORTION OF RIGHT BREAST IN FEMALE, ESTROGEN RECEPTOR NEGATIVE (HCC): Primary | ICD-10-CM

## 2021-10-12 PROCEDURE — 99215 OFFICE O/P EST HI 40 MIN: CPT | Performed by: NURSE PRACTITIONER

## 2021-10-12 RX ORDER — PALONOSETRON 0.05 MG/ML
0.25 INJECTION, SOLUTION INTRAVENOUS ONCE
Status: CANCELLED | OUTPATIENT
Start: 2021-10-13

## 2021-10-12 RX ORDER — DIPHENHYDRAMINE HYDROCHLORIDE 50 MG/ML
50 INJECTION INTRAMUSCULAR; INTRAVENOUS AS NEEDED
Status: CANCELLED | OUTPATIENT
Start: 2021-10-13

## 2021-10-12 RX ORDER — FAMOTIDINE 10 MG/ML
20 INJECTION, SOLUTION INTRAVENOUS AS NEEDED
Status: CANCELLED | OUTPATIENT
Start: 2021-10-13

## 2021-10-12 RX ORDER — PALONOSETRON 0.05 MG/ML
0.25 INJECTION, SOLUTION INTRAVENOUS ONCE
Status: CANCELLED | OUTPATIENT
Start: 2021-10-21

## 2021-10-12 RX ORDER — SODIUM CHLORIDE 9 MG/ML
250 INJECTION, SOLUTION INTRAVENOUS ONCE
Status: CANCELLED | OUTPATIENT
Start: 2021-10-13

## 2021-10-12 RX ORDER — HYDROCODONE BITARTRATE AND ACETAMINOPHEN 5; 325 MG/1; MG/1
1 TABLET ORAL EVERY 8 HOURS PRN
Qty: 20 TABLET | Refills: 0 | Status: SHIPPED | OUTPATIENT
Start: 2021-10-12 | End: 2021-10-19 | Stop reason: SDUPTHER

## 2021-10-12 RX ORDER — DIPHENHYDRAMINE HYDROCHLORIDE 50 MG/ML
50 INJECTION INTRAMUSCULAR; INTRAVENOUS AS NEEDED
Status: CANCELLED | OUTPATIENT
Start: 2021-10-28

## 2021-10-12 RX ORDER — PALONOSETRON 0.05 MG/ML
0.25 INJECTION, SOLUTION INTRAVENOUS ONCE
Status: CANCELLED | OUTPATIENT
Start: 2021-10-28

## 2021-10-12 RX ORDER — FAMOTIDINE 10 MG/ML
20 INJECTION, SOLUTION INTRAVENOUS AS NEEDED
Status: CANCELLED | OUTPATIENT
Start: 2021-10-28

## 2021-10-12 RX ORDER — FAMOTIDINE 10 MG/ML
20 INJECTION, SOLUTION INTRAVENOUS ONCE
Status: CANCELLED | OUTPATIENT
Start: 2021-10-13

## 2021-10-12 RX ORDER — FAMOTIDINE 10 MG/ML
20 INJECTION, SOLUTION INTRAVENOUS ONCE
Status: CANCELLED | OUTPATIENT
Start: 2021-10-28

## 2021-10-12 RX ORDER — FAMOTIDINE 10 MG/ML
20 INJECTION, SOLUTION INTRAVENOUS AS NEEDED
Status: CANCELLED | OUTPATIENT
Start: 2021-10-21

## 2021-10-12 RX ORDER — ONDANSETRON HYDROCHLORIDE 8 MG/1
8 TABLET, FILM COATED ORAL EVERY 8 HOURS PRN
Qty: 30 TABLET | Refills: 3 | Status: SHIPPED | OUTPATIENT
Start: 2021-10-12 | End: 2021-11-08 | Stop reason: SDUPTHER

## 2021-10-12 RX ORDER — FAMOTIDINE 10 MG/ML
20 INJECTION, SOLUTION INTRAVENOUS ONCE
Status: CANCELLED | OUTPATIENT
Start: 2021-10-21

## 2021-10-12 RX ORDER — SODIUM CHLORIDE 9 MG/ML
250 INJECTION, SOLUTION INTRAVENOUS ONCE
Status: CANCELLED | OUTPATIENT
Start: 2021-10-21

## 2021-10-12 RX ORDER — SODIUM CHLORIDE 9 MG/ML
250 INJECTION, SOLUTION INTRAVENOUS ONCE
Status: CANCELLED | OUTPATIENT
Start: 2021-10-28

## 2021-10-12 RX ORDER — DIPHENHYDRAMINE HYDROCHLORIDE 50 MG/ML
50 INJECTION INTRAMUSCULAR; INTRAVENOUS AS NEEDED
Status: CANCELLED | OUTPATIENT
Start: 2021-10-21

## 2021-10-12 NOTE — PROGRESS NOTES
[facility] Hematology/Oncology Treatment Plan Summary    Name: Sierra Mao  [facility]# [id number]  MD: Dr. De La O    Diagnosis:     ICD-10-CM ICD-9-CM   1. Malignant neoplasm of central portion of right breast in female, estrogen receptor negative (HCC)  C50.111 174.1    Z17.1 V86.1   2. Bone metastasis (HCC)  C79.51 198.5      Goal of chemotherapy: palliative    Treatment Medication(s):   1. Carboplatin  2. Taxol    Frequency: weekly    Number of cycles: 12    Starting on: 10/13/2021    Items for home use: Thermometer    Rx written for: [x] Nausea    [] Pre-Chemo   ondansetron 8 mg by mouth every 8 hours as needed for nausea    Notes:     Next Steps:       Completing Provider: LEX Mendoza           Date/time: 10/12/2021      Please note: You will be seen by a provider frequently with your treatment plan. This plan may change depending on many factors, if so, this will be discussed with you by your physician.  Last update 12/2020.

## 2021-10-12 NOTE — PROGRESS NOTES
Subjective   Sierra Mao is a 41 y.o. female. Referred by Dr. Price for right breast invasive ductal carcinoma triple negative, metastatic    History of Present Illness     Ms. Mao is a 41-year-old Premenopausal  lady who palpated a mass in her right breast. She had a mass in a similar area in 2018 which was biopsied and benign. Further evaluation was performed.    08/18/2021-bilateral diagnostic mammogram-parenchyma is heterogeneously dense. In the palpable area of concern in the upper outer quadrant of the right breast there is an area of asymmetry with pleomorphic microcalcifications in the posterior one third of the upper outer quadrant of the right breast in the axillary region. There is also microcalcifications in the middle one third and anterior one third of the left breast at 12 o'clock position which are new. Architectural distortion in either breast.    Ultrasound-  Right breast at the site of palpable concern, at 11 o'clock, 1 cm from the nipple there is an irregular hypoechoic mass which measures up to 4 cm.  10 o'clock, 6 cm from the nipple there is an irregular mass which measures up to 1.6 cm  At 10 o'clock, 8 cm from the nipple there is a irregular mass which measures 1.8 cm.  Multiple right axillary lymph nodes demonstrate thickened cortices and rounded morphology largest of which measures 2 cm.  Left breast at 2 o'clock, 6 cm from the nipple there is a 1 cm heterogeneous hypoechoic masslike region.    09/07/2021-  1.right breast 11 o'clock, 1 cm from the nipple-invasive ductal carcinoma, poorly differentiated, grade 3, ER low +1 to 10%, weak, MN negative less than 1%, HER-2 negative.  2.right axillary lymph node-metastatic mammary carcinoma measuring 9 mm. ER negative, MN +5% three, HER-2 negative  3.left breast 2 o'clock, 6 cm from the nipple-cluster of apocrine cyst  4.left breast 12 o'clock-apocrine cyst with polarizable calcium oxalate crystals.    Bilateral breast MRI  09/23/20215850-lsupal-dpezhn malignancy of the right breast occupying the middle and the posterior one thirds from 11 o'clock to 1 o'clock position and measuring up to 7.1 cm in greatest dimension. The lesion abuts the pectoralis fascia but there is no evidence of direct invasion. There are portions of the lesion that extended to the subpatellar soft tissues but there is no abnormal enhancement of the skin or skin thickening.  Bulky right axillary lymphadenopathy noted.  2.4 cm irregular mass in the lower outer quadrant of the right breast centered at 8 o'clock position which is separate from the known malignancy suspicious for additional malignancy.  Ultrasound and biopsy of this lesion recommended.  No findings suspicious for malignancy in the left breast.    09/23/2021-CT of the chest abdomen and pelvis and soft tissue neck-multiple mild to moderately enlarged subpectoral and right axillary lymph nodes consistent with metastatic disease. The subpectoral lymphadenopathy extends into the right supraclavicular region as well. There is no evidence of metastatic disease elsewhere within the neck. No evidence of metastatic disease in the chest abdomen or pelvis. 5 cm right ovarian cyst is noted. Suggest follow-up ultrasound in 6 weeks for further evaluation.    09/24/2021-bone scan without any evidence of metastatic disease.    9/28/2021-right cervical lymph node biopsy-pathology consistent with metastatic mammary carcinoma.    10/1/2021-PET/CT-there are 2 separate hypermetabolic lesions within the right breast.  1 measuring 6 cm and the other measuring 1.3 cm.  The SUV of 6 cm lesion of 5.6 and small lesion of 4.8.  Hypermetabolic right axillary, infra pectoral, right supraclavicular lymphadenopathy noted.  In addition there are hypermetabolic nodes at the right thoracic inlet, right base of the neck and right posterior cervical triangle.  1.6 x 0.8 cm right posterior cervical triangle node with an SUV of 3.9.  Right  axillary lymphadenopathy with lymphedema 6.4.  Hypermetabolic left hilar lymphadenopathy measuring 2 x 1.1 cm with an SUV of 5.8.  No hypermetabolic activity in the liver or evidence of metastatic disease in the abdomen or pelvis.  Hypermetabolic lesion in the right femoral neck measuring 1.5 cm.  SUV 5.4.    Mr. Mao reports that she initially felt good right breast mass in May 2021.  Starting July 2021 she had palpated something abnormal in her right neck.  Since noticing the mass in May 2021 she feels like the mass in the right breast has doubled in size.  She also feels like the right axillary lymph nodes have enlarged in size.    She had a 70 pound weight loss in 2019 due to poorly controlled diabetes.  She was initially diagnosed with a type 2 diabetes and subsequently determined to be type I and switched to insulin.  She had an admission to the hospital due to DKA.    She is unvaccinated for COVID-19.  She previously worked as a  however currently not working.    Family history significant for ovarian cancer in grandmother and bladder cancer in her father.    Interval history  Sierra presents to the clinic today for follow-up.  She is accompanied by her .  She is here to discuss treatment for metastatic breast cancer.  She sought a second opinion from [facility] regarding the diagnosis and management as well as had her images reviewed by her friends at [facility].  She is complaining of some pain in the right breast where the masses as well as a right axilla.  Besides that no other complaints.      The following portions of the patient's history were reviewed and updated as appropriate: allergies, current medications, past family history, past medical history, past social history, past surgical history and problem list.    Past Medical History:   Diagnosis Date   • Anxiety    • Arthritis    • Breast cancer (HCC) 09/07/2021    Right breast invasive ductal carcinoma ER low  positive, NM negative, VWE9evg negative, mercedes to lymph node (biopsy positive)   • Chronic fatigue    • Diabetes mellitus with stage 3 chronic kidney disease (HCC)    • Hyperlipidemia    • Leukocytosis    • Menorrhagia with regular cycle    • Seasonal allergies    • Type I diabetes mellitus (HCC)    • Vitamin D deficiency         Past Surgical History:   Procedure Laterality Date   • APPENDECTOMY N/A    • BREAST BIOPSY Right 2018   • BREAST BIOPSY Bilateral 2021   • US GUIDED LYMPH NODE BIOPSY  2021    Dr. Carol Terrazas, [facility]   • US GUIDED LYMPH NODE BIOPSY  2021        Family History   Problem Relation Age of Onset   • Diabetes Mother    • Cervical cancer Maternal Grandmother         cervical/uterine    • Diabetes Maternal Grandfather    • Lymphoma Paternal Aunt 60   • Breast cancer Neg Hx    • Malig Hyperthermia Neg Hx         Social History     Socioeconomic History   • Marital status:    Tobacco Use   • Smoking status: Former Smoker     Packs/day: 0.50     Years: 8.00     Pack years: 4.00     Types: Cigarettes     Start date: 1995     Quit date: 2003     Years since quittin.7   • Smokeless tobacco: Never Used   • Tobacco comment: teen / young adult   Vaping Use   • Vaping Use: Never used   Substance and Sexual Activity   • Alcohol use: Yes     Alcohol/week: 1.0 standard drink     Types: 1 Standard drinks or equivalent per week     Comment: social   • Drug use: No   • Sexual activity: Defer     Partners: Male     Birth control/protection: None     Comment:         OB History        1    Para   1    Term   1            AB        Living           SAB        IAB        Ectopic        Molar        Multiple        Live Births                 Age of menarche-14  Age at first live childbirth-24   one para one  zero  Oral contraceptive pill use for 20 years  She is premenopausal    No Known Allergies         Review of Systems   Constitutional:  "Negative.    HENT: Negative.    Eyes: Negative.    Respiratory: Negative.    Cardiovascular: Negative.    Gastrointestinal: Negative.    Endocrine: Negative.    Genitourinary: Negative.  Positive for breast lump.   Musculoskeletal: Negative.    Allergic/Immunologic: Negative.    Neurological: Negative.    Hematological: Negative.    Psychiatric/Behavioral: The patient is nervous/anxious.      Review of systems as mentioned in the HPI    Objective   Blood pressure 130/80, pulse 107, temperature 98.4 °F (36.9 °C), temperature source Temporal, resp. rate 16, height 180.3 cm (70.98\"), weight 71.4 kg (157 lb 8 oz), last menstrual period 09/26/2021, SpO2 98 %, not currently breastfeeding.   Physical Exam  Vitals reviewed.   Constitutional:       Appearance: Normal appearance. She is normal weight.   HENT:      Head: Normocephalic and atraumatic.      Right Ear: External ear normal.      Left Ear: External ear normal.      Nose: Nose normal.      Mouth/Throat:      Mouth: Mucous membranes are moist.      Pharynx: Oropharynx is clear.   Eyes:      Extraocular Movements: Extraocular movements intact.      Conjunctiva/sclera: Conjunctivae normal.      Pupils: Pupils are equal, round, and reactive to light.   Cardiovascular:      Rate and Rhythm: Normal rate and regular rhythm.      Pulses: Normal pulses.      Heart sounds: Normal heart sounds.   Pulmonary:      Effort: Pulmonary effort is normal.      Breath sounds: Normal breath sounds.   Abdominal:      General: Abdomen is flat. Bowel sounds are normal.   Musculoskeletal:         General: Normal range of motion.      Cervical back: Normal range of motion.   Skin:     General: Skin is warm and dry.   Neurological:      General: No focal deficit present.      Mental Status: She is alert and oriented to person, place, and time. Mental status is at baseline.   Psychiatric:         Mood and Affect: Mood normal.         Behavior: Behavior normal.         Thought Content: Thought " content normal.         Judgment: Judgment normal.       Breast Exam: Right breast-On inspection there is a fullness in the upper outer quadrant.  On palpation there is a 8x 7 cm mass in the upper outer quadrant.  There is bulky right axillary lymphadenopathy.  There is also palpable supraclavicular lymphadenopathy.  Left breast appears normal on inspection.  No palpable abnormalities of the breast or the axilla.  There are firm palpable nodular lesions on the right side of the neck anterior to the sternocleidomastoid.  These are maybe 1 cm  Most consistent with LAD.     I have reexamined the patient and the results are consistent with the previously documented exam. Didi De La O MD     Hospital Outpatient Visit on 10/01/2021   Component Date Value Ref Range Status   • Glucose 10/01/2021 181* 70 - 130 mg/dL Final    Meter: [id number] : [id number] Radhames Sarah RT   Pre-Admission Testing on 10/01/2021   Component Date Value Ref Range Status   • QT Interval 10/01/2021 407  ms Final   • WBC 10/01/2021 7.90  3.40 - 10.80 10*3/mm3 Final   • RBC 10/01/2021 4.09  3.77 - 5.28 10*6/mm3 Final   • Hemoglobin 10/01/2021 12.5  12.0 - 15.9 g/dL Final   • Hematocrit 10/01/2021 38.0  34.0 - 46.6 % Final   • MCV 10/01/2021 92.9  79.0 - 97.0 fL Final   • MCH 10/01/2021 30.6  26.6 - 33.0 pg Final   • MCHC 10/01/2021 32.9  31.5 - 35.7 g/dL Final   • RDW 10/01/2021 12.9  12.3 - 15.4 % Final   • RDW-SD 10/01/2021 44.0  37.0 - 54.0 fl Final   • MPV 10/01/2021 9.6  6.0 - 12.0 fL Final   • Platelets 10/01/2021 241  140 - 450 10*3/mm3 Final   • Glucose 10/01/2021 291* 65 - 99 mg/dL Final   • BUN 10/01/2021 10  6 - 20 mg/dL Final   • Creatinine 10/01/2021 0.58  0.57 - 1.00 mg/dL Final   • Sodium 10/01/2021 135* 136 - 145 mmol/L Final   • Potassium 10/01/2021 4.2  3.5 - 5.2 mmol/L Final   • Chloride 10/01/2021 100  98 - 107 mmol/L Final   • CO2 10/01/2021 24.4  22.0 - 29.0 mmol/L Final   • Calcium 10/01/2021 8.8  8.6 - 10.5 mg/dL Final    • eGFR Non  Amer 10/01/2021 115  >60 mL/min/1.73 Final   • BUN/Creatinine Ratio 10/01/2021 17.2  7.0 - 25.0 Final   • Anion Gap 10/01/2021 10.6  5.0 - 15.0 mmol/L Final   • HCG Qualitative 10/01/2021 Negative  Negative Final   • Thyroid Peroxidase Antibody 10/01/2021 10  0 - 34 IU/mL Final   • Vitamin B-12 10/01/2021 426  211 - 946 pg/mL Final   • COVID19 10/01/2021 Not Detected  Not Detected - Ref. Range Final   • Microalbumin, Urine 10/01/2021 <1.2  mg/dL Final   Hospital Outpatient Visit on 09/29/2021   Component Date Value Ref Range Status   • Sinus 09/29/2021 3.5  cm Final   • STJ 09/29/2021 2.6  cm Final   • Ascending aorta 09/29/2021 3.4  cm Final   • LA Volume Index 09/29/2021 12.0  mL/m2 Final   • Aortic arch 09/29/2021 2.7  cm Final   • Abdo Ao Diam 09/29/2021 1.7  cm Final   • 3D vol index 09/29/2021 13.0   Final   • EF_3D-VOL 09/29/2021 57  % Final   • BSA 09/29/2021 1.9  m^2 Final   • IVSd 09/29/2021 0.86  cm Final   • LVIDd 09/29/2021 4.1  cm Final   • LVIDs 09/29/2021 2.6  cm Final   • LVPWd 09/29/2021 0.96  cm Final   • IVS/LVPW 09/29/2021 0.9   Final   • FS 09/29/2021 37.0  % Final   • EDV(Teich) 09/29/2021 75.9  ml Final   • ESV(Teich) 09/29/2021 24.8  ml Final   • EF(Teich) 09/29/2021 67.3  % Final   • EF(cubed) 09/29/2021 75.0  % Final   • LV mass(C)d 09/29/2021 118.0  grams Final   • LV mass(C)dI 09/29/2021 62.0  grams/m^2 Final   • SV(Teich) 09/29/2021 51.1  ml Final   • SI(Teich) 09/29/2021 26.9  ml/m^2 Final   • SV(cubed) 09/29/2021 53.2  ml Final   • SI(cubed) 09/29/2021 28.0  ml/m^2 Final   • Ao root diam 09/29/2021 3.2  cm Final   • Ao root area 09/29/2021 8.2  cm^2 Final   • ACS 09/29/2021 2.1  cm Final   • asc Aorta Diam 09/29/2021 3.4  cm Final   • LVOT diam 09/29/2021 1.9  cm Final   • LVOT area 09/29/2021 3.0  cm^2 Final   • LVOT area(traced) 09/29/2021 2.8  cm^2 Final   • RVOT diam 09/29/2021 1.8  cm Final   • RVOT area 09/29/2021 2.6  cm^2 Final   • LVLd ap4 09/29/2021 7.9   cm Final   • EDV(MOD-sp4) 09/29/2021 101.0  ml Final   • LVLs ap4 09/29/2021 6.6  cm Final   • ESV(MOD-sp4) 09/29/2021 41.0  ml Final   • EF(MOD-sp4) 09/29/2021 59.4  % Final   • LVLd ap2 09/29/2021 8.0  cm Final   • EDV(MOD-sp2) 09/29/2021 101.0  ml Final   • LVLs ap2 09/29/2021 6.2  cm Final   • ESV(MOD-sp2) 09/29/2021 43.0  ml Final   • EF(MOD-sp2) 09/29/2021 57.4  % Final   • SV(MOD-sp4) 09/29/2021 60.0  ml Final   • SI(MOD-sp4) 09/29/2021 31.5  ml/m^2 Final   • SV(MOD-sp2) 09/29/2021 58.0  ml Final   • SI(MOD-sp2) 09/29/2021 30.5  ml/m^2 Final   • Ao root area (BSA corrected) 09/29/2021 1.7   Final   • LV Corey Vol (BSA corrected) 09/29/2021 53.1  ml/m^2 Final   • LV Sys Vol (BSA corrected) 09/29/2021 21.5  ml/m^2 Final   • TAPSE (>1.6) 09/29/2021 2.7  cm Final   • EF(MOD-bp) 09/29/2021 57.6  % Final   • MV A dur 09/29/2021 0.09  sec Final   • MV E max lit 09/29/2021 70.3  cm/sec Final   • MV A max lit 09/29/2021 58.3  cm/sec Final   • MV E/A 09/29/2021 1.2   Final   • MV V2 max 09/29/2021 84.7  cm/sec Final   • MV max PG 09/29/2021 2.9  mmHg Final   • MV V2 mean 09/29/2021 47.2  cm/sec Final   • MV mean PG 09/29/2021 1.1  mmHg Final   • MV V2 VTI 09/29/2021 20.2  cm Final   • MVA(VTI) 09/29/2021 2.6  cm^2 Final   • MV P1/2t max lit 09/29/2021 85.6  cm/sec Final   • MV P1/2t 09/29/2021 54.5  msec Final   • MVA(P1/2t) 09/29/2021 4.0  cm^2 Final   • MV dec slope 09/29/2021 460.1  cm/sec^2 Final   • MV dec time 09/29/2021 0.2  sec Final   • Ao pk lit 09/29/2021 118.3  cm/sec Final   • Ao max PG 09/29/2021 5.6  mmHg Final   • Ao max PG (full) 09/29/2021 1.6  mmHg Final   • Ao V2 mean 09/29/2021 84.9  cm/sec Final   • Ao mean PG 09/29/2021 3.3  mmHg Final   • Ao mean PG (full) 09/29/2021 0.83  mmHg Final   • Ao V2 VTI 09/29/2021 21.3  cm Final   • COREY(I,A) 09/29/2021 2.5  cm^2 Final   • COREY(I,D) 09/29/2021 2.5  cm^2 Final   • COREY(V,A) 09/29/2021 2.5  cm^2 Final   • COREY(V,D) 09/29/2021 2.5  cm^2 Final   • LV V1 max PG  09/29/2021 4.0  mmHg Final   • LV V1 mean PG 09/29/2021 2.4  mmHg Final   • LV V1 max 09/29/2021 99.4  cm/sec Final   • LV V1 mean 09/29/2021 73.7  cm/sec Final   • LV V1 VTI 09/29/2021 17.7  cm Final   • MR max juan alberto 09/29/2021 436.8  cm/sec Final   • MR max PG 09/29/2021 76.3  mmHg Final   • SV(Ao) 09/29/2021 174.1  ml Final   • SI(Ao) 09/29/2021 91.5  ml/m^2 Final   • SV(LVOT) 09/29/2021 52.8  ml Final   • SV(RVOT) 09/29/2021 32.3  ml Final   • SI(LVOT) 09/29/2021 27.7  ml/m^2 Final   • PA V2 max 09/29/2021 92.3  cm/sec Final   • PA max PG 09/29/2021 3.4  mmHg Final   • PA max PG (full) 09/29/2021 1.4  mmHg Final   • BH CV ECHO HOMERO - PVA(V,A) 09/29/2021 2.0  cm^2 Final   • BH CV ECHO HOMERO - PVA(V,D) 09/29/2021 2.0  cm^2 Final   • PA acc time 09/29/2021 0.11  sec Final   • RV V1 max PG 09/29/2021 2.0  mmHg Final   • RV V1 mean PG 09/29/2021 1.3  mmHg Final   • RV V1 max 09/29/2021 70.7  cm/sec Final   • RV V1 mean 09/29/2021 54.1  cm/sec Final   • RV V1 VTI 09/29/2021 12.6  cm Final   • TR max juan alberto 09/29/2021 170.1  cm/sec Final   • PA pr(Accel) 09/29/2021 27.9  mmHg Final   • Pulm Sys Juan Alberto 09/29/2021 56.1  cm/sec Final   • Pulm Corey Juan Alberto 09/29/2021 53.6  cm/sec Final   • Pulm S/D 09/29/2021 1.0   Final   • Qp/Qs 09/29/2021 0.61   Final   • Pulm A Revs Dur 09/29/2021 0.11  sec Final   • Pulm A Revs Juan Alberto 09/29/2021 42.8  cm/sec Final   • MVA P1/2T LCG 09/29/2021 2.6  cm^2 Final   • RV Base 09/29/2021 3.2  cm Final   • RV Length 09/29/2021 6.8  cm Final   • RV Mid 09/29/2021 2.7  cm Final   • Lat Peak E' Juan Alberto 09/29/2021 21.1  cm/sec Final   • Med Peak E' Juan Alberto 09/29/2021 14.1  cm/sec Final   • RV S' 09/29/2021 15.3  cm/sec Final   • BH CV ECHO HOMERO - BZI_BMI 09/29/2021 21.9  kilograms/m^2 Final   • BH CV ECHO HOMERO - BSA(HAYCOCK) 09/29/2021 1.9  m^2 Final   • BH CV ECHO HOMERO - BZI_METRIC_WEIGHT 09/29/2021 71.2  kg Final   • BH CV ECHO HOMERO - BZI_METRIC_HEIGHT 09/29/2021 180.3  cm Final   • Avg E/e' ratio 09/29/2021 3.99    Final   • RAP systole 09/29/2021 3  mmHg Final   • RVSP(TR) 09/29/2021 15  mmHg Final   • Target HR (85%) 09/29/2021 153  bpm Final   • Max. Pred. HR (100%) 09/29/2021 180  bpm Final   Hospital Outpatient Visit on 09/28/2021   Component Date Value Ref Range Status   • Case Report 09/28/2021    Final                    Value:Surgical Pathology Report                         Case: [id number]                                  Authorizing Provider:  Didi De La O MD       Collected:           09/28/2021 01:11 PM          Ordering Location:     [facility]  Received:            09/28/2021 01:41 PM                                                                                                            Pathologist:           Onelia Pedersen MD                                                          Specimen:    Lymph Node, right cervical neck L N                                                       • Clinical Information 09/28/2021    Final                    Value:This result contains rich text formatting which cannot be displayed here.   • Final Diagnosis 09/28/2021    Final                    Value:This result contains rich text formatting which cannot be displayed here.   • Synoptic Checklist 09/28/2021    Final                    Value:Breast Biomarker Reporting Template  (BREAST: BIOMARKER REPORTING TEMPLATE - All Specimens)                                                        Protocol posted: 2/26/2020                                                           Test(s) Performed:                                     Estrogen Receptor (ER) Status:    Negative (less than 1%)                                    :    Internal control cells absent                                  Test Type:    Food and Drug Administration (FDA) cleared (test / vendor): Cignifi                                  Primary Antibody:    SP1                                  Scoring System:    Jacqui                                     Proportion Score:    0                                    Intensity Score:    0                                    Total Jacqui Score:    0                                Test(s) Performed:                                     Progesterone Receptor (PgR) Status:    Negative (less than 1%)                                    :    Internal control cells absent                                  Test Type:    Food and Drug Administration (FDA) cleared (test / vendor): Couplewise                                  Primary Antibody:    1E2                                  Scoring System:    Jacqui                                    Proportion Score:    0                                    Intensity Score:    0                                    Total Jacqui Score:    0                                Test(s) Performed:                                     HER2 by Immunohistochemistry:    Negative (Score 1+)                                  Test Type:    Food and Drug Administration (FDA) cleared (test / vendor): Couplewise                                  Primary Antibody:    4B5                                Cold Ischemia and Fixation Times:    Cannot be determined: Unknown cold ischemia time                                Fixation Time (hours):    8 hours                               Testing Performed on Block Number(s):    1A                                                         METHODS                               Fixative:    Formalin                                Image Analysis:    Not performed      • Comment 09/28/2021    Final                    Value:This result contains rich text formatting which cannot be displayed here.   • Gross Description 09/28/2021    Final                    Value:This result contains rich text formatting which cannot be displayed here.   • Special Stains 09/28/2021    Final                    Value:This result contains rich text formatting which cannot be displayed here.   • Case  Report 09/28/2021    Final                    Value:Medical Cytology Report                           Case: [id number]                                 Authorizing Provider:  Didi De La O MD       Collected:           09/28/2021 01:00 PM          Ordering Location:     [facility]  Received:            09/28/2021 01:34 PM                                                                                                            Pathologist:           Onelia Pedersen MD                                                          Specimen:    Lymph Node, Right Neck Node                                                               • Final Diagnosis 09/28/2021    Final                    Value:This result contains rich text formatting which cannot be displayed here.   • Comment 09/28/2021    Final                    Value:This result contains rich text formatting which cannot be displayed here.   • Specimen Adequacy 09/28/2021 FNA immediate cytologic evaluation, satisfactory   Final   • Gross Description 09/28/2021    Final                    Value:This result contains rich text formatting which cannot be displayed here.   • Microscopic Description 09/28/2021    Final                    Value:This result contains rich text formatting which cannot be displayed here.   Lab on 09/27/2021   Component Date Value Ref Range Status   • Glucose 09/27/2021 350* 74 - 124 mg/dL Final   • BUN 09/27/2021 15  6 - 20 mg/dL Final   • Creatinine 09/27/2021 0.64  0.60 - 1.10 mg/dL Final   • Sodium 09/27/2021 137  134 - 145 mmol/L Final   • Potassium 09/27/2021 4.4  3.5 - 4.7 mmol/L Final   • Chloride 09/27/2021 98  98 - 107 mmol/L Final   • CO2 09/27/2021 26.9  22.0 - 29.0 mmol/L Final   • Calcium 09/27/2021 9.5  8.5 - 10.2 mg/dL Final   • Total Protein 09/27/2021 7.0  6.3 - 8.0 g/dL Final   • Albumin 09/27/2021 4.30  3.50 - 5.20 g/dL Final   • ALT (SGPT) 09/27/2021 11  0 - 33 U/L Final   • AST (SGOT) 09/27/2021 15  0 -  32 U/L Final   • Alkaline Phosphatase 09/27/2021 53  38 - 116 U/L Final   • Total Bilirubin 09/27/2021 0.3  0.2 - 1.2 mg/dL Final   • eGFR Non  Amer 09/27/2021 103  >60 mL/min/1.73 Final   • Globulin 09/27/2021 2.7  1.8 - 3.5 gm/dL Final   • A/G Ratio 09/27/2021 1.6  1.1 - 2.4 g/dL Final   • BUN/Creatinine Ratio 09/27/2021 23.4  7.3 - 30.0 Final   • Anion Gap 09/27/2021 12.1  5.0 - 15.0 mmol/L Final   • WBC 09/27/2021 8.86  3.40 - 10.80 10*3/mm3 Final   • RBC 09/27/2021 4.46  3.77 - 5.28 10*6/mm3 Final   • Hemoglobin 09/27/2021 13.5  12.0 - 15.9 g/dL Final   • Hematocrit 09/27/2021 41.3  34.0 - 46.6 % Final   • MCV 09/27/2021 92.6  79.0 - 97.0 fL Final   • MCH 09/27/2021 30.3  26.6 - 33.0 pg Final   • MCHC 09/27/2021 32.7  31.5 - 35.7 g/dL Final   • RDW 09/27/2021 13.6  12.3 - 15.4 % Final   • RDW-SD 09/27/2021 46.1  37.0 - 54.0 fl Final   • MPV 09/27/2021 9.5  6.0 - 12.0 fL Final   • Platelets 09/27/2021 293  140 - 450 10*3/mm3 Final   • Neutrophil % 09/27/2021 70.0  42.7 - 76.0 % Final   • Lymphocyte % 09/27/2021 17.9* 19.6 - 45.3 % Final   • Monocyte % 09/27/2021 7.2  5.0 - 12.0 % Final   • Eosinophil % 09/27/2021 4.1  0.3 - 6.2 % Final   • Basophil % 09/27/2021 0.3  0.0 - 1.5 % Final   • Immature Grans % 09/27/2021 0.5  0.0 - 0.5 % Final   • Neutrophils, Absolute 09/27/2021 6.20  1.70 - 7.00 10*3/mm3 Final   • Lymphocytes, Absolute 09/27/2021 1.59  0.70 - 3.10 10*3/mm3 Final   • Monocytes, Absolute 09/27/2021 0.64  0.10 - 0.90 10*3/mm3 Final   • Eosinophils, Absolute 09/27/2021 0.36  0.00 - 0.40 10*3/mm3 Final   • Basophils, Absolute 09/27/2021 0.03  0.00 - 0.20 10*3/mm3 Final   • Immature Grans, Absolute 09/27/2021 0.04  0.00 - 0.05 10*3/mm3 Final   • nRBC 09/27/2021 0.0  0.0 - 0.2 /100 WBC Final   Hospital Outpatient Visit on 09/23/2021   Component Date Value Ref Range Status   • Creatinine 09/23/2021 0.60  0.60 - 1.30 mg/dL Final    Serial Number: [id number]  [id number]   Hospital Outpatient Visit on  09/23/2021   Component Date Value Ref Range Status   • Creatinine 09/23/2021 0.70  0.60 - 1.30 mg/dL Final    Serial Number: [id number]  [id number]        CT Soft Tissue Neck With Contrast    Result Date: 9/23/2021  Multiple mild to moderately enlarged subpectoral and right axillary lymph nodes consistent with metastatic disease. The subpectoral lymphadenopathy extends into the right supraclavicular region, as well. There is no evidence of metastatic disease elsewhere within the neck, chest, abdomen or pelvis. A 5 cm diameter right ovarian cyst is noted. Suggest a follow-up pelvic ultrasound in 6 weeks for further evaluation.  This report was finalized on 9/23/2021 7:37 PM by Dr. Alexander White M.D.      CT Chest With Contrast Diagnostic    Result Date: 9/23/2021  Multiple mild to moderately enlarged subpectoral and right axillary lymph nodes consistent with metastatic disease. The subpectoral lymphadenopathy extends into the right supraclavicular region, as well. There is no evidence of metastatic disease elsewhere within the neck, chest, abdomen or pelvis. A 5 cm diameter right ovarian cyst is noted. Suggest a follow-up pelvic ultrasound in 6 weeks for further evaluation.  This report was finalized on 9/23/2021 7:37 PM by Dr. Alexander White M.D.      CT Abdomen Pelvis With Contrast    Result Date: 9/23/2021  Multiple mild to moderately enlarged subpectoral and right axillary lymph nodes consistent with metastatic disease. The subpectoral lymphadenopathy extends into the right supraclavicular region, as well. There is no evidence of metastatic disease elsewhere within the neck, chest, abdomen or pelvis. A 5 cm diameter right ovarian cyst is noted. Suggest a follow-up pelvic ultrasound in 6 weeks for further evaluation.  This report was finalized on 9/23/2021 7:37 PM by Dr. Alexander White M.D.      MRI Breast Bilateral With & Without Contrast    Result Date: 9/24/2021  1. Biopsy-proven malignancy in the right  breast occupying the middle and posterior one thirds from the 11 o'clock through 1 o'clock positions and measuring up to 7.1 cm in greatest dimension. The heart shaped metallic clip is within the anterior aspect of the lesion. The lesion abuts the pectoral fascia but there is no evidence for direct invasion of the pectoral muscle. Also, there are portions of the lesion that extend to the subtalar soft tissues but there is no abnormal enhancement of the skin or skin thickening. Evidence of bulky right axillary adenopathy is noted. 2. There is a 2.4 cm irregular mass in the lower outer quadrant of the right breast centered at the 8 o'clock position that is separate from the known malignancy. This is suspicious for additional malignancy. Targeted sonography and biopsy of the lesion should be considered. 3. There are no findings suspicious for malignancy in the left breast.  BI-RADS category 6: Known malignancy.  This report was finalized on 9/24/2021 5:02 PM by Dr. Vicente Carpenter M.D.      US Guided Lymph Node Biopsy    Result Date: 9/29/2021   Successful ultrasound guided FNA and core biopsy of right cervical lymph node.  This report was finalized on 9/29/2021 8:07 PM by Dr. Eulalio Kwok M.D.      NM PET/CT Skull Base to Mid Thigh    Result Date: 10/4/2021  1. There are 2 separate hypermetabolic lesions within the right breast. The right axillary, interpectoral, supraclavicular, and right neck lymphadenopathy is likely metastatic. The hypermetabolic left hilar lymphadenopathy is suspicious for metastatic lymphadenopathy as well and there is a hypermetabolic right femoral neck lesion which likely represents a metastasis. 2. There is no hypermetabolic lymphadenopathy or evidence for visceral metastases within the abdomen or pelvis.  This report was finalized on 10/4/2021 12:01 PM by Dr. Atiya Ambrocio M.D.       CT of the chest abdomen pelvis-images independently reviewed and interpreted by me in detail summarized in  the HPI  Bone scan-images independently reviewed and interpreted by me in detail summarized in the HPI    CT neck-images independently reviewed and interpreted by me in detail summarized in HPI.  I also discussed the CT neck with Dr. Alexander Blevins with radiology regarding the right side lymphadenopathy.     9/28/2021-right cervical lymph node biopsy-pathology consistent with metastatic mammary carcinoma.    10/1/2021-PET/CT-there are 2 separate hypermetabolic lesions within the right breast.  1 measuring 6 cm and the other measuring 1.3 cm.  The SUV of 6 cm lesion of 5.6 and small lesion of 4.8.  Hypermetabolic right axillary, infra pectoral, right supraclavicular lymphadenopathy noted.  In addition there are hypermetabolic nodes at the right thoracic inlet, right base of the neck and right posterior cervical triangle.  1.6 x 0.8 cm right posterior cervical triangle node with an SUV of 3.9.  Right axillary lymphadenopathy with lymphedema 6.4.  Hypermetabolic left hilar lymphadenopathy measuring 2 x 1.1 cm with an SUV of 5.8.  No hypermetabolic activity in the liver or evidence of metastatic disease in the abdomen or pelvis.  Hypermetabolic lesion in the right femoral neck measuring 1.5 cm.  SUV 5.4.    PET/CT images independently reviewed and interpreted by me in detail summarized above.  Images also reviewed with patient.    Assessment/Plan       *Invasive ductal carcinoma of the right breast  · Clinical T3 N3 cM1, stage IV  · The tumor is grade 3, poorly differentiated, ER 1 to 10% weak, MN negative, HER-2 negative, Ki-67 65%  · Cervical lymph node biopsy consistent with metastatic mammary carcinoma.  · PET/CT the abnormal uptake noted in the left hilar lymph node, right femoral neck, cervical lymph nodes.  · Since the cervical lymph node biopsy is positive as well as PET/CT with several abnormal findings I do believe that this is stage IV disease.  · Discussed case at multidisciplinary conference on 9/28/2021 and  the initial plan was to proceed with neoadjuvant chemotherapy followed by surgery and radiation to the positive cervical nodes.  PET/CT results were not available at that time.  · The PET/CT showing other areas of metastatic disease unfortunately we will not be able to proceed with curative intent treatment.  · Explained that the disease is incurable  · On hearing this news Sierra was overwhelmed and she decided to cancel the procedure for the port echo scheduled for later in the day.  · Caris testing requested  · I discussed today with her about palliative intent chemotherapy with single agent Taxol and adding immunotherapy down the line once PD-L1 results available.  · Since she is complaining of significant pain in the right breast I would consider adding carboplatin for about 4 to 6 weeks to help get a quick response in the right breast tumor as well as right axillary lymphadenopathy.  · Adverse effects of Taxol including but not limited to myelosuppression, increased risk of infections, nausea, vomiting, arthralgias, neuropathy, nail changes, alopecia, diarrhea, possible mucositis discussed.  · Discussed adverse effects of carboplatin as well.  · She will be started on chemotherapy 10/13/2021  · Port placement scheduled for 10/16/2021    *Diabetes  · Type I  · Poorly controlled  · Referred to endocrinology  ·  notes reviewed   · Her endocrinology    * Cervical lymphadenopathy  · Biopsied and pathology consistent with metastatic breast cancer    *Anxiety  · She has been referred to supportive oncology  · She has an appointment with Tenisha Rizzo today      *Port placement-scheduled for 10/16/2021    *Breast pain-Norco every 8 hours prescribed    *Follow-up-week 2 of Taxol    Today's visit entailed discussing treatment for metastatic breast cancer which is terminal without treatment.

## 2021-10-12 NOTE — PROGRESS NOTES
This was an audio and video enabled telemedicine encounter.    ____________________PATIENT EDUCATION____________________    PATIENT EDUCATION:  Today I met with the patient to discuss the chemotherapy regimen recommended for treatment of his/her Malignant neoplasm of central portion of right breast in female, estrogen receptor negative (HCC)  - ondansetron (ZOFRAN) 8 MG tablet    Bone metastasis (HCC)  .  The patient was given explanation of treatment premed side effects including office policy that prohibits patients to drive if sedating medications are administered, MD explanation given regarding benefits, side effects, toxicities and goals of treatment.  The patient received a Chemotherapy/Biotherapy Plan Summary including diagnosis and explanation of specific treatment plan.    SIDE EFFECTS:  Common side effects were discussed with the patient and/or significant other.  Discussion included where applicable hair loss/discoloration, anemia/fatigue, infection/chills/fever, appetite, bleeding risk/precautions, constipation, diarrhea, mouth sores, taste alteration, loss of appetite, nausea/vomiting, peripheral neuropathy, skin/nail changes, rash, muscle aches/weakness, photosensitivity, weight gain/loss, hearing loss, dizziness, menopausal symptoms, menstrual irregularity, sterility, high blood pressure, heart damage, liver damage, lung damage, kidney damage, DVT/PE risk, fluid retention, pleural/pericardial effusion, somnolence, electrolyte/LFT imbalance, vein exercises and/or the possible need for vascular access/port placement.  The patient was advised that although uncommon, leakage of an infused medication from the vein or venous access device may lead to skin breakdown and/or other tissue damage.  The patient was advised that he/she may have pain, bleeding, and/or bruising from the insertion of a needle in their vein or venous access device (port).  The patient was further advised that, in spite of proper  technique, infection with redness and irritation may rarely occur at the site where the needle was inserted.  The patient was advised that if complications occur, additional medical treatment is available.  Finally, where applicable we have reviewed rare but potential immune mediated side effects including shortness of breath, cough, chest pain (pneumonitis), abdominal pain, diarrhea (colitis), thyroiditis (hypothyroid or hyperthyroid), hepatitis and liver dysfunction, nephritis and renal dysfunction.    Discussion also included side effects specific to drugs in the treatment plan, specifically Taxol, carboplatin    Reproductive risks were discussed, including appropriate use of birth control and protection during sexual relations.    Survivorship referral placed if appropriate no.     I spent 40 minutes caring for Sierra on this date of service. This time includes time spent by me in the following activities: preparing for the visit, obtaining and/or reviewing a separately obtained history, counseling and educating the patient/family/caregiver, referring and communicating with other health care professionals, documenting information in the medical record and care coordination.

## 2021-10-13 ENCOUNTER — INFUSION (OUTPATIENT)
Dept: ONCOLOGY | Facility: HOSPITAL | Age: 41
End: 2021-10-13

## 2021-10-13 ENCOUNTER — APPOINTMENT (OUTPATIENT)
Dept: NUCLEAR MEDICINE | Facility: HOSPITAL | Age: 41
End: 2021-10-13

## 2021-10-13 ENCOUNTER — NURSE NAVIGATOR (OUTPATIENT)
Dept: OTHER | Facility: HOSPITAL | Age: 41
End: 2021-10-13

## 2021-10-13 VITALS
TEMPERATURE: 98 F | SYSTOLIC BLOOD PRESSURE: 112 MMHG | HEART RATE: 67 BPM | DIASTOLIC BLOOD PRESSURE: 72 MMHG | WEIGHT: 158.4 LBS | BODY MASS INDEX: 22.1 KG/M2 | OXYGEN SATURATION: 96 %

## 2021-10-13 DIAGNOSIS — Z17.1 MALIGNANT NEOPLASM OF CENTRAL PORTION OF RIGHT BREAST IN FEMALE, ESTROGEN RECEPTOR NEGATIVE (HCC): Primary | ICD-10-CM

## 2021-10-13 DIAGNOSIS — C50.111 MALIGNANT NEOPLASM OF CENTRAL PORTION OF RIGHT BREAST IN FEMALE, ESTROGEN RECEPTOR NEGATIVE (HCC): Primary | ICD-10-CM

## 2021-10-13 LAB
ALBUMIN SERPL-MCNC: 4.2 G/DL (ref 3.5–5.2)
ALBUMIN/GLOB SERPL: 1.8 G/DL (ref 1.1–2.4)
ALP SERPL-CCNC: 42 U/L (ref 38–116)
ALT SERPL W P-5'-P-CCNC: 13 U/L (ref 0–33)
ANION GAP SERPL CALCULATED.3IONS-SCNC: 8.8 MMOL/L (ref 5–15)
AST SERPL-CCNC: 16 U/L (ref 0–32)
BASOPHILS # BLD AUTO: 0.03 10*3/MM3 (ref 0–0.2)
BASOPHILS NFR BLD AUTO: 0.5 % (ref 0–1.5)
BILIRUB SERPL-MCNC: 0.2 MG/DL (ref 0.2–1.2)
BUN SERPL-MCNC: 14 MG/DL (ref 6–20)
BUN/CREAT SERPL: 24.6 (ref 7.3–30)
CALCIUM SPEC-SCNC: 9.5 MG/DL (ref 8.5–10.2)
CHLORIDE SERPL-SCNC: 99 MMOL/L (ref 98–107)
CO2 SERPL-SCNC: 26.2 MMOL/L (ref 22–29)
CREAT SERPL-MCNC: 0.57 MG/DL (ref 0.6–1.1)
DEPRECATED RDW RBC AUTO: 44.1 FL (ref 37–54)
EOSINOPHIL # BLD AUTO: 0.34 10*3/MM3 (ref 0–0.4)
EOSINOPHIL NFR BLD AUTO: 5.3 % (ref 0.3–6.2)
ERYTHROCYTE [DISTWIDTH] IN BLOOD BY AUTOMATED COUNT: 13.5 % (ref 12.3–15.4)
GFR SERPL CREATININE-BSD FRML MDRD: 117 ML/MIN/1.73
GLOBULIN UR ELPH-MCNC: 2.3 GM/DL (ref 1.8–3.5)
GLUCOSE SERPL-MCNC: 360 MG/DL (ref 74–124)
HCG INTACT+B SERPL-ACNC: <0.5 MIU/ML
HCT VFR BLD AUTO: 38.2 % (ref 34–46.6)
HGB BLD-MCNC: 12.8 G/DL (ref 12–15.9)
IMM GRANULOCYTES # BLD AUTO: 0.01 10*3/MM3 (ref 0–0.05)
IMM GRANULOCYTES NFR BLD AUTO: 0.2 % (ref 0–0.5)
LYMPHOCYTES # BLD AUTO: 1.95 10*3/MM3 (ref 0.7–3.1)
LYMPHOCYTES NFR BLD AUTO: 30.6 % (ref 19.6–45.3)
MCH RBC QN AUTO: 29.8 PG (ref 26.6–33)
MCHC RBC AUTO-ENTMCNC: 33.5 G/DL (ref 31.5–35.7)
MCV RBC AUTO: 88.8 FL (ref 79–97)
MONOCYTES # BLD AUTO: 0.65 10*3/MM3 (ref 0.1–0.9)
MONOCYTES NFR BLD AUTO: 10.2 % (ref 5–12)
NEUTROPHILS NFR BLD AUTO: 3.39 10*3/MM3 (ref 1.7–7)
NEUTROPHILS NFR BLD AUTO: 53.2 % (ref 42.7–76)
NRBC BLD AUTO-RTO: 0 /100 WBC (ref 0–0.2)
PLATELET # BLD AUTO: 219 10*3/MM3 (ref 140–450)
PMV BLD AUTO: 9.6 FL (ref 6–12)
POTASSIUM SERPL-SCNC: 4.2 MMOL/L (ref 3.5–4.7)
PROT SERPL-MCNC: 6.5 G/DL (ref 6.3–8)
RBC # BLD AUTO: 4.3 10*6/MM3 (ref 3.77–5.28)
SODIUM SERPL-SCNC: 134 MMOL/L (ref 134–145)
WBC # BLD AUTO: 6.37 10*3/MM3 (ref 3.4–10.8)

## 2021-10-13 PROCEDURE — 96375 TX/PRO/DX INJ NEW DRUG ADDON: CPT

## 2021-10-13 PROCEDURE — 25010000002 DIPHENHYDRAMINE PER 50 MG: Performed by: INTERNAL MEDICINE

## 2021-10-13 PROCEDURE — 96417 CHEMO IV INFUS EACH ADDL SEQ: CPT

## 2021-10-13 PROCEDURE — 25010000002 PALONOSETRON PER 25 MCG: Performed by: INTERNAL MEDICINE

## 2021-10-13 PROCEDURE — 25010000002 PACLITAXEL PER 1 MG: Performed by: INTERNAL MEDICINE

## 2021-10-13 PROCEDURE — 80053 COMPREHEN METABOLIC PANEL: CPT

## 2021-10-13 PROCEDURE — 85025 COMPLETE CBC W/AUTO DIFF WBC: CPT

## 2021-10-13 PROCEDURE — 25010000002 DEXAMETHASONE SODIUM PHOSPHATE 100 MG/10ML SOLUTION: Performed by: INTERNAL MEDICINE

## 2021-10-13 PROCEDURE — 25010000002 CARBOPLATIN PER 50 MG: Performed by: INTERNAL MEDICINE

## 2021-10-13 PROCEDURE — 96413 CHEMO IV INFUSION 1 HR: CPT

## 2021-10-13 PROCEDURE — 84702 CHORIONIC GONADOTROPIN TEST: CPT | Performed by: INTERNAL MEDICINE

## 2021-10-13 RX ORDER — LIDOCAINE AND PRILOCAINE 25; 25 MG/G; MG/G
1 CREAM TOPICAL AS NEEDED
Qty: 5 G | Refills: 2 | Status: SHIPPED | OUTPATIENT
Start: 2021-10-13

## 2021-10-13 RX ORDER — PALONOSETRON 0.05 MG/ML
0.25 INJECTION, SOLUTION INTRAVENOUS ONCE
Status: COMPLETED | OUTPATIENT
Start: 2021-10-13 | End: 2021-10-13

## 2021-10-13 RX ORDER — FAMOTIDINE 10 MG/ML
20 INJECTION, SOLUTION INTRAVENOUS ONCE
Status: COMPLETED | OUTPATIENT
Start: 2021-10-13 | End: 2021-10-13

## 2021-10-13 RX ORDER — SODIUM CHLORIDE 9 MG/ML
250 INJECTION, SOLUTION INTRAVENOUS ONCE
Status: COMPLETED | OUTPATIENT
Start: 2021-10-13 | End: 2021-10-13

## 2021-10-13 RX ADMIN — DEXAMETHASONE SODIUM PHOSPHATE 12 MG: 10 INJECTION, SOLUTION INTRAMUSCULAR; INTRAVENOUS at 09:06

## 2021-10-13 RX ADMIN — CARBOPLATIN 230 MG: 10 INJECTION INTRAVENOUS at 11:32

## 2021-10-13 RX ADMIN — DIPHENHYDRAMINE HYDROCHLORIDE 50 MG: 50 INJECTION, SOLUTION INTRAMUSCULAR; INTRAVENOUS at 09:26

## 2021-10-13 RX ADMIN — PALONOSETRON 0.25 MG: 0.05 INJECTION, SOLUTION INTRAVENOUS at 09:06

## 2021-10-13 RX ADMIN — SODIUM CHLORIDE 250 ML: 9 INJECTION, SOLUTION INTRAVENOUS at 09:06

## 2021-10-13 RX ADMIN — SODIUM CHLORIDE 150 MG: 9 INJECTION, SOLUTION INTRAVENOUS at 10:25

## 2021-10-13 RX ADMIN — FAMOTIDINE 20 MG: 10 INJECTION INTRAVENOUS at 09:06

## 2021-10-13 NOTE — NURSING NOTE
Pt did utilize cold therapy during taxol, but did take them off due to pain in feet.  Pt then fell asleep, but was able to keep hands in mitts partially.

## 2021-10-13 NOTE — TELEPHONE ENCOUNTER
Port placement scheduled 10/15/21.  Emla cream sent to pharmacy and discussed instructions with pt.  Pharmacy verified.

## 2021-10-13 NOTE — NURSING NOTE
Pt signed consent and given printed chemo ed information folder from [facility].  Pt and this nurse discussed plan of care and pt has no questions at this time.    Per Dr. De La O, results of hcg not required to start as pt's spouse has vasectomy.  Pharmacy notified of this as well.  Reviewed labs with pt and blood glucose is 360 today.  Pt is type 1 diabetic and has self treated with insulin.  Pt reports she often spikes with stress and will continue to monitor.

## 2021-10-13 NOTE — PROGRESS NOTES
Met Ms. Mao at her [facility] infusion appointment. I introduced myself and navigational services. She stated this is her first treatment and she is having a hard time staying awake. We talked briefly about her treatment plan and she had no questions related to her plan of care. She was also interested in a wig voucher and that was given to her as well.       We discussed her support system and she stated she has a good support system and she is already established with Tenisha BURNETT.       We discussed integrative therapies and other services [facility]. I gave her a navigation folder with the following information:     [facility] Cancer Support Network, Sharing Our Stories Breast Cancer Support Group, Cancer and Restorative Exercise (CARE), [facility], [facility] Exercise program, Together for Breast Cancer Survival, Guide for the Newly Diagnosed, [facility], [facility], Massage Therapy, Reiki Therapy, [facility] [place], Cancer Nutrition, Survivorship Clinic.     She verbalized appreciation for navigational services and she has my contact information and will call with any questions that arise.

## 2021-10-14 ENCOUNTER — LAB (OUTPATIENT)
Dept: LAB | Facility: HOSPITAL | Age: 41
End: 2021-10-14

## 2021-10-14 ENCOUNTER — OFFICE VISIT (OUTPATIENT)
Dept: PSYCHIATRY | Facility: HOSPITAL | Age: 41
End: 2021-10-14

## 2021-10-14 ENCOUNTER — TRANSCRIBE ORDERS (OUTPATIENT)
Dept: ADMINISTRATIVE | Facility: HOSPITAL | Age: 41
End: 2021-10-14

## 2021-10-14 DIAGNOSIS — Z17.0 MALIGNANT NEOPLASM OF BREAST IN FEMALE, ESTROGEN RECEPTOR POSITIVE, UNSPECIFIED LATERALITY, UNSPECIFIED SITE OF BREAST (HCC): Primary | ICD-10-CM

## 2021-10-14 DIAGNOSIS — F90.1 ATTENTION DEFICIT HYPERACTIVITY DISORDER (ADHD), PREDOMINANTLY HYPERACTIVE TYPE: ICD-10-CM

## 2021-10-14 DIAGNOSIS — C50.919 MALIGNANT NEOPLASM OF BREAST IN FEMALE, ESTROGEN RECEPTOR POSITIVE, UNSPECIFIED LATERALITY, UNSPECIFIED SITE OF BREAST (HCC): Primary | ICD-10-CM

## 2021-10-14 DIAGNOSIS — F33.1 MODERATE EPISODE OF RECURRENT MAJOR DEPRESSIVE DISORDER (HCC): ICD-10-CM

## 2021-10-14 DIAGNOSIS — F41.1 GENERALIZED ANXIETY DISORDER: Primary | ICD-10-CM

## 2021-10-14 DIAGNOSIS — Z01.818 PREOP EXAMINATION: Primary | ICD-10-CM

## 2021-10-14 LAB — SARS-COV-2 RNA RESP QL NAA+PROBE: NOT DETECTED

## 2021-10-14 PROCEDURE — C9803 HOPD COVID-19 SPEC COLLECT: HCPCS | Performed by: STUDENT IN AN ORGANIZED HEALTH CARE EDUCATION/TRAINING PROGRAM

## 2021-10-14 PROCEDURE — 90792 PSYCH DIAG EVAL W/MED SRVCS: CPT | Performed by: NURSE PRACTITIONER

## 2021-10-14 PROCEDURE — U0003 INFECTIOUS AGENT DETECTION BY NUCLEIC ACID (DNA OR RNA); SEVERE ACUTE RESPIRATORY SYNDROME CORONAVIRUS 2 (SARS-COV-2) (CORONAVIRUS DISEASE [COVID-19]), AMPLIFIED PROBE TECHNIQUE, MAKING USE OF HIGH THROUGHPUT TECHNOLOGIES AS DESCRIBED BY CMS-2020-01-R: HCPCS | Performed by: STUDENT IN AN ORGANIZED HEALTH CARE EDUCATION/TRAINING PROGRAM

## 2021-10-14 RX ORDER — GABAPENTIN 300 MG/1
300 CAPSULE ORAL 3 TIMES DAILY
Qty: 90 CAPSULE | Refills: 2 | Status: ON HOLD | OUTPATIENT
Start: 2021-10-14 | End: 2022-10-22

## 2021-10-15 ENCOUNTER — HOSPITAL ENCOUNTER (OUTPATIENT)
Facility: HOSPITAL | Age: 41
Setting detail: HOSPITAL OUTPATIENT SURGERY
Discharge: HOME | End: 2021-10-15
Attending: STUDENT IN AN ORGANIZED HEALTH CARE EDUCATION/TRAINING PROGRAM | Admitting: STUDENT IN AN ORGANIZED HEALTH CARE EDUCATION/TRAINING PROGRAM

## 2021-10-15 ENCOUNTER — ANESTHESIA (OUTPATIENT)
Dept: PERIOP | Facility: HOSPITAL | Age: 41
End: 2021-10-15

## 2021-10-15 ENCOUNTER — APPOINTMENT (OUTPATIENT)
Dept: GENERAL RADIOLOGY | Facility: HOSPITAL | Age: 41
End: 2021-10-15

## 2021-10-15 ENCOUNTER — ANESTHESIA EVENT (OUTPATIENT)
Dept: PERIOP | Facility: HOSPITAL | Age: 41
End: 2021-10-15

## 2021-10-15 VITALS
SYSTOLIC BLOOD PRESSURE: 107 MMHG | HEART RATE: 66 BPM | WEIGHT: 158.7 LBS | BODY MASS INDEX: 22.22 KG/M2 | RESPIRATION RATE: 16 BRPM | DIASTOLIC BLOOD PRESSURE: 61 MMHG | OXYGEN SATURATION: 96 % | HEIGHT: 71 IN | TEMPERATURE: 98.7 F

## 2021-10-15 LAB
B-HCG UR QL: NEGATIVE
EXPIRATION DATE: NORMAL
GLUCOSE BLDC GLUCOMTR-MCNC: 167 MG/DL (ref 70–130)
INTERNAL NEGATIVE CONTROL: NORMAL
INTERNAL POSITIVE CONTROL: NORMAL
Lab: NORMAL

## 2021-10-15 PROCEDURE — 25010000002 PROPOFOL 10 MG/ML EMULSION: Performed by: NURSE ANESTHETIST, CERTIFIED REGISTERED

## 2021-10-15 PROCEDURE — 36561 INSERT TUNNELED CV CATH: CPT | Performed by: STUDENT IN AN ORGANIZED HEALTH CARE EDUCATION/TRAINING PROGRAM

## 2021-10-15 PROCEDURE — 77001 FLUOROGUIDE FOR VEIN DEVICE: CPT | Performed by: STUDENT IN AN ORGANIZED HEALTH CARE EDUCATION/TRAINING PROGRAM

## 2021-10-15 PROCEDURE — 25010000002 HEPARIN (PORCINE) PER 1000 UNITS: Performed by: STUDENT IN AN ORGANIZED HEALTH CARE EDUCATION/TRAINING PROGRAM

## 2021-10-15 PROCEDURE — 25010000003 CEFAZOLIN IN DEXTROSE 2-4 GM/100ML-% SOLUTION: Performed by: STUDENT IN AN ORGANIZED HEALTH CARE EDUCATION/TRAINING PROGRAM

## 2021-10-15 PROCEDURE — C1788 PORT, INDWELLING, IMP: HCPCS | Performed by: STUDENT IN AN ORGANIZED HEALTH CARE EDUCATION/TRAINING PROGRAM

## 2021-10-15 PROCEDURE — 25010000002 CEFAZOLIN 1-4 GM/50ML-% SOLUTION: Performed by: NURSE ANESTHETIST, CERTIFIED REGISTERED

## 2021-10-15 PROCEDURE — 81025 URINE PREGNANCY TEST: CPT | Performed by: ANESTHESIOLOGY

## 2021-10-15 PROCEDURE — 76000 FLUOROSCOPY <1 HR PHYS/QHP: CPT

## 2021-10-15 PROCEDURE — S0260 H&P FOR SURGERY: HCPCS | Performed by: STUDENT IN AN ORGANIZED HEALTH CARE EDUCATION/TRAINING PROGRAM

## 2021-10-15 PROCEDURE — 25010000002 MIDAZOLAM PER 1 MG: Performed by: ANESTHESIOLOGY

## 2021-10-15 PROCEDURE — 82962 GLUCOSE BLOOD TEST: CPT

## 2021-10-15 DEVICE — POWERPORT M.R.I. IMPLANTABLE PORT WITH ATTACHABLE 8F CHRONOFLEX OPEN-ENDED SINGLE-LUMEN VENOUS CATHETER INTERMEDIATE KIT (WITH SUTURE PLUGS)
Type: IMPLANTABLE DEVICE | Site: CHEST | Status: FUNCTIONAL
Brand: POWERPORT M.R.I., CHRONOFLEX

## 2021-10-15 RX ORDER — OXYCODONE AND ACETAMINOPHEN 10; 325 MG/1; MG/1
1 TABLET ORAL EVERY 4 HOURS PRN
Status: DISCONTINUED | OUTPATIENT
Start: 2021-10-15 | End: 2021-10-15 | Stop reason: HOSPADM

## 2021-10-15 RX ORDER — EPHEDRINE SULFATE 50 MG/ML
5 INJECTION, SOLUTION INTRAVENOUS ONCE AS NEEDED
Status: DISCONTINUED | OUTPATIENT
Start: 2021-10-15 | End: 2021-10-15 | Stop reason: HOSPADM

## 2021-10-15 RX ORDER — HYDRALAZINE HYDROCHLORIDE 20 MG/ML
5 INJECTION INTRAMUSCULAR; INTRAVENOUS
Status: DISCONTINUED | OUTPATIENT
Start: 2021-10-15 | End: 2021-10-15 | Stop reason: HOSPADM

## 2021-10-15 RX ORDER — LABETALOL HYDROCHLORIDE 5 MG/ML
5 INJECTION, SOLUTION INTRAVENOUS
Status: DISCONTINUED | OUTPATIENT
Start: 2021-10-15 | End: 2021-10-15 | Stop reason: HOSPADM

## 2021-10-15 RX ORDER — DIPHENHYDRAMINE HYDROCHLORIDE 50 MG/ML
12.5 INJECTION INTRAMUSCULAR; INTRAVENOUS
Status: DISCONTINUED | OUTPATIENT
Start: 2021-10-15 | End: 2021-10-15 | Stop reason: HOSPADM

## 2021-10-15 RX ORDER — LIDOCAINE HYDROCHLORIDE 10 MG/ML
0.5 INJECTION, SOLUTION EPIDURAL; INFILTRATION; INTRACAUDAL; PERINEURAL ONCE AS NEEDED
Status: DISCONTINUED | OUTPATIENT
Start: 2021-10-15 | End: 2021-10-15 | Stop reason: HOSPADM

## 2021-10-15 RX ORDER — CEFAZOLIN SODIUM 1 G/50ML
INJECTION, SOLUTION INTRAVENOUS AS NEEDED
Status: DISCONTINUED | OUTPATIENT
Start: 2021-10-15 | End: 2021-10-15 | Stop reason: SURG

## 2021-10-15 RX ORDER — BUPIVACAINE HYDROCHLORIDE AND EPINEPHRINE 5; 5 MG/ML; UG/ML
INJECTION, SOLUTION EPIDURAL; INTRACAUDAL; PERINEURAL AS NEEDED
Status: DISCONTINUED | OUTPATIENT
Start: 2021-10-15 | End: 2021-10-15 | Stop reason: HOSPADM

## 2021-10-15 RX ORDER — IBUPROFEN 600 MG/1
600 TABLET ORAL ONCE AS NEEDED
Status: DISCONTINUED | OUTPATIENT
Start: 2021-10-15 | End: 2021-10-15 | Stop reason: HOSPADM

## 2021-10-15 RX ORDER — DIPHENHYDRAMINE HCL 25 MG
25 CAPSULE ORAL
Status: DISCONTINUED | OUTPATIENT
Start: 2021-10-15 | End: 2021-10-15 | Stop reason: HOSPADM

## 2021-10-15 RX ORDER — FLUMAZENIL 0.1 MG/ML
0.2 INJECTION INTRAVENOUS AS NEEDED
Status: DISCONTINUED | OUTPATIENT
Start: 2021-10-15 | End: 2021-10-15 | Stop reason: HOSPADM

## 2021-10-15 RX ORDER — SODIUM CHLORIDE 0.9 % (FLUSH) 0.9 %
3 SYRINGE (ML) INJECTION EVERY 12 HOURS SCHEDULED
Status: DISCONTINUED | OUTPATIENT
Start: 2021-10-15 | End: 2021-10-15 | Stop reason: HOSPADM

## 2021-10-15 RX ORDER — SODIUM CHLORIDE, SODIUM LACTATE, POTASSIUM CHLORIDE, CALCIUM CHLORIDE 600; 310; 30; 20 MG/100ML; MG/100ML; MG/100ML; MG/100ML
INJECTION, SOLUTION INTRAVENOUS CONTINUOUS PRN
Status: DISCONTINUED | OUTPATIENT
Start: 2021-10-15 | End: 2021-10-15 | Stop reason: SURG

## 2021-10-15 RX ORDER — NALOXONE HCL 0.4 MG/ML
0.2 VIAL (ML) INJECTION AS NEEDED
Status: DISCONTINUED | OUTPATIENT
Start: 2021-10-15 | End: 2021-10-15 | Stop reason: HOSPADM

## 2021-10-15 RX ORDER — PROMETHAZINE HYDROCHLORIDE 12.5 MG/1
25 TABLET ORAL ONCE AS NEEDED
Status: DISCONTINUED | OUTPATIENT
Start: 2021-10-15 | End: 2021-10-15 | Stop reason: HOSPADM

## 2021-10-15 RX ORDER — PROMETHAZINE HYDROCHLORIDE 25 MG/1
25 SUPPOSITORY RECTAL ONCE AS NEEDED
Status: DISCONTINUED | OUTPATIENT
Start: 2021-10-15 | End: 2021-10-15 | Stop reason: HOSPADM

## 2021-10-15 RX ORDER — HYDROMORPHONE HYDROCHLORIDE 1 MG/ML
0.5 INJECTION, SOLUTION INTRAMUSCULAR; INTRAVENOUS; SUBCUTANEOUS
Status: DISCONTINUED | OUTPATIENT
Start: 2021-10-15 | End: 2021-10-15 | Stop reason: HOSPADM

## 2021-10-15 RX ORDER — FENTANYL CITRATE 50 UG/ML
50 INJECTION, SOLUTION INTRAMUSCULAR; INTRAVENOUS
Status: DISCONTINUED | OUTPATIENT
Start: 2021-10-15 | End: 2021-10-15 | Stop reason: HOSPADM

## 2021-10-15 RX ORDER — HYDROCODONE BITARTRATE AND ACETAMINOPHEN 7.5; 325 MG/1; MG/1
1 TABLET ORAL ONCE AS NEEDED
Status: COMPLETED | OUTPATIENT
Start: 2021-10-15 | End: 2021-10-15

## 2021-10-15 RX ORDER — FAMOTIDINE 10 MG/ML
20 INJECTION, SOLUTION INTRAVENOUS ONCE
Status: COMPLETED | OUTPATIENT
Start: 2021-10-15 | End: 2021-10-15

## 2021-10-15 RX ORDER — SODIUM CHLORIDE, SODIUM LACTATE, POTASSIUM CHLORIDE, CALCIUM CHLORIDE 600; 310; 30; 20 MG/100ML; MG/100ML; MG/100ML; MG/100ML
9 INJECTION, SOLUTION INTRAVENOUS CONTINUOUS
Status: DISCONTINUED | OUTPATIENT
Start: 2021-10-15 | End: 2021-10-15 | Stop reason: HOSPADM

## 2021-10-15 RX ORDER — MIDAZOLAM HYDROCHLORIDE 1 MG/ML
1 INJECTION INTRAMUSCULAR; INTRAVENOUS
Status: COMPLETED | OUTPATIENT
Start: 2021-10-15 | End: 2021-10-15

## 2021-10-15 RX ORDER — CEFAZOLIN SODIUM 2 G/100ML
2 INJECTION, SOLUTION INTRAVENOUS ONCE
Status: COMPLETED | OUTPATIENT
Start: 2021-10-15 | End: 2021-10-15

## 2021-10-15 RX ORDER — SODIUM CHLORIDE 0.9 % (FLUSH) 0.9 %
3-10 SYRINGE (ML) INJECTION AS NEEDED
Status: DISCONTINUED | OUTPATIENT
Start: 2021-10-15 | End: 2021-10-15 | Stop reason: HOSPADM

## 2021-10-15 RX ORDER — PROPOFOL 10 MG/ML
VIAL (ML) INTRAVENOUS CONTINUOUS PRN
Status: DISCONTINUED | OUTPATIENT
Start: 2021-10-15 | End: 2021-10-15 | Stop reason: SURG

## 2021-10-15 RX ORDER — LIDOCAINE HYDROCHLORIDE 20 MG/ML
INJECTION, SOLUTION INFILTRATION; PERINEURAL AS NEEDED
Status: DISCONTINUED | OUTPATIENT
Start: 2021-10-15 | End: 2021-10-15 | Stop reason: SURG

## 2021-10-15 RX ORDER — ONDANSETRON 2 MG/ML
4 INJECTION INTRAMUSCULAR; INTRAVENOUS ONCE AS NEEDED
Status: DISCONTINUED | OUTPATIENT
Start: 2021-10-15 | End: 2021-10-15 | Stop reason: HOSPADM

## 2021-10-15 RX ADMIN — CEFAZOLIN SODIUM 2 G: 2 INJECTION, SOLUTION INTRAVENOUS at 08:32

## 2021-10-15 RX ADMIN — FAMOTIDINE 20 MG: 10 INJECTION INTRAVENOUS at 08:08

## 2021-10-15 RX ADMIN — PROPOFOL 300 MCG/KG/MIN: 10 INJECTION, EMULSION INTRAVENOUS at 08:44

## 2021-10-15 RX ADMIN — CEFAZOLIN SODIUM 2 G: 1 INJECTION, SOLUTION INTRAVENOUS at 08:54

## 2021-10-15 RX ADMIN — SODIUM CHLORIDE, POTASSIUM CHLORIDE, SODIUM LACTATE AND CALCIUM CHLORIDE: 600; 310; 30; 20 INJECTION, SOLUTION INTRAVENOUS at 08:06

## 2021-10-15 RX ADMIN — MIDAZOLAM 1 MG: 1 INJECTION INTRAMUSCULAR; INTRAVENOUS at 08:08

## 2021-10-15 RX ADMIN — LIDOCAINE HYDROCHLORIDE 80 MG: 20 INJECTION, SOLUTION INFILTRATION; PERINEURAL at 08:44

## 2021-10-15 RX ADMIN — HYDROCODONE BITARTRATE AND ACETAMINOPHEN 1 TABLET: 7.5; 325 TABLET ORAL at 09:51

## 2021-10-15 RX ADMIN — MIDAZOLAM 1 MG: 1 INJECTION INTRAMUSCULAR; INTRAVENOUS at 08:21

## 2021-10-15 RX ADMIN — SODIUM CHLORIDE, POTASSIUM CHLORIDE, SODIUM LACTATE AND CALCIUM CHLORIDE 9 ML/HR: 600; 310; 30; 20 INJECTION, SOLUTION INTRAVENOUS at 07:55

## 2021-10-15 NOTE — ANESTHESIA PREPROCEDURE EVALUATION
Anesthesia Evaluation     Patient summary reviewed and Nursing notes reviewed   NPO Solid Status: > 8 hours             Airway   Mallampati: II  TM distance: >3 FB  Neck ROM: full  no difficulty expected  Dental - normal exam     Pulmonary - normal exam   Cardiovascular - normal exam    (+) hyperlipidemia,       Neuro/Psych  (+) psychiatric history Anxiety,     GI/Hepatic/Renal/Endo    (+)   renal disease CRI, diabetes mellitus type 1 using insulin,     Musculoskeletal     Abdominal  - normal exam   Substance History      OB/GYN          Other   arthritis,    history of cancer active      Other Comment: METASTATIC BREAST CANCER                  Anesthesia Plan    ASA 3     general     intravenous induction     Anesthetic plan, all risks, benefits, and alternatives have been provided, discussed and informed consent has been obtained with: patient.

## 2021-10-15 NOTE — ANESTHESIA POSTPROCEDURE EVALUATION
"Patient: Sierra Mao    Procedure Summary     Date: 10/15/21 Room / Location: [facility] OR [address] [place] [address] MAIN OR    Anesthesia Start: 0836 Anesthesia Stop: 0923    Procedure: INSERTION VENOUS ACCESS DEVICE (N/A ) Diagnosis:       Malignant neoplasm of central portion of right breast in female, estrogen receptor negative (HCC)      (Malignant neoplasm of central portion of right breast in female, estrogen receptor negative (CMS/HCC) [C50.111, Z17.1])    Surgeons: Mariposa Price MD Provider: Alexander Randall MD    Anesthesia Type: general ASA Status: 3          Anesthesia Type: general    Vitals  Vitals Value Taken Time   /66 10/15/21 1011   Temp 37.1 °C (98.7 °F) 10/15/21 0922   Pulse 67 10/15/21 1016   Resp 16 10/15/21 0940   SpO2 96 % 10/15/21 1016   Vitals shown include unvalidated device data.        Post Anesthesia Care and Evaluation    Patient location during evaluation: bedside  Patient participation: complete - patient participated  Level of consciousness: awake and alert  Pain management: adequate  Airway patency: patent  Anesthetic complications: No anesthetic complications    Cardiovascular status: acceptable  Respiratory status: acceptable  Hydration status: acceptable    Comments: /64   Pulse 68   Temp 37.1 °C (98.7 °F) (Oral)   Resp 16   Ht 180.3 cm (71\")   Wt 72 kg (158 lb 11.2 oz)   LMP 09/26/2021   SpO2 99%   BMI 22.13 kg/m²           "

## 2021-10-15 NOTE — H&P
General Surgery History and Physical      Summary:    Mrs. Sierra Mao is a 41 y.o. lady with metastatic breast cancer.  She presents today for port placement for chemotherapy.  All risks (including bleeding, infection, damage to surrounding structures including pneumothorax), benefits, and alternatives were explained the patient who agreed and wished to proceed.    Referring Provider: Mariposa Price MD    Chief Complaint:    Need for IV access    History of Present Illness:    Mrs. Sierra Mao is a 41 y.o. lady who was recently diagnosed with metastatic breast cancer.  She presents today for port placement.  No prior ports placed.    Past Medical History:   Past Medical History:   Diagnosis Date   • Anxiety    • Arthritis    • Breast cancer (HCC) 09/07/2021    Right breast invasive ductal carcinoma ER low positive, FL negative, KAT5uye negative, mercedes to lymph node (biopsy positive)   • Chronic fatigue    • Diabetes mellitus with stage 3 chronic kidney disease (HCC)    • Hyperlipidemia    • Leukocytosis    • Menorrhagia with regular cycle    • Seasonal allergies    • Type I diabetes mellitus (HCC)    • Vitamin D deficiency       Past Surgical History:    Past Surgical History:   Procedure Laterality Date   • APPENDECTOMY N/A 1995   • BREAST BIOPSY Right 2018   • BREAST BIOPSY Bilateral 09/07/2021   • US GUIDED LYMPH NODE BIOPSY  9/7/2021    Dr. Carol Terrazas, [facility]   • US GUIDED LYMPH NODE BIOPSY  9/28/2021     Family History:    Family History   Problem Relation Age of Onset   • Diabetes Mother    • Cervical cancer Maternal Grandmother         cervical/uterine    • Diabetes Maternal Grandfather    • Lymphoma Paternal Aunt 60   • Breast cancer Neg Hx    • Malig Hyperthermia Neg Hx      Social History:    Social History     Socioeconomic History   • Marital status:    Tobacco Use   • Smoking status: Former Smoker     Packs/day: 0.50     Years: 8.00     Pack years: 4.00     Types: Cigarettes     Start  date: 1995     Quit date: 2003     Years since quittin.8   • Smokeless tobacco: Never Used   • Tobacco comment: teen / young adult   Vaping Use   • Vaping Use: Never used   Substance and Sexual Activity   • Alcohol use: Yes     Alcohol/week: 1.0 standard drink     Types: 1 Standard drinks or equivalent per week     Comment: social   • Drug use: No   • Sexual activity: Defer     Partners: Male     Birth control/protection: None     Comment:      Allergies:   No Known Allergies    Medications:   No current facility-administered medications for this encounter.    Radiology/Endoscopy:    • Recent imaging reviewed    Labs:    • Labs from 10/14/2021 reviewed    Review of Systems:   Influenza-like illness: no fever, no  cough, no  sore throat, no  body aches, no loss of sense of taste or smell, no known exposure to person with Covid-19.  Constitutional: Negative for fevers or chills  HENT: Negative for hearing loss or runny nose  Eyes: Negative for vision changes or scleral icterus  Respiratory: Negative for cough or shortness of breath  Cardiovascular: Negative for chest pain or heart palpitations  Gastrointestinal: Negative for abdominal pain, nausea, vomiting, constipation, melena, or hematochezia  Genitourinary: Negative for hematuria or dysuria  Musculoskeletal: Negative for joint swelling or gait instability  Neurologic: Negative for tremors or seizures  Psychiatric: Negative for suicidal ideations or depression  All other systems reviewed and negative    Physical Exam:   • Constitutional: Well-developed well-nourished, no acute distress  • Eyes: Conjunctiva normal, sclera nonicteric  • ENMT: Hearing grossly normal, oral mucosa moist  • Neck: Supple, no palpable mass, trachea midline  • Respiratory: Clear to auscultation, normal inspiratory effort  • Cardiovascular: Regular rate, no peripheral edema, no jugular venous distention  • Gastrointestinal: Soft, nontender  • Skin:  Warm, dry, no rash  on visualized skin surfaces  • Musculoskeletal: Symmetric strength, normal gait  • Psychiatric: Alert and oriented ×3, normal affect       Mariposa Price M.D.  General and Endoscopic Surgery  [facility]    [address], [address]  [place], [place], [address]  P: [phone]  F: [phone]

## 2021-10-15 NOTE — OP NOTE
PREOPERATIVE DIAGNOSIS:  Metastatic breast cancer    POSTOPERATIVE DIAGNOSIS AND FINDINGS:  same    PROCEDURE:  Left internal jugular Powerport placement with fluoroscopic guidance    DATE OF PROCEDURE:   10/15/2021    SURGEON:  Mariposa Johnson MD    ASSISTANT:  none    ANESTHESIA:  MAC    EBL:  Minimal    SPECIMEN:  none    DESCRIPTION:  All risks, benefits, and alternatives were explained and informed consent was obtained. The patient was taken to the operating room, transferred onto the operating room table in the supine position, underwent monitored anesthesia, and was prepped and draped in the usual sterile manner.  Half percent marcaine with epinephrine local anesthesic was administered.  The left internal jugular vein was accessed with an 18-gauge needle under ultrasound guiddance, and a guidewire was inserted under fluoroscopic guidance into the superior vena cava.  A subcutaneous pocket was then created with electrocautery on the left chest wall. The port was placed in the pocket and secured in two points with 2-0 prolene. The tubing was then tunneled from the subcutaneous pocket to the location of the wire. The vein dilator and sheath were introduced, and the dilator and guidewire were removed.  The port tubing was inserted to the cavoatrial junction, and position of tip was confirmed with fluoroscopic guidance.  The port was connected to the tubing and the cap was secured. Venous blood was easily aspirated from the port, and the port was flushed with heparinized saline. There was good hemostasis noted. The skin was then closed with 3-0 Vicryl deep dermal and 4-0 Vicryl subcuticular sutures. Dermabond was placed over the wound. The patient tolerated the procedure well, and was transferred to the recovery area in stable condition. Recovery room CXR was ordered to confirm placement.    MARIPOSA JOHNSON M.D.  General and Endoscopic Surgery  [facility]    [address], [address]  [facility]  [place], [address]  P: [phone]  F: [phone]

## 2021-10-18 RX ORDER — HYDROCODONE BITARTRATE AND ACETAMINOPHEN 5; 325 MG/1; MG/1
1 TABLET ORAL EVERY 8 HOURS PRN
Qty: 20 TABLET | Refills: 0 | Status: CANCELLED | OUTPATIENT
Start: 2021-10-18

## 2021-10-19 LAB
CYTO UR: NORMAL
CYTO UR: NORMAL
LAB AP CASE REPORT: NORMAL
LAB AP CASE REPORT: NORMAL
LAB AP DIAGNOSIS COMMENT: NORMAL
LAB AP DIAGNOSIS COMMENT: NORMAL
LAB AP SPECIAL STAINS: NORMAL
LAB AP SPECIAL STAINS: NORMAL
LAB AP SYNOPTIC CHECKLIST: NORMAL
LAB AP SYNOPTIC CHECKLIST: NORMAL
Lab: NORMAL
Lab: NORMAL
PATH REPORT.ADDENDUM SPEC: NORMAL
PATH REPORT.ADDENDUM SPEC: NORMAL
PATH REPORT.FINAL DX SPEC: NORMAL
PATH REPORT.FINAL DX SPEC: NORMAL
PATH REPORT.GROSS SPEC: NORMAL
PATH REPORT.GROSS SPEC: NORMAL

## 2021-10-19 RX ORDER — HYDROCODONE BITARTRATE AND ACETAMINOPHEN 5; 325 MG/1; MG/1
1 TABLET ORAL EVERY 8 HOURS PRN
Qty: 90 TABLET | Refills: 0 | Status: SHIPPED | OUTPATIENT
Start: 2021-10-19 | End: 2021-11-19 | Stop reason: SDUPTHER

## 2021-10-21 ENCOUNTER — INFUSION (OUTPATIENT)
Dept: ONCOLOGY | Facility: HOSPITAL | Age: 41
End: 2021-10-21

## 2021-10-21 ENCOUNTER — PATIENT MESSAGE (OUTPATIENT)
Dept: ONCOLOGY | Facility: CLINIC | Age: 41
End: 2021-10-21

## 2021-10-21 ENCOUNTER — OFFICE VISIT (OUTPATIENT)
Dept: ONCOLOGY | Facility: CLINIC | Age: 41
End: 2021-10-21

## 2021-10-21 VITALS — DIASTOLIC BLOOD PRESSURE: 62 MMHG | HEART RATE: 73 BPM | SYSTOLIC BLOOD PRESSURE: 103 MMHG

## 2021-10-21 VITALS
DIASTOLIC BLOOD PRESSURE: 76 MMHG | RESPIRATION RATE: 18 BRPM | SYSTOLIC BLOOD PRESSURE: 117 MMHG | HEART RATE: 87 BPM | TEMPERATURE: 97.8 F | HEIGHT: 71 IN | WEIGHT: 157 LBS | OXYGEN SATURATION: 98 % | BODY MASS INDEX: 21.98 KG/M2

## 2021-10-21 DIAGNOSIS — C79.51 BONE METASTASIS: ICD-10-CM

## 2021-10-21 DIAGNOSIS — Z17.1 MALIGNANT NEOPLASM OF CENTRAL PORTION OF RIGHT BREAST IN FEMALE, ESTROGEN RECEPTOR NEGATIVE (HCC): Primary | ICD-10-CM

## 2021-10-21 DIAGNOSIS — C50.111 MALIGNANT NEOPLASM OF CENTRAL PORTION OF RIGHT BREAST IN FEMALE, ESTROGEN RECEPTOR NEGATIVE (HCC): Primary | ICD-10-CM

## 2021-10-21 DIAGNOSIS — Z17.1 MALIGNANT NEOPLASM OF CENTRAL PORTION OF RIGHT BREAST IN FEMALE, ESTROGEN RECEPTOR NEGATIVE (HCC): ICD-10-CM

## 2021-10-21 DIAGNOSIS — C50.111 MALIGNANT NEOPLASM OF CENTRAL PORTION OF RIGHT BREAST IN FEMALE, ESTROGEN RECEPTOR NEGATIVE (HCC): ICD-10-CM

## 2021-10-21 DIAGNOSIS — Z45.2 ENCOUNTER FOR FITTING AND ADJUSTMENT OF VASCULAR CATHETER: ICD-10-CM

## 2021-10-21 DIAGNOSIS — C79.51 BONE METASTASIS: Primary | ICD-10-CM

## 2021-10-21 LAB
ALBUMIN SERPL-MCNC: 4.1 G/DL (ref 3.5–5.2)
ALBUMIN/GLOB SERPL: 1.6 G/DL
ALP SERPL-CCNC: 43 U/L (ref 39–117)
ALT SERPL W P-5'-P-CCNC: 13 U/L (ref 1–33)
ANION GAP SERPL CALCULATED.3IONS-SCNC: 8.6 MMOL/L (ref 5–15)
AST SERPL-CCNC: 18 U/L (ref 1–32)
BASOPHILS # BLD AUTO: 0.02 10*3/MM3 (ref 0–0.2)
BASOPHILS NFR BLD AUTO: 0.3 % (ref 0–1.5)
BILIRUB SERPL-MCNC: <0.2 MG/DL (ref 0–1.2)
BUN SERPL-MCNC: 12 MG/DL (ref 6–20)
BUN/CREAT SERPL: 21.4 (ref 7–25)
CALCIUM SPEC-SCNC: 9.2 MG/DL (ref 8.6–10.5)
CHLORIDE SERPL-SCNC: 103 MMOL/L (ref 98–107)
CO2 SERPL-SCNC: 26.4 MMOL/L (ref 22–29)
CREAT SERPL-MCNC: 0.56 MG/DL (ref 0.57–1)
DEPRECATED RDW RBC AUTO: 44 FL (ref 37–54)
EOSINOPHIL # BLD AUTO: 0.24 10*3/MM3 (ref 0–0.4)
EOSINOPHIL NFR BLD AUTO: 3.7 % (ref 0.3–6.2)
ERYTHROCYTE [DISTWIDTH] IN BLOOD BY AUTOMATED COUNT: 13.5 % (ref 12.3–15.4)
GFR SERPL CREATININE-BSD FRML MDRD: 120 ML/MIN/1.73
GLOBULIN UR ELPH-MCNC: 2.5 GM/DL
GLUCOSE SERPL-MCNC: 291 MG/DL (ref 65–99)
HCT VFR BLD AUTO: 33.8 % (ref 34–46.6)
HGB BLD-MCNC: 11.3 G/DL (ref 12–15.9)
IMM GRANULOCYTES # BLD AUTO: 0.05 10*3/MM3 (ref 0–0.05)
IMM GRANULOCYTES NFR BLD AUTO: 0.8 % (ref 0–0.5)
LYMPHOCYTES # BLD AUTO: 1.81 10*3/MM3 (ref 0.7–3.1)
LYMPHOCYTES NFR BLD AUTO: 27.6 % (ref 19.6–45.3)
MAGNESIUM SERPL-MCNC: 2 MG/DL (ref 1.6–2.6)
MCH RBC QN AUTO: 30.1 PG (ref 26.6–33)
MCHC RBC AUTO-ENTMCNC: 33.4 G/DL (ref 31.5–35.7)
MCV RBC AUTO: 89.9 FL (ref 79–97)
MONOCYTES # BLD AUTO: 0.53 10*3/MM3 (ref 0.1–0.9)
MONOCYTES NFR BLD AUTO: 8.1 % (ref 5–12)
NEUTROPHILS NFR BLD AUTO: 3.91 10*3/MM3 (ref 1.7–7)
NEUTROPHILS NFR BLD AUTO: 59.5 % (ref 42.7–76)
NRBC BLD AUTO-RTO: 0 /100 WBC (ref 0–0.2)
PHOSPHATE SERPL-MCNC: 3.6 MG/DL (ref 2.5–4.5)
PLATELET # BLD AUTO: 204 10*3/MM3 (ref 140–450)
PMV BLD AUTO: 9.7 FL (ref 6–12)
POTASSIUM SERPL-SCNC: 4.1 MMOL/L (ref 3.5–5.2)
PROT SERPL-MCNC: 6.6 G/DL (ref 6–8.5)
RBC # BLD AUTO: 3.76 10*6/MM3 (ref 3.77–5.28)
SODIUM SERPL-SCNC: 138 MMOL/L (ref 136–145)
WBC # BLD AUTO: 6.56 10*3/MM3 (ref 3.4–10.8)

## 2021-10-21 PROCEDURE — 80053 COMPREHEN METABOLIC PANEL: CPT | Performed by: INTERNAL MEDICINE

## 2021-10-21 PROCEDURE — 25010000002 PALONOSETRON PER 25 MCG: Performed by: INTERNAL MEDICINE

## 2021-10-21 PROCEDURE — 25010000002 PACLITAXEL PER 1 MG: Performed by: INTERNAL MEDICINE

## 2021-10-21 PROCEDURE — 84100 ASSAY OF PHOSPHORUS: CPT | Performed by: INTERNAL MEDICINE

## 2021-10-21 PROCEDURE — 25010000002 DIPHENHYDRAMINE PER 50 MG: Performed by: INTERNAL MEDICINE

## 2021-10-21 PROCEDURE — 96375 TX/PRO/DX INJ NEW DRUG ADDON: CPT

## 2021-10-21 PROCEDURE — 83735 ASSAY OF MAGNESIUM: CPT | Performed by: INTERNAL MEDICINE

## 2021-10-21 PROCEDURE — 25010000002 DEXAMETHASONE PER 1 MG: Performed by: INTERNAL MEDICINE

## 2021-10-21 PROCEDURE — 96413 CHEMO IV INFUSION 1 HR: CPT

## 2021-10-21 PROCEDURE — 25010000002 CARBOPLATIN PER 50 MG: Performed by: INTERNAL MEDICINE

## 2021-10-21 PROCEDURE — 96417 CHEMO IV INFUS EACH ADDL SEQ: CPT

## 2021-10-21 PROCEDURE — 99215 OFFICE O/P EST HI 40 MIN: CPT | Performed by: INTERNAL MEDICINE

## 2021-10-21 PROCEDURE — 25010000002 HEPARIN LOCK FLUSH PER 10 UNITS: Performed by: INTERNAL MEDICINE

## 2021-10-21 PROCEDURE — 85025 COMPLETE CBC W/AUTO DIFF WBC: CPT | Performed by: INTERNAL MEDICINE

## 2021-10-21 RX ORDER — HEPARIN SODIUM (PORCINE) LOCK FLUSH IV SOLN 100 UNIT/ML 100 UNIT/ML
500 SOLUTION INTRAVENOUS AS NEEDED
Status: CANCELLED | OUTPATIENT
Start: 2021-10-21

## 2021-10-21 RX ORDER — SODIUM CHLORIDE 9 MG/ML
250 INJECTION, SOLUTION INTRAVENOUS ONCE
Status: COMPLETED | OUTPATIENT
Start: 2021-10-21 | End: 2021-10-21

## 2021-10-21 RX ORDER — HEPARIN SODIUM (PORCINE) LOCK FLUSH IV SOLN 100 UNIT/ML 100 UNIT/ML
500 SOLUTION INTRAVENOUS AS NEEDED
Status: DISCONTINUED | OUTPATIENT
Start: 2021-10-21 | End: 2021-10-21 | Stop reason: HOSPADM

## 2021-10-21 RX ORDER — FAMOTIDINE 10 MG/ML
20 INJECTION, SOLUTION INTRAVENOUS ONCE
Status: COMPLETED | OUTPATIENT
Start: 2021-10-21 | End: 2021-10-21

## 2021-10-21 RX ORDER — SODIUM CHLORIDE 0.9 % (FLUSH) 0.9 %
10 SYRINGE (ML) INJECTION AS NEEDED
Status: CANCELLED | OUTPATIENT
Start: 2021-10-21

## 2021-10-21 RX ORDER — SODIUM CHLORIDE 0.9 % (FLUSH) 0.9 %
10 SYRINGE (ML) INJECTION AS NEEDED
Status: DISCONTINUED | OUTPATIENT
Start: 2021-10-21 | End: 2021-10-21 | Stop reason: HOSPADM

## 2021-10-21 RX ORDER — INSULIN GLARGINE 100 [IU]/ML
INJECTION, SOLUTION SUBCUTANEOUS
Status: ON HOLD | COMMUNITY
Start: 2021-10-06 | End: 2022-10-22

## 2021-10-21 RX ORDER — PALONOSETRON 0.05 MG/ML
0.25 INJECTION, SOLUTION INTRAVENOUS ONCE
Status: COMPLETED | OUTPATIENT
Start: 2021-10-21 | End: 2021-10-21

## 2021-10-21 RX ADMIN — PACLITAXEL 150 MG: 6 INJECTION, SOLUTION INTRAVENOUS at 10:56

## 2021-10-21 RX ADMIN — DEXAMETHASONE SODIUM PHOSPHATE 8 MG: 10 INJECTION INTRAMUSCULAR; INTRAVENOUS at 09:49

## 2021-10-21 RX ADMIN — PALONOSETRON 0.25 MG: 0.05 INJECTION, SOLUTION INTRAVENOUS at 09:46

## 2021-10-21 RX ADMIN — Medication 500 UNITS: at 12:29

## 2021-10-21 RX ADMIN — SODIUM CHLORIDE, PRESERVATIVE FREE 10 ML: 5 INJECTION INTRAVENOUS at 12:29

## 2021-10-21 RX ADMIN — DIPHENHYDRAMINE HYDROCHLORIDE 25 MG: 50 INJECTION INTRAMUSCULAR; INTRAVENOUS at 10:06

## 2021-10-21 RX ADMIN — FAMOTIDINE 20 MG: 10 INJECTION INTRAVENOUS at 09:44

## 2021-10-21 RX ADMIN — CARBOPLATIN 230 MG: 10 INJECTION INTRAVENOUS at 11:56

## 2021-10-21 RX ADMIN — SODIUM CHLORIDE 250 ML: 9 INJECTION, SOLUTION INTRAVENOUS at 09:44

## 2021-10-21 NOTE — TELEPHONE ENCOUNTER
Angela message from pt forwarded to me-she wanted to let us know that her Hydrocodone needs a PA.    I have submitted the PA to [facility] through covermymeds.    Waiting for a decision.

## 2021-10-21 NOTE — PROGRESS NOTES
Subjective   Sierra Mao is a 41 y.o. female. Referred by Dr. Price for right breast invasive ductal carcinoma triple negative, metastatic    Treatment  1.Taxol and carboplatin weekly started 10/13/2021    History of Present Illness     Ms. Mao is a 41-year-old Premenopausal  lady who palpated a mass in her right breast. She had a mass in a similar area in 2018 which was biopsied and benign. Further evaluation was performed.    08/18/2021-bilateral diagnostic mammogram-parenchyma is heterogeneously dense. In the palpable area of concern in the upper outer quadrant of the right breast there is an area of asymmetry with pleomorphic microcalcifications in the posterior one third of the upper outer quadrant of the right breast in the axillary region. There is also microcalcifications in the middle one third and anterior one third of the left breast at 12 o'clock position which are new. Architectural distortion in either breast.    Ultrasound-  Right breast at the site of palpable concern, at 11 o'clock, 1 cm from the nipple there is an irregular hypoechoic mass which measures up to 4 cm.  10 o'clock, 6 cm from the nipple there is an irregular mass which measures up to 1.6 cm  At 10 o'clock, 8 cm from the nipple there is a irregular mass which measures 1.8 cm.  Multiple right axillary lymph nodes demonstrate thickened cortices and rounded morphology largest of which measures 2 cm.  Left breast at 2 o'clock, 6 cm from the nipple there is a 1 cm heterogeneous hypoechoic masslike region.    09/07/2021-  1.right breast 11 o'clock, 1 cm from the nipple-invasive ductal carcinoma, poorly differentiated, grade 3, ER low +1 to 10%, weak, GA negative less than 1%, HER-2 negative.  2.right axillary lymph node-metastatic mammary carcinoma measuring 9 mm. ER negative, GA +5% three, HER-2 negative  3.left breast 2 o'clock, 6 cm from the nipple-cluster of apocrine cyst  4.left breast 12 o'clock-apocrine cyst with  polarizable calcium oxalate crystals.    Bilateral breast MRI 09/23/2021-biopsy-proven malignancy of the right breast occupying the middle and the posterior one thirds from 11 o'clock to 1 o'clock position and measuring up to 7.1 cm in greatest dimension. The lesion abuts the pectoralis fascia but there is no evidence of direct invasion. There are portions of the lesion that extended to the subpatellar soft tissues but there is no abnormal enhancement of the skin or skin thickening.  Bulky right axillary lymphadenopathy noted.  2.4 cm irregular mass in the lower outer quadrant of the right breast centered at 8 o'clock position which is separate from the known malignancy suspicious for additional malignancy.  Ultrasound and biopsy of this lesion recommended.  No findings suspicious for malignancy in the left breast.    09/23/2021-CT of the chest abdomen and pelvis and soft tissue neck-multiple mild to moderately enlarged subpectoral and right axillary lymph nodes consistent with metastatic disease. The subpectoral lymphadenopathy extends into the right supraclavicular region as well. There is no evidence of metastatic disease elsewhere within the neck. No evidence of metastatic disease in the chest abdomen or pelvis. 5 cm right ovarian cyst is noted. Suggest follow-up ultrasound in 6 weeks for further evaluation.    09/24/2021-bone scan without any evidence of metastatic disease.    9/28/2021-right cervical lymph node biopsy-pathology consistent with metastatic mammary carcinoma.    10/1/2021-PET/CT-there are 2 separate hypermetabolic lesions within the right breast.  1 measuring 6 cm and the other measuring 1.3 cm.  The SUV of 6 cm lesion of 5.6 and small lesion of 4.8.  Hypermetabolic right axillary, infra pectoral, right supraclavicular lymphadenopathy noted.  In addition there are hypermetabolic nodes at the right thoracic inlet, right base of the neck and right posterior cervical triangle.  1.6 x 0.8 cm right  posterior cervical triangle node with an SUV of 3.9.  Right axillary lymphadenopathy with lymphedema 6.4.  Hypermetabolic left hilar lymphadenopathy measuring 2 x 1.1 cm with an SUV of 5.8.  No hypermetabolic activity in the liver or evidence of metastatic disease in the abdomen or pelvis.  Hypermetabolic lesion in the right femoral neck measuring 1.5 cm.  SUV 5.4.    Mr. Mao reports that she initially felt good right breast mass in May 2021.  Starting July 2021 she had palpated something abnormal in her right neck.  Since noticing the mass in May 2021 she feels like the mass in the right breast has doubled in size.  She also feels like the right axillary lymph nodes have enlarged in size.    She had a 70 pound weight loss in 2019 due to poorly controlled diabetes.  She was initially diagnosed with a type 2 diabetes and subsequently determined to be type I and switched to insulin.  She had an admission to the hospital due to DKA.    She is unvaccinated for COVID-19.  She previously worked as a  however currently not working.    Family history significant for ovarian cancer in grandmother and bladder cancer in her father.      Interval history  Sierra presents to the clinic today for follow-up.  She has tolerated the first cycle of Taxol and carboplatin fairly well.  Experienced nausea on day 3 for and 5 and had to take Zofran every 8 hours to help with the nausea.  No tingling or numbness.  She has not started taking the gabapentin and concerned about that making her extremely tired and drowsy.  She has been using Norco to help with the pain in the right breast.  She wants to know if she can use the cooling cap to prevent hair loss.  She also has questions about the cooling gloves and socks to help prevent neuropathy.  She has been relatively anxious and has been trying to adjust to this new diagnosis.  She met with Tenisha Rizzo at her last visit and feels like that has helped her a lot.  She  has another appointment with Tenisha.  She was started on gabapentin 300mg 3 times daily by Tenisha to help with anxiety.      The following portions of the patient's history were reviewed and updated as appropriate: allergies, current medications, past family history, past medical history, past social history, past surgical history and problem list.    Past Medical History:   Diagnosis Date   • Anxiety    • Arthritis    • Breast cancer (HCC) 2021    Right breast invasive ductal carcinoma ER low positive, AL negative, [id number] negative, mercedes to lymph node (biopsy positive)   • Chronic fatigue    • Diabetes mellitus with stage 3 chronic kidney disease (HCC)    • Hyperlipidemia    • Leukocytosis    • Menorrhagia with regular cycle    • Seasonal allergies    • Type I diabetes mellitus (HCC)    • Vitamin D deficiency         Past Surgical History:   Procedure Laterality Date   • APPENDECTOMY N/A    • BREAST BIOPSY Right 2018   • BREAST BIOPSY Bilateral 2021   • US GUIDED LYMPH NODE BIOPSY  2021    Dr. Carol Terrazas, [facility]   • US GUIDED LYMPH NODE BIOPSY  2021   • VENOUS ACCESS DEVICE (PORT) INSERTION N/A 10/15/2021    Procedure: INSERTION VENOUS ACCESS DEVICE;  Surgeon: Mariposa Price MD;  Location: [facility];  Service: General;  Laterality: N/A;        Family History   Problem Relation Age of Onset   • Diabetes Mother    • Cervical cancer Maternal Grandmother         cervical/uterine    • Diabetes Maternal Grandfather    • Lymphoma Paternal Aunt 60   • Breast cancer Neg Hx    • Malig Hyperthermia Neg Hx         Social History     Socioeconomic History   • Marital status:    Tobacco Use   • Smoking status: Former Smoker     Packs/day: 0.50     Years: 8.00     Pack years: 4.00     Types: Cigarettes     Start date: 1995     Quit date: 2003     Years since quittin.8   • Smokeless tobacco: Never Used   • Tobacco comment: teen / young adult   Vaping Use   • Vaping Use: Never  "used   Substance and Sexual Activity   • Alcohol use: Yes     Alcohol/week: 1.0 standard drink     Types: 1 Standard drinks or equivalent per week     Comment: social   • Drug use: No   • Sexual activity: Defer     Partners: Male     Birth control/protection: None     Comment:         OB History        1    Para   1    Term   1            AB        Living           SAB        IAB        Ectopic        Molar        Multiple        Live Births                 Age of menarche-14  Age at first live childbirth-24   one para one  zero  Oral contraceptive pill use for 20 years  She is premenopausal    No Known Allergies       Review of systems as mentioned in the HPI    Objective   Blood pressure 117/76, pulse 87, temperature 97.8 °F (36.6 °C), temperature source Temporal, resp. rate 18, height 180 cm (70.87\"), weight 71.2 kg (157 lb), last menstrual period 2021, SpO2 98 %, not currently breastfeeding.   Physical Exam  Vitals reviewed.   Constitutional:       Appearance: Normal appearance. She is normal weight.   HENT:      Head: Normocephalic and atraumatic.      Right Ear: External ear normal.      Left Ear: External ear normal.      Nose: Nose normal.      Mouth/Throat:      Mouth: Mucous membranes are moist.      Pharynx: Oropharynx is clear.   Eyes:      Extraocular Movements: Extraocular movements intact.      Conjunctiva/sclera: Conjunctivae normal.      Pupils: Pupils are equal, round, and reactive to light.   Cardiovascular:      Rate and Rhythm: Normal rate and regular rhythm.      Pulses: Normal pulses.      Heart sounds: Normal heart sounds.   Pulmonary:      Effort: Pulmonary effort is normal.      Breath sounds: Normal breath sounds.   Abdominal:      General: Abdomen is flat. Bowel sounds are normal.   Musculoskeletal:         General: Normal range of motion.      Cervical back: Normal range of motion.   Skin:     General: Skin is warm and dry.   Neurological:      " General: No focal deficit present.      Mental Status: She is alert and oriented to person, place, and time. Mental status is at baseline.   Psychiatric:         Mood and Affect: Mood normal.         Behavior: Behavior normal.         Thought Content: Thought content normal.         Judgment: Judgment normal.       Breast Exam: Right breast-On inspection there is a fullness in the upper outer quadrant.  On palpation there is a 8x 7 cm mass in the upper outer quadrant.  There is bulky right axillary lymphadenopathy.  There is also palpable supraclavicular lymphadenopathy.  Left breast appears normal on inspection.  No palpable abnormalities of the breast or the axilla.  There are firm palpable nodular lesions on the right side of the neck anterior to the sternocleidomastoid.  These are maybe 1 cm  Most consistent with LAD.     I have reexamined the patient and the results are consistent with the previously documented exam. Didi De La O MD     Infusion on 10/21/2021   Component Date Value Ref Range Status   • WBC 10/21/2021 6.56  3.40 - 10.80 10*3/mm3 Final   • RBC 10/21/2021 3.76* 3.77 - 5.28 10*6/mm3 Final   • Hemoglobin 10/21/2021 11.3* 12.0 - 15.9 g/dL Final   • Hematocrit 10/21/2021 33.8* 34.0 - 46.6 % Final   • MCV 10/21/2021 89.9  79.0 - 97.0 fL Final   • MCH 10/21/2021 30.1  26.6 - 33.0 pg Final   • MCHC 10/21/2021 33.4  31.5 - 35.7 g/dL Final   • RDW 10/21/2021 13.5  12.3 - 15.4 % Final   • RDW-SD 10/21/2021 44.0  37.0 - 54.0 fl Final   • MPV 10/21/2021 9.7  6.0 - 12.0 fL Final   • Platelets 10/21/2021 204  140 - 450 10*3/mm3 Final   • Neutrophil % 10/21/2021 59.5  42.7 - 76.0 % Final   • Lymphocyte % 10/21/2021 27.6  19.6 - 45.3 % Final   • Monocyte % 10/21/2021 8.1  5.0 - 12.0 % Final   • Eosinophil % 10/21/2021 3.7  0.3 - 6.2 % Final   • Basophil % 10/21/2021 0.3  0.0 - 1.5 % Final   • Immature Grans % 10/21/2021 0.8* 0.0 - 0.5 % Final   • Neutrophils, Absolute 10/21/2021 3.91  1.70 - 7.00 10*3/mm3  Final   • Lymphocytes, Absolute 10/21/2021 1.81  0.70 - 3.10 10*3/mm3 Final   • Monocytes, Absolute 10/21/2021 0.53  0.10 - 0.90 10*3/mm3 Final   • Eosinophils, Absolute 10/21/2021 0.24  0.00 - 0.40 10*3/mm3 Final   • Basophils, Absolute 10/21/2021 0.02  0.00 - 0.20 10*3/mm3 Final   • Immature Grans, Absolute 10/21/2021 0.05  0.00 - 0.05 10*3/mm3 Final   • nRBC 10/21/2021 0.0  0.0 - 0.2 /100 WBC Final   Admission on 10/15/2021, Discharged on 10/15/2021   Component Date Value Ref Range Status   • Glucose 10/15/2021 167* 70 - 130 mg/dL Final    Meter: [id number] : [id number] Mayur Alonso EUNICE   • HCG, Urine, QL 10/15/2021 Negative  Negative Final   • Lot Number 10/15/2021 [id number]   Final   • Internal Positive Control 10/15/2021 Passed  Positive, Passed Final   • Internal Negative Control 10/15/2021 Passed  Negative, Passed Final   • Expiration Date 10/15/2021 04/30/2022   Final   Transcribe Orders on 10/14/2021   Component Date Value Ref Range Status   • COVID19 10/14/2021 Not Detected  Not Detected - Ref. Range Final   Infusion on 10/13/2021   Component Date Value Ref Range Status   • Glucose 10/13/2021 360* 74 - 124 mg/dL Final   • BUN 10/13/2021 14  6 - 20 mg/dL Final   • Creatinine 10/13/2021 0.57* 0.60 - 1.10 mg/dL Final   • Sodium 10/13/2021 134  134 - 145 mmol/L Final   • Potassium 10/13/2021 4.2  3.5 - 4.7 mmol/L Final   • Chloride 10/13/2021 99  98 - 107 mmol/L Final   • CO2 10/13/2021 26.2  22.0 - 29.0 mmol/L Final   • Calcium 10/13/2021 9.5  8.5 - 10.2 mg/dL Final   • Total Protein 10/13/2021 6.5  6.3 - 8.0 g/dL Final   • Albumin 10/13/2021 4.20  3.50 - 5.20 g/dL Final   • ALT (SGPT) 10/13/2021 13  0 - 33 U/L Final   • AST (SGOT) 10/13/2021 16  0 - 32 U/L Final   • Alkaline Phosphatase 10/13/2021 42  38 - 116 U/L Final   • Total Bilirubin 10/13/2021 0.2  0.2 - 1.2 mg/dL Final   • eGFR Non  Amer 10/13/2021 117  >60 mL/min/1.73 Final   • Globulin 10/13/2021 2.3  1.8 - 3.5 gm/dL Final   •  A/G Ratio 10/13/2021 1.8  1.1 - 2.4 g/dL Final   • BUN/Creatinine Ratio 10/13/2021 24.6  7.3 - 30.0 Final   • Anion Gap 10/13/2021 8.8  5.0 - 15.0 mmol/L Final   • HCG Quantitative 10/13/2021 <0.50  mIU/mL Final   • WBC 10/13/2021 6.37  3.40 - 10.80 10*3/mm3 Final   • RBC 10/13/2021 4.30  3.77 - 5.28 10*6/mm3 Final   • Hemoglobin 10/13/2021 12.8  12.0 - 15.9 g/dL Final   • Hematocrit 10/13/2021 38.2  34.0 - 46.6 % Final   • MCV 10/13/2021 88.8  79.0 - 97.0 fL Final   • MCH 10/13/2021 29.8  26.6 - 33.0 pg Final   • MCHC 10/13/2021 33.5  31.5 - 35.7 g/dL Final   • RDW 10/13/2021 13.5  12.3 - 15.4 % Final   • RDW-SD 10/13/2021 44.1  37.0 - 54.0 fl Final   • MPV 10/13/2021 9.6  6.0 - 12.0 fL Final   • Platelets 10/13/2021 219  140 - 450 10*3/mm3 Final   • Neutrophil % 10/13/2021 53.2  42.7 - 76.0 % Final   • Lymphocyte % 10/13/2021 30.6  19.6 - 45.3 % Final   • Monocyte % 10/13/2021 10.2  5.0 - 12.0 % Final   • Eosinophil % 10/13/2021 5.3  0.3 - 6.2 % Final   • Basophil % 10/13/2021 0.5  0.0 - 1.5 % Final   • Immature Grans % 10/13/2021 0.2  0.0 - 0.5 % Final   • Neutrophils, Absolute 10/13/2021 3.39  1.70 - 7.00 10*3/mm3 Final   • Lymphocytes, Absolute 10/13/2021 1.95  0.70 - 3.10 10*3/mm3 Final   • Monocytes, Absolute 10/13/2021 0.65  0.10 - 0.90 10*3/mm3 Final   • Eosinophils, Absolute 10/13/2021 0.34  0.00 - 0.40 10*3/mm3 Final   • Basophils, Absolute 10/13/2021 0.03  0.00 - 0.20 10*3/mm3 Final   • Immature Grans, Absolute 10/13/2021 0.01  0.00 - 0.05 10*3/mm3 Final   • nRBC 10/13/2021 0.0  0.0 - 0.2 /100 WBC Final   Hospital Outpatient Visit on 10/01/2021   Component Date Value Ref Range Status   • Glucose 10/01/2021 181* 70 - 130 mg/dL Final    Meter: [id number] : [id number] Radhames Sarah RT   Pre-Admission Testing on 10/01/2021   Component Date Value Ref Range Status   • QT Interval 10/01/2021 407  ms Final   • WBC 10/01/2021 7.90  3.40 - 10.80 10*3/mm3 Final   • RBC 10/01/2021 4.09  3.77 - 5.28 10*6/mm3 Final    • Hemoglobin 10/01/2021 12.5  12.0 - 15.9 g/dL Final   • Hematocrit 10/01/2021 38.0  34.0 - 46.6 % Final   • MCV 10/01/2021 92.9  79.0 - 97.0 fL Final   • MCH 10/01/2021 30.6  26.6 - 33.0 pg Final   • MCHC 10/01/2021 32.9  31.5 - 35.7 g/dL Final   • RDW 10/01/2021 12.9  12.3 - 15.4 % Final   • RDW-SD 10/01/2021 44.0  37.0 - 54.0 fl Final   • MPV 10/01/2021 9.6  6.0 - 12.0 fL Final   • Platelets 10/01/2021 241  140 - 450 10*3/mm3 Final   • Glucose 10/01/2021 291* 65 - 99 mg/dL Final   • BUN 10/01/2021 10  6 - 20 mg/dL Final   • Creatinine 10/01/2021 0.58  0.57 - 1.00 mg/dL Final   • Sodium 10/01/2021 135* 136 - 145 mmol/L Final   • Potassium 10/01/2021 4.2  3.5 - 5.2 mmol/L Final   • Chloride 10/01/2021 100  98 - 107 mmol/L Final   • CO2 10/01/2021 24.4  22.0 - 29.0 mmol/L Final   • Calcium 10/01/2021 8.8  8.6 - 10.5 mg/dL Final   • eGFR Non African Amer 10/01/2021 115  >60 mL/min/1.73 Final   • BUN/Creatinine Ratio 10/01/2021 17.2  7.0 - 25.0 Final   • Anion Gap 10/01/2021 10.6  5.0 - 15.0 mmol/L Final   • HCG Qualitative 10/01/2021 Negative  Negative Final   • Thyroid Peroxidase Antibody 10/01/2021 10  0 - 34 IU/mL Final   • Vitamin B-12 10/01/2021 426  211 - 946 pg/mL Final   • COVID19 10/01/2021 Not Detected  Not Detected - Ref. Range Final   • Microalbumin, Urine 10/01/2021 <1.2  mg/dL Final   Hospital Outpatient Visit on 09/29/2021   Component Date Value Ref Range Status   • Sinus 09/29/2021 3.5  cm Final   • STJ 09/29/2021 2.6  cm Final   • Ascending aorta 09/29/2021 3.4  cm Final   • LA Volume Index 09/29/2021 12.0  mL/m2 Final   • Aortic arch 09/29/2021 2.7  cm Final   • Abdo Ao Diam 09/29/2021 1.7  cm Final   • 3D vol index 09/29/2021 13.0   Final   • EF_3D-VOL 09/29/2021 57  % Final   • BSA 09/29/2021 1.9  m^2 Final   • IVSd 09/29/2021 0.86  cm Final   • LVIDd 09/29/2021 4.1  cm Final   • LVIDs 09/29/2021 2.6  cm Final   • LVPWd 09/29/2021 0.96  cm Final   • IVS/LVPW 09/29/2021 0.9   Final   • FS  09/29/2021 37.0  % Final   • EDV(Teich) 09/29/2021 75.9  ml Final   • ESV(Teich) 09/29/2021 24.8  ml Final   • EF(Teich) 09/29/2021 67.3  % Final   • EF(cubed) 09/29/2021 75.0  % Final   • LV mass(C)d 09/29/2021 118.0  grams Final   • LV mass(C)dI 09/29/2021 62.0  grams/m^2 Final   • SV(Teich) 09/29/2021 51.1  ml Final   • SI(Teich) 09/29/2021 26.9  ml/m^2 Final   • SV(cubed) 09/29/2021 53.2  ml Final   • SI(cubed) 09/29/2021 28.0  ml/m^2 Final   • Ao root diam 09/29/2021 3.2  cm Final   • Ao root area 09/29/2021 8.2  cm^2 Final   • ACS 09/29/2021 2.1  cm Final   • asc Aorta Diam 09/29/2021 3.4  cm Final   • LVOT diam 09/29/2021 1.9  cm Final   • LVOT area 09/29/2021 3.0  cm^2 Final   • LVOT area(traced) 09/29/2021 2.8  cm^2 Final   • RVOT diam 09/29/2021 1.8  cm Final   • RVOT area 09/29/2021 2.6  cm^2 Final   • LVLd ap4 09/29/2021 7.9  cm Final   • EDV(MOD-sp4) 09/29/2021 101.0  ml Final   • LVLs ap4 09/29/2021 6.6  cm Final   • ESV(MOD-sp4) 09/29/2021 41.0  ml Final   • EF(MOD-sp4) 09/29/2021 59.4  % Final   • LVLd ap2 09/29/2021 8.0  cm Final   • EDV(MOD-sp2) 09/29/2021 101.0  ml Final   • LVLs ap2 09/29/2021 6.2  cm Final   • ESV(MOD-sp2) 09/29/2021 43.0  ml Final   • EF(MOD-sp2) 09/29/2021 57.4  % Final   • SV(MOD-sp4) 09/29/2021 60.0  ml Final   • SI(MOD-sp4) 09/29/2021 31.5  ml/m^2 Final   • SV(MOD-sp2) 09/29/2021 58.0  ml Final   • SI(MOD-sp2) 09/29/2021 30.5  ml/m^2 Final   • Ao root area (BSA corrected) 09/29/2021 1.7   Final   • LV Corey Vol (BSA corrected) 09/29/2021 53.1  ml/m^2 Final   • LV Sys Vol (BSA corrected) 09/29/2021 21.5  ml/m^2 Final   • TAPSE (>1.6) 09/29/2021 2.7  cm Final   • EF(MOD-bp) 09/29/2021 57.6  % Final   • MV A dur 09/29/2021 0.09  sec Final   • MV E max lit 09/29/2021 70.3  cm/sec Final   • MV A max lit 09/29/2021 58.3  cm/sec Final   • MV E/A 09/29/2021 1.2   Final   • MV V2 max 09/29/2021 84.7  cm/sec Final   • MV max PG 09/29/2021 2.9  mmHg Final   • MV V2 mean 09/29/2021 47.2   cm/sec Final   • MV mean PG 09/29/2021 1.1  mmHg Final   • MV V2 VTI 09/29/2021 20.2  cm Final   • MVA(VTI) 09/29/2021 2.6  cm^2 Final   • MV P1/2t max lit 09/29/2021 85.6  cm/sec Final   • MV P1/2t 09/29/2021 54.5  msec Final   • MVA(P1/2t) 09/29/2021 4.0  cm^2 Final   • MV dec slope 09/29/2021 460.1  cm/sec^2 Final   • MV dec time 09/29/2021 0.2  sec Final   • Ao pk lit 09/29/2021 118.3  cm/sec Final   • Ao max PG 09/29/2021 5.6  mmHg Final   • Ao max PG (full) 09/29/2021 1.6  mmHg Final   • Ao V2 mean 09/29/2021 84.9  cm/sec Final   • Ao mean PG 09/29/2021 3.3  mmHg Final   • Ao mean PG (full) 09/29/2021 0.83  mmHg Final   • Ao V2 VTI 09/29/2021 21.3  cm Final   • COREY(I,A) 09/29/2021 2.5  cm^2 Final   • COREY(I,D) 09/29/2021 2.5  cm^2 Final   • COREY(V,A) 09/29/2021 2.5  cm^2 Final   • COREY(V,D) 09/29/2021 2.5  cm^2 Final   • LV V1 max PG 09/29/2021 4.0  mmHg Final   • LV V1 mean PG 09/29/2021 2.4  mmHg Final   • LV V1 max 09/29/2021 99.4  cm/sec Final   • LV V1 mean 09/29/2021 73.7  cm/sec Final   • LV V1 VTI 09/29/2021 17.7  cm Final   • MR max lit 09/29/2021 436.8  cm/sec Final   • MR max PG 09/29/2021 76.3  mmHg Final   • SV(Ao) 09/29/2021 174.1  ml Final   • SI(Ao) 09/29/2021 91.5  ml/m^2 Final   • SV(LVOT) 09/29/2021 52.8  ml Final   • SV(RVOT) 09/29/2021 32.3  ml Final   • SI(LVOT) 09/29/2021 27.7  ml/m^2 Final   • PA V2 max 09/29/2021 92.3  cm/sec Final   • PA max PG 09/29/2021 3.4  mmHg Final   • PA max PG (full) 09/29/2021 1.4  mmHg Final   • BH CV ECHO HOMERO - PVA(V,A) 09/29/2021 2.0  cm^2 Final   • BH CV ECHO HOMERO - PVA(V,D) 09/29/2021 2.0  cm^2 Final   • PA acc time 09/29/2021 0.11  sec Final   • RV V1 max PG 09/29/2021 2.0  mmHg Final   • RV V1 mean PG 09/29/2021 1.3  mmHg Final   • RV V1 max 09/29/2021 70.7  cm/sec Final   • RV V1 mean 09/29/2021 54.1  cm/sec Final   • RV V1 VTI 09/29/2021 12.6  cm Final   • TR max lit 09/29/2021 170.1  cm/sec Final   • PA pr(Accel) 09/29/2021 27.9  mmHg Final   • Pulm  Sys Juan Alberto 09/29/2021 56.1  cm/sec Final   • Pulm Corey Juan Alberto 09/29/2021 53.6  cm/sec Final   • Pulm S/D 09/29/2021 1.0   Final   • Qp/Qs 09/29/2021 0.61   Final   • Pulm A Revs Dur 09/29/2021 0.11  sec Final   • Pulm A Revs Juan Alberto 09/29/2021 42.8  cm/sec Final   • MVA P1/2T LCG 09/29/2021 2.6  cm^2 Final   • RV Base 09/29/2021 3.2  cm Final   • RV Length 09/29/2021 6.8  cm Final   • RV Mid 09/29/2021 2.7  cm Final   • Lat Peak E' Juan Alberto 09/29/2021 21.1  cm/sec Final   • Med Peak E' Juan Alberto 09/29/2021 14.1  cm/sec Final   • RV S' 09/29/2021 15.3  cm/sec Final   • BH CV ECHO HOMERO - BZI_BMI 09/29/2021 21.9  kilograms/m^2 Final   • BH CV ECHO HOMERO - BSA(HAYCOCK) 09/29/2021 1.9  m^2 Final   • BH CV ECHO HOMERO - BZI_METRIC_WEIGHT 09/29/2021 71.2  kg Final   • BH CV ECHO HOMERO - BZI_METRIC_HEIGHT 09/29/2021 180.3  cm Final   • Avg E/e' ratio 09/29/2021 3.99   Final   • RAP systole 09/29/2021 3  mmHg Final   • RVSP(TR) 09/29/2021 15  mmHg Final   • Target HR (85%) 09/29/2021 153  bpm Final   • Max. Pred. HR (100%) 09/29/2021 180  bpm Final   Hospital Outpatient Visit on 09/28/2021   Component Date Value Ref Range Status   • Case Report 09/28/2021    Final                    Value:Surgical Pathology Report                         Case: [id number]                                  Authorizing Provider:  Didi De La O MD       Collected:           09/28/2021 01:11 PM          Ordering Location:     [facility]  Received:            09/28/2021 01:41 PM                                 [place]                                                                           Pathologist:           Onelia Pedersen MD                                                          Specimen:    Lymph Node, right cervical neck L N                                                       • Clinical Information 09/28/2021    Final                    Value:This result contains rich text formatting which cannot be displayed here.   • Final Diagnosis 09/28/2021     Final                    Value:This result contains rich text formatting which cannot be displayed here.   • Synoptic Checklist 09/28/2021    Final                    Value:Breast Biomarker Reporting Template  (BREAST: BIOMARKER REPORTING TEMPLATE - All Specimens)                                                        Protocol posted: 2/26/2020                                                           Test(s) Performed:                                     Estrogen Receptor (ER) Status:    Negative (less than 1%)                                    :    Internal control cells absent                                  Test Type:    Food and Drug Administration (FDA) cleared (test / vendor): [place]                                  Primary Antibody:    SP1                                  Scoring System:    Jacqui                                    Proportion Score:    0                                    Intensity Score:    0                                    Total Jacqui Score:    0                                Test(s) Performed:                                     Progesterone Receptor (PgR) Status:    Negative (less than 1%)                                    :    Internal control cells absent                                  Test Type:    Food and Drug Administration (FDA) cleared (test / vendor): [place]                                  Primary Antibody:    1E2                                  Scoring System:    Jacqui                                    Proportion Score:    0                                    Intensity Score:    0                                    Total Jacqui Score:    0                                Test(s) Performed:                                     HER2 by Immunohistochemistry:    Negative (Score 1+)                                  Test Type:    Food and Drug Administration (FDA) cleared (test / vendor): [place]                                  Primary Antibody:    4B5                                 Cold Ischemia and Fixation Times:    Cannot be determined: Unknown cold ischemia time                                Fixation Time (hours):    8 hours                               Testing Performed on Block Number(s):    1A                                                         METHODS                               Fixative:    Formalin                                Image Analysis:    Not performed      • Comment 09/28/2021    Final                    Value:This result contains rich text formatting which cannot be displayed here.   • Gross Description 09/28/2021    Final                    Value:This result contains rich text formatting which cannot be displayed here.   • Special Stains 09/28/2021    Final                    Value:This result contains rich text formatting which cannot be displayed here.   • Case Report 09/28/2021    Final                    Value:Medical Cytology Report                           Case: [id number]                                 Authorizing Provider:  Didi De La O MD       Collected:           09/28/2021 01:00 PM          Ordering Location:     [facility]  Received:            09/28/2021 01:34 PM                                                                                                            Pathologist:           Onelia Pedersen MD                                                          Specimen:    Lymph Node, Right Neck Node                                                               • Final Diagnosis 09/28/2021    Final                    Value:This result contains rich text formatting which cannot be displayed here.   • Comment 09/28/2021    Final                    Value:This result contains rich text formatting which cannot be displayed here.   • Specimen Adequacy 09/28/2021 FNA immediate cytologic evaluation, satisfactory   Final   • Gross Description 09/28/2021    Final                    Value:This result contains rich  text formatting which cannot be displayed here.   • Microscopic Description 09/28/2021    Final                    Value:This result contains rich text formatting which cannot be displayed here.   Lab on 09/27/2021   Component Date Value Ref Range Status   • Glucose 09/27/2021 350* 74 - 124 mg/dL Final   • BUN 09/27/2021 15  6 - 20 mg/dL Final   • Creatinine 09/27/2021 0.64  0.60 - 1.10 mg/dL Final   • Sodium 09/27/2021 137  134 - 145 mmol/L Final   • Potassium 09/27/2021 4.4  3.5 - 4.7 mmol/L Final   • Chloride 09/27/2021 98  98 - 107 mmol/L Final   • CO2 09/27/2021 26.9  22.0 - 29.0 mmol/L Final   • Calcium 09/27/2021 9.5  8.5 - 10.2 mg/dL Final   • Total Protein 09/27/2021 7.0  6.3 - 8.0 g/dL Final   • Albumin 09/27/2021 4.30  3.50 - 5.20 g/dL Final   • ALT (SGPT) 09/27/2021 11  0 - 33 U/L Final   • AST (SGOT) 09/27/2021 15  0 - 32 U/L Final   • Alkaline Phosphatase 09/27/2021 53  38 - 116 U/L Final   • Total Bilirubin 09/27/2021 0.3  0.2 - 1.2 mg/dL Final   • eGFR Non  Amer 09/27/2021 103  >60 mL/min/1.73 Final   • Globulin 09/27/2021 2.7  1.8 - 3.5 gm/dL Final   • A/G Ratio 09/27/2021 1.6  1.1 - 2.4 g/dL Final   • BUN/Creatinine Ratio 09/27/2021 23.4  7.3 - 30.0 Final   • Anion Gap 09/27/2021 12.1  5.0 - 15.0 mmol/L Final   • WBC 09/27/2021 8.86  3.40 - 10.80 10*3/mm3 Final   • RBC 09/27/2021 4.46  3.77 - 5.28 10*6/mm3 Final   • Hemoglobin 09/27/2021 13.5  12.0 - 15.9 g/dL Final   • Hematocrit 09/27/2021 41.3  34.0 - 46.6 % Final   • MCV 09/27/2021 92.6  79.0 - 97.0 fL Final   • MCH 09/27/2021 30.3  26.6 - 33.0 pg Final   • MCHC 09/27/2021 32.7  31.5 - 35.7 g/dL Final   • RDW 09/27/2021 13.6  12.3 - 15.4 % Final   • RDW-SD 09/27/2021 46.1  37.0 - 54.0 fl Final   • MPV 09/27/2021 9.5  6.0 - 12.0 fL Final   • Platelets 09/27/2021 293  140 - 450 10*3/mm3 Final   • Neutrophil % 09/27/2021 70.0  42.7 - 76.0 % Final   • Lymphocyte % 09/27/2021 17.9* 19.6 - 45.3 % Final   • Monocyte % 09/27/2021 7.2  5.0 - 12.0  % Final   • Eosinophil % 09/27/2021 4.1  0.3 - 6.2 % Final   • Basophil % 09/27/2021 0.3  0.0 - 1.5 % Final   • Immature Grans % 09/27/2021 0.5  0.0 - 0.5 % Final   • Neutrophils, Absolute 09/27/2021 6.20  1.70 - 7.00 10*3/mm3 Final   • Lymphocytes, Absolute 09/27/2021 1.59  0.70 - 3.10 10*3/mm3 Final   • Monocytes, Absolute 09/27/2021 0.64  0.10 - 0.90 10*3/mm3 Final   • Eosinophils, Absolute 09/27/2021 0.36  0.00 - 0.40 10*3/mm3 Final   • Basophils, Absolute 09/27/2021 0.03  0.00 - 0.20 10*3/mm3 Final   • Immature Grans, Absolute 09/27/2021 0.04  0.00 - 0.05 10*3/mm3 Final   • nRBC 09/27/2021 0.0  0.0 - 0.2 /100 WBC Final   Hospital Outpatient Visit on 09/23/2021   Component Date Value Ref Range Status   • Creatinine 09/23/2021 0.60  0.60 - 1.30 mg/dL Final    Serial Number: [id number]:  [id number]   There may be more visits with results that are not included.        CT Soft Tissue Neck With Contrast    Result Date: 9/23/2021  Multiple mild to moderately enlarged subpectoral and right axillary lymph nodes consistent with metastatic disease. The subpectoral lymphadenopathy extends into the right supraclavicular region, as well. There is no evidence of metastatic disease elsewhere within the neck, chest, abdomen or pelvis. A 5 cm diameter right ovarian cyst is noted. Suggest a follow-up pelvic ultrasound in 6 weeks for further evaluation.  This report was finalized on 9/23/2021 7:37 PM by Dr. Alexander White M.D.      CT Chest With Contrast Diagnostic    Result Date: 9/23/2021  Multiple mild to moderately enlarged subpectoral and right axillary lymph nodes consistent with metastatic disease. The subpectoral lymphadenopathy extends into the right supraclavicular region, as well. There is no evidence of metastatic disease elsewhere within the neck, chest, abdomen or pelvis. A 5 cm diameter right ovarian cyst is noted. Suggest a follow-up pelvic ultrasound in 6 weeks for further evaluation.  This report was finalized on  9/23/2021 7:37 PM by Dr. Alexander White M.D.      CT Abdomen Pelvis With Contrast    Result Date: 9/23/2021  Multiple mild to moderately enlarged subpectoral and right axillary lymph nodes consistent with metastatic disease. The subpectoral lymphadenopathy extends into the right supraclavicular region, as well. There is no evidence of metastatic disease elsewhere within the neck, chest, abdomen or pelvis. A 5 cm diameter right ovarian cyst is noted. Suggest a follow-up pelvic ultrasound in 6 weeks for further evaluation.  This report was finalized on 9/23/2021 7:37 PM by Dr. Alexander White M.D.      MRI Breast Bilateral With & Without Contrast    Result Date: 9/24/2021  1. Biopsy-proven malignancy in the right breast occupying the middle and posterior one thirds from the 11 o'clock through 1 o'clock positions and measuring up to 7.1 cm in greatest dimension. The heart shaped metallic clip is within the anterior aspect of the lesion. The lesion abuts the pectoral fascia but there is no evidence for direct invasion of the pectoral muscle. Also, there are portions of the lesion that extend to the subtalar soft tissues but there is no abnormal enhancement of the skin or skin thickening. Evidence of bulky right axillary adenopathy is noted. 2. There is a 2.4 cm irregular mass in the lower outer quadrant of the right breast centered at the 8 o'clock position that is separate from the known malignancy. This is suspicious for additional malignancy. Targeted sonography and biopsy of the lesion should be considered. 3. There are no findings suspicious for malignancy in the left breast.  BI-RADS category 6: Known malignancy.  This report was finalized on 9/24/2021 5:02 PM by Dr. Vicente Carpenter M.D.      US Guided Lymph Node Biopsy    Result Date: 9/29/2021   Successful ultrasound guided FNA and core biopsy of right cervical lymph node.  This report was finalized on 9/29/2021 8:07 PM by Dr. Eulalio Kwok M.D.      NM PET/CT  Skull Base to Mid Thigh    Result Date: 10/4/2021  1. There are 2 separate hypermetabolic lesions within the right breast. The right axillary, interpectoral, supraclavicular, and right neck lymphadenopathy is likely metastatic. The hypermetabolic left hilar lymphadenopathy is suspicious for metastatic lymphadenopathy as well and there is a hypermetabolic right femoral neck lesion which likely represents a metastasis. 2. There is no hypermetabolic lymphadenopathy or evidence for visceral metastases within the abdomen or pelvis.  This report was finalized on 10/4/2021 12:01 PM by Dr. Atiya Ambrocio M.D.       CT of the chest abdomen pelvis-images independently reviewed and interpreted by me in detail summarized in the HPI  Bone scan-images independently reviewed and interpreted by me in detail summarized in the HPI    CT neck-images independently reviewed and interpreted by me in detail summarized in HPI.  I also discussed the CT neck with Dr. Alexander Blevins with radiology regarding the right side lymphadenopathy.     9/28/2021-right cervical lymph node biopsy-pathology consistent with metastatic mammary carcinoma.    10/1/2021-PET/CT-there are 2 separate hypermetabolic lesions within the right breast.  1 measuring 6 cm and the other measuring 1.3 cm.  The SUV of 6 cm lesion of 5.6 and small lesion of 4.8.  Hypermetabolic right axillary, infra pectoral, right supraclavicular lymphadenopathy noted.  In addition there are hypermetabolic nodes at the right thoracic inlet, right base of the neck and right posterior cervical triangle.  1.6 x 0.8 cm right posterior cervical triangle node with an SUV of 3.9.  Right axillary lymphadenopathy with lymphedema 6.4.  Hypermetabolic left hilar lymphadenopathy measuring 2 x 1.1 cm with an SUV of 5.8.  No hypermetabolic activity in the liver or evidence of metastatic disease in the abdomen or pelvis.  Hypermetabolic lesion in the right femoral neck measuring 1.5 cm.  SUV 5.4.    PET/CT  images independently reviewed and interpreted by me in detail summarized above.  Images also reviewed with patient.    10/21/2747-XWQ-BOW 6.56, hemoglobin 7.3, platelets 204,000  CMP-blood sugar 291, BUN 12, creatinine 0.56, LFTs normal    Assessment/Plan       *Invasive ductal carcinoma of the right breast  · Clinical T3 N3 cM1, stage IV  · The tumor is grade 3, poorly differentiated, ER 1 to 10% weak, MS negative, HER-2 negative, Ki-67 65%  · Cervical lymph node biopsy consistent with metastatic mammary carcinoma.  · PET/CT the abnormal uptake noted in the left hilar lymph node, right femoral neck, cervical lymph nodes.  · Since the cervical lymph node biopsy is positive as well as PET/CT with several abnormal findings I do believe that this is stage IV disease.  · Discussed case at multidisciplinary conference on 9/28/2021 and the initial plan was to proceed with neoadjuvant chemotherapy followed by surgery and radiation to the positive cervical nodes.  PET/CT results were not available at that time.  · The PET/CT showing other areas of metastatic disease unfortunately we will not be able to proceed with curative intent treatment.  · Explained that the disease is incurable  · On hearing this news Sierra was overwhelmed and she decided to cancel the procedure for the port echo scheduled for later in the day.  · Caris testing requested  · On the subsequent visit I discussed with her about palliative intent chemotherapy with single agent Taxol and adding immunotherapy down the line once PD-L1 results available.  · Since she is complaining of significant pain in the right breast I would consider adding carboplatin for about 4 to 6 weeks to help get a quick response in the right breast tumor as well as right axillary lymphadenopathy.  · Adverse effects of Taxol including but not limited to myelosuppression, increased risk of infections, nausea, vomiting, arthralgias, neuropathy, nail changes, alopecia, diarrhea,  possible mucositis discussed.  · Caris testing has been reviewed today and PD-L1 CPS was 3 and hence no additional benefit from immunotherapy.  Tumor mutational burden was also low and MSI stable.  Therefore there is no added benefit from immunotherapy.  · We will continue with carboplatin and Taxol at this time.    *Nausea  · Continue Zofran every 8 hours as needed for nausea  · At this continues to be ongoing issue then we will add Aloxi to her subsequent cycles of Taxol carbo    *Diabetes  · Type I  · Poorly controlled  ·  notes reviewed   · Follow-up with endocrinology    * Cervical lymphadenopathy  · Biopsied and pathology consistent with metastatic breast cancer    *Anxiety  · She has been referred to supportive oncology  · Met with Tenisha Rizzo  · Started on gabapentin 300 mg 3 times daily  · She is currently on max dose of Celexa  · There is a level C interaction of Zyprexa with Celexa.  · However I think Zyprexa would be reasonable choice given anxiety, insomnia, nausea.  · We we will start Zyprexa the next visit.      *Port placement-on 10/16/2021    *Breast pain-Norco every 8 hours prescribed, continue the same    *Follow-up-week 3 Taxol and carboplatin      45 minutes spent on the encounter including face-to-face time, documentation on the same day patient has metastatic breast cancer which is terminal and she is currently on chemotherapy requiring close monitoring for toxicities.

## 2021-10-22 ENCOUNTER — DOCUMENTATION (OUTPATIENT)
Dept: PHARMACY | Facility: HOSPITAL | Age: 41
End: 2021-10-22

## 2021-10-22 NOTE — PROGRESS NOTES
Hydrocodone/Acetaminophen approved from 10/22/2021-10/22/2022-[facility] notified of approval.

## 2021-10-26 ENCOUNTER — OFFICE VISIT (OUTPATIENT)
Dept: PSYCHIATRY | Facility: HOSPITAL | Age: 41
End: 2021-10-26

## 2021-10-26 DIAGNOSIS — F90.1 ATTENTION DEFICIT HYPERACTIVITY DISORDER (ADHD), PREDOMINANTLY HYPERACTIVE TYPE: Primary | ICD-10-CM

## 2021-10-26 PROCEDURE — 99214 OFFICE O/P EST MOD 30 MIN: CPT | Performed by: NURSE PRACTITIONER

## 2021-10-26 RX ORDER — DEXTROAMPHETAMINE SACCHARATE, AMPHETAMINE ASPARTATE, DEXTROAMPHETAMINE SULFATE AND AMPHETAMINE SULFATE 2.5; 2.5; 2.5; 2.5 MG/1; MG/1; MG/1; MG/1
10 TABLET ORAL DAILY
Qty: 30 TABLET | Refills: 0 | Status: SHIPPED | OUTPATIENT
Start: 2021-10-26 | End: 2021-11-23 | Stop reason: SDUPTHER

## 2021-10-28 ENCOUNTER — INFUSION (OUTPATIENT)
Dept: ONCOLOGY | Facility: HOSPITAL | Age: 41
End: 2021-10-28

## 2021-10-28 ENCOUNTER — OFFICE VISIT (OUTPATIENT)
Dept: ONCOLOGY | Facility: CLINIC | Age: 41
End: 2021-10-28

## 2021-10-28 VITALS — HEART RATE: 65 BPM | DIASTOLIC BLOOD PRESSURE: 67 MMHG | SYSTOLIC BLOOD PRESSURE: 106 MMHG

## 2021-10-28 VITALS
BODY MASS INDEX: 22.82 KG/M2 | HEART RATE: 85 BPM | TEMPERATURE: 97.5 F | SYSTOLIC BLOOD PRESSURE: 103 MMHG | RESPIRATION RATE: 16 BRPM | DIASTOLIC BLOOD PRESSURE: 67 MMHG | OXYGEN SATURATION: 97 % | WEIGHT: 163 LBS | HEIGHT: 71 IN

## 2021-10-28 DIAGNOSIS — C50.111 MALIGNANT NEOPLASM OF CENTRAL PORTION OF RIGHT BREAST IN FEMALE, ESTROGEN RECEPTOR NEGATIVE (HCC): Primary | ICD-10-CM

## 2021-10-28 DIAGNOSIS — Z17.1 MALIGNANT NEOPLASM OF CENTRAL PORTION OF RIGHT BREAST IN FEMALE, ESTROGEN RECEPTOR NEGATIVE (HCC): Primary | ICD-10-CM

## 2021-10-28 DIAGNOSIS — C79.51 BONE METASTASIS: ICD-10-CM

## 2021-10-28 DIAGNOSIS — Z45.2 ENCOUNTER FOR FITTING AND ADJUSTMENT OF VASCULAR CATHETER: ICD-10-CM

## 2021-10-28 DIAGNOSIS — R59.1 LYMPHADENOPATHY OF HEAD AND NECK: ICD-10-CM

## 2021-10-28 DIAGNOSIS — Z17.1 MALIGNANT NEOPLASM OF CENTRAL PORTION OF RIGHT BREAST IN FEMALE, ESTROGEN RECEPTOR NEGATIVE (HCC): ICD-10-CM

## 2021-10-28 DIAGNOSIS — C50.111 MALIGNANT NEOPLASM OF CENTRAL PORTION OF RIGHT BREAST IN FEMALE, ESTROGEN RECEPTOR NEGATIVE (HCC): ICD-10-CM

## 2021-10-28 LAB
ALBUMIN SERPL-MCNC: 3.9 G/DL (ref 3.5–5.2)
ALBUMIN/GLOB SERPL: 1.6 G/DL
ALP SERPL-CCNC: 43 U/L (ref 39–117)
ALT SERPL W P-5'-P-CCNC: 18 U/L (ref 1–33)
ANION GAP SERPL CALCULATED.3IONS-SCNC: 9.7 MMOL/L (ref 5–15)
AST SERPL-CCNC: 23 U/L (ref 1–32)
BASOPHILS # BLD AUTO: 0.03 10*3/MM3 (ref 0–0.2)
BASOPHILS NFR BLD AUTO: 0.5 % (ref 0–1.5)
BILIRUB SERPL-MCNC: <0.2 MG/DL (ref 0–1.2)
BUN SERPL-MCNC: 13 MG/DL (ref 6–20)
BUN/CREAT SERPL: 24.1 (ref 7–25)
CALCIUM SPEC-SCNC: 8.9 MG/DL (ref 8.6–10.5)
CHLORIDE SERPL-SCNC: 102 MMOL/L (ref 98–107)
CO2 SERPL-SCNC: 25.3 MMOL/L (ref 22–29)
CREAT SERPL-MCNC: 0.54 MG/DL (ref 0.57–1)
DEPRECATED RDW RBC AUTO: 46.3 FL (ref 37–54)
EOSINOPHIL # BLD AUTO: 0.26 10*3/MM3 (ref 0–0.4)
EOSINOPHIL NFR BLD AUTO: 4.6 % (ref 0.3–6.2)
ERYTHROCYTE [DISTWIDTH] IN BLOOD BY AUTOMATED COUNT: 13.9 % (ref 12.3–15.4)
GFR SERPL CREATININE-BSD FRML MDRD: 125 ML/MIN/1.73
GLOBULIN UR ELPH-MCNC: 2.5 GM/DL
GLUCOSE SERPL-MCNC: 174 MG/DL (ref 65–99)
HCT VFR BLD AUTO: 34.6 % (ref 34–46.6)
HGB BLD-MCNC: 11.3 G/DL (ref 12–15.9)
IMM GRANULOCYTES # BLD AUTO: 0.02 10*3/MM3 (ref 0–0.05)
IMM GRANULOCYTES NFR BLD AUTO: 0.4 % (ref 0–0.5)
LYMPHOCYTES # BLD AUTO: 2.01 10*3/MM3 (ref 0.7–3.1)
LYMPHOCYTES NFR BLD AUTO: 35.6 % (ref 19.6–45.3)
MCH RBC QN AUTO: 30.6 PG (ref 26.6–33)
MCHC RBC AUTO-ENTMCNC: 32.7 G/DL (ref 31.5–35.7)
MCV RBC AUTO: 93.8 FL (ref 79–97)
MONOCYTES # BLD AUTO: 0.4 10*3/MM3 (ref 0.1–0.9)
MONOCYTES NFR BLD AUTO: 7.1 % (ref 5–12)
NEUTROPHILS NFR BLD AUTO: 2.92 10*3/MM3 (ref 1.7–7)
NEUTROPHILS NFR BLD AUTO: 51.8 % (ref 42.7–76)
NRBC BLD AUTO-RTO: 0 /100 WBC (ref 0–0.2)
PLATELET # BLD AUTO: 245 10*3/MM3 (ref 140–450)
PMV BLD AUTO: 9.6 FL (ref 6–12)
POTASSIUM SERPL-SCNC: 4 MMOL/L (ref 3.5–5.2)
PROT SERPL-MCNC: 6.4 G/DL (ref 6–8.5)
RBC # BLD AUTO: 3.69 10*6/MM3 (ref 3.77–5.28)
SODIUM SERPL-SCNC: 137 MMOL/L (ref 136–145)
WBC # BLD AUTO: 5.64 10*3/MM3 (ref 3.4–10.8)

## 2021-10-28 PROCEDURE — 25010000002 DIPHENHYDRAMINE PER 50 MG: Performed by: INTERNAL MEDICINE

## 2021-10-28 PROCEDURE — 96367 TX/PROPH/DG ADDL SEQ IV INF: CPT

## 2021-10-28 PROCEDURE — 25010000002 CARBOPLATIN PER 50 MG: Performed by: INTERNAL MEDICINE

## 2021-10-28 PROCEDURE — 85025 COMPLETE CBC W/AUTO DIFF WBC: CPT | Performed by: INTERNAL MEDICINE

## 2021-10-28 PROCEDURE — 80053 COMPREHEN METABOLIC PANEL: CPT | Performed by: INTERNAL MEDICINE

## 2021-10-28 PROCEDURE — 96413 CHEMO IV INFUSION 1 HR: CPT

## 2021-10-28 PROCEDURE — 25010000002 DEXAMETHASONE PER 1 MG: Performed by: INTERNAL MEDICINE

## 2021-10-28 PROCEDURE — 96417 CHEMO IV INFUS EACH ADDL SEQ: CPT

## 2021-10-28 PROCEDURE — 99214 OFFICE O/P EST MOD 30 MIN: CPT | Performed by: INTERNAL MEDICINE

## 2021-10-28 PROCEDURE — 96415 CHEMO IV INFUSION ADDL HR: CPT

## 2021-10-28 PROCEDURE — 25010000002 PACLITAXEL PER 1 MG: Performed by: INTERNAL MEDICINE

## 2021-10-28 PROCEDURE — 25010000002 HEPARIN LOCK FLUSH PER 10 UNITS: Performed by: INTERNAL MEDICINE

## 2021-10-28 PROCEDURE — 96375 TX/PRO/DX INJ NEW DRUG ADDON: CPT

## 2021-10-28 PROCEDURE — 25010000002 PALONOSETRON PER 25 MCG: Performed by: INTERNAL MEDICINE

## 2021-10-28 RX ORDER — SODIUM CHLORIDE 9 MG/ML
250 INJECTION, SOLUTION INTRAVENOUS ONCE
Status: COMPLETED | OUTPATIENT
Start: 2021-10-28 | End: 2021-10-28

## 2021-10-28 RX ORDER — SODIUM CHLORIDE 0.9 % (FLUSH) 0.9 %
10 SYRINGE (ML) INJECTION AS NEEDED
Status: DISCONTINUED | OUTPATIENT
Start: 2021-10-28 | End: 2021-10-28 | Stop reason: HOSPADM

## 2021-10-28 RX ORDER — PALONOSETRON 0.05 MG/ML
0.25 INJECTION, SOLUTION INTRAVENOUS ONCE
Status: COMPLETED | OUTPATIENT
Start: 2021-10-28 | End: 2021-10-28

## 2021-10-28 RX ORDER — HEPARIN SODIUM (PORCINE) LOCK FLUSH IV SOLN 100 UNIT/ML 100 UNIT/ML
500 SOLUTION INTRAVENOUS AS NEEDED
Status: DISCONTINUED | OUTPATIENT
Start: 2021-10-28 | End: 2021-10-28 | Stop reason: HOSPADM

## 2021-10-28 RX ORDER — HEPARIN SODIUM (PORCINE) LOCK FLUSH IV SOLN 100 UNIT/ML 100 UNIT/ML
500 SOLUTION INTRAVENOUS AS NEEDED
Status: CANCELLED | OUTPATIENT
Start: 2021-10-28

## 2021-10-28 RX ORDER — FAMOTIDINE 10 MG/ML
20 INJECTION, SOLUTION INTRAVENOUS ONCE
Status: COMPLETED | OUTPATIENT
Start: 2021-10-28 | End: 2021-10-28

## 2021-10-28 RX ORDER — SODIUM CHLORIDE 0.9 % (FLUSH) 0.9 %
10 SYRINGE (ML) INJECTION AS NEEDED
Status: CANCELLED | OUTPATIENT
Start: 2021-10-28

## 2021-10-28 RX ADMIN — CARBOPLATIN 230 MG: 10 INJECTION INTRAVENOUS at 12:06

## 2021-10-28 RX ADMIN — SODIUM CHLORIDE 250 ML: 9 INJECTION, SOLUTION INTRAVENOUS at 09:55

## 2021-10-28 RX ADMIN — SODIUM CHLORIDE, PRESERVATIVE FREE 10 ML: 5 INJECTION INTRAVENOUS at 12:48

## 2021-10-28 RX ADMIN — Medication 500 UNITS: at 12:48

## 2021-10-28 RX ADMIN — DIPHENHYDRAMINE HYDROCHLORIDE 25 MG: 50 INJECTION INTRAMUSCULAR; INTRAVENOUS at 10:28

## 2021-10-28 RX ADMIN — PALONOSETRON 0.25 MG: 0.05 INJECTION, SOLUTION INTRAVENOUS at 10:09

## 2021-10-28 RX ADMIN — FAMOTIDINE 20 MG: 10 INJECTION INTRAVENOUS at 10:09

## 2021-10-28 RX ADMIN — DEXAMETHASONE SODIUM PHOSPHATE 8 MG: 10 INJECTION INTRAMUSCULAR; INTRAVENOUS at 10:09

## 2021-10-28 RX ADMIN — PACLITAXEL 150 MG: 6 INJECTION, SOLUTION INTRAVENOUS at 11:00

## 2021-10-28 NOTE — PROGRESS NOTES
Subjective   Sierra Mao is a 41 y.o. female. Referred by Dr. Price for right breast invasive ductal carcinoma triple negative, metastatic    Treatment  1.Taxol and carboplatin weekly started 10/13/2021    History of Present Illness     Ms. Mao is a 41-year-old Premenopausal  lady who palpated a mass in her right breast. She had a mass in a similar area in 2018 which was biopsied and benign. Further evaluation was performed.    08/18/2021-bilateral diagnostic mammogram-parenchyma is heterogeneously dense. In the palpable area of concern in the upper outer quadrant of the right breast there is an area of asymmetry with pleomorphic microcalcifications in the posterior one third of the upper outer quadrant of the right breast in the axillary region. There is also microcalcifications in the middle one third and anterior one third of the left breast at 12 o'clock position which are new. Architectural distortion in either breast.    Ultrasound-  Right breast at the site of palpable concern, at 11 o'clock, 1 cm from the nipple there is an irregular hypoechoic mass which measures up to 4 cm.  10 o'clock, 6 cm from the nipple there is an irregular mass which measures up to 1.6 cm  At 10 o'clock, 8 cm from the nipple there is a irregular mass which measures 1.8 cm.  Multiple right axillary lymph nodes demonstrate thickened cortices and rounded morphology largest of which measures 2 cm.  Left breast at 2 o'clock, 6 cm from the nipple there is a 1 cm heterogeneous hypoechoic masslike region.    09/07/2021-  1.right breast 11 o'clock, 1 cm from the nipple-invasive ductal carcinoma, poorly differentiated, grade 3, ER low +1 to 10%, weak, AL negative less than 1%, HER-2 negative.  2.right axillary lymph node-metastatic mammary carcinoma measuring 9 mm. ER negative, AL +5% three, HER-2 negative  3.left breast 2 o'clock, 6 cm from the nipple-cluster of apocrine cyst  4.left breast 12 o'clock-apocrine cyst with  polarizable calcium oxalate crystals.    Bilateral breast MRI 09/23/2021-biopsy-proven malignancy of the right breast occupying the middle and the posterior one thirds from 11 o'clock to 1 o'clock position and measuring up to 7.1 cm in greatest dimension. The lesion abuts the pectoralis fascia but there is no evidence of direct invasion. There are portions of the lesion that extended to the subpatellar soft tissues but there is no abnormal enhancement of the skin or skin thickening.  Bulky right axillary lymphadenopathy noted.  2.4 cm irregular mass in the lower outer quadrant of the right breast centered at 8 o'clock position which is separate from the known malignancy suspicious for additional malignancy.  Ultrasound and biopsy of this lesion recommended.  No findings suspicious for malignancy in the left breast.    09/23/2021-CT of the chest abdomen and pelvis and soft tissue neck-multiple mild to moderately enlarged subpectoral and right axillary lymph nodes consistent with metastatic disease. The subpectoral lymphadenopathy extends into the right supraclavicular region as well. There is no evidence of metastatic disease elsewhere within the neck. No evidence of metastatic disease in the chest abdomen or pelvis. 5 cm right ovarian cyst is noted. Suggest follow-up ultrasound in 6 weeks for further evaluation.    09/24/2021-bone scan without any evidence of metastatic disease.    9/28/2021-right cervical lymph node biopsy-pathology consistent with metastatic mammary carcinoma.    10/1/2021-PET/CT-there are 2 separate hypermetabolic lesions within the right breast.  1 measuring 6 cm and the other measuring 1.3 cm.  The SUV of 6 cm lesion of 5.6 and small lesion of 4.8.  Hypermetabolic right axillary, infra pectoral, right supraclavicular lymphadenopathy noted.  In addition there are hypermetabolic nodes at the right thoracic inlet, right base of the neck and right posterior cervical triangle.  1.6 x 0.8 cm right  posterior cervical triangle node with an SUV of 3.9.  Right axillary lymphadenopathy with lymphedema 6.4.  Hypermetabolic left hilar lymphadenopathy measuring 2 x 1.1 cm with an SUV of 5.8.  No hypermetabolic activity in the liver or evidence of metastatic disease in the abdomen or pelvis.  Hypermetabolic lesion in the right femoral neck measuring 1.5 cm.  SUV 5.4.    Mr. Mao reports that she initially felt good right breast mass in May 2021.  Starting July 2021 she had palpated something abnormal in her right neck.  Since noticing the mass in May 2021 she feels like the mass in the right breast has doubled in size.  She also feels like the right axillary lymph nodes have enlarged in size.    She had a 70 pound weight loss in 2019 due to poorly controlled diabetes.  She was initially diagnosed with a type 2 diabetes and subsequently determined to be type I and switched to insulin.  She had an admission to the hospital due to DKA.    She is unvaccinated for COVID-19.  She previously worked as a  however currently not working.    Family history significant for ovarian cancer in grandmother and bladder cancer in her father.      Interval history  Sierra presents to the clinic today for follow-up.  She is due for week 3 of carboplatin and taxol.   Tolerating chemotherapy well.   She continues to have nausea which starts after 2 to 3 days.  This was fairly well controlled with Zofran.  The pain in the right breast has decreased some but still present.  The right axillary nodule has decreased in size and still have the cervical nodes.      The following portions of the patient's history were reviewed and updated as appropriate: allergies, current medications, past family history, past medical history, past social history, past surgical history and problem list.    Past Medical History:   Diagnosis Date   • Anxiety    • Arthritis    • Breast cancer (HCC) 09/07/2021    Right breast invasive ductal  carcinoma ER low positive, AZ negative, WFI3pch negative, mercedes to lymph node (biopsy positive)   • Chronic fatigue    • Diabetes mellitus with stage 3 chronic kidney disease (HCC)    • Hyperlipidemia    • Leukocytosis    • Menorrhagia with regular cycle    • Seasonal allergies    • Type I diabetes mellitus (HCC)    • Vitamin D deficiency         Past Surgical History:   Procedure Laterality Date   • APPENDECTOMY N/A    • BREAST BIOPSY Right 2018   • BREAST BIOPSY Bilateral 2021   • US GUIDED LYMPH NODE BIOPSY  2021    Dr. Carol Terrazas, [facility]   • US GUIDED LYMPH NODE BIOPSY  2021   • VENOUS ACCESS DEVICE (PORT) INSERTION N/A 10/15/2021    Procedure: INSERTION VENOUS ACCESS DEVICE;  Surgeon: Mariposa Price MD;  Location: [facility];  Service: General;  Laterality: N/A;        Family History   Problem Relation Age of Onset   • Diabetes Mother    • Cervical cancer Maternal Grandmother         cervical/uterine    • Diabetes Maternal Grandfather    • Lymphoma Paternal Aunt 60   • Breast cancer Neg Hx    • Malig Hyperthermia Neg Hx         Social History     Socioeconomic History   • Marital status:    Tobacco Use   • Smoking status: Former Smoker     Packs/day: 0.50     Years: 8.00     Pack years: 4.00     Types: Cigarettes     Start date: 1995     Quit date: 2003     Years since quittin.8   • Smokeless tobacco: Never Used   • Tobacco comment: teen / young adult   Vaping Use   • Vaping Use: Never used   Substance and Sexual Activity   • Alcohol use: Yes     Alcohol/week: 1.0 standard drink     Types: 1 Standard drinks or equivalent per week     Comment: social   • Drug use: No   • Sexual activity: Defer     Partners: Male     Birth control/protection: None     Comment:         OB History        1    Para   1    Term   1            AB        Living           SAB        IAB        Ectopic        Molar        Multiple        Live Births                 Age  "of menarche-14  Age at first live childbirth-24   one para one  zero  Oral contraceptive pill use for 20 years  She is premenopausal    No Known Allergies       Review of systems as mentioned in the HPI    Objective   Blood pressure 103/67, pulse 85, temperature 97.5 °F (36.4 °C), temperature source Temporal, resp. rate 16, height 180 cm (70.87\"), weight 73.9 kg (163 lb), SpO2 97 %, not currently breastfeeding.   Physical Exam  Vitals reviewed.   Constitutional:       Appearance: Normal appearance. She is normal weight.   HENT:      Head: Normocephalic and atraumatic.      Right Ear: External ear normal.      Left Ear: External ear normal.      Nose: Nose normal.      Mouth/Throat:      Mouth: Mucous membranes are moist.      Pharynx: Oropharynx is clear.   Eyes:      Extraocular Movements: Extraocular movements intact.      Conjunctiva/sclera: Conjunctivae normal.      Pupils: Pupils are equal, round, and reactive to light.   Cardiovascular:      Rate and Rhythm: Normal rate and regular rhythm.      Pulses: Normal pulses.      Heart sounds: Normal heart sounds.   Pulmonary:      Effort: Pulmonary effort is normal.      Breath sounds: Normal breath sounds.   Abdominal:      General: Abdomen is flat. Bowel sounds are normal.   Musculoskeletal:         General: Normal range of motion.      Cervical back: Normal range of motion.   Skin:     General: Skin is warm and dry.   Neurological:      General: No focal deficit present.      Mental Status: She is alert and oriented to person, place, and time. Mental status is at baseline.   Psychiatric:         Mood and Affect: Mood normal.         Behavior: Behavior normal.         Thought Content: Thought content normal.         Judgment: Judgment normal.       Breast Exam: Right breast-On inspection there is a fullness in the upper outer quadrant.  On palpation there is a 6x6 cm mass in the upper outer quadrant.  There is bulky right axillary lymphadenopathy.  " Supraclavicular lymph nodes present.   Left breast appears normal on inspection.  No palpable abnormalities of the breast or the axilla.  The right hilar lymphadenopathy in the neck has decreased in size.    I have reexamined the patient and the results are consistent with the previously documented exam. Didi De La O MD     Infusion on 10/21/2021   Component Date Value Ref Range Status   • Glucose 10/21/2021 291* 65 - 99 mg/dL Final   • BUN 10/21/2021 12  6 - 20 mg/dL Final   • Creatinine 10/21/2021 0.56* 0.57 - 1.00 mg/dL Final   • Sodium 10/21/2021 138  136 - 145 mmol/L Final   • Potassium 10/21/2021 4.1  3.5 - 5.2 mmol/L Final   • Chloride 10/21/2021 103  98 - 107 mmol/L Final   • CO2 10/21/2021 26.4  22.0 - 29.0 mmol/L Final   • Calcium 10/21/2021 9.2  8.6 - 10.5 mg/dL Final   • Total Protein 10/21/2021 6.6  6.0 - 8.5 g/dL Final   • Albumin 10/21/2021 4.10  3.50 - 5.20 g/dL Final   • ALT (SGPT) 10/21/2021 13  1 - 33 U/L Final   • AST (SGOT) 10/21/2021 18  1 - 32 U/L Final   • Alkaline Phosphatase 10/21/2021 43  39 - 117 U/L Final   • Total Bilirubin 10/21/2021 <0.2  0.0 - 1.2 mg/dL Final   • eGFR Non African Amer 10/21/2021 120  >60 mL/min/1.73 Final   • Globulin 10/21/2021 2.5  gm/dL Final   • A/G Ratio 10/21/2021 1.6  g/dL Final   • BUN/Creatinine Ratio 10/21/2021 21.4  7.0 - 25.0 Final   • Anion Gap 10/21/2021 8.6  5.0 - 15.0 mmol/L Final   • Magnesium 10/21/2021 2.0  1.6 - 2.6 mg/dL Final   • Phosphorus 10/21/2021 3.6  2.5 - 4.5 mg/dL Final   • WBC 10/21/2021 6.56  3.40 - 10.80 10*3/mm3 Final   • RBC 10/21/2021 3.76* 3.77 - 5.28 10*6/mm3 Final   • Hemoglobin 10/21/2021 11.3* 12.0 - 15.9 g/dL Final   • Hematocrit 10/21/2021 33.8* 34.0 - 46.6 % Final   • MCV 10/21/2021 89.9  79.0 - 97.0 fL Final   • MCH 10/21/2021 30.1  26.6 - 33.0 pg Final   • MCHC 10/21/2021 33.4  31.5 - 35.7 g/dL Final   • RDW 10/21/2021 13.5  12.3 - 15.4 % Final   • RDW-SD 10/21/2021 44.0  37.0 - 54.0 fl Final   • MPV 10/21/2021 9.7   6.0 - 12.0 fL Final   • Platelets 10/21/2021 204  140 - 450 10*3/mm3 Final   • Neutrophil % 10/21/2021 59.5  42.7 - 76.0 % Final   • Lymphocyte % 10/21/2021 27.6  19.6 - 45.3 % Final   • Monocyte % 10/21/2021 8.1  5.0 - 12.0 % Final   • Eosinophil % 10/21/2021 3.7  0.3 - 6.2 % Final   • Basophil % 10/21/2021 0.3  0.0 - 1.5 % Final   • Immature Grans % 10/21/2021 0.8* 0.0 - 0.5 % Final   • Neutrophils, Absolute 10/21/2021 3.91  1.70 - 7.00 10*3/mm3 Final   • Lymphocytes, Absolute 10/21/2021 1.81  0.70 - 3.10 10*3/mm3 Final   • Monocytes, Absolute 10/21/2021 0.53  0.10 - 0.90 10*3/mm3 Final   • Eosinophils, Absolute 10/21/2021 0.24  0.00 - 0.40 10*3/mm3 Final   • Basophils, Absolute 10/21/2021 0.02  0.00 - 0.20 10*3/mm3 Final   • Immature Grans, Absolute 10/21/2021 0.05  0.00 - 0.05 10*3/mm3 Final   • nRBC 10/21/2021 0.0  0.0 - 0.2 /100 WBC Final   Admission on 10/15/2021, Discharged on 10/15/2021   Component Date Value Ref Range Status   • Glucose 10/15/2021 167* 70 - 130 mg/dL Final    Meter: [id number] : [id number] Mayur Alonso EUNICE   • HCG, Urine, QL 10/15/2021 Negative  Negative Final   • Lot Number 10/15/2021 [id number]   Final   • Internal Positive Control 10/15/2021 Passed  Positive, Passed Final   • Internal Negative Control 10/15/2021 Passed  Negative, Passed Final   • Expiration Date 10/15/2021 04/30/2022   Final   Transcribe Orders on 10/14/2021   Component Date Value Ref Range Status   • COVID19 10/14/2021 Not Detected  Not Detected - Ref. Range Final   Infusion on 10/13/2021   Component Date Value Ref Range Status   • Glucose 10/13/2021 360* 74 - 124 mg/dL Final   • BUN 10/13/2021 14  6 - 20 mg/dL Final   • Creatinine 10/13/2021 0.57* 0.60 - 1.10 mg/dL Final   • Sodium 10/13/2021 134  134 - 145 mmol/L Final   • Potassium 10/13/2021 4.2  3.5 - 4.7 mmol/L Final   • Chloride 10/13/2021 99  98 - 107 mmol/L Final   • CO2 10/13/2021 26.2  22.0 - 29.0 mmol/L Final   • Calcium 10/13/2021 9.5  8.5 -  10.2 mg/dL Final   • Total Protein 10/13/2021 6.5  6.3 - 8.0 g/dL Final   • Albumin 10/13/2021 4.20  3.50 - 5.20 g/dL Final   • ALT (SGPT) 10/13/2021 13  0 - 33 U/L Final   • AST (SGOT) 10/13/2021 16  0 - 32 U/L Final   • Alkaline Phosphatase 10/13/2021 42  38 - 116 U/L Final   • Total Bilirubin 10/13/2021 0.2  0.2 - 1.2 mg/dL Final   • eGFR Non  Amer 10/13/2021 117  >60 mL/min/1.73 Final   • Globulin 10/13/2021 2.3  1.8 - 3.5 gm/dL Final   • A/G Ratio 10/13/2021 1.8  1.1 - 2.4 g/dL Final   • BUN/Creatinine Ratio 10/13/2021 24.6  7.3 - 30.0 Final   • Anion Gap 10/13/2021 8.8  5.0 - 15.0 mmol/L Final   • HCG Quantitative 10/13/2021 <0.50  mIU/mL Final   • WBC 10/13/2021 6.37  3.40 - 10.80 10*3/mm3 Final   • RBC 10/13/2021 4.30  3.77 - 5.28 10*6/mm3 Final   • Hemoglobin 10/13/2021 12.8  12.0 - 15.9 g/dL Final   • Hematocrit 10/13/2021 38.2  34.0 - 46.6 % Final   • MCV 10/13/2021 88.8  79.0 - 97.0 fL Final   • MCH 10/13/2021 29.8  26.6 - 33.0 pg Final   • MCHC 10/13/2021 33.5  31.5 - 35.7 g/dL Final   • RDW 10/13/2021 13.5  12.3 - 15.4 % Final   • RDW-SD 10/13/2021 44.1  37.0 - 54.0 fl Final   • MPV 10/13/2021 9.6  6.0 - 12.0 fL Final   • Platelets 10/13/2021 219  140 - 450 10*3/mm3 Final   • Neutrophil % 10/13/2021 53.2  42.7 - 76.0 % Final   • Lymphocyte % 10/13/2021 30.6  19.6 - 45.3 % Final   • Monocyte % 10/13/2021 10.2  5.0 - 12.0 % Final   • Eosinophil % 10/13/2021 5.3  0.3 - 6.2 % Final   • Basophil % 10/13/2021 0.5  0.0 - 1.5 % Final   • Immature Grans % 10/13/2021 0.2  0.0 - 0.5 % Final   • Neutrophils, Absolute 10/13/2021 3.39  1.70 - 7.00 10*3/mm3 Final   • Lymphocytes, Absolute 10/13/2021 1.95  0.70 - 3.10 10*3/mm3 Final   • Monocytes, Absolute 10/13/2021 0.65  0.10 - 0.90 10*3/mm3 Final   • Eosinophils, Absolute 10/13/2021 0.34  0.00 - 0.40 10*3/mm3 Final   • Basophils, Absolute 10/13/2021 0.03  0.00 - 0.20 10*3/mm3 Final   • Immature Grans, Absolute 10/13/2021 0.01  0.00 - 0.05 10*3/mm3 Final   •  nRBC 10/13/2021 0.0  0.0 - 0.2 /100 WBC Final   Hospital Outpatient Visit on 10/01/2021   Component Date Value Ref Range Status   • Glucose 10/01/2021 181* 70 - 130 mg/dL Final    Meter: [id number] : [id number] Radhames Sarah RT   Pre-Admission Testing on 10/01/2021   Component Date Value Ref Range Status   • QT Interval 10/01/2021 407  ms Final   • WBC 10/01/2021 7.90  3.40 - 10.80 10*3/mm3 Final   • RBC 10/01/2021 4.09  3.77 - 5.28 10*6/mm3 Final   • Hemoglobin 10/01/2021 12.5  12.0 - 15.9 g/dL Final   • Hematocrit 10/01/2021 38.0  34.0 - 46.6 % Final   • MCV 10/01/2021 92.9  79.0 - 97.0 fL Final   • MCH 10/01/2021 30.6  26.6 - 33.0 pg Final   • MCHC 10/01/2021 32.9  31.5 - 35.7 g/dL Final   • RDW 10/01/2021 12.9  12.3 - 15.4 % Final   • RDW-SD 10/01/2021 44.0  37.0 - 54.0 fl Final   • MPV 10/01/2021 9.6  6.0 - 12.0 fL Final   • Platelets 10/01/2021 241  140 - 450 10*3/mm3 Final   • Glucose 10/01/2021 291* 65 - 99 mg/dL Final   • BUN 10/01/2021 10  6 - 20 mg/dL Final   • Creatinine 10/01/2021 0.58  0.57 - 1.00 mg/dL Final   • Sodium 10/01/2021 135* 136 - 145 mmol/L Final   • Potassium 10/01/2021 4.2  3.5 - 5.2 mmol/L Final   • Chloride 10/01/2021 100  98 - 107 mmol/L Final   • CO2 10/01/2021 24.4  22.0 - 29.0 mmol/L Final   • Calcium 10/01/2021 8.8  8.6 - 10.5 mg/dL Final   • eGFR Non African Amer 10/01/2021 115  >60 mL/min/1.73 Final   • BUN/Creatinine Ratio 10/01/2021 17.2  7.0 - 25.0 Final   • Anion Gap 10/01/2021 10.6  5.0 - 15.0 mmol/L Final   • HCG Qualitative 10/01/2021 Negative  Negative Final   • Thyroid Peroxidase Antibody 10/01/2021 10  0 - 34 IU/mL Final   • Vitamin B-12 10/01/2021 426  211 - 946 pg/mL Final   • COVID19 10/01/2021 Not Detected  Not Detected - Ref. Range Final   • Microalbumin, Urine 10/01/2021 <1.2  mg/dL Final   Hospital Outpatient Visit on 09/29/2021   Component Date Value Ref Range Status   • Sinus 09/29/2021 3.5  cm Final   • STJ 09/29/2021 2.6  cm Final   • Ascending aorta  09/29/2021 3.4  cm Final   • LA Volume Index 09/29/2021 12.0  mL/m2 Final   • Aortic arch 09/29/2021 2.7  cm Final   • Abdo Ao Diam 09/29/2021 1.7  cm Final   • 3D vol index 09/29/2021 13.0   Final   • EF_3D-VOL 09/29/2021 57  % Final   • BSA 09/29/2021 1.9  m^2 Final   • IVSd 09/29/2021 0.86  cm Final   • LVIDd 09/29/2021 4.1  cm Final   • LVIDs 09/29/2021 2.6  cm Final   • LVPWd 09/29/2021 0.96  cm Final   • IVS/LVPW 09/29/2021 0.9   Final   • FS 09/29/2021 37.0  % Final   • EDV(Teich) 09/29/2021 75.9  ml Final   • ESV(Teich) 09/29/2021 24.8  ml Final   • EF(Teich) 09/29/2021 67.3  % Final   • EF(cubed) 09/29/2021 75.0  % Final   • LV mass(C)d 09/29/2021 118.0  grams Final   • LV mass(C)dI 09/29/2021 62.0  grams/m^2 Final   • SV(Teich) 09/29/2021 51.1  ml Final   • SI(Teich) 09/29/2021 26.9  ml/m^2 Final   • SV(cubed) 09/29/2021 53.2  ml Final   • SI(cubed) 09/29/2021 28.0  ml/m^2 Final   • Ao root diam 09/29/2021 3.2  cm Final   • Ao root area 09/29/2021 8.2  cm^2 Final   • ACS 09/29/2021 2.1  cm Final   • asc Aorta Diam 09/29/2021 3.4  cm Final   • LVOT diam 09/29/2021 1.9  cm Final   • LVOT area 09/29/2021 3.0  cm^2 Final   • LVOT area(traced) 09/29/2021 2.8  cm^2 Final   • RVOT diam 09/29/2021 1.8  cm Final   • RVOT area 09/29/2021 2.6  cm^2 Final   • LVLd ap4 09/29/2021 7.9  cm Final   • EDV(MOD-sp4) 09/29/2021 101.0  ml Final   • LVLs ap4 09/29/2021 6.6  cm Final   • ESV(MOD-sp4) 09/29/2021 41.0  ml Final   • EF(MOD-sp4) 09/29/2021 59.4  % Final   • LVLd ap2 09/29/2021 8.0  cm Final   • EDV(MOD-sp2) 09/29/2021 101.0  ml Final   • LVLs ap2 09/29/2021 6.2  cm Final   • ESV(MOD-sp2) 09/29/2021 43.0  ml Final   • EF(MOD-sp2) 09/29/2021 57.4  % Final   • SV(MOD-sp4) 09/29/2021 60.0  ml Final   • SI(MOD-sp4) 09/29/2021 31.5  ml/m^2 Final   • SV(MOD-sp2) 09/29/2021 58.0  ml Final   • SI(MOD-sp2) 09/29/2021 30.5  ml/m^2 Final   • Ao root area (BSA corrected) 09/29/2021 1.7   Final   • LV Corey Vol (BSA corrected)  09/29/2021 53.1  ml/m^2 Final   • LV Sys Vol (BSA corrected) 09/29/2021 21.5  ml/m^2 Final   • TAPSE (>1.6) 09/29/2021 2.7  cm Final   • EF(MOD-bp) 09/29/2021 57.6  % Final   • MV A dur 09/29/2021 0.09  sec Final   • MV E max lit 09/29/2021 70.3  cm/sec Final   • MV A max lit 09/29/2021 58.3  cm/sec Final   • MV E/A 09/29/2021 1.2   Final   • MV V2 max 09/29/2021 84.7  cm/sec Final   • MV max PG 09/29/2021 2.9  mmHg Final   • MV V2 mean 09/29/2021 47.2  cm/sec Final   • MV mean PG 09/29/2021 1.1  mmHg Final   • MV V2 VTI 09/29/2021 20.2  cm Final   • MVA(VTI) 09/29/2021 2.6  cm^2 Final   • MV P1/2t max lit 09/29/2021 85.6  cm/sec Final   • MV P1/2t 09/29/2021 54.5  msec Final   • MVA(P1/2t) 09/29/2021 4.0  cm^2 Final   • MV dec slope 09/29/2021 460.1  cm/sec^2 Final   • MV dec time 09/29/2021 0.2  sec Final   • Ao pk lit 09/29/2021 118.3  cm/sec Final   • Ao max PG 09/29/2021 5.6  mmHg Final   • Ao max PG (full) 09/29/2021 1.6  mmHg Final   • Ao V2 mean 09/29/2021 84.9  cm/sec Final   • Ao mean PG 09/29/2021 3.3  mmHg Final   • Ao mean PG (full) 09/29/2021 0.83  mmHg Final   • Ao V2 VTI 09/29/2021 21.3  cm Final   • COREY(I,A) 09/29/2021 2.5  cm^2 Final   • COREY(I,D) 09/29/2021 2.5  cm^2 Final   • COREY(V,A) 09/29/2021 2.5  cm^2 Final   • COREY(V,D) 09/29/2021 2.5  cm^2 Final   • LV V1 max PG 09/29/2021 4.0  mmHg Final   • LV V1 mean PG 09/29/2021 2.4  mmHg Final   • LV V1 max 09/29/2021 99.4  cm/sec Final   • LV V1 mean 09/29/2021 73.7  cm/sec Final   • LV V1 VTI 09/29/2021 17.7  cm Final   • MR max lit 09/29/2021 436.8  cm/sec Final   • MR max PG 09/29/2021 76.3  mmHg Final   • SV(Ao) 09/29/2021 174.1  ml Final   • SI(Ao) 09/29/2021 91.5  ml/m^2 Final   • SV(LVOT) 09/29/2021 52.8  ml Final   • SV(RVOT) 09/29/2021 32.3  ml Final   • SI(LVOT) 09/29/2021 27.7  ml/m^2 Final   • PA V2 max 09/29/2021 92.3  cm/sec Final   • PA max PG 09/29/2021 3.4  mmHg Final   • PA max PG (full) 09/29/2021 1.4  mmHg Final   • BH CV ECHO HOMERO  - PVA(V,A) 09/29/2021 2.0  cm^2 Final   • BH CV ECHO HOMERO - PVA(V,D) 09/29/2021 2.0  cm^2 Final   • PA acc time 09/29/2021 0.11  sec Final   • RV V1 max PG 09/29/2021 2.0  mmHg Final   • RV V1 mean PG 09/29/2021 1.3  mmHg Final   • RV V1 max 09/29/2021 70.7  cm/sec Final   • RV V1 mean 09/29/2021 54.1  cm/sec Final   • RV V1 VTI 09/29/2021 12.6  cm Final   • TR max juan alberto 09/29/2021 170.1  cm/sec Final   • PA pr(Accel) 09/29/2021 27.9  mmHg Final   • Pulm Sys Juan Alberto 09/29/2021 56.1  cm/sec Final   • Pulm Corey Juan Alberto 09/29/2021 53.6  cm/sec Final   • Pulm S/D 09/29/2021 1.0   Final   • Qp/Qs 09/29/2021 0.61   Final   • Pulm A Revs Dur 09/29/2021 0.11  sec Final   • Pulm A Revs Juan Alberto 09/29/2021 42.8  cm/sec Final   • MVA P1/2T LCG 09/29/2021 2.6  cm^2 Final   • RV Base 09/29/2021 3.2  cm Final   • RV Length 09/29/2021 6.8  cm Final   • RV Mid 09/29/2021 2.7  cm Final   • Lat Peak E' Juan Alberto 09/29/2021 21.1  cm/sec Final   • Med Peak E' Juan Alberto 09/29/2021 14.1  cm/sec Final   • RV S' 09/29/2021 15.3  cm/sec Final   • BH CV ECHO HOMERO - BZI_BMI 09/29/2021 21.9  kilograms/m^2 Final   • BH CV ECHO HOMERO - BSA(HAYCOCK) 09/29/2021 1.9  m^2 Final   • BH CV ECHO HOMERO - BZI_METRIC_WEIGHT 09/29/2021 71.2  kg Final   • BH CV ECHO HOMERO - BZI_METRIC_HEIGHT 09/29/2021 180.3  cm Final   • Avg E/e' ratio 09/29/2021 3.99   Final   • RAP systole 09/29/2021 3  mmHg Final   • RVSP(TR) 09/29/2021 15  mmHg Final   • Target HR (85%) 09/29/2021 153  bpm Final   • Max. Pred. HR (100%) 09/29/2021 180  bpm Final        US Guided Lymph Node Biopsy    Result Date: 9/29/2021   Successful ultrasound guided FNA and core biopsy of right cervical lymph node.  This report was finalized on 9/29/2021 8:07 PM by Dr. Eulalio Kwok M.D.      NM PET/CT Skull Base to Mid Thigh    Result Date: 10/4/2021  1. There are 2 separate hypermetabolic lesions within the right breast. The right axillary, interpectoral, supraclavicular, and right neck lymphadenopathy is likely metastatic.  The hypermetabolic left hilar lymphadenopathy is suspicious for metastatic lymphadenopathy as well and there is a hypermetabolic right femoral neck lesion which likely represents a metastasis. 2. There is no hypermetabolic lymphadenopathy or evidence for visceral metastases within the abdomen or pelvis.  This report was finalized on 10/4/2021 12:01 PM by Dr. Atiya Ambrocio M.D.       CT of the chest abdomen pelvis-images independently reviewed and interpreted by me in detail summarized in the HPI  Bone scan-images independently reviewed and interpreted by me in detail summarized in the HPI    CT neck-images independently reviewed and interpreted by me in detail summarized in HPI.  I also discussed the CT neck with Dr. Alexander Blevins with radiology regarding the right side lymphadenopathy.     9/28/2021-right cervical lymph node biopsy-pathology consistent with metastatic mammary carcinoma.    10/1/2021-PET/CT-there are 2 separate hypermetabolic lesions within the right breast.  1 measuring 6 cm and the other measuring 1.3 cm.  The SUV of 6 cm lesion of 5.6 and small lesion of 4.8.  Hypermetabolic right axillary, infra pectoral, right supraclavicular lymphadenopathy noted.  In addition there are hypermetabolic nodes at the right thoracic inlet, right base of the neck and right posterior cervical triangle.  1.6 x 0.8 cm right posterior cervical triangle node with an SUV of 3.9.  Right axillary lymphadenopathy with lymphedema 6.4.  Hypermetabolic left hilar lymphadenopathy measuring 2 x 1.1 cm with an SUV of 5.8.  No hypermetabolic activity in the liver or evidence of metastatic disease in the abdomen or pelvis.  Hypermetabolic lesion in the right femoral neck measuring 1.5 cm.  SUV 5.4.    PET/CT images independently reviewed and interpreted by me in detail summarized above.  Images also reviewed with patient.    10/28/2021  CBC-WBC 5.64, hemoglobin 11.3, platelets 245  CMP-blood sugar 174, BUN 13, creatinine 0.54, LFTs  normal    Assessment/Plan       *Invasive ductal carcinoma of the right breast  · Clinical T3 N3 cM1, stage IV  · The tumor is grade 3, poorly differentiated, ER 1 to 10% weak, DC negative, HER-2 negative, Ki-67 65%  · Cervical lymph node biopsy consistent with metastatic mammary carcinoma.  · PET/CT the abnormal uptake noted in the left hilar lymph node, right femoral neck, cervical lymph nodes.  · Since the cervical lymph node biopsy is positive as well as PET/CT with several abnormal findings I do believe that this is stage IV disease.  · Discussed case at multidisciplinary conference on 9/28/2021 and the initial plan was to proceed with neoadjuvant chemotherapy followed by surgery and radiation to the positive cervical nodes.  PET/CT results were not available at that time.  · The PET/CT showing other areas of metastatic disease unfortunately we will not be able to proceed with curative intent treatment.  · Explained that the disease is incurable  · On hearing this news Sierra was overwhelmed and she decided to cancel the procedure for the port echo scheduled for later in the day.  · Caris testing requested  · On the subsequent visit I discussed with her about palliative intent chemotherapy with single agent Taxol and adding immunotherapy down the line once PD-L1 results available.  · Since she is complaining of significant pain in the right breast I would consider adding carboplatin for about 4 to 6 weeks to help get a quick response in the right breast tumor as well as right axillary lymphadenopathy.  · Adverse effects of Taxol including but not limited to myelosuppression, increased risk of infections, nausea, vomiting, arthralgias, neuropathy, nail changes, alopecia, diarrhea, possible mucositis discussed.  · Caris testing has been reviewed today and PD-L1 CPS was 3 and hence no additional benefit from immunotherapy.  Tumor mutational burden was also low and MSI stable.  Therefore there is no added benefit  from immunotherapy.  · Tolerating chemotherapy fairly well.  · Only adverse effect of nausea.  · Continue carboplatin and Taxol  · CBC CMP reviewed and stable to proceed with chemotherapy      *Nausea  · Continue Zofran every 8 hours as needed for nausea  · Since the nausea starts 2 to 3 days after chemotherapy I do not think adding another premed would help the situation    *Diabetes  · Type I  · Poorly controlled  ·  notes reviewed   · Follow-up with endocrinology    * Cervical lymphadenopathy  · Biopsied and pathology consistent with metastatic breast cancer    *Anxiety  · She has been referred to supportive oncology  · Met with Tenisha Rizzo  · Started on gabapentin 300 mg 3 times daily  · She is currently on max dose of Celexa  · There is a level C interaction of Zyprexa with Celexa.  · However I think Zyprexa would be reasonable choice given anxiety, insomnia, nausea.  · Discussed Zyprexa at next visit    *Port placement-on 10/16/2021    *Breast pain-Norco every 8 hours prescribed, continue the same    *Follow-up-2 weeks

## 2021-10-28 NOTE — NURSING NOTE
Pt did not want to use the cold packs today. She is to cold in the clinic today. She has 2 warm blankets on her.

## 2021-10-29 ENCOUNTER — TELEPHONE (OUTPATIENT)
Dept: OTHER | Facility: HOSPITAL | Age: 41
End: 2021-10-29

## 2021-10-29 NOTE — TELEPHONE ENCOUNTER
Called MsPriti Mayco to see how she was doing. Left a message with my contact information and asked her to call back at her convenience.

## 2021-11-04 ENCOUNTER — INFUSION (OUTPATIENT)
Dept: ONCOLOGY | Facility: HOSPITAL | Age: 41
End: 2021-11-04

## 2021-11-04 VITALS
BODY MASS INDEX: 22.76 KG/M2 | TEMPERATURE: 96.8 F | OXYGEN SATURATION: 100 % | RESPIRATION RATE: 18 BRPM | HEIGHT: 71 IN | HEART RATE: 71 BPM | WEIGHT: 162.6 LBS | DIASTOLIC BLOOD PRESSURE: 68 MMHG | SYSTOLIC BLOOD PRESSURE: 113 MMHG

## 2021-11-04 DIAGNOSIS — C50.111 MALIGNANT NEOPLASM OF CENTRAL PORTION OF RIGHT BREAST IN FEMALE, ESTROGEN RECEPTOR NEGATIVE (HCC): Primary | ICD-10-CM

## 2021-11-04 DIAGNOSIS — Z45.2 ENCOUNTER FOR FITTING AND ADJUSTMENT OF VASCULAR CATHETER: ICD-10-CM

## 2021-11-04 DIAGNOSIS — Z17.1 MALIGNANT NEOPLASM OF CENTRAL PORTION OF RIGHT BREAST IN FEMALE, ESTROGEN RECEPTOR NEGATIVE (HCC): Primary | ICD-10-CM

## 2021-11-04 LAB
ALBUMIN SERPL-MCNC: 4.1 G/DL (ref 3.5–5.2)
ALBUMIN/GLOB SERPL: 1.7 G/DL
ALP SERPL-CCNC: 43 U/L (ref 39–117)
ALT SERPL W P-5'-P-CCNC: 17 U/L (ref 1–33)
ANION GAP SERPL CALCULATED.3IONS-SCNC: 8.3 MMOL/L (ref 5–15)
AST SERPL-CCNC: 17 U/L (ref 1–32)
BASOPHILS # BLD AUTO: 0.03 10*3/MM3 (ref 0–0.2)
BASOPHILS NFR BLD AUTO: 0.6 % (ref 0–1.5)
BILIRUB SERPL-MCNC: <0.2 MG/DL (ref 0–1.2)
BUN SERPL-MCNC: 14 MG/DL (ref 6–20)
BUN/CREAT SERPL: 24.6 (ref 7–25)
CALCIUM SPEC-SCNC: 9 MG/DL (ref 8.6–10.5)
CHLORIDE SERPL-SCNC: 102 MMOL/L (ref 98–107)
CO2 SERPL-SCNC: 26.7 MMOL/L (ref 22–29)
CREAT SERPL-MCNC: 0.57 MG/DL (ref 0.57–1)
DEPRECATED RDW RBC AUTO: 47.6 FL (ref 37–54)
EOSINOPHIL # BLD AUTO: 0.15 10*3/MM3 (ref 0–0.4)
EOSINOPHIL NFR BLD AUTO: 2.8 % (ref 0.3–6.2)
ERYTHROCYTE [DISTWIDTH] IN BLOOD BY AUTOMATED COUNT: 14.2 % (ref 12.3–15.4)
GFR SERPL CREATININE-BSD FRML MDRD: 117 ML/MIN/1.73
GLOBULIN UR ELPH-MCNC: 2.4 GM/DL
GLUCOSE SERPL-MCNC: 197 MG/DL (ref 65–99)
HCT VFR BLD AUTO: 34.1 % (ref 34–46.6)
HGB BLD-MCNC: 11.1 G/DL (ref 12–15.9)
IMM GRANULOCYTES # BLD AUTO: 0.03 10*3/MM3 (ref 0–0.05)
IMM GRANULOCYTES NFR BLD AUTO: 0.6 % (ref 0–0.5)
LYMPHOCYTES # BLD AUTO: 1.61 10*3/MM3 (ref 0.7–3.1)
LYMPHOCYTES NFR BLD AUTO: 29.9 % (ref 19.6–45.3)
MCH RBC QN AUTO: 30.1 PG (ref 26.6–33)
MCHC RBC AUTO-ENTMCNC: 32.6 G/DL (ref 31.5–35.7)
MCV RBC AUTO: 92.4 FL (ref 79–97)
MONOCYTES # BLD AUTO: 0.42 10*3/MM3 (ref 0.1–0.9)
MONOCYTES NFR BLD AUTO: 7.8 % (ref 5–12)
NEUTROPHILS NFR BLD AUTO: 3.15 10*3/MM3 (ref 1.7–7)
NEUTROPHILS NFR BLD AUTO: 58.3 % (ref 42.7–76)
NRBC BLD AUTO-RTO: 0 /100 WBC (ref 0–0.2)
PLATELET # BLD AUTO: 222 10*3/MM3 (ref 140–450)
PMV BLD AUTO: 9.6 FL (ref 6–12)
POTASSIUM SERPL-SCNC: 4.4 MMOL/L (ref 3.5–5.2)
PROT SERPL-MCNC: 6.5 G/DL (ref 6–8.5)
RBC # BLD AUTO: 3.69 10*6/MM3 (ref 3.77–5.28)
SODIUM SERPL-SCNC: 137 MMOL/L (ref 136–145)
WBC # BLD AUTO: 5.39 10*3/MM3 (ref 3.4–10.8)

## 2021-11-04 PROCEDURE — 96417 CHEMO IV INFUS EACH ADDL SEQ: CPT

## 2021-11-04 PROCEDURE — 25010000002 CARBOPLATIN PER 50 MG: Performed by: NURSE PRACTITIONER

## 2021-11-04 PROCEDURE — 96413 CHEMO IV INFUSION 1 HR: CPT

## 2021-11-04 PROCEDURE — 25010000002 DEXAMETHASONE PER 1 MG: Performed by: NURSE PRACTITIONER

## 2021-11-04 PROCEDURE — 25010000002 PALONOSETRON PER 25 MCG: Performed by: NURSE PRACTITIONER

## 2021-11-04 PROCEDURE — 25010000002 DIPHENHYDRAMINE PER 50 MG: Performed by: NURSE PRACTITIONER

## 2021-11-04 PROCEDURE — 25010000002 HEPARIN LOCK FLUSH PER 10 UNITS: Performed by: INTERNAL MEDICINE

## 2021-11-04 PROCEDURE — 25010000002 PACLITAXEL PER 1 MG: Performed by: NURSE PRACTITIONER

## 2021-11-04 PROCEDURE — 96375 TX/PRO/DX INJ NEW DRUG ADDON: CPT

## 2021-11-04 PROCEDURE — 85025 COMPLETE CBC W/AUTO DIFF WBC: CPT | Performed by: INTERNAL MEDICINE

## 2021-11-04 PROCEDURE — 80053 COMPREHEN METABOLIC PANEL: CPT | Performed by: INTERNAL MEDICINE

## 2021-11-04 RX ORDER — FAMOTIDINE 10 MG/ML
20 INJECTION, SOLUTION INTRAVENOUS ONCE
Status: COMPLETED | OUTPATIENT
Start: 2021-11-04 | End: 2021-11-04

## 2021-11-04 RX ORDER — SODIUM CHLORIDE 0.9 % (FLUSH) 0.9 %
10 SYRINGE (ML) INJECTION AS NEEDED
Status: CANCELLED | OUTPATIENT
Start: 2021-11-04

## 2021-11-04 RX ORDER — FAMOTIDINE 10 MG/ML
20 INJECTION, SOLUTION INTRAVENOUS AS NEEDED
Status: CANCELLED | OUTPATIENT
Start: 2021-11-04

## 2021-11-04 RX ORDER — DIPHENHYDRAMINE HYDROCHLORIDE 50 MG/ML
50 INJECTION INTRAMUSCULAR; INTRAVENOUS AS NEEDED
Status: CANCELLED | OUTPATIENT
Start: 2021-11-04

## 2021-11-04 RX ORDER — HEPARIN SODIUM (PORCINE) LOCK FLUSH IV SOLN 100 UNIT/ML 100 UNIT/ML
500 SOLUTION INTRAVENOUS AS NEEDED
Status: DISCONTINUED | OUTPATIENT
Start: 2021-11-04 | End: 2021-11-04 | Stop reason: HOSPADM

## 2021-11-04 RX ORDER — SODIUM CHLORIDE 9 MG/ML
250 INJECTION, SOLUTION INTRAVENOUS ONCE
Status: COMPLETED | OUTPATIENT
Start: 2021-11-04 | End: 2021-11-04

## 2021-11-04 RX ORDER — PALONOSETRON 0.05 MG/ML
0.25 INJECTION, SOLUTION INTRAVENOUS ONCE
Status: COMPLETED | OUTPATIENT
Start: 2021-11-04 | End: 2021-11-04

## 2021-11-04 RX ORDER — HEPARIN SODIUM (PORCINE) LOCK FLUSH IV SOLN 100 UNIT/ML 100 UNIT/ML
500 SOLUTION INTRAVENOUS AS NEEDED
Status: CANCELLED | OUTPATIENT
Start: 2021-11-04

## 2021-11-04 RX ORDER — SODIUM CHLORIDE 0.9 % (FLUSH) 0.9 %
10 SYRINGE (ML) INJECTION AS NEEDED
Status: DISCONTINUED | OUTPATIENT
Start: 2021-11-04 | End: 2021-11-04 | Stop reason: HOSPADM

## 2021-11-04 RX ADMIN — CARBOPLATIN 230 MG: 10 INJECTION INTRAVENOUS at 13:56

## 2021-11-04 RX ADMIN — SODIUM CHLORIDE 250 ML: 9 INJECTION, SOLUTION INTRAVENOUS at 11:24

## 2021-11-04 RX ADMIN — SODIUM CHLORIDE, PRESERVATIVE FREE 10 ML: 5 INJECTION INTRAVENOUS at 14:38

## 2021-11-04 RX ADMIN — FAMOTIDINE 20 MG: 10 INJECTION INTRAVENOUS at 11:31

## 2021-11-04 RX ADMIN — PALONOSETRON 0.25 MG: 0.05 INJECTION, SOLUTION INTRAVENOUS at 11:33

## 2021-11-04 RX ADMIN — DEXAMETHASONE SODIUM PHOSPHATE 8 MG: 10 INJECTION INTRAMUSCULAR; INTRAVENOUS at 11:34

## 2021-11-04 RX ADMIN — PACLITAXEL 150 MG: 6 INJECTION, SOLUTION INTRAVENOUS at 12:45

## 2021-11-04 RX ADMIN — Medication 500 UNITS: at 14:38

## 2021-11-04 RX ADMIN — DIPHENHYDRAMINE HYDROCHLORIDE 12.5 MG: 50 INJECTION INTRAMUSCULAR; INTRAVENOUS at 11:57

## 2021-11-04 NOTE — NURSING NOTE
Patient declined ice packs today during infusion.  Patient requested half dose of IV Benadryl (12.5mg) today due to plans following infusion and Benadryl causing drowsiness.  BUDDY Abrams approved and updated today's treatment plan.

## 2021-11-04 NOTE — NURSING NOTE
Spoke with LEX Reece and Sil Pederson, clinical RN for Dr. De La O. Both in agreement that further discussion will be held with Dr. De La O at pt's next appointment on 11/11 with regard to Xgeva and Zyprexa; neither will be given today.

## 2021-11-08 DIAGNOSIS — Z17.1 MALIGNANT NEOPLASM OF CENTRAL PORTION OF RIGHT BREAST IN FEMALE, ESTROGEN RECEPTOR NEGATIVE (HCC): ICD-10-CM

## 2021-11-08 DIAGNOSIS — C50.111 MALIGNANT NEOPLASM OF CENTRAL PORTION OF RIGHT BREAST IN FEMALE, ESTROGEN RECEPTOR NEGATIVE (HCC): ICD-10-CM

## 2021-11-09 RX ORDER — LORAZEPAM 1 MG/1
1 TABLET ORAL NIGHTLY PRN
Qty: 10 TABLET | Refills: 0 | Status: SHIPPED | OUTPATIENT
Start: 2021-11-09 | End: 2021-11-19 | Stop reason: SDUPTHER

## 2021-11-09 RX ORDER — ONDANSETRON HYDROCHLORIDE 8 MG/1
8 TABLET, FILM COATED ORAL EVERY 8 HOURS PRN
Qty: 30 TABLET | Refills: 3 | Status: SHIPPED | OUTPATIENT
Start: 2021-11-09

## 2021-11-11 ENCOUNTER — INFUSION (OUTPATIENT)
Dept: ONCOLOGY | Facility: HOSPITAL | Age: 41
End: 2021-11-11

## 2021-11-11 ENCOUNTER — OFFICE VISIT (OUTPATIENT)
Dept: ONCOLOGY | Facility: CLINIC | Age: 41
End: 2021-11-11

## 2021-11-11 VITALS — HEART RATE: 82 BPM | DIASTOLIC BLOOD PRESSURE: 72 MMHG | SYSTOLIC BLOOD PRESSURE: 116 MMHG

## 2021-11-11 VITALS
HEART RATE: 82 BPM | SYSTOLIC BLOOD PRESSURE: 106 MMHG | WEIGHT: 160 LBS | TEMPERATURE: 97.5 F | HEIGHT: 71 IN | RESPIRATION RATE: 16 BRPM | DIASTOLIC BLOOD PRESSURE: 71 MMHG | OXYGEN SATURATION: 98 % | BODY MASS INDEX: 22.4 KG/M2

## 2021-11-11 DIAGNOSIS — Z17.1 MALIGNANT NEOPLASM OF CENTRAL PORTION OF RIGHT BREAST IN FEMALE, ESTROGEN RECEPTOR NEGATIVE (HCC): ICD-10-CM

## 2021-11-11 DIAGNOSIS — C50.111 MALIGNANT NEOPLASM OF CENTRAL PORTION OF RIGHT BREAST IN FEMALE, ESTROGEN RECEPTOR NEGATIVE (HCC): Primary | ICD-10-CM

## 2021-11-11 DIAGNOSIS — Z17.1 MALIGNANT NEOPLASM OF CENTRAL PORTION OF RIGHT BREAST IN FEMALE, ESTROGEN RECEPTOR NEGATIVE (HCC): Primary | ICD-10-CM

## 2021-11-11 DIAGNOSIS — C50.111 MALIGNANT NEOPLASM OF CENTRAL PORTION OF RIGHT BREAST IN FEMALE, ESTROGEN RECEPTOR NEGATIVE (HCC): ICD-10-CM

## 2021-11-11 LAB
ALBUMIN SERPL-MCNC: 4 G/DL (ref 3.5–5.2)
ALBUMIN/GLOB SERPL: 1.7 G/DL
ALP SERPL-CCNC: 39 U/L (ref 39–117)
ALT SERPL W P-5'-P-CCNC: 18 U/L (ref 1–33)
ANION GAP SERPL CALCULATED.3IONS-SCNC: 9.4 MMOL/L (ref 5–15)
AST SERPL-CCNC: 21 U/L (ref 1–32)
BASOPHILS # BLD AUTO: 0.03 10*3/MM3 (ref 0–0.2)
BASOPHILS NFR BLD AUTO: 0.6 % (ref 0–1.5)
BILIRUB SERPL-MCNC: <0.2 MG/DL (ref 0–1.2)
BUN SERPL-MCNC: 15 MG/DL (ref 6–20)
BUN/CREAT SERPL: 21.4 (ref 7–25)
CALCIUM SPEC-SCNC: 9 MG/DL (ref 8.6–10.5)
CHLORIDE SERPL-SCNC: 104 MMOL/L (ref 98–107)
CO2 SERPL-SCNC: 24.6 MMOL/L (ref 22–29)
CREAT SERPL-MCNC: 0.7 MG/DL (ref 0.57–1)
DEPRECATED RDW RBC AUTO: 49 FL (ref 37–54)
EOSINOPHIL # BLD AUTO: 0.12 10*3/MM3 (ref 0–0.4)
EOSINOPHIL NFR BLD AUTO: 2.4 % (ref 0.3–6.2)
ERYTHROCYTE [DISTWIDTH] IN BLOOD BY AUTOMATED COUNT: 14.6 % (ref 12.3–15.4)
GFR SERPL CREATININE-BSD FRML MDRD: 93 ML/MIN/1.73
GLOBULIN UR ELPH-MCNC: 2.4 GM/DL
GLUCOSE SERPL-MCNC: 236 MG/DL (ref 65–99)
HCT VFR BLD AUTO: 34.3 % (ref 34–46.6)
HGB BLD-MCNC: 11 G/DL (ref 12–15.9)
IMM GRANULOCYTES # BLD AUTO: 0.02 10*3/MM3 (ref 0–0.05)
IMM GRANULOCYTES NFR BLD AUTO: 0.4 % (ref 0–0.5)
LYMPHOCYTES # BLD AUTO: 1.8 10*3/MM3 (ref 0.7–3.1)
LYMPHOCYTES NFR BLD AUTO: 36.6 % (ref 19.6–45.3)
MCH RBC QN AUTO: 30.1 PG (ref 26.6–33)
MCHC RBC AUTO-ENTMCNC: 32.1 G/DL (ref 31.5–35.7)
MCV RBC AUTO: 93.7 FL (ref 79–97)
MONOCYTES # BLD AUTO: 0.38 10*3/MM3 (ref 0.1–0.9)
MONOCYTES NFR BLD AUTO: 7.7 % (ref 5–12)
NEUTROPHILS NFR BLD AUTO: 2.57 10*3/MM3 (ref 1.7–7)
NEUTROPHILS NFR BLD AUTO: 52.3 % (ref 42.7–76)
NRBC BLD AUTO-RTO: 0 /100 WBC (ref 0–0.2)
PLATELET # BLD AUTO: 200 10*3/MM3 (ref 140–450)
PMV BLD AUTO: 9.5 FL (ref 6–12)
POTASSIUM SERPL-SCNC: 4.3 MMOL/L (ref 3.5–5.2)
PROT SERPL-MCNC: 6.4 G/DL (ref 6–8.5)
RBC # BLD AUTO: 3.66 10*6/MM3 (ref 3.77–5.28)
SODIUM SERPL-SCNC: 138 MMOL/L (ref 136–145)
WBC # BLD AUTO: 4.92 10*3/MM3 (ref 3.4–10.8)

## 2021-11-11 PROCEDURE — 25010000002 DEXAMETHASONE PER 1 MG: Performed by: INTERNAL MEDICINE

## 2021-11-11 PROCEDURE — 25010000002 DIPHENHYDRAMINE PER 50 MG: Performed by: INTERNAL MEDICINE

## 2021-11-11 PROCEDURE — 80053 COMPREHEN METABOLIC PANEL: CPT | Performed by: INTERNAL MEDICINE

## 2021-11-11 PROCEDURE — 99214 OFFICE O/P EST MOD 30 MIN: CPT | Performed by: INTERNAL MEDICINE

## 2021-11-11 PROCEDURE — 96413 CHEMO IV INFUSION 1 HR: CPT

## 2021-11-11 PROCEDURE — 25010000002 PACLITAXEL PER 1 MG: Performed by: INTERNAL MEDICINE

## 2021-11-11 PROCEDURE — 96375 TX/PRO/DX INJ NEW DRUG ADDON: CPT

## 2021-11-11 PROCEDURE — 85025 COMPLETE CBC W/AUTO DIFF WBC: CPT | Performed by: INTERNAL MEDICINE

## 2021-11-11 RX ORDER — DIPHENHYDRAMINE HYDROCHLORIDE 50 MG/ML
50 INJECTION INTRAMUSCULAR; INTRAVENOUS AS NEEDED
Status: CANCELLED | OUTPATIENT
Start: 2021-11-18

## 2021-11-11 RX ORDER — PROCHLORPERAZINE MALEATE 10 MG
10 TABLET ORAL EVERY 6 HOURS PRN
Qty: 60 TABLET | Refills: 3 | Status: ON HOLD | OUTPATIENT
Start: 2021-11-11 | End: 2022-10-22

## 2021-11-11 RX ORDER — DIPHENHYDRAMINE HYDROCHLORIDE 50 MG/ML
50 INJECTION INTRAMUSCULAR; INTRAVENOUS AS NEEDED
Status: CANCELLED | OUTPATIENT
Start: 2021-11-11

## 2021-11-11 RX ORDER — FAMOTIDINE 10 MG/ML
20 INJECTION, SOLUTION INTRAVENOUS ONCE
Status: COMPLETED | OUTPATIENT
Start: 2021-11-11 | End: 2021-11-11

## 2021-11-11 RX ORDER — FAMOTIDINE 10 MG/ML
20 INJECTION, SOLUTION INTRAVENOUS ONCE
Status: CANCELLED | OUTPATIENT
Start: 2021-11-18

## 2021-11-11 RX ORDER — FAMOTIDINE 10 MG/ML
20 INJECTION, SOLUTION INTRAVENOUS AS NEEDED
Status: CANCELLED | OUTPATIENT
Start: 2021-11-11

## 2021-11-11 RX ORDER — FAMOTIDINE 10 MG/ML
20 INJECTION, SOLUTION INTRAVENOUS ONCE
Status: CANCELLED | OUTPATIENT
Start: 2021-11-11

## 2021-11-11 RX ORDER — FAMOTIDINE 10 MG/ML
20 INJECTION, SOLUTION INTRAVENOUS AS NEEDED
Status: CANCELLED | OUTPATIENT
Start: 2021-11-18

## 2021-11-11 RX ORDER — SODIUM CHLORIDE 9 MG/ML
250 INJECTION, SOLUTION INTRAVENOUS ONCE
Status: COMPLETED | OUTPATIENT
Start: 2021-11-11 | End: 2021-11-11

## 2021-11-11 RX ORDER — SODIUM CHLORIDE 9 MG/ML
250 INJECTION, SOLUTION INTRAVENOUS ONCE
Status: CANCELLED | OUTPATIENT
Start: 2021-11-11

## 2021-11-11 RX ORDER — SODIUM CHLORIDE 9 MG/ML
250 INJECTION, SOLUTION INTRAVENOUS ONCE
Status: CANCELLED | OUTPATIENT
Start: 2021-11-18

## 2021-11-11 RX ADMIN — PACLITAXEL 150 MG: 6 INJECTION, SOLUTION INTRAVENOUS at 12:28

## 2021-11-11 RX ADMIN — DEXAMETHASONE SODIUM PHOSPHATE 8 MG: 10 INJECTION INTRAMUSCULAR; INTRAVENOUS at 11:33

## 2021-11-11 RX ADMIN — FAMOTIDINE 20 MG: 10 INJECTION INTRAVENOUS at 11:16

## 2021-11-11 RX ADMIN — DIPHENHYDRAMINE HYDROCHLORIDE 25 MG: 50 INJECTION INTRAMUSCULAR; INTRAVENOUS at 11:17

## 2021-11-11 RX ADMIN — SODIUM CHLORIDE 250 ML: 9 INJECTION, SOLUTION INTRAVENOUS at 11:16

## 2021-11-11 NOTE — PROGRESS NOTES
Subjective   Sierra Mao is a 41 y.o. female. Referred by Dr. Price for right breast invasive ductal carcinoma triple negative, metastatic    Treatment  1.Taxol and carboplatin weekly started 10/13/2021    History of Present Illness     Ms. Mao is a 41-year-old Premenopausal  lady who palpated a mass in her right breast. She had a mass in a similar area in 2018 which was biopsied and benign. Further evaluation was performed.    08/18/2021-bilateral diagnostic mammogram-parenchyma is heterogeneously dense. In the palpable area of concern in the upper outer quadrant of the right breast there is an area of asymmetry with pleomorphic microcalcifications in the posterior one third of the upper outer quadrant of the right breast in the axillary region. There is also microcalcifications in the middle one third and anterior one third of the left breast at 12 o'clock position which are new. Architectural distortion in either breast.    Ultrasound-  Right breast at the site of palpable concern, at 11 o'clock, 1 cm from the nipple there is an irregular hypoechoic mass which measures up to 4 cm.  10 o'clock, 6 cm from the nipple there is an irregular mass which measures up to 1.6 cm  At 10 o'clock, 8 cm from the nipple there is a irregular mass which measures 1.8 cm.  Multiple right axillary lymph nodes demonstrate thickened cortices and rounded morphology largest of which measures 2 cm.  Left breast at 2 o'clock, 6 cm from the nipple there is a 1 cm heterogeneous hypoechoic masslike region.    09/07/2021-  1.right breast 11 o'clock, 1 cm from the nipple-invasive ductal carcinoma, poorly differentiated, grade 3, ER low +1 to 10%, weak, VA negative less than 1%, HER-2 negative.  2.right axillary lymph node-metastatic mammary carcinoma measuring 9 mm. ER negative, VA +5% three, HER-2 negative  3.left breast 2 o'clock, 6 cm from the nipple-cluster of apocrine cyst  4.left breast 12 o'clock-apocrine cyst with  polarizable calcium oxalate crystals.    Bilateral breast MRI 09/23/2021-biopsy-proven malignancy of the right breast occupying the middle and the posterior one thirds from 11 o'clock to 1 o'clock position and measuring up to 7.1 cm in greatest dimension. The lesion abuts the pectoralis fascia but there is no evidence of direct invasion. There are portions of the lesion that extended to the subpatellar soft tissues but there is no abnormal enhancement of the skin or skin thickening.  Bulky right axillary lymphadenopathy noted.  2.4 cm irregular mass in the lower outer quadrant of the right breast centered at 8 o'clock position which is separate from the known malignancy suspicious for additional malignancy.  Ultrasound and biopsy of this lesion recommended.  No findings suspicious for malignancy in the left breast.    09/23/2021-CT of the chest abdomen and pelvis and soft tissue neck-multiple mild to moderately enlarged subpectoral and right axillary lymph nodes consistent with metastatic disease. The subpectoral lymphadenopathy extends into the right supraclavicular region as well. There is no evidence of metastatic disease elsewhere within the neck. No evidence of metastatic disease in the chest abdomen or pelvis. 5 cm right ovarian cyst is noted. Suggest follow-up ultrasound in 6 weeks for further evaluation.    09/24/2021-bone scan without any evidence of metastatic disease.    9/28/2021-right cervical lymph node biopsy-pathology consistent with metastatic mammary carcinoma.    10/1/2021-PET/CT-there are 2 separate hypermetabolic lesions within the right breast.  1 measuring 6 cm and the other measuring 1.3 cm.  The SUV of 6 cm lesion of 5.6 and small lesion of 4.8.  Hypermetabolic right axillary, infra pectoral, right supraclavicular lymphadenopathy noted.  In addition there are hypermetabolic nodes at the right thoracic inlet, right base of the neck and right posterior cervical triangle.  1.6 x 0.8 cm right  posterior cervical triangle node with an SUV of 3.9.  Right axillary lymphadenopathy with lymphedema 6.4.  Hypermetabolic left hilar lymphadenopathy measuring 2 x 1.1 cm with an SUV of 5.8.  No hypermetabolic activity in the liver or evidence of metastatic disease in the abdomen or pelvis.  Hypermetabolic lesion in the right femoral neck measuring 1.5 cm.  SUV 5.4.    Mr. Mao reports that she initially felt good right breast mass in May 2021.  Starting July 2021 she had palpated something abnormal in her right neck.  Since noticing the mass in May 2021 she feels like the mass in the right breast has doubled in size.  She also feels like the right axillary lymph nodes have enlarged in size.    She had a 70 pound weight loss in 2019 due to poorly controlled diabetes.  She was initially diagnosed with a type 2 diabetes and subsequently determined to be type I and switched to insulin.  She had an admission to the hospital due to DKA.    She is unvaccinated for COVID-19.  She previously worked as a  however currently not working.    Family history significant for ovarian cancer in grandmother and bladder cancer in her father.      Interval history  Sierra presents to the clinic today for follow-up. She is accompanied by her .  The pain in the right breast has decreased. The mass has decreased as well. The right axillary tumors have also decreased in size.  Overall she is tolerating chemotherapy fairly well except for nausea and headache.  She has been taking Zofran and also receives Aloxi with chemotherapy for the nausea.  She has occasional days of significant fatigue.    The following portions of the patient's history were reviewed and updated as appropriate: allergies, current medications, past family history, past medical history, past social history, past surgical history and problem list.    Past Medical History:   Diagnosis Date   • Anxiety    • Arthritis    • Breast cancer (HCC)  2021    Right breast invasive ductal carcinoma ER low positive, MN negative, [id number] negative, mercedes to lymph node (biopsy positive)   • Chronic fatigue    • Diabetes mellitus with stage 3 chronic kidney disease (HCC)    • Hyperlipidemia    • Leukocytosis    • Menorrhagia with regular cycle    • Seasonal allergies    • Type I diabetes mellitus (HCC)    • Vitamin D deficiency         Past Surgical History:   Procedure Laterality Date   • APPENDECTOMY N/A    • BREAST BIOPSY Right 2018   • BREAST BIOPSY Bilateral 2021   • US GUIDED LYMPH NODE BIOPSY  2021    Dr. Carol Terrazas, [facility]   • US GUIDED LYMPH NODE BIOPSY  2021   • VENOUS ACCESS DEVICE (PORT) INSERTION N/A 10/15/2021    Procedure: INSERTION VENOUS ACCESS DEVICE;  Surgeon: Mariposa Price MD;  Location: [facility];  Service: General;  Laterality: N/A;        Family History   Problem Relation Age of Onset   • Diabetes Mother    • Cervical cancer Maternal Grandmother         cervical/uterine    • Diabetes Maternal Grandfather    • Lymphoma Paternal Aunt 60   • Breast cancer Neg Hx    • Malig Hyperthermia Neg Hx         Social History     Socioeconomic History   • Marital status:    Tobacco Use   • Smoking status: Former Smoker     Packs/day: 0.50     Years: 8.00     Pack years: 4.00     Types: Cigarettes     Start date: 1995     Quit date: 2003     Years since quittin.8   • Smokeless tobacco: Never Used   • Tobacco comment: teen / young adult   Vaping Use   • Vaping Use: Never used   Substance and Sexual Activity   • Alcohol use: Yes     Alcohol/week: 1.0 standard drink     Types: 1 Standard drinks or equivalent per week     Comment: social   • Drug use: No   • Sexual activity: Defer     Partners: Male     Birth control/protection: None     Comment:         OB History        1    Para   1    Term   1            AB        Living           SAB        IAB        Ectopic        Molar         "Multiple        Live Births                 Age of menarche-14  Age at first live childbirth-24   one para one  zero  Oral contraceptive pill use for 20 years  She is premenopausal    No Known Allergies       Review of systems as mentioned in the HPI    Objective   Blood pressure 106/71, pulse 82, temperature 97.5 °F (36.4 °C), temperature source Temporal, resp. rate 16, height 180 cm (70.87\"), weight 72.6 kg (160 lb), SpO2 98 %, not currently breastfeeding.   Physical Exam  Vitals reviewed.   Constitutional:       Appearance: Normal appearance. She is normal weight.   HENT:      Head: Normocephalic and atraumatic.      Right Ear: External ear normal.      Left Ear: External ear normal.      Nose: Nose normal.      Mouth/Throat:      Mouth: Mucous membranes are moist.      Pharynx: Oropharynx is clear.   Eyes:      Extraocular Movements: Extraocular movements intact.      Conjunctiva/sclera: Conjunctivae normal.      Pupils: Pupils are equal, round, and reactive to light.   Cardiovascular:      Rate and Rhythm: Normal rate and regular rhythm.      Pulses: Normal pulses.      Heart sounds: Normal heart sounds.   Pulmonary:      Effort: Pulmonary effort is normal.      Breath sounds: Normal breath sounds.   Abdominal:      General: Abdomen is flat. Bowel sounds are normal.   Musculoskeletal:         General: Normal range of motion.      Cervical back: Normal range of motion.   Skin:     General: Skin is warm and dry.   Neurological:      General: No focal deficit present.      Mental Status: She is alert and oriented to person, place, and time. Mental status is at baseline.   Psychiatric:         Mood and Affect: Mood normal.         Behavior: Behavior normal.         Thought Content: Thought content normal.         Judgment: Judgment normal.       Breast Exam: Right breast-right breast.  On palpation there is a 4x3 cm mass in the upper outer quadrant.  right axillary lymphadenopathy decreased in size.  " Supraclavicular lymph nodes present, decreased in size.   Left breast appears normal on inspection.  No palpable abnormalities of the breast or the axilla.    I have reexamined the patient and the results are consistent with the previously documented exam. Didi De La O MD     Infusion on 11/04/2021   Component Date Value Ref Range Status   • Glucose 11/04/2021 197* 65 - 99 mg/dL Final   • BUN 11/04/2021 14  6 - 20 mg/dL Final   • Creatinine 11/04/2021 0.57  0.57 - 1.00 mg/dL Final   • Sodium 11/04/2021 137  136 - 145 mmol/L Final   • Potassium 11/04/2021 4.4  3.5 - 5.2 mmol/L Final   • Chloride 11/04/2021 102  98 - 107 mmol/L Final   • CO2 11/04/2021 26.7  22.0 - 29.0 mmol/L Final   • Calcium 11/04/2021 9.0  8.6 - 10.5 mg/dL Final   • Total Protein 11/04/2021 6.5  6.0 - 8.5 g/dL Final   • Albumin 11/04/2021 4.10  3.50 - 5.20 g/dL Final   • ALT (SGPT) 11/04/2021 17  1 - 33 U/L Final   • AST (SGOT) 11/04/2021 17  1 - 32 U/L Final   • Alkaline Phosphatase 11/04/2021 43  39 - 117 U/L Final   • Total Bilirubin 11/04/2021 <0.2  0.0 - 1.2 mg/dL Final   • eGFR Non African Amer 11/04/2021 117  >60 mL/min/1.73 Final   • Globulin 11/04/2021 2.4  gm/dL Final   • A/G Ratio 11/04/2021 1.7  g/dL Final   • BUN/Creatinine Ratio 11/04/2021 24.6  7.0 - 25.0 Final   • Anion Gap 11/04/2021 8.3  5.0 - 15.0 mmol/L Final   • WBC 11/04/2021 5.39  3.40 - 10.80 10*3/mm3 Final   • RBC 11/04/2021 3.69* 3.77 - 5.28 10*6/mm3 Final   • Hemoglobin 11/04/2021 11.1* 12.0 - 15.9 g/dL Final   • Hematocrit 11/04/2021 34.1  34.0 - 46.6 % Final   • MCV 11/04/2021 92.4  79.0 - 97.0 fL Final   • MCH 11/04/2021 30.1  26.6 - 33.0 pg Final   • MCHC 11/04/2021 32.6  31.5 - 35.7 g/dL Final   • RDW 11/04/2021 14.2  12.3 - 15.4 % Final   • RDW-SD 11/04/2021 47.6  37.0 - 54.0 fl Final   • MPV 11/04/2021 9.6  6.0 - 12.0 fL Final   • Platelets 11/04/2021 222  140 - 450 10*3/mm3 Final   • Neutrophil % 11/04/2021 58.3  42.7 - 76.0 % Final   • Lymphocyte %  11/04/2021 29.9  19.6 - 45.3 % Final   • Monocyte % 11/04/2021 7.8  5.0 - 12.0 % Final   • Eosinophil % 11/04/2021 2.8  0.3 - 6.2 % Final   • Basophil % 11/04/2021 0.6  0.0 - 1.5 % Final   • Immature Grans % 11/04/2021 0.6* 0.0 - 0.5 % Final   • Neutrophils, Absolute 11/04/2021 3.15  1.70 - 7.00 10*3/mm3 Final   • Lymphocytes, Absolute 11/04/2021 1.61  0.70 - 3.10 10*3/mm3 Final   • Monocytes, Absolute 11/04/2021 0.42  0.10 - 0.90 10*3/mm3 Final   • Eosinophils, Absolute 11/04/2021 0.15  0.00 - 0.40 10*3/mm3 Final   • Basophils, Absolute 11/04/2021 0.03  0.00 - 0.20 10*3/mm3 Final   • Immature Grans, Absolute 11/04/2021 0.03  0.00 - 0.05 10*3/mm3 Final   • nRBC 11/04/2021 0.0  0.0 - 0.2 /100 WBC Final   Infusion on 10/28/2021   Component Date Value Ref Range Status   • Glucose 10/28/2021 174* 65 - 99 mg/dL Final   • BUN 10/28/2021 13  6 - 20 mg/dL Final   • Creatinine 10/28/2021 0.54* 0.57 - 1.00 mg/dL Final   • Sodium 10/28/2021 137  136 - 145 mmol/L Final   • Potassium 10/28/2021 4.0  3.5 - 5.2 mmol/L Final   • Chloride 10/28/2021 102  98 - 107 mmol/L Final   • CO2 10/28/2021 25.3  22.0 - 29.0 mmol/L Final   • Calcium 10/28/2021 8.9  8.6 - 10.5 mg/dL Final   • Total Protein 10/28/2021 6.4  6.0 - 8.5 g/dL Final   • Albumin 10/28/2021 3.90  3.50 - 5.20 g/dL Final   • ALT (SGPT) 10/28/2021 18  1 - 33 U/L Final   • AST (SGOT) 10/28/2021 23  1 - 32 U/L Final   • Alkaline Phosphatase 10/28/2021 43  39 - 117 U/L Final   • Total Bilirubin 10/28/2021 <0.2  0.0 - 1.2 mg/dL Final   • eGFR Non  Amer 10/28/2021 125  >60 mL/min/1.73 Final   • Globulin 10/28/2021 2.5  gm/dL Final   • A/G Ratio 10/28/2021 1.6  g/dL Final   • BUN/Creatinine Ratio 10/28/2021 24.1  7.0 - 25.0 Final   • Anion Gap 10/28/2021 9.7  5.0 - 15.0 mmol/L Final   • WBC 10/28/2021 5.64  3.40 - 10.80 10*3/mm3 Final   • RBC 10/28/2021 3.69* 3.77 - 5.28 10*6/mm3 Final   • Hemoglobin 10/28/2021 11.3* 12.0 - 15.9 g/dL Final   • Hematocrit 10/28/2021 34.6   34.0 - 46.6 % Final   • MCV 10/28/2021 93.8  79.0 - 97.0 fL Final   • MCH 10/28/2021 30.6  26.6 - 33.0 pg Final   • MCHC 10/28/2021 32.7  31.5 - 35.7 g/dL Final   • RDW 10/28/2021 13.9  12.3 - 15.4 % Final   • RDW-SD 10/28/2021 46.3  37.0 - 54.0 fl Final   • MPV 10/28/2021 9.6  6.0 - 12.0 fL Final   • Platelets 10/28/2021 245  140 - 450 10*3/mm3 Final   • Neutrophil % 10/28/2021 51.8  42.7 - 76.0 % Final   • Lymphocyte % 10/28/2021 35.6  19.6 - 45.3 % Final   • Monocyte % 10/28/2021 7.1  5.0 - 12.0 % Final   • Eosinophil % 10/28/2021 4.6  0.3 - 6.2 % Final   • Basophil % 10/28/2021 0.5  0.0 - 1.5 % Final   • Immature Grans % 10/28/2021 0.4  0.0 - 0.5 % Final   • Neutrophils, Absolute 10/28/2021 2.92  1.70 - 7.00 10*3/mm3 Final   • Lymphocytes, Absolute 10/28/2021 2.01  0.70 - 3.10 10*3/mm3 Final   • Monocytes, Absolute 10/28/2021 0.40  0.10 - 0.90 10*3/mm3 Final   • Eosinophils, Absolute 10/28/2021 0.26  0.00 - 0.40 10*3/mm3 Final   • Basophils, Absolute 10/28/2021 0.03  0.00 - 0.20 10*3/mm3 Final   • Immature Grans, Absolute 10/28/2021 0.02  0.00 - 0.05 10*3/mm3 Final   • nRBC 10/28/2021 0.0  0.0 - 0.2 /100 WBC Final   Infusion on 10/21/2021   Component Date Value Ref Range Status   • Glucose 10/21/2021 291* 65 - 99 mg/dL Final   • BUN 10/21/2021 12  6 - 20 mg/dL Final   • Creatinine 10/21/2021 0.56* 0.57 - 1.00 mg/dL Final   • Sodium 10/21/2021 138  136 - 145 mmol/L Final   • Potassium 10/21/2021 4.1  3.5 - 5.2 mmol/L Final   • Chloride 10/21/2021 103  98 - 107 mmol/L Final   • CO2 10/21/2021 26.4  22.0 - 29.0 mmol/L Final   • Calcium 10/21/2021 9.2  8.6 - 10.5 mg/dL Final   • Total Protein 10/21/2021 6.6  6.0 - 8.5 g/dL Final   • Albumin 10/21/2021 4.10  3.50 - 5.20 g/dL Final   • ALT (SGPT) 10/21/2021 13  1 - 33 U/L Final   • AST (SGOT) 10/21/2021 18  1 - 32 U/L Final   • Alkaline Phosphatase 10/21/2021 43  39 - 117 U/L Final   • Total Bilirubin 10/21/2021 <0.2  0.0 - 1.2 mg/dL Final   • eGFR Non African Amer  10/21/2021 120  >60 mL/min/1.73 Final   • Globulin 10/21/2021 2.5  gm/dL Final   • A/G Ratio 10/21/2021 1.6  g/dL Final   • BUN/Creatinine Ratio 10/21/2021 21.4  7.0 - 25.0 Final   • Anion Gap 10/21/2021 8.6  5.0 - 15.0 mmol/L Final   • Magnesium 10/21/2021 2.0  1.6 - 2.6 mg/dL Final   • Phosphorus 10/21/2021 3.6  2.5 - 4.5 mg/dL Final   • WBC 10/21/2021 6.56  3.40 - 10.80 10*3/mm3 Final   • RBC 10/21/2021 3.76* 3.77 - 5.28 10*6/mm3 Final   • Hemoglobin 10/21/2021 11.3* 12.0 - 15.9 g/dL Final   • Hematocrit 10/21/2021 33.8* 34.0 - 46.6 % Final   • MCV 10/21/2021 89.9  79.0 - 97.0 fL Final   • MCH 10/21/2021 30.1  26.6 - 33.0 pg Final   • MCHC 10/21/2021 33.4  31.5 - 35.7 g/dL Final   • RDW 10/21/2021 13.5  12.3 - 15.4 % Final   • RDW-SD 10/21/2021 44.0  37.0 - 54.0 fl Final   • MPV 10/21/2021 9.7  6.0 - 12.0 fL Final   • Platelets 10/21/2021 204  140 - 450 10*3/mm3 Final   • Neutrophil % 10/21/2021 59.5  42.7 - 76.0 % Final   • Lymphocyte % 10/21/2021 27.6  19.6 - 45.3 % Final   • Monocyte % 10/21/2021 8.1  5.0 - 12.0 % Final   • Eosinophil % 10/21/2021 3.7  0.3 - 6.2 % Final   • Basophil % 10/21/2021 0.3  0.0 - 1.5 % Final   • Immature Grans % 10/21/2021 0.8* 0.0 - 0.5 % Final   • Neutrophils, Absolute 10/21/2021 3.91  1.70 - 7.00 10*3/mm3 Final   • Lymphocytes, Absolute 10/21/2021 1.81  0.70 - 3.10 10*3/mm3 Final   • Monocytes, Absolute 10/21/2021 0.53  0.10 - 0.90 10*3/mm3 Final   • Eosinophils, Absolute 10/21/2021 0.24  0.00 - 0.40 10*3/mm3 Final   • Basophils, Absolute 10/21/2021 0.02  0.00 - 0.20 10*3/mm3 Final   • Immature Grans, Absolute 10/21/2021 0.05  0.00 - 0.05 10*3/mm3 Final   • nRBC 10/21/2021 0.0  0.0 - 0.2 /100 WBC Final   Admission on 10/15/2021, Discharged on 10/15/2021   Component Date Value Ref Range Status   • Glucose 10/15/2021 167* 70 - 130 mg/dL Final    Meter: [id number] : [id number] Mayur Alonso EUNICE   • HCG, Urine, QL 10/15/2021 Negative  Negative Final   • Lot Number  10/15/2021 [id number]   Final   • Internal Positive Control 10/15/2021 Passed  Positive, Passed Final   • Internal Negative Control 10/15/2021 Passed  Negative, Passed Final   • Expiration Date 10/15/2021 04/30/2022   Final   Transcribe Orders on 10/14/2021   Component Date Value Ref Range Status   • COVID19 10/14/2021 Not Detected  Not Detected - Ref. Range Final   Infusion on 10/13/2021   Component Date Value Ref Range Status   • Glucose 10/13/2021 360* 74 - 124 mg/dL Final   • BUN 10/13/2021 14  6 - 20 mg/dL Final   • Creatinine 10/13/2021 0.57* 0.60 - 1.10 mg/dL Final   • Sodium 10/13/2021 134  134 - 145 mmol/L Final   • Potassium 10/13/2021 4.2  3.5 - 4.7 mmol/L Final   • Chloride 10/13/2021 99  98 - 107 mmol/L Final   • CO2 10/13/2021 26.2  22.0 - 29.0 mmol/L Final   • Calcium 10/13/2021 9.5  8.5 - 10.2 mg/dL Final   • Total Protein 10/13/2021 6.5  6.3 - 8.0 g/dL Final   • Albumin 10/13/2021 4.20  3.50 - 5.20 g/dL Final   • ALT (SGPT) 10/13/2021 13  0 - 33 U/L Final   • AST (SGOT) 10/13/2021 16  0 - 32 U/L Final   • Alkaline Phosphatase 10/13/2021 42  38 - 116 U/L Final   • Total Bilirubin 10/13/2021 0.2  0.2 - 1.2 mg/dL Final   • eGFR Non  Amer 10/13/2021 117  >60 mL/min/1.73 Final   • Globulin 10/13/2021 2.3  1.8 - 3.5 gm/dL Final   • A/G Ratio 10/13/2021 1.8  1.1 - 2.4 g/dL Final   • BUN/Creatinine Ratio 10/13/2021 24.6  7.3 - 30.0 Final   • Anion Gap 10/13/2021 8.8  5.0 - 15.0 mmol/L Final   • HCG Quantitative 10/13/2021 <0.50  mIU/mL Final   • WBC 10/13/2021 6.37  3.40 - 10.80 10*3/mm3 Final   • RBC 10/13/2021 4.30  3.77 - 5.28 10*6/mm3 Final   • Hemoglobin 10/13/2021 12.8  12.0 - 15.9 g/dL Final   • Hematocrit 10/13/2021 38.2  34.0 - 46.6 % Final   • MCV 10/13/2021 88.8  79.0 - 97.0 fL Final   • MCH 10/13/2021 29.8  26.6 - 33.0 pg Final   • MCHC 10/13/2021 33.5  31.5 - 35.7 g/dL Final   • RDW 10/13/2021 13.5  12.3 - 15.4 % Final   • RDW-SD 10/13/2021 44.1  37.0 - 54.0 fl Final   • MPV 10/13/2021  9.6  6.0 - 12.0 fL Final   • Platelets 10/13/2021 219  140 - 450 10*3/mm3 Final   • Neutrophil % 10/13/2021 53.2  42.7 - 76.0 % Final   • Lymphocyte % 10/13/2021 30.6  19.6 - 45.3 % Final   • Monocyte % 10/13/2021 10.2  5.0 - 12.0 % Final   • Eosinophil % 10/13/2021 5.3  0.3 - 6.2 % Final   • Basophil % 10/13/2021 0.5  0.0 - 1.5 % Final   • Immature Grans % 10/13/2021 0.2  0.0 - 0.5 % Final   • Neutrophils, Absolute 10/13/2021 3.39  1.70 - 7.00 10*3/mm3 Final   • Lymphocytes, Absolute 10/13/2021 1.95  0.70 - 3.10 10*3/mm3 Final   • Monocytes, Absolute 10/13/2021 0.65  0.10 - 0.90 10*3/mm3 Final   • Eosinophils, Absolute 10/13/2021 0.34  0.00 - 0.40 10*3/mm3 Final   • Basophils, Absolute 10/13/2021 0.03  0.00 - 0.20 10*3/mm3 Final   • Immature Grans, Absolute 10/13/2021 0.01  0.00 - 0.05 10*3/mm3 Final   • nRBC 10/13/2021 0.0  0.0 - 0.2 /100 WBC Final        No radiology results for the last 30 days.   CT of the chest abdomen pelvis-images independently reviewed and interpreted by me in detail summarized in the HPI  Bone scan-images independently reviewed and interpreted by me in detail summarized in the HPI    CT neck-images independently reviewed and interpreted by me in detail summarized in HPI.  I also discussed the CT neck with Dr. Alexander Blevins with radiology regarding the right side lymphadenopathy.     9/28/2021-right cervical lymph node biopsy-pathology consistent with metastatic mammary carcinoma.    10/1/2021-PET/CT-there are 2 separate hypermetabolic lesions within the right breast.  1 measuring 6 cm and the other measuring 1.3 cm.  The SUV of 6 cm lesion of 5.6 and small lesion of 4.8.  Hypermetabolic right axillary, infra pectoral, right supraclavicular lymphadenopathy noted.  In addition there are hypermetabolic nodes at the right thoracic inlet, right base of the neck and right posterior cervical triangle.  1.6 x 0.8 cm right posterior cervical triangle node with an SUV of 3.9.  Right axillary  lymphadenopathy with lymphedema 6.4.  Hypermetabolic left hilar lymphadenopathy measuring 2 x 1.1 cm with an SUV of 5.8.  No hypermetabolic activity in the liver or evidence of metastatic disease in the abdomen or pelvis.  Hypermetabolic lesion in the right femoral neck measuring 1.5 cm.  SUV 5.4.    PET/CT images independently reviewed and interpreted by me in detail summarized above.  Images also reviewed with patient.    CBC reviewed and hemoglobin 11 otherwise within normal limits  CMP-blood sugar 236 but otherwise within normal limits    Assessment/Plan       *Invasive ductal carcinoma of the right breast  · Clinical T3 N3 cM1, stage IV  · The tumor is grade 3, poorly differentiated, ER 1 to 10% weak, W[place] negative, HER-2 negative, Ki-67 65%  · Cervical lymph node biopsy consistent with metastatic mammary carcinoma.  · PET/CT the abnormal uptake noted in the left hilar lymph node, right femoral neck, cervical lymph nodes.  · Since the cervical lymph node biopsy is positive as well as PET/CT with several abnormal findings I do believe that this is stage IV disease.  · Discussed case at multidisciplinary conference on 9/28/2021 and the initial plan was to proceed with neoadjuvant chemotherapy followed by surgery and radiation to the positive cervical nodes.  PET/CT results were not available at that time.  · The PET/CT showing other areas of metastatic disease unfortunately we will not be able to proceed with curative intent treatment.  · Explained that the disease is incurable  · On hearing this news Sierra was overwhelmed and she decided to cancel the procedure for the port echo scheduled for later in the day.  · Caris testing requested  · On the subsequent visit I discussed with her about palliative intent chemotherapy with single agent Taxol and adding immunotherapy down the line once PD-L1 results available.  · Since she is complaining of significant pain in the right breast I would consider adding carboplatin  for about 4 to 6 weeks to help get a quick response in the right breast tumor as well as right axillary lymphadenopathy.  · Adverse effects of Taxol including but not limited to myelosuppression, increased risk of infections, nausea, vomiting, arthralgias, neuropathy, nail changes, alopecia, diarrhea, possible mucositis discussed.  · Caris testing has been reviewed today and PD-L1 CPS was 3 and hence no additional benefit from immunotherapy.  Tumor mutational burden was also low and MSI stable.  Therefore there is no added benefit from immunotherapy.  · She has received 4 doses we will carboplatin and Taxol  · Since the tumor has decreased in size and symptoms in the right breast and axilla have improved we will discontinue carboplatin and continue with Taxol only      *Nausea  · She is experiencing headaches  · Discontinue Zofran and start Compazine    *Headaches  · Could be secondary to Zofran and Aloxi  · Discontinue both  · If headaches persist obtain MRI of the brain    *Diabetes  · Type I  · Poorly controlled  ·  notes reviewed   · Follow-up with endocrinology    * Cervical lymphadenopathy  · Biopsied and pathology consistent with metastatic breast cancer    *Anxiety  · She has been referred to supportive oncology  · Met with Tenisha Rizzo  · Started on gabapentin 300 mg 3 times daily  · She is currently on max dose of Celexa  · There is a level C interaction of Zyprexa with Celexa.  · However I think Zyprexa would be reasonable choice given anxiety, insomnia, nausea.    *Port placement-on 10/16/2021    *Breast pain-Norco every 8 hours prescribed, pain improved    *SRE prevention-hold off on Xgeva she needs some dental work.    *Follow-up-2 weeks with APRN and 7 weeks with myself with PET/CT

## 2021-11-18 ENCOUNTER — APPOINTMENT (OUTPATIENT)
Dept: ONCOLOGY | Facility: HOSPITAL | Age: 41
End: 2021-11-18

## 2021-11-19 ENCOUNTER — INFUSION (OUTPATIENT)
Dept: ONCOLOGY | Facility: HOSPITAL | Age: 41
End: 2021-11-19

## 2021-11-19 ENCOUNTER — TELEMEDICINE (OUTPATIENT)
Dept: ONCOLOGY | Facility: CLINIC | Age: 41
End: 2021-11-19

## 2021-11-19 VITALS
HEIGHT: 71 IN | WEIGHT: 164.7 LBS | BODY MASS INDEX: 23.06 KG/M2 | HEART RATE: 80 BPM | OXYGEN SATURATION: 97 % | DIASTOLIC BLOOD PRESSURE: 66 MMHG | TEMPERATURE: 97.3 F | SYSTOLIC BLOOD PRESSURE: 107 MMHG | RESPIRATION RATE: 16 BRPM

## 2021-11-19 VITALS — HEART RATE: 76 BPM | SYSTOLIC BLOOD PRESSURE: 118 MMHG | DIASTOLIC BLOOD PRESSURE: 69 MMHG

## 2021-11-19 DIAGNOSIS — C50.111 MALIGNANT NEOPLASM OF CENTRAL PORTION OF RIGHT BREAST IN FEMALE, ESTROGEN RECEPTOR NEGATIVE (HCC): Primary | ICD-10-CM

## 2021-11-19 DIAGNOSIS — M25.50 ARTHRALGIA, UNSPECIFIED JOINT: ICD-10-CM

## 2021-11-19 DIAGNOSIS — F41.9 ANXIETY: ICD-10-CM

## 2021-11-19 DIAGNOSIS — Z17.1 MALIGNANT NEOPLASM OF CENTRAL PORTION OF RIGHT BREAST IN FEMALE, ESTROGEN RECEPTOR NEGATIVE (HCC): ICD-10-CM

## 2021-11-19 DIAGNOSIS — Z45.2 ENCOUNTER FOR FITTING AND ADJUSTMENT OF VASCULAR CATHETER: ICD-10-CM

## 2021-11-19 DIAGNOSIS — Z17.1 MALIGNANT NEOPLASM OF CENTRAL PORTION OF RIGHT BREAST IN FEMALE, ESTROGEN RECEPTOR NEGATIVE (HCC): Primary | ICD-10-CM

## 2021-11-19 DIAGNOSIS — C50.111 MALIGNANT NEOPLASM OF CENTRAL PORTION OF RIGHT BREAST IN FEMALE, ESTROGEN RECEPTOR NEGATIVE (HCC): ICD-10-CM

## 2021-11-19 DIAGNOSIS — C79.51 BONE METASTASIS: ICD-10-CM

## 2021-11-19 LAB
ALBUMIN SERPL-MCNC: 4.2 G/DL (ref 3.5–5.2)
ALBUMIN/GLOB SERPL: 1.6 G/DL
ALP SERPL-CCNC: 42 U/L (ref 39–117)
ALT SERPL W P-5'-P-CCNC: 17 U/L (ref 1–33)
ANION GAP SERPL CALCULATED.3IONS-SCNC: 8.1 MMOL/L (ref 5–15)
AST SERPL-CCNC: 20 U/L (ref 1–32)
BASOPHILS # BLD AUTO: 0.02 10*3/MM3 (ref 0–0.2)
BASOPHILS NFR BLD AUTO: 0.4 % (ref 0–1.5)
BILIRUB SERPL-MCNC: 0.2 MG/DL (ref 0–1.2)
BUN SERPL-MCNC: 14 MG/DL (ref 6–20)
BUN/CREAT SERPL: 21.2 (ref 7–25)
CALCIUM SPEC-SCNC: 9.3 MG/DL (ref 8.6–10.5)
CHLORIDE SERPL-SCNC: 104 MMOL/L (ref 98–107)
CO2 SERPL-SCNC: 25.9 MMOL/L (ref 22–29)
CREAT SERPL-MCNC: 0.66 MG/DL (ref 0.57–1)
DEPRECATED RDW RBC AUTO: 51.4 FL (ref 37–54)
EOSINOPHIL # BLD AUTO: 0.13 10*3/MM3 (ref 0–0.4)
EOSINOPHIL NFR BLD AUTO: 2.5 % (ref 0.3–6.2)
ERYTHROCYTE [DISTWIDTH] IN BLOOD BY AUTOMATED COUNT: 15.1 % (ref 12.3–15.4)
GFR SERPL CREATININE-BSD FRML MDRD: 99 ML/MIN/1.73
GLOBULIN UR ELPH-MCNC: 2.6 GM/DL
GLUCOSE SERPL-MCNC: 304 MG/DL (ref 65–99)
HCT VFR BLD AUTO: 35.3 % (ref 34–46.6)
HGB BLD-MCNC: 11.3 G/DL (ref 12–15.9)
IMM GRANULOCYTES # BLD AUTO: 0.04 10*3/MM3 (ref 0–0.05)
IMM GRANULOCYTES NFR BLD AUTO: 0.8 % (ref 0–0.5)
LYMPHOCYTES # BLD AUTO: 1.57 10*3/MM3 (ref 0.7–3.1)
LYMPHOCYTES NFR BLD AUTO: 30.1 % (ref 19.6–45.3)
MCH RBC QN AUTO: 30 PG (ref 26.6–33)
MCHC RBC AUTO-ENTMCNC: 32 G/DL (ref 31.5–35.7)
MCV RBC AUTO: 93.6 FL (ref 79–97)
MONOCYTES # BLD AUTO: 0.51 10*3/MM3 (ref 0.1–0.9)
MONOCYTES NFR BLD AUTO: 9.8 % (ref 5–12)
NEUTROPHILS NFR BLD AUTO: 2.94 10*3/MM3 (ref 1.7–7)
NEUTROPHILS NFR BLD AUTO: 56.4 % (ref 42.7–76)
NRBC BLD AUTO-RTO: 0 /100 WBC (ref 0–0.2)
PLATELET # BLD AUTO: 219 10*3/MM3 (ref 140–450)
PMV BLD AUTO: 9.5 FL (ref 6–12)
POTASSIUM SERPL-SCNC: 5 MMOL/L (ref 3.5–5.2)
PROT SERPL-MCNC: 6.8 G/DL (ref 6–8.5)
RBC # BLD AUTO: 3.77 10*6/MM3 (ref 3.77–5.28)
SODIUM SERPL-SCNC: 138 MMOL/L (ref 136–145)
WBC NRBC COR # BLD: 5.21 10*3/MM3 (ref 3.4–10.8)

## 2021-11-19 PROCEDURE — 25010000002 DIPHENHYDRAMINE PER 50 MG: Performed by: INTERNAL MEDICINE

## 2021-11-19 PROCEDURE — 96375 TX/PRO/DX INJ NEW DRUG ADDON: CPT

## 2021-11-19 PROCEDURE — 25010000002 HEPARIN LOCK FLUSH PER 10 UNITS: Performed by: INTERNAL MEDICINE

## 2021-11-19 PROCEDURE — 25010000002 PACLITAXEL PER 1 MG: Performed by: INTERNAL MEDICINE

## 2021-11-19 PROCEDURE — 99214 OFFICE O/P EST MOD 30 MIN: CPT | Performed by: NURSE PRACTITIONER

## 2021-11-19 PROCEDURE — 96413 CHEMO IV INFUSION 1 HR: CPT

## 2021-11-19 PROCEDURE — 85025 COMPLETE CBC W/AUTO DIFF WBC: CPT | Performed by: INTERNAL MEDICINE

## 2021-11-19 PROCEDURE — 80053 COMPREHEN METABOLIC PANEL: CPT | Performed by: INTERNAL MEDICINE

## 2021-11-19 PROCEDURE — 25010000002 DEXAMETHASONE PER 1 MG: Performed by: INTERNAL MEDICINE

## 2021-11-19 RX ORDER — LORAZEPAM 1 MG/1
1 TABLET ORAL NIGHTLY PRN
Qty: 30 TABLET | Refills: 0 | Status: SHIPPED | OUTPATIENT
Start: 2021-11-19 | End: 2022-01-25 | Stop reason: SDUPTHER

## 2021-11-19 RX ORDER — HEPARIN SODIUM (PORCINE) LOCK FLUSH IV SOLN 100 UNIT/ML 100 UNIT/ML
500 SOLUTION INTRAVENOUS AS NEEDED
Status: CANCELLED | OUTPATIENT
Start: 2021-11-19

## 2021-11-19 RX ORDER — SODIUM CHLORIDE 9 MG/ML
250 INJECTION, SOLUTION INTRAVENOUS ONCE
Status: COMPLETED | OUTPATIENT
Start: 2021-11-19 | End: 2021-11-19

## 2021-11-19 RX ORDER — HEPARIN SODIUM (PORCINE) LOCK FLUSH IV SOLN 100 UNIT/ML 100 UNIT/ML
500 SOLUTION INTRAVENOUS AS NEEDED
Status: DISCONTINUED | OUTPATIENT
Start: 2021-11-19 | End: 2021-11-19 | Stop reason: HOSPADM

## 2021-11-19 RX ORDER — HYDROCODONE BITARTRATE AND ACETAMINOPHEN 5; 325 MG/1; MG/1
1 TABLET ORAL EVERY 8 HOURS PRN
Qty: 90 TABLET | Refills: 0 | Status: SHIPPED | OUTPATIENT
Start: 2021-11-19 | End: 2021-12-16 | Stop reason: SDUPTHER

## 2021-11-19 RX ORDER — FAMOTIDINE 10 MG/ML
20 INJECTION, SOLUTION INTRAVENOUS ONCE
Status: COMPLETED | OUTPATIENT
Start: 2021-11-19 | End: 2021-11-19

## 2021-11-19 RX ORDER — SODIUM CHLORIDE 0.9 % (FLUSH) 0.9 %
10 SYRINGE (ML) INJECTION AS NEEDED
Status: CANCELLED | OUTPATIENT
Start: 2021-11-19

## 2021-11-19 RX ORDER — SODIUM CHLORIDE 0.9 % (FLUSH) 0.9 %
10 SYRINGE (ML) INJECTION AS NEEDED
Status: DISCONTINUED | OUTPATIENT
Start: 2021-11-19 | End: 2021-11-19 | Stop reason: HOSPADM

## 2021-11-19 RX ADMIN — Medication 500 UNITS: at 15:44

## 2021-11-19 RX ADMIN — PACLITAXEL 150 MG: 6 INJECTION, SOLUTION INTRAVENOUS at 14:30

## 2021-11-19 RX ADMIN — FAMOTIDINE 20 MG: 10 INJECTION INTRAVENOUS at 13:30

## 2021-11-19 RX ADMIN — DIPHENHYDRAMINE HYDROCHLORIDE 12.5 MG: 50 INJECTION INTRAMUSCULAR; INTRAVENOUS at 13:30

## 2021-11-19 RX ADMIN — DEXAMETHASONE SODIUM PHOSPHATE 8 MG: 10 INJECTION INTRAMUSCULAR; INTRAVENOUS at 13:24

## 2021-11-19 RX ADMIN — SODIUM CHLORIDE 250 ML: 9 INJECTION, SOLUTION INTRAVENOUS at 13:24

## 2021-11-19 NOTE — PROGRESS NOTES
This was an audio and video enabled telemedicine encounter.    Subjective   Sierra Mao is a 41 y.o. female. Referred by Dr. Price for right breast invasive ductal carcinoma triple negative, metastatic    Treatment  1.Taxol and carboplatin weekly started 10/13/2021    History of Present Illness     Ms. Mao is a 41-year-old Premenopausal  lady who palpated a mass in her right breast. She had a mass in a similar area in 2018 which was biopsied and benign. Further evaluation was performed.    08/18/2021-bilateral diagnostic mammogram-parenchyma is heterogeneously dense. In the palpable area of concern in the upper outer quadrant of the right breast there is an area of asymmetry with pleomorphic microcalcifications in the posterior one third of the upper outer quadrant of the right breast in the axillary region. There is also microcalcifications in the middle one third and anterior one third of the left breast at 12 o'clock position which are new. Architectural distortion in either breast.    Ultrasound-  Right breast at the site of palpable concern, at 11 o'clock, 1 cm from the nipple there is an irregular hypoechoic mass which measures up to 4 cm.  10 o'clock, 6 cm from the nipple there is an irregular mass which measures up to 1.6 cm  At 10 o'clock, 8 cm from the nipple there is a irregular mass which measures 1.8 cm.  Multiple right axillary lymph nodes demonstrate thickened cortices and rounded morphology largest of which measures 2 cm.  Left breast at 2 o'clock, 6 cm from the nipple there is a 1 cm heterogeneous hypoechoic masslike region.    09/07/2021-  1.right breast 11 o'clock, 1 cm from the nipple-invasive ductal carcinoma, poorly differentiated, grade 3, ER low +1 to 10%, weak, OH negative less than 1%, HER-2 negative.  2.right axillary lymph node-metastatic mammary carcinoma measuring 9 mm. ER negative, OH +5% three, HER-2 negative  3.left breast 2 o'clock, 6 cm from the nipple-cluster of  apocrine cyst  4.left breast 12 o'clock-apocrine cyst with polarizable calcium oxalate crystals.    Bilateral breast MRI 09/23/2021-biopsy-proven malignancy of the right breast occupying the middle and the posterior one thirds from 11 o'clock to 1 o'clock position and measuring up to 7.1 cm in greatest dimension. The lesion abuts the pectoralis fascia but there is no evidence of direct invasion. There are portions of the lesion that extended to the subpatellar soft tissues but there is no abnormal enhancement of the skin or skin thickening.  Bulky right axillary lymphadenopathy noted.  2.4 cm irregular mass in the lower outer quadrant of the right breast centered at 8 o'clock position which is separate from the known malignancy suspicious for additional malignancy.  Ultrasound and biopsy of this lesion recommended.  No findings suspicious for malignancy in the left breast.    09/23/2021-CT of the chest abdomen and pelvis and soft tissue neck-multiple mild to moderately enlarged subpectoral and right axillary lymph nodes consistent with metastatic disease. The subpectoral lymphadenopathy extends into the right supraclavicular region as well. There is no evidence of metastatic disease elsewhere within the neck. No evidence of metastatic disease in the chest abdomen or pelvis. 5 cm right ovarian cyst is noted. Suggest follow-up ultrasound in 6 weeks for further evaluation.    09/24/2021-bone scan without any evidence of metastatic disease.    9/28/2021-right cervical lymph node biopsy-pathology consistent with metastatic mammary carcinoma.    10/1/2021-PET/CT-there are 2 separate hypermetabolic lesions within the right breast.  1 measuring 6 cm and the other measuring 1.3 cm.  The SUV of 6 cm lesion of 5.6 and small lesion of 4.8.  Hypermetabolic right axillary, infra pectoral, right supraclavicular lymphadenopathy noted.  In addition there are hypermetabolic nodes at the right thoracic inlet, right base of the neck  and right posterior cervical triangle.  1.6 x 0.8 cm right posterior cervical triangle node with an SUV of 3.9.  Right axillary lymphadenopathy with lymphedema 6.4.  Hypermetabolic left hilar lymphadenopathy measuring 2 x 1.1 cm with an SUV of 5.8.  No hypermetabolic activity in the liver or evidence of metastatic disease in the abdomen or pelvis.  Hypermetabolic lesion in the right femoral neck measuring 1.5 cm.  SUV 5.4.    Mr. Mao reports that she initially felt good right breast mass in May 2021.  Starting July 2021 she had palpated something abnormal in her right neck.  Since noticing the mass in May 2021 she feels like the mass in the right breast has doubled in size.  She also feels like the right axillary lymph nodes have enlarged in size.    She had a 70 pound weight loss in 2019 due to poorly controlled diabetes.  She was initially diagnosed with a type 2 diabetes and subsequently determined to be type I and switched to insulin.  She had an admission to the hospital due to DKA.    She is unvaccinated for COVID-19.  She previously worked as a  however currently not working.    Family history significant for ovarian cancer in grandmother and bladder cancer in her father.      Interval history  Patient returns today 11/19/2021 for lab review and follow up due for continued Taxol.  She was actually due for treatment yesterday. She states that she had been having some issues with constipation.  She took the medication to help relieve this and was having diarrhea yesterday.  She states that her bowels are fine today.  She also reports some issues with bone pain.  After her last treatment around day 3 or 4 she started having bone pain.  She took ibuprofen the first day which did not help.  She took her hydrocodone which did provide her with relief.    She reports taking Ativan intermittently to help with sleep.    The following portions of the patient's history were reviewed and updated as  appropriate: allergies, current medications, past family history, past medical history, past social history, past surgical history and problem list.    Past Medical History:   Diagnosis Date   • Anxiety    • Arthritis    • Breast cancer (HCC) 2021    Right breast invasive ductal carcinoma ER low positive, ND negative, PDW6ile negative, mercedes to lymph node (biopsy positive)   • Chronic fatigue    • Diabetes mellitus with stage 3 chronic kidney disease (HCC)    • Hyperlipidemia    • Leukocytosis    • Menorrhagia with regular cycle    • Seasonal allergies    • Type I diabetes mellitus (HCC)    • Vitamin D deficiency         Past Surgical History:   Procedure Laterality Date   • APPENDECTOMY N/A    • BREAST BIOPSY Right 2018   • BREAST BIOPSY Bilateral 2021   • US GUIDED LYMPH NODE BIOPSY  2021    Dr. Carol Terrazas, [facility]   • US GUIDED LYMPH NODE BIOPSY  2021   • VENOUS ACCESS DEVICE (PORT) INSERTION N/A 10/15/2021    Procedure: INSERTION VENOUS ACCESS DEVICE;  Surgeon: Mariposa Price MD;  Location: [facility];  Service: General;  Laterality: N/A;        Family History   Problem Relation Age of Onset   • Diabetes Mother    • Cervical cancer Maternal Grandmother         cervical/uterine    • Diabetes Maternal Grandfather    • Lymphoma Paternal Aunt 60   • Breast cancer Neg Hx    • Malig Hyperthermia Neg Hx         Social History     Socioeconomic History   • Marital status:    Tobacco Use   • Smoking status: Former Smoker     Packs/day: 0.50     Years: 8.00     Pack years: 4.00     Types: Cigarettes     Start date: 1995     Quit date: 2003     Years since quittin.8   • Smokeless tobacco: Never Used   • Tobacco comment: teen / young adult   Vaping Use   • Vaping Use: Never used   Substance and Sexual Activity   • Alcohol use: Yes     Alcohol/week: 1.0 standard drink     Types: 1 Standard drinks or equivalent per week     Comment: social   • Drug use: No   • Sexual  "activity: Defer     Partners: Male     Birth control/protection: None     Comment:         OB History        1    Para   1    Term   1            AB        Living           SAB        IAB        Ectopic        Molar        Multiple        Live Births                 Age of menarche-14  Age at first live childbirth-24   one para one  zero  Oral contraceptive pill use for 20 years  She is premenopausal    No Known Allergies       Review of systems as mentioned in the HPI    Objective   Blood pressure 107/66, pulse 80, temperature 97.3 °F (36.3 °C), temperature source Temporal, resp. rate 16, height 180 cm (70.87\"), weight 74.7 kg (164 lb 11.2 oz), SpO2 97 %, not currently breastfeeding.   Physical Exam  Constitutional:       General: She is not in acute distress.     Appearance: She is well-developed.   Pulmonary:      Effort: Pulmonary effort is normal. No respiratory distress.   Skin:     General: Skin is warm and dry.   Neurological:      Mental Status: She is alert and oriented to person, place, and time.     physical exam limited due to tele-health visit. Breast exam not performed today.    Breast Exam: Right breast-right breast.  On palpation there is a 4x3 cm mass in the upper outer quadrant.  right axillary lymphadenopathy decreased in size.  Supraclavicular lymph nodes present, decreased in size.   Left breast appears normal on inspection.  No palpable abnormalities of the breast or the axilla.      Infusion on 2021   Component Date Value Ref Range Status   • Glucose 2021 304* 65 - 99 mg/dL Final   • BUN 2021 14  6 - 20 mg/dL Final   • Creatinine 2021 0.66  0.57 - 1.00 mg/dL Final   • Sodium 2021 138  136 - 145 mmol/L Final   • Potassium 2021 5.0  3.5 - 5.2 mmol/L Final    Slight hemolysis detected by analyzer. Results may be affected.   • Chloride 2021 104  98 - 107 mmol/L Final   • CO2 2021 25.9  22.0 - 29.0 mmol/L Final   • " Calcium 11/19/2021 9.3  8.6 - 10.5 mg/dL Final   • Total Protein 11/19/2021 6.8  6.0 - 8.5 g/dL Final   • Albumin 11/19/2021 4.20  3.50 - 5.20 g/dL Final   • ALT (SGPT) 11/19/2021 17  1 - 33 U/L Final   • AST (SGOT) 11/19/2021 20  1 - 32 U/L Final   • Alkaline Phosphatase 11/19/2021 42  39 - 117 U/L Final   • Total Bilirubin 11/19/2021 0.2  0.0 - 1.2 mg/dL Final   • eGFR Non African Amer 11/19/2021 99  >60 mL/min/1.73 Final   • Globulin 11/19/2021 2.6  gm/dL Final   • A/G Ratio 11/19/2021 1.6  g/dL Final   • BUN/Creatinine Ratio 11/19/2021 21.2  7.0 - 25.0 Final   • Anion Gap 11/19/2021 8.1  5.0 - 15.0 mmol/L Final   • WBC 11/19/2021 5.21  3.40 - 10.80 10*3/mm3 Final   • RBC 11/19/2021 3.77  3.77 - 5.28 10*6/mm3 Final   • Hemoglobin 11/19/2021 11.3* 12.0 - 15.9 g/dL Final   • Hematocrit 11/19/2021 35.3  34.0 - 46.6 % Final   • MCV 11/19/2021 93.6  79.0 - 97.0 fL Final   • MCH 11/19/2021 30.0  26.6 - 33.0 pg Final   • MCHC 11/19/2021 32.0  31.5 - 35.7 g/dL Final   • RDW 11/19/2021 15.1  12.3 - 15.4 % Final   • RDW-SD 11/19/2021 51.4  37.0 - 54.0 fl Final   • MPV 11/19/2021 9.5  6.0 - 12.0 fL Final   • Platelets 11/19/2021 219  140 - 450 10*3/mm3 Final   • Neutrophil % 11/19/2021 56.4  42.7 - 76.0 % Final   • Lymphocyte % 11/19/2021 30.1  19.6 - 45.3 % Final   • Monocyte % 11/19/2021 9.8  5.0 - 12.0 % Final   • Eosinophil % 11/19/2021 2.5  0.3 - 6.2 % Final   • Basophil % 11/19/2021 0.4  0.0 - 1.5 % Final   • Immature Grans % 11/19/2021 0.8* 0.0 - 0.5 % Final   • Neutrophils, Absolute 11/19/2021 2.94  1.70 - 7.00 10*3/mm3 Final   • Lymphocytes, Absolute 11/19/2021 1.57  0.70 - 3.10 10*3/mm3 Final   • Monocytes, Absolute 11/19/2021 0.51  0.10 - 0.90 10*3/mm3 Final   • Eosinophils, Absolute 11/19/2021 0.13  0.00 - 0.40 10*3/mm3 Final   • Basophils, Absolute 11/19/2021 0.02  0.00 - 0.20 10*3/mm3 Final   • Immature Grans, Absolute 11/19/2021 0.04  0.00 - 0.05 10*3/mm3 Final   • nRBC 11/19/2021 0.0  0.0 - 0.2 /100 WBC  Final   Infusion on 11/11/2021   Component Date Value Ref Range Status   • Glucose 11/11/2021 236* 65 - 99 mg/dL Final   • BUN 11/11/2021 15  6 - 20 mg/dL Final   • Creatinine 11/11/2021 0.70  0.57 - 1.00 mg/dL Final   • Sodium 11/11/2021 138  136 - 145 mmol/L Final   • Potassium 11/11/2021 4.3  3.5 - 5.2 mmol/L Final   • Chloride 11/11/2021 104  98 - 107 mmol/L Final   • CO2 11/11/2021 24.6  22.0 - 29.0 mmol/L Final   • Calcium 11/11/2021 9.0  8.6 - 10.5 mg/dL Final   • Total Protein 11/11/2021 6.4  6.0 - 8.5 g/dL Final   • Albumin 11/11/2021 4.00  3.50 - 5.20 g/dL Final   • ALT (SGPT) 11/11/2021 18  1 - 33 U/L Final   • AST (SGOT) 11/11/2021 21  1 - 32 U/L Final   • Alkaline Phosphatase 11/11/2021 39  39 - 117 U/L Final   • Total Bilirubin 11/11/2021 <0.2  0.0 - 1.2 mg/dL Final   • eGFR Non African Amer 11/11/2021 93  >60 mL/min/1.73 Final   • Globulin 11/11/2021 2.4  gm/dL Final   • A/G Ratio 11/11/2021 1.7  g/dL Final   • BUN/Creatinine Ratio 11/11/2021 21.4  7.0 - 25.0 Final   • Anion Gap 11/11/2021 9.4  5.0 - 15.0 mmol/L Final   • WBC 11/11/2021 4.92  3.40 - 10.80 10*3/mm3 Final   • RBC 11/11/2021 3.66* 3.77 - 5.28 10*6/mm3 Final   • Hemoglobin 11/11/2021 11.0* 12.0 - 15.9 g/dL Final   • Hematocrit 11/11/2021 34.3  34.0 - 46.6 % Final   • MCV 11/11/2021 93.7  79.0 - 97.0 fL Final   • MCH 11/11/2021 30.1  26.6 - 33.0 pg Final   • MCHC 11/11/2021 32.1  31.5 - 35.7 g/dL Final   • RDW 11/11/2021 14.6  12.3 - 15.4 % Final   • RDW-SD 11/11/2021 49.0  37.0 - 54.0 fl Final   • MPV 11/11/2021 9.5  6.0 - 12.0 fL Final   • Platelets 11/11/2021 200  140 - 450 10*3/mm3 Final   • Neutrophil % 11/11/2021 52.3  42.7 - 76.0 % Final   • Lymphocyte % 11/11/2021 36.6  19.6 - 45.3 % Final   • Monocyte % 11/11/2021 7.7  5.0 - 12.0 % Final   • Eosinophil % 11/11/2021 2.4  0.3 - 6.2 % Final   • Basophil % 11/11/2021 0.6  0.0 - 1.5 % Final   • Immature Grans % 11/11/2021 0.4  0.0 - 0.5 % Final   • Neutrophils, Absolute 11/11/2021 2.57   1.70 - 7.00 10*3/mm3 Final   • Lymphocytes, Absolute 11/11/2021 1.80  0.70 - 3.10 10*3/mm3 Final   • Monocytes, Absolute 11/11/2021 0.38  0.10 - 0.90 10*3/mm3 Final   • Eosinophils, Absolute 11/11/2021 0.12  0.00 - 0.40 10*3/mm3 Final   • Basophils, Absolute 11/11/2021 0.03  0.00 - 0.20 10*3/mm3 Final   • Immature Grans, Absolute 11/11/2021 0.02  0.00 - 0.05 10*3/mm3 Final   • nRBC 11/11/2021 0.0  0.0 - 0.2 /100 WBC Final   Infusion on 11/04/2021   Component Date Value Ref Range Status   • Glucose 11/04/2021 197* 65 - 99 mg/dL Final   • BUN 11/04/2021 14  6 - 20 mg/dL Final   • Creatinine 11/04/2021 0.57  0.57 - 1.00 mg/dL Final   • Sodium 11/04/2021 137  136 - 145 mmol/L Final   • Potassium 11/04/2021 4.4  3.5 - 5.2 mmol/L Final   • Chloride 11/04/2021 102  98 - 107 mmol/L Final   • CO2 11/04/2021 26.7  22.0 - 29.0 mmol/L Final   • Calcium 11/04/2021 9.0  8.6 - 10.5 mg/dL Final   • Total Protein 11/04/2021 6.5  6.0 - 8.5 g/dL Final   • Albumin 11/04/2021 4.10  3.50 - 5.20 g/dL Final   • ALT (SGPT) 11/04/2021 17  1 - 33 U/L Final   • AST (SGOT) 11/04/2021 17  1 - 32 U/L Final   • Alkaline Phosphatase 11/04/2021 43  39 - 117 U/L Final   • Total Bilirubin 11/04/2021 <0.2  0.0 - 1.2 mg/dL Final   • eGFR Non African Amer 11/04/2021 117  >60 mL/min/1.73 Final   • Globulin 11/04/2021 2.4  gm/dL Final   • A/G Ratio 11/04/2021 1.7  g/dL Final   • BUN/Creatinine Ratio 11/04/2021 24.6  7.0 - 25.0 Final   • Anion Gap 11/04/2021 8.3  5.0 - 15.0 mmol/L Final   • WBC 11/04/2021 5.39  3.40 - 10.80 10*3/mm3 Final   • RBC 11/04/2021 3.69* 3.77 - 5.28 10*6/mm3 Final   • Hemoglobin 11/04/2021 11.1* 12.0 - 15.9 g/dL Final   • Hematocrit 11/04/2021 34.1  34.0 - 46.6 % Final   • MCV 11/04/2021 92.4  79.0 - 97.0 fL Final   • MCH 11/04/2021 30.1  26.6 - 33.0 pg Final   • MCHC 11/04/2021 32.6  31.5 - 35.7 g/dL Final   • RDW 11/04/2021 14.2  12.3 - 15.4 % Final   • RDW-SD 11/04/2021 47.6  37.0 - 54.0 fl Final   • MPV 11/04/2021 9.6  6.0 -  12.0 fL Final   • Platelets 11/04/2021 222  140 - 450 10*3/mm3 Final   • Neutrophil % 11/04/2021 58.3  42.7 - 76.0 % Final   • Lymphocyte % 11/04/2021 29.9  19.6 - 45.3 % Final   • Monocyte % 11/04/2021 7.8  5.0 - 12.0 % Final   • Eosinophil % 11/04/2021 2.8  0.3 - 6.2 % Final   • Basophil % 11/04/2021 0.6  0.0 - 1.5 % Final   • Immature Grans % 11/04/2021 0.6* 0.0 - 0.5 % Final   • Neutrophils, Absolute 11/04/2021 3.15  1.70 - 7.00 10*3/mm3 Final   • Lymphocytes, Absolute 11/04/2021 1.61  0.70 - 3.10 10*3/mm3 Final   • Monocytes, Absolute 11/04/2021 0.42  0.10 - 0.90 10*3/mm3 Final   • Eosinophils, Absolute 11/04/2021 0.15  0.00 - 0.40 10*3/mm3 Final   • Basophils, Absolute 11/04/2021 0.03  0.00 - 0.20 10*3/mm3 Final   • Immature Grans, Absolute 11/04/2021 0.03  0.00 - 0.05 10*3/mm3 Final   • nRBC 11/04/2021 0.0  0.0 - 0.2 /100 WBC Final   Infusion on 10/28/2021   Component Date Value Ref Range Status   • Glucose 10/28/2021 174* 65 - 99 mg/dL Final   • BUN 10/28/2021 13  6 - 20 mg/dL Final   • Creatinine 10/28/2021 0.54* 0.57 - 1.00 mg/dL Final   • Sodium 10/28/2021 137  136 - 145 mmol/L Final   • Potassium 10/28/2021 4.0  3.5 - 5.2 mmol/L Final   • Chloride 10/28/2021 102  98 - 107 mmol/L Final   • CO2 10/28/2021 25.3  22.0 - 29.0 mmol/L Final   • Calcium 10/28/2021 8.9  8.6 - 10.5 mg/dL Final   • Total Protein 10/28/2021 6.4  6.0 - 8.5 g/dL Final   • Albumin 10/28/2021 3.90  3.50 - 5.20 g/dL Final   • ALT (SGPT) 10/28/2021 18  1 - 33 U/L Final   • AST (SGOT) 10/28/2021 23  1 - 32 U/L Final   • Alkaline Phosphatase 10/28/2021 43  39 - 117 U/L Final   • Total Bilirubin 10/28/2021 <0.2  0.0 - 1.2 mg/dL Final   • eGFR Non  Amer 10/28/2021 125  >60 mL/min/1.73 Final   • Globulin 10/28/2021 2.5  gm/dL Final   • A/G Ratio 10/28/2021 1.6  g/dL Final   • BUN/Creatinine Ratio 10/28/2021 24.1  7.0 - 25.0 Final   • Anion Gap 10/28/2021 9.7  5.0 - 15.0 mmol/L Final   • WBC 10/28/2021 5.64  3.40 - 10.80 10*3/mm3 Final    • RBC 10/28/2021 3.69* 3.77 - 5.28 10*6/mm3 Final   • Hemoglobin 10/28/2021 11.3* 12.0 - 15.9 g/dL Final   • Hematocrit 10/28/2021 34.6  34.0 - 46.6 % Final   • MCV 10/28/2021 93.8  79.0 - 97.0 fL Final   • MCH 10/28/2021 30.6  26.6 - 33.0 pg Final   • MCHC 10/28/2021 32.7  31.5 - 35.7 g/dL Final   • RDW 10/28/2021 13.9  12.3 - 15.4 % Final   • RDW-SD 10/28/2021 46.3  37.0 - 54.0 fl Final   • MPV 10/28/2021 9.6  6.0 - 12.0 fL Final   • Platelets 10/28/2021 245  140 - 450 10*3/mm3 Final   • Neutrophil % 10/28/2021 51.8  42.7 - 76.0 % Final   • Lymphocyte % 10/28/2021 35.6  19.6 - 45.3 % Final   • Monocyte % 10/28/2021 7.1  5.0 - 12.0 % Final   • Eosinophil % 10/28/2021 4.6  0.3 - 6.2 % Final   • Basophil % 10/28/2021 0.5  0.0 - 1.5 % Final   • Immature Grans % 10/28/2021 0.4  0.0 - 0.5 % Final   • Neutrophils, Absolute 10/28/2021 2.92  1.70 - 7.00 10*3/mm3 Final   • Lymphocytes, Absolute 10/28/2021 2.01  0.70 - 3.10 10*3/mm3 Final   • Monocytes, Absolute 10/28/2021 0.40  0.10 - 0.90 10*3/mm3 Final   • Eosinophils, Absolute 10/28/2021 0.26  0.00 - 0.40 10*3/mm3 Final   • Basophils, Absolute 10/28/2021 0.03  0.00 - 0.20 10*3/mm3 Final   • Immature Grans, Absolute 10/28/2021 0.02  0.00 - 0.05 10*3/mm3 Final   • nRBC 10/28/2021 0.0  0.0 - 0.2 /100 WBC Final   Infusion on 10/21/2021   Component Date Value Ref Range Status   • Glucose 10/21/2021 291* 65 - 99 mg/dL Final   • BUN 10/21/2021 12  6 - 20 mg/dL Final   • Creatinine 10/21/2021 0.56* 0.57 - 1.00 mg/dL Final   • Sodium 10/21/2021 138  136 - 145 mmol/L Final   • Potassium 10/21/2021 4.1  3.5 - 5.2 mmol/L Final   • Chloride 10/21/2021 103  98 - 107 mmol/L Final   • CO2 10/21/2021 26.4  22.0 - 29.0 mmol/L Final   • Calcium 10/21/2021 9.2  8.6 - 10.5 mg/dL Final   • Total Protein 10/21/2021 6.6  6.0 - 8.5 g/dL Final   • Albumin 10/21/2021 4.10  3.50 - 5.20 g/dL Final   • ALT (SGPT) 10/21/2021 13  1 - 33 U/L Final   • AST (SGOT) 10/21/2021 18  1 - 32 U/L Final   •  Alkaline Phosphatase 10/21/2021 43  39 - 117 U/L Final   • Total Bilirubin 10/21/2021 <0.2  0.0 - 1.2 mg/dL Final   • eGFR Non African Amer 10/21/2021 120  >60 mL/min/1.73 Final   • Globulin 10/21/2021 2.5  gm/dL Final   • A/G Ratio 10/21/2021 1.6  g/dL Final   • BUN/Creatinine Ratio 10/21/2021 21.4  7.0 - 25.0 Final   • Anion Gap 10/21/2021 8.6  5.0 - 15.0 mmol/L Final   • Magnesium 10/21/2021 2.0  1.6 - 2.6 mg/dL Final   • Phosphorus 10/21/2021 3.6  2.5 - 4.5 mg/dL Final   • WBC 10/21/2021 6.56  3.40 - 10.80 10*3/mm3 Final   • RBC 10/21/2021 3.76* 3.77 - 5.28 10*6/mm3 Final   • Hemoglobin 10/21/2021 11.3* 12.0 - 15.9 g/dL Final   • Hematocrit 10/21/2021 33.8* 34.0 - 46.6 % Final   • MCV 10/21/2021 89.9  79.0 - 97.0 fL Final   • MCH 10/21/2021 30.1  26.6 - 33.0 pg Final   • MCHC 10/21/2021 33.4  31.5 - 35.7 g/dL Final   • RDW 10/21/2021 13.5  12.3 - 15.4 % Final   • RDW-SD 10/21/2021 44.0  37.0 - 54.0 fl Final   • MPV 10/21/2021 9.7  6.0 - 12.0 fL Final   • Platelets 10/21/2021 204  140 - 450 10*3/mm3 Final   • Neutrophil % 10/21/2021 59.5  42.7 - 76.0 % Final   • Lymphocyte % 10/21/2021 27.6  19.6 - 45.3 % Final   • Monocyte % 10/21/2021 8.1  5.0 - 12.0 % Final   • Eosinophil % 10/21/2021 3.7  0.3 - 6.2 % Final   • Basophil % 10/21/2021 0.3  0.0 - 1.5 % Final   • Immature Grans % 10/21/2021 0.8* 0.0 - 0.5 % Final   • Neutrophils, Absolute 10/21/2021 3.91  1.70 - 7.00 10*3/mm3 Final   • Lymphocytes, Absolute 10/21/2021 1.81  0.70 - 3.10 10*3/mm3 Final   • Monocytes, Absolute 10/21/2021 0.53  0.10 - 0.90 10*3/mm3 Final   • Eosinophils, Absolute 10/21/2021 0.24  0.00 - 0.40 10*3/mm3 Final   • Basophils, Absolute 10/21/2021 0.02  0.00 - 0.20 10*3/mm3 Final   • Immature Grans, Absolute 10/21/2021 0.05  0.00 - 0.05 10*3/mm3 Final   • nRBC 10/21/2021 0.0  0.0 - 0.2 /100 WBC Final        No radiology results for the last 30 days.   CT of the chest abdomen pelvis-images independently reviewed and interpreted by me in  detail summarized in the HPI  Bone scan-images independently reviewed and interpreted by me in detail summarized in the HPI    CT neck-images independently reviewed and interpreted by me in detail summarized in HPI.  I also discussed the CT neck with Dr. Alexander Blevins with radiology regarding the right side lymphadenopathy.     9/28/2021-right cervical lymph node biopsy-pathology consistent with metastatic mammary carcinoma.    10/1/2021-PET/CT-there are 2 separate hypermetabolic lesions within the right breast.  1 measuring 6 cm and the other measuring 1.3 cm.  The SUV of 6 cm lesion of 5.6 and small lesion of 4.8.  Hypermetabolic right axillary, infra pectoral, right supraclavicular lymphadenopathy noted.  In addition there are hypermetabolic nodes at the right thoracic inlet, right base of the neck and right posterior cervical triangle.  1.6 x 0.8 cm right posterior cervical triangle node with an SUV of 3.9.  Right axillary lymphadenopathy with lymphedema 6.4.  Hypermetabolic left hilar lymphadenopathy measuring 2 x 1.1 cm with an SUV of 5.8.  No hypermetabolic activity in the liver or evidence of metastatic disease in the abdomen or pelvis.  Hypermetabolic lesion in the right femoral neck measuring 1.5 cm.  SUV 5.4.    PET/CT images independently reviewed and interpreted by me in detail summarized above.  Images also reviewed with patient.    CBC reviewed and hemoglobin 11 otherwise within normal limits  CMP-blood sugar 236 but otherwise within normal limits    Assessment/Plan       *Invasive ductal carcinoma of the right breast  · Clinical T3 N3 cM1, stage IV  · The tumor is grade 3, poorly differentiated, ER 1 to 10% weak, WV negative, HER-2 negative, Ki-67 65%  · Cervical lymph node biopsy consistent with metastatic mammary carcinoma.  · PET/CT the abnormal uptake noted in the left hilar lymph node, right femoral neck, cervical lymph nodes.  · Since the cervical lymph node biopsy is positive as well as PET/CT  with several abnormal findings I do believe that this is stage IV disease.  · Discussed case at multidisciplinary conference on 9/28/2021 and the initial plan was to proceed with neoadjuvant chemotherapy followed by surgery and radiation to the positive cervical nodes.  PET/CT results were not available at that time.  · The PET/CT showing other areas of metastatic disease unfortunately we will not be able to proceed with curative intent treatment.  · Explained that the disease is incurable  · On hearing this news Sierra was overwhelmed and she decided to cancel the procedure for the port echo scheduled for later in the day.  · Caris testing requested  · On the subsequent visit I discussed with her about palliative intent chemotherapy with single agent Taxol and adding immunotherapy down the line once PD-L1 results available.  · Since she is complaining of significant pain in the right breast I would consider adding carboplatin for about 4 to 6 weeks to help get a quick response in the right breast tumor as well as right axillary lymphadenopathy.  · Adverse effects of Taxol including but not limited to myelosuppression, increased risk of infections, nausea, vomiting, arthralgias, neuropathy, nail changes, alopecia, diarrhea, possible mucositis discussed.  · Caris testing has been reviewed today and PD-L1 CPS was 3 and hence no additional benefit from immunotherapy.  Tumor mutational burden was also low and MSI stable.  Therefore there is no added benefit from immunotherapy.  · She has received 4 doses we will carboplatin and Taxol  · Since the tumor has decreased in size and symptoms in the right breast and axilla have improved we will discontinue carboplatin and continue with Taxol only.  · Here 11/19/2021 for continued single agent taxol reporting issues with bone pain from taxol.  No neuropathy.  Has norco to use for bone pain if needed.       *Nausea  · She is experiencing headaches  · Discontinue Zofran and  start Compazine    *Headaches  · Could be secondary to Zofran and Aloxi  · Discontinue both  · If headaches persist obtain MRI of the brain    *Diabetes  · Type I  · Poorly controlled  ·  notes reviewed   · Follow-up with endocrinology    * Cervical lymphadenopathy  · Biopsied and pathology consistent with metastatic breast cancer    *Anxiety  · She has been referred to supportive oncology  · Met with Tenisha Rizzo  · Started on gabapentin 300 mg 3 times daily  · She is currently on max dose of Celexa  · There is a level C interaction of Zyprexa with Celexa.  · However I think Zyprexa would be reasonable choice given anxiety, insomnia, nausea.  · Currently using Ativan to help with sleep occasionally.  States she will need a refill in a few days.  Dr. De La O would like her to     *Port placement-on 10/16/2021    *Breast pain-Norco every 8 hours prescribed, pain improved    *SRE prevention-hold off on Xgeva she needs some dental work.    * Arthralgia from Taxol: Could try Claritin, otherwise continue Norco if needed and occasional ibuprofen.    PLAN:  1. Proceed with Taxol today.  2. Patient okay to return on 12/2/2021 for labs and her next dose of weekly Taxol- will schedule her to see MD or NP that day.  3. Refill Ativan but follow up with Tenisha Villasenor to see about maximizing meds otherwise to help with sleep and anxiety.  4. Norco if needed for pain- we will go ahead and send in a refill for this medication.  5. Patient requests appointments a little later in the day if possible due to transportation.    Patient is on high risk medication requiring close monitoring for toxicity.     Philly Mac, APRN  11/19/2021        Mode of Visit: Video  Location of patient: other: office  You have chosen to receive care through a telehealth visit.  Does the patient consent to use a video/audio connection for your medical care today? Yes  The visit included audio and video interaction. No technical  issues occurred during this visit.

## 2021-11-23 ENCOUNTER — OFFICE VISIT (OUTPATIENT)
Dept: PSYCHIATRY | Facility: HOSPITAL | Age: 41
End: 2021-11-23

## 2021-11-23 DIAGNOSIS — F41.1 GENERALIZED ANXIETY DISORDER: Primary | ICD-10-CM

## 2021-11-23 DIAGNOSIS — F90.1 ATTENTION DEFICIT HYPERACTIVITY DISORDER (ADHD), PREDOMINANTLY HYPERACTIVE TYPE: ICD-10-CM

## 2021-11-23 PROCEDURE — 99214 OFFICE O/P EST MOD 30 MIN: CPT | Performed by: NURSE PRACTITIONER

## 2021-11-23 RX ORDER — DEXTROAMPHETAMINE SACCHARATE, AMPHETAMINE ASPARTATE, DEXTROAMPHETAMINE SULFATE AND AMPHETAMINE SULFATE 2.5; 2.5; 2.5; 2.5 MG/1; MG/1; MG/1; MG/1
10 TABLET ORAL DAILY
Qty: 30 TABLET | Refills: 0 | Status: SHIPPED | OUTPATIENT
Start: 2021-11-23 | End: 2022-11-23

## 2021-11-24 ENCOUNTER — APPOINTMENT (OUTPATIENT)
Dept: ONCOLOGY | Facility: HOSPITAL | Age: 41
End: 2021-11-24

## 2021-12-02 ENCOUNTER — INFUSION (OUTPATIENT)
Dept: ONCOLOGY | Facility: HOSPITAL | Age: 41
End: 2021-12-02

## 2021-12-02 ENCOUNTER — OFFICE VISIT (OUTPATIENT)
Dept: ONCOLOGY | Facility: CLINIC | Age: 41
End: 2021-12-02

## 2021-12-02 VITALS
OXYGEN SATURATION: 97 % | TEMPERATURE: 97.5 F | BODY MASS INDEX: 22.43 KG/M2 | SYSTOLIC BLOOD PRESSURE: 114 MMHG | HEIGHT: 71 IN | RESPIRATION RATE: 18 BRPM | DIASTOLIC BLOOD PRESSURE: 70 MMHG | HEART RATE: 93 BPM | WEIGHT: 160.2 LBS

## 2021-12-02 VITALS — SYSTOLIC BLOOD PRESSURE: 105 MMHG | HEART RATE: 67 BPM | DIASTOLIC BLOOD PRESSURE: 65 MMHG

## 2021-12-02 DIAGNOSIS — Z45.2 ENCOUNTER FOR FITTING AND ADJUSTMENT OF VASCULAR CATHETER: ICD-10-CM

## 2021-12-02 DIAGNOSIS — Z17.1 MALIGNANT NEOPLASM OF CENTRAL PORTION OF RIGHT BREAST IN FEMALE, ESTROGEN RECEPTOR NEGATIVE (HCC): Primary | ICD-10-CM

## 2021-12-02 DIAGNOSIS — C79.51 BONE METASTASIS: ICD-10-CM

## 2021-12-02 DIAGNOSIS — Z17.1 MALIGNANT NEOPLASM OF CENTRAL PORTION OF RIGHT BREAST IN FEMALE, ESTROGEN RECEPTOR NEGATIVE (HCC): ICD-10-CM

## 2021-12-02 DIAGNOSIS — C50.111 MALIGNANT NEOPLASM OF CENTRAL PORTION OF RIGHT BREAST IN FEMALE, ESTROGEN RECEPTOR NEGATIVE (HCC): Primary | ICD-10-CM

## 2021-12-02 DIAGNOSIS — Z79.899 HIGH RISK MEDICATION USE: ICD-10-CM

## 2021-12-02 DIAGNOSIS — C50.111 MALIGNANT NEOPLASM OF CENTRAL PORTION OF RIGHT BREAST IN FEMALE, ESTROGEN RECEPTOR NEGATIVE (HCC): ICD-10-CM

## 2021-12-02 DIAGNOSIS — M25.50 ARTHRALGIA, UNSPECIFIED JOINT: ICD-10-CM

## 2021-12-02 LAB
ALBUMIN SERPL-MCNC: 4.1 G/DL (ref 3.5–5.2)
ALBUMIN/GLOB SERPL: 1.6 G/DL
ALP SERPL-CCNC: 48 U/L (ref 39–117)
ALT SERPL W P-5'-P-CCNC: 14 U/L (ref 1–33)
ANION GAP SERPL CALCULATED.3IONS-SCNC: 9 MMOL/L (ref 5–15)
AST SERPL-CCNC: 15 U/L (ref 1–32)
BASOPHILS # BLD AUTO: 0.04 10*3/MM3 (ref 0–0.2)
BASOPHILS NFR BLD AUTO: 0.5 % (ref 0–1.5)
BILIRUB SERPL-MCNC: 0.2 MG/DL (ref 0–1.2)
BUN SERPL-MCNC: 11 MG/DL (ref 6–20)
BUN/CREAT SERPL: 18.6 (ref 7–25)
CALCIUM SPEC-SCNC: 9 MG/DL (ref 8.6–10.5)
CHLORIDE SERPL-SCNC: 103 MMOL/L (ref 98–107)
CO2 SERPL-SCNC: 26 MMOL/L (ref 22–29)
CREAT SERPL-MCNC: 0.59 MG/DL (ref 0.57–1)
DEPRECATED RDW RBC AUTO: 53.2 FL (ref 37–54)
EOSINOPHIL # BLD AUTO: 0.21 10*3/MM3 (ref 0–0.4)
EOSINOPHIL NFR BLD AUTO: 2.7 % (ref 0.3–6.2)
ERYTHROCYTE [DISTWIDTH] IN BLOOD BY AUTOMATED COUNT: 15.4 % (ref 12.3–15.4)
GFR SERPL CREATININE-BSD FRML MDRD: 113 ML/MIN/1.73
GLOBULIN UR ELPH-MCNC: 2.6 GM/DL
GLUCOSE SERPL-MCNC: 255 MG/DL (ref 65–99)
HCT VFR BLD AUTO: 35.4 % (ref 34–46.6)
HGB BLD-MCNC: 11.7 G/DL (ref 12–15.9)
IMM GRANULOCYTES # BLD AUTO: 0.02 10*3/MM3 (ref 0–0.05)
IMM GRANULOCYTES NFR BLD AUTO: 0.3 % (ref 0–0.5)
LYMPHOCYTES # BLD AUTO: 1.48 10*3/MM3 (ref 0.7–3.1)
LYMPHOCYTES NFR BLD AUTO: 19 % (ref 19.6–45.3)
MCH RBC QN AUTO: 31 PG (ref 26.6–33)
MCHC RBC AUTO-ENTMCNC: 33.1 G/DL (ref 31.5–35.7)
MCV RBC AUTO: 93.7 FL (ref 79–97)
MONOCYTES # BLD AUTO: 0.79 10*3/MM3 (ref 0.1–0.9)
MONOCYTES NFR BLD AUTO: 10.2 % (ref 5–12)
NEUTROPHILS NFR BLD AUTO: 5.23 10*3/MM3 (ref 1.7–7)
NEUTROPHILS NFR BLD AUTO: 67.3 % (ref 42.7–76)
NRBC BLD AUTO-RTO: 0 /100 WBC (ref 0–0.2)
PLATELET # BLD AUTO: 208 10*3/MM3 (ref 140–450)
PMV BLD AUTO: 9.2 FL (ref 6–12)
POTASSIUM SERPL-SCNC: 4.5 MMOL/L (ref 3.5–5.2)
PROT SERPL-MCNC: 6.7 G/DL (ref 6–8.5)
RBC # BLD AUTO: 3.78 10*6/MM3 (ref 3.77–5.28)
SODIUM SERPL-SCNC: 138 MMOL/L (ref 136–145)
WBC NRBC COR # BLD: 7.77 10*3/MM3 (ref 3.4–10.8)

## 2021-12-02 PROCEDURE — 96375 TX/PRO/DX INJ NEW DRUG ADDON: CPT

## 2021-12-02 PROCEDURE — 25010000002 HEPARIN LOCK FLUSH PER 10 UNITS: Performed by: INTERNAL MEDICINE

## 2021-12-02 PROCEDURE — 25010000002 PACLITAXEL PER 1 MG: Performed by: NURSE PRACTITIONER

## 2021-12-02 PROCEDURE — 85025 COMPLETE CBC W/AUTO DIFF WBC: CPT | Performed by: INTERNAL MEDICINE

## 2021-12-02 PROCEDURE — 25010000002 DIPHENHYDRAMINE PER 50 MG: Performed by: NURSE PRACTITIONER

## 2021-12-02 PROCEDURE — 96413 CHEMO IV INFUSION 1 HR: CPT

## 2021-12-02 PROCEDURE — 80053 COMPREHEN METABOLIC PANEL: CPT | Performed by: INTERNAL MEDICINE

## 2021-12-02 PROCEDURE — 99214 OFFICE O/P EST MOD 30 MIN: CPT | Performed by: NURSE PRACTITIONER

## 2021-12-02 PROCEDURE — 25010000002 DEXAMETHASONE PER 1 MG: Performed by: NURSE PRACTITIONER

## 2021-12-02 RX ORDER — SODIUM CHLORIDE 0.9 % (FLUSH) 0.9 %
10 SYRINGE (ML) INJECTION AS NEEDED
Status: CANCELLED | OUTPATIENT
Start: 2021-12-02

## 2021-12-02 RX ORDER — FAMOTIDINE 10 MG/ML
20 INJECTION, SOLUTION INTRAVENOUS ONCE
Status: COMPLETED | OUTPATIENT
Start: 2021-12-02 | End: 2021-12-02

## 2021-12-02 RX ORDER — HEPARIN SODIUM (PORCINE) LOCK FLUSH IV SOLN 100 UNIT/ML 100 UNIT/ML
500 SOLUTION INTRAVENOUS AS NEEDED
Status: CANCELLED | OUTPATIENT
Start: 2021-12-02

## 2021-12-02 RX ORDER — SODIUM CHLORIDE 9 MG/ML
250 INJECTION, SOLUTION INTRAVENOUS ONCE
Status: CANCELLED | OUTPATIENT
Start: 2021-12-02

## 2021-12-02 RX ORDER — FAMOTIDINE 10 MG/ML
20 INJECTION, SOLUTION INTRAVENOUS AS NEEDED
Status: CANCELLED | OUTPATIENT
Start: 2021-12-02

## 2021-12-02 RX ORDER — SODIUM CHLORIDE 0.9 % (FLUSH) 0.9 %
10 SYRINGE (ML) INJECTION AS NEEDED
Status: DISCONTINUED | OUTPATIENT
Start: 2021-12-02 | End: 2021-12-02 | Stop reason: HOSPADM

## 2021-12-02 RX ORDER — SODIUM CHLORIDE 9 MG/ML
250 INJECTION, SOLUTION INTRAVENOUS ONCE
Status: COMPLETED | OUTPATIENT
Start: 2021-12-02 | End: 2021-12-02

## 2021-12-02 RX ORDER — FAMOTIDINE 10 MG/ML
20 INJECTION, SOLUTION INTRAVENOUS ONCE
Status: CANCELLED | OUTPATIENT
Start: 2021-12-02

## 2021-12-02 RX ORDER — HEPARIN SODIUM (PORCINE) LOCK FLUSH IV SOLN 100 UNIT/ML 100 UNIT/ML
500 SOLUTION INTRAVENOUS AS NEEDED
Status: DISCONTINUED | OUTPATIENT
Start: 2021-12-02 | End: 2021-12-02 | Stop reason: HOSPADM

## 2021-12-02 RX ORDER — DIPHENHYDRAMINE HYDROCHLORIDE 50 MG/ML
50 INJECTION INTRAMUSCULAR; INTRAVENOUS AS NEEDED
Status: CANCELLED | OUTPATIENT
Start: 2021-12-02

## 2021-12-02 RX ADMIN — SODIUM CHLORIDE 250 ML: 9 INJECTION, SOLUTION INTRAVENOUS at 12:00

## 2021-12-02 RX ADMIN — SODIUM CHLORIDE, PRESERVATIVE FREE 10 ML: 5 INJECTION INTRAVENOUS at 14:15

## 2021-12-02 RX ADMIN — DEXAMETHASONE SODIUM PHOSPHATE 8 MG: 10 INJECTION INTRAMUSCULAR; INTRAVENOUS at 12:03

## 2021-12-02 RX ADMIN — DIPHENHYDRAMINE HYDROCHLORIDE 12.5 MG: 50 INJECTION INTRAMUSCULAR; INTRAVENOUS at 12:19

## 2021-12-02 RX ADMIN — Medication 500 UNITS: at 14:15

## 2021-12-02 RX ADMIN — FAMOTIDINE 20 MG: 10 INJECTION INTRAVENOUS at 12:01

## 2021-12-02 RX ADMIN — PACLITAXEL 150 MG: 6 INJECTION, SOLUTION INTRAVENOUS at 13:14

## 2021-12-06 DIAGNOSIS — F41.9 ANXIETY: ICD-10-CM

## 2021-12-06 RX ORDER — CITALOPRAM 40 MG/1
40 TABLET ORAL DAILY
Qty: 90 TABLET | Refills: 0 | Status: SHIPPED | OUTPATIENT
Start: 2021-12-06 | End: 2021-12-28 | Stop reason: ALTCHOICE

## 2021-12-06 NOTE — TELEPHONE ENCOUNTER
Rx Refill Note  Requested Prescriptions     Pending Prescriptions Disp Refills   • citalopram (CeleXA) 40 MG tablet 90 tablet 0     Sig: Take 1 tablet by mouth Daily.      Last office visit with prescribing clinician: 2/16/2021      Next office visit with prescribing clinician: Visit date not found            Ishmael Chavarria MA  12/06/21, 09:57 EST

## 2021-12-09 ENCOUNTER — INFUSION (OUTPATIENT)
Dept: ONCOLOGY | Facility: HOSPITAL | Age: 41
End: 2021-12-09

## 2021-12-09 VITALS
OXYGEN SATURATION: 100 % | TEMPERATURE: 96.9 F | RESPIRATION RATE: 18 BRPM | SYSTOLIC BLOOD PRESSURE: 100 MMHG | DIASTOLIC BLOOD PRESSURE: 65 MMHG | WEIGHT: 162 LBS | HEART RATE: 84 BPM | HEIGHT: 71 IN | BODY MASS INDEX: 22.68 KG/M2

## 2021-12-09 DIAGNOSIS — Z17.1 MALIGNANT NEOPLASM OF CENTRAL PORTION OF RIGHT BREAST IN FEMALE, ESTROGEN RECEPTOR NEGATIVE (HCC): Primary | ICD-10-CM

## 2021-12-09 DIAGNOSIS — Z45.2 ENCOUNTER FOR FITTING AND ADJUSTMENT OF VASCULAR CATHETER: ICD-10-CM

## 2021-12-09 DIAGNOSIS — C50.111 MALIGNANT NEOPLASM OF CENTRAL PORTION OF RIGHT BREAST IN FEMALE, ESTROGEN RECEPTOR NEGATIVE (HCC): Primary | ICD-10-CM

## 2021-12-09 LAB
ALBUMIN SERPL-MCNC: 4 G/DL (ref 3.5–5.2)
ALBUMIN/GLOB SERPL: 1.7 G/DL
ALP SERPL-CCNC: 45 U/L (ref 39–117)
ALT SERPL W P-5'-P-CCNC: 14 U/L (ref 1–33)
ANION GAP SERPL CALCULATED.3IONS-SCNC: 9.3 MMOL/L (ref 5–15)
AST SERPL-CCNC: 17 U/L (ref 1–32)
BASOPHILS # BLD AUTO: 0.02 10*3/MM3 (ref 0–0.2)
BASOPHILS NFR BLD AUTO: 0.3 % (ref 0–1.5)
BILIRUB SERPL-MCNC: 0.2 MG/DL (ref 0–1.2)
BUN SERPL-MCNC: 11 MG/DL (ref 6–20)
BUN/CREAT SERPL: 18.3 (ref 7–25)
CALCIUM SPEC-SCNC: 8.8 MG/DL (ref 8.6–10.5)
CHLORIDE SERPL-SCNC: 105 MMOL/L (ref 98–107)
CO2 SERPL-SCNC: 26.7 MMOL/L (ref 22–29)
CREAT SERPL-MCNC: 0.6 MG/DL (ref 0.57–1)
DEPRECATED RDW RBC AUTO: 51.3 FL (ref 37–54)
EOSINOPHIL # BLD AUTO: 0.27 10*3/MM3 (ref 0–0.4)
EOSINOPHIL NFR BLD AUTO: 4.3 % (ref 0.3–6.2)
ERYTHROCYTE [DISTWIDTH] IN BLOOD BY AUTOMATED COUNT: 14.9 % (ref 12.3–15.4)
GFR SERPL CREATININE-BSD FRML MDRD: 111 ML/MIN/1.73
GLOBULIN UR ELPH-MCNC: 2.3 GM/DL
GLUCOSE SERPL-MCNC: 267 MG/DL (ref 65–99)
HCT VFR BLD AUTO: 33.4 % (ref 34–46.6)
HGB BLD-MCNC: 11 G/DL (ref 12–15.9)
IMM GRANULOCYTES # BLD AUTO: 0.03 10*3/MM3 (ref 0–0.05)
IMM GRANULOCYTES NFR BLD AUTO: 0.5 % (ref 0–0.5)
LYMPHOCYTES # BLD AUTO: 1.66 10*3/MM3 (ref 0.7–3.1)
LYMPHOCYTES NFR BLD AUTO: 26.2 % (ref 19.6–45.3)
MCH RBC QN AUTO: 30.9 PG (ref 26.6–33)
MCHC RBC AUTO-ENTMCNC: 32.9 G/DL (ref 31.5–35.7)
MCV RBC AUTO: 93.8 FL (ref 79–97)
MONOCYTES # BLD AUTO: 0.28 10*3/MM3 (ref 0.1–0.9)
MONOCYTES NFR BLD AUTO: 4.4 % (ref 5–12)
NEUTROPHILS NFR BLD AUTO: 4.07 10*3/MM3 (ref 1.7–7)
NEUTROPHILS NFR BLD AUTO: 64.3 % (ref 42.7–76)
NRBC BLD AUTO-RTO: 0 /100 WBC (ref 0–0.2)
PLATELET # BLD AUTO: 235 10*3/MM3 (ref 140–450)
PMV BLD AUTO: 9.3 FL (ref 6–12)
POTASSIUM SERPL-SCNC: 3.9 MMOL/L (ref 3.5–5.2)
PROT SERPL-MCNC: 6.3 G/DL (ref 6–8.5)
RBC # BLD AUTO: 3.56 10*6/MM3 (ref 3.77–5.28)
SODIUM SERPL-SCNC: 141 MMOL/L (ref 136–145)
WBC NRBC COR # BLD: 6.33 10*3/MM3 (ref 3.4–10.8)

## 2021-12-09 PROCEDURE — 25010000002 HEPARIN LOCK FLUSH PER 10 UNITS: Performed by: INTERNAL MEDICINE

## 2021-12-09 PROCEDURE — 80053 COMPREHEN METABOLIC PANEL: CPT | Performed by: INTERNAL MEDICINE

## 2021-12-09 PROCEDURE — 96375 TX/PRO/DX INJ NEW DRUG ADDON: CPT

## 2021-12-09 PROCEDURE — 25010000002 PACLITAXEL PER 1 MG: Performed by: NURSE PRACTITIONER

## 2021-12-09 PROCEDURE — 25010000002 DIPHENHYDRAMINE PER 50 MG: Performed by: NURSE PRACTITIONER

## 2021-12-09 PROCEDURE — 25010000002 DEXAMETHASONE PER 1 MG: Performed by: NURSE PRACTITIONER

## 2021-12-09 PROCEDURE — 85025 COMPLETE CBC W/AUTO DIFF WBC: CPT | Performed by: INTERNAL MEDICINE

## 2021-12-09 PROCEDURE — 96413 CHEMO IV INFUSION 1 HR: CPT

## 2021-12-09 RX ORDER — SODIUM CHLORIDE 0.9 % (FLUSH) 0.9 %
10 SYRINGE (ML) INJECTION AS NEEDED
Status: CANCELLED | OUTPATIENT
Start: 2021-12-09

## 2021-12-09 RX ORDER — SODIUM CHLORIDE 9 MG/ML
250 INJECTION, SOLUTION INTRAVENOUS ONCE
Status: COMPLETED | OUTPATIENT
Start: 2021-12-09 | End: 2021-12-09

## 2021-12-09 RX ORDER — HEPARIN SODIUM (PORCINE) LOCK FLUSH IV SOLN 100 UNIT/ML 100 UNIT/ML
500 SOLUTION INTRAVENOUS AS NEEDED
Status: DISCONTINUED | OUTPATIENT
Start: 2021-12-09 | End: 2021-12-09 | Stop reason: HOSPADM

## 2021-12-09 RX ORDER — HEPARIN SODIUM (PORCINE) LOCK FLUSH IV SOLN 100 UNIT/ML 100 UNIT/ML
500 SOLUTION INTRAVENOUS AS NEEDED
Status: CANCELLED | OUTPATIENT
Start: 2021-12-09

## 2021-12-09 RX ORDER — DIPHENHYDRAMINE HYDROCHLORIDE 50 MG/ML
50 INJECTION INTRAMUSCULAR; INTRAVENOUS AS NEEDED
Status: CANCELLED | OUTPATIENT
Start: 2021-12-09

## 2021-12-09 RX ORDER — FAMOTIDINE 10 MG/ML
20 INJECTION, SOLUTION INTRAVENOUS ONCE
Status: COMPLETED | OUTPATIENT
Start: 2021-12-09 | End: 2021-12-09

## 2021-12-09 RX ORDER — SODIUM CHLORIDE 0.9 % (FLUSH) 0.9 %
10 SYRINGE (ML) INJECTION AS NEEDED
Status: DISCONTINUED | OUTPATIENT
Start: 2021-12-09 | End: 2021-12-09 | Stop reason: HOSPADM

## 2021-12-09 RX ORDER — FAMOTIDINE 10 MG/ML
20 INJECTION, SOLUTION INTRAVENOUS AS NEEDED
Status: CANCELLED | OUTPATIENT
Start: 2021-12-09

## 2021-12-09 RX ADMIN — Medication 500 UNITS: at 14:58

## 2021-12-09 RX ADMIN — FAMOTIDINE 20 MG: 10 INJECTION INTRAVENOUS at 12:44

## 2021-12-09 RX ADMIN — DIPHENHYDRAMINE HYDROCHLORIDE 12.5 MG: 50 INJECTION, SOLUTION INTRAMUSCULAR; INTRAVENOUS at 12:47

## 2021-12-09 RX ADMIN — PACLITAXEL 150 MG: 6 INJECTION, SOLUTION INTRAVENOUS at 13:53

## 2021-12-09 RX ADMIN — SODIUM CHLORIDE, PRESERVATIVE FREE 10 ML: 5 INJECTION INTRAVENOUS at 14:58

## 2021-12-09 RX ADMIN — SODIUM CHLORIDE 250 ML: 9 INJECTION, SOLUTION INTRAVENOUS at 12:44

## 2021-12-09 RX ADMIN — DEXAMETHASONE SODIUM PHOSPHATE 8 MG: 10 INJECTION INTRAMUSCULAR; INTRAVENOUS at 13:01

## 2021-12-14 DIAGNOSIS — F90.1 ATTENTION DEFICIT HYPERACTIVITY DISORDER (ADHD), PREDOMINANTLY HYPERACTIVE TYPE: ICD-10-CM

## 2021-12-16 ENCOUNTER — INFUSION (OUTPATIENT)
Dept: ONCOLOGY | Facility: HOSPITAL | Age: 41
End: 2021-12-16

## 2021-12-16 VITALS
BODY MASS INDEX: 22.96 KG/M2 | OXYGEN SATURATION: 99 % | HEIGHT: 70 IN | SYSTOLIC BLOOD PRESSURE: 115 MMHG | WEIGHT: 160.4 LBS | RESPIRATION RATE: 18 BRPM | TEMPERATURE: 98.1 F | DIASTOLIC BLOOD PRESSURE: 70 MMHG | HEART RATE: 86 BPM

## 2021-12-16 DIAGNOSIS — Z17.1 MALIGNANT NEOPLASM OF CENTRAL PORTION OF RIGHT BREAST IN FEMALE, ESTROGEN RECEPTOR NEGATIVE (HCC): ICD-10-CM

## 2021-12-16 DIAGNOSIS — C50.111 MALIGNANT NEOPLASM OF CENTRAL PORTION OF RIGHT BREAST IN FEMALE, ESTROGEN RECEPTOR NEGATIVE (HCC): ICD-10-CM

## 2021-12-16 DIAGNOSIS — Z17.1 MALIGNANT NEOPLASM OF CENTRAL PORTION OF RIGHT BREAST IN FEMALE, ESTROGEN RECEPTOR NEGATIVE (HCC): Primary | ICD-10-CM

## 2021-12-16 DIAGNOSIS — Z45.2 ENCOUNTER FOR FITTING AND ADJUSTMENT OF VASCULAR CATHETER: ICD-10-CM

## 2021-12-16 DIAGNOSIS — C50.111 MALIGNANT NEOPLASM OF CENTRAL PORTION OF RIGHT BREAST IN FEMALE, ESTROGEN RECEPTOR NEGATIVE (HCC): Primary | ICD-10-CM

## 2021-12-16 LAB
ALBUMIN SERPL-MCNC: 4.2 G/DL (ref 3.5–5.2)
ALBUMIN/GLOB SERPL: 1.8 G/DL
ALP SERPL-CCNC: 42 U/L (ref 39–117)
ALT SERPL W P-5'-P-CCNC: 19 U/L (ref 1–33)
ANION GAP SERPL CALCULATED.3IONS-SCNC: 8 MMOL/L (ref 5–15)
AST SERPL-CCNC: 18 U/L (ref 1–32)
BASOPHILS # BLD AUTO: 0.02 10*3/MM3 (ref 0–0.2)
BASOPHILS NFR BLD AUTO: 0.4 % (ref 0–1.5)
BILIRUB SERPL-MCNC: 0.3 MG/DL (ref 0–1.2)
BUN SERPL-MCNC: 14 MG/DL (ref 6–20)
BUN/CREAT SERPL: 19.2 (ref 7–25)
CALCIUM SPEC-SCNC: 9.1 MG/DL (ref 8.6–10.5)
CHLORIDE SERPL-SCNC: 103 MMOL/L (ref 98–107)
CO2 SERPL-SCNC: 27 MMOL/L (ref 22–29)
CREAT SERPL-MCNC: 0.73 MG/DL (ref 0.57–1)
DEPRECATED RDW RBC AUTO: 53 FL (ref 37–54)
EOSINOPHIL # BLD AUTO: 0.25 10*3/MM3 (ref 0–0.4)
EOSINOPHIL NFR BLD AUTO: 5.4 % (ref 0.3–6.2)
ERYTHROCYTE [DISTWIDTH] IN BLOOD BY AUTOMATED COUNT: 15.5 % (ref 12.3–15.4)
GFR SERPL CREATININE-BSD FRML MDRD: 88 ML/MIN/1.73
GLOBULIN UR ELPH-MCNC: 2.3 GM/DL
GLUCOSE SERPL-MCNC: 274 MG/DL (ref 65–99)
HCT VFR BLD AUTO: 34.7 % (ref 34–46.6)
HGB BLD-MCNC: 11.4 G/DL (ref 12–15.9)
IMM GRANULOCYTES # BLD AUTO: 0.01 10*3/MM3 (ref 0–0.05)
IMM GRANULOCYTES NFR BLD AUTO: 0.2 % (ref 0–0.5)
LYMPHOCYTES # BLD AUTO: 1.52 10*3/MM3 (ref 0.7–3.1)
LYMPHOCYTES NFR BLD AUTO: 33.1 % (ref 19.6–45.3)
MCH RBC QN AUTO: 30.9 PG (ref 26.6–33)
MCHC RBC AUTO-ENTMCNC: 32.9 G/DL (ref 31.5–35.7)
MCV RBC AUTO: 94 FL (ref 79–97)
MONOCYTES # BLD AUTO: 0.41 10*3/MM3 (ref 0.1–0.9)
MONOCYTES NFR BLD AUTO: 8.9 % (ref 5–12)
NEUTROPHILS NFR BLD AUTO: 2.38 10*3/MM3 (ref 1.7–7)
NEUTROPHILS NFR BLD AUTO: 52 % (ref 42.7–76)
NRBC BLD AUTO-RTO: 0 /100 WBC (ref 0–0.2)
PLATELET # BLD AUTO: 254 10*3/MM3 (ref 140–450)
PMV BLD AUTO: 9.4 FL (ref 6–12)
POTASSIUM SERPL-SCNC: 4.6 MMOL/L (ref 3.5–5.2)
PROT SERPL-MCNC: 6.5 G/DL (ref 6–8.5)
RBC # BLD AUTO: 3.69 10*6/MM3 (ref 3.77–5.28)
SODIUM SERPL-SCNC: 138 MMOL/L (ref 136–145)
WBC NRBC COR # BLD: 4.59 10*3/MM3 (ref 3.4–10.8)

## 2021-12-16 PROCEDURE — 25010000002 HEPARIN LOCK FLUSH PER 10 UNITS: Performed by: INTERNAL MEDICINE

## 2021-12-16 PROCEDURE — 96413 CHEMO IV INFUSION 1 HR: CPT

## 2021-12-16 PROCEDURE — 25010000002 DIPHENHYDRAMINE PER 50 MG: Performed by: NURSE PRACTITIONER

## 2021-12-16 PROCEDURE — 85025 COMPLETE CBC W/AUTO DIFF WBC: CPT | Performed by: INTERNAL MEDICINE

## 2021-12-16 PROCEDURE — 96375 TX/PRO/DX INJ NEW DRUG ADDON: CPT

## 2021-12-16 PROCEDURE — 96374 THER/PROPH/DIAG INJ IV PUSH: CPT

## 2021-12-16 PROCEDURE — 80053 COMPREHEN METABOLIC PANEL: CPT | Performed by: INTERNAL MEDICINE

## 2021-12-16 PROCEDURE — 25010000002 DEXAMETHASONE PER 1 MG: Performed by: NURSE PRACTITIONER

## 2021-12-16 PROCEDURE — 25010000002 PACLITAXEL PER 1 MG: Performed by: NURSE PRACTITIONER

## 2021-12-16 RX ORDER — HEPARIN SODIUM (PORCINE) LOCK FLUSH IV SOLN 100 UNIT/ML 100 UNIT/ML
500 SOLUTION INTRAVENOUS AS NEEDED
Status: CANCELLED | OUTPATIENT
Start: 2021-12-16

## 2021-12-16 RX ORDER — HEPARIN SODIUM (PORCINE) LOCK FLUSH IV SOLN 100 UNIT/ML 100 UNIT/ML
500 SOLUTION INTRAVENOUS AS NEEDED
Status: DISCONTINUED | OUTPATIENT
Start: 2021-12-16 | End: 2021-12-16 | Stop reason: HOSPADM

## 2021-12-16 RX ORDER — HYDROCODONE BITARTRATE AND ACETAMINOPHEN 5; 325 MG/1; MG/1
1 TABLET ORAL EVERY 8 HOURS PRN
Qty: 90 TABLET | Refills: 0 | Status: SHIPPED | OUTPATIENT
Start: 2021-12-16 | End: 2021-12-17 | Stop reason: ALTCHOICE

## 2021-12-16 RX ORDER — SODIUM CHLORIDE 9 MG/ML
250 INJECTION, SOLUTION INTRAVENOUS ONCE
Status: COMPLETED | OUTPATIENT
Start: 2021-12-16 | End: 2021-12-16

## 2021-12-16 RX ORDER — FAMOTIDINE 10 MG/ML
20 INJECTION, SOLUTION INTRAVENOUS ONCE
Status: COMPLETED | OUTPATIENT
Start: 2021-12-16 | End: 2021-12-16

## 2021-12-16 RX ORDER — DIPHENHYDRAMINE HYDROCHLORIDE 50 MG/ML
50 INJECTION INTRAMUSCULAR; INTRAVENOUS AS NEEDED
Status: CANCELLED | OUTPATIENT
Start: 2021-12-16

## 2021-12-16 RX ORDER — SODIUM CHLORIDE 0.9 % (FLUSH) 0.9 %
10 SYRINGE (ML) INJECTION AS NEEDED
Status: CANCELLED | OUTPATIENT
Start: 2021-12-16

## 2021-12-16 RX ORDER — SODIUM CHLORIDE 0.9 % (FLUSH) 0.9 %
10 SYRINGE (ML) INJECTION AS NEEDED
Status: DISCONTINUED | OUTPATIENT
Start: 2021-12-16 | End: 2021-12-16 | Stop reason: HOSPADM

## 2021-12-16 RX ORDER — FAMOTIDINE 10 MG/ML
20 INJECTION, SOLUTION INTRAVENOUS AS NEEDED
Status: CANCELLED | OUTPATIENT
Start: 2021-12-16

## 2021-12-16 RX ADMIN — FAMOTIDINE 20 MG: 10 INJECTION INTRAVENOUS at 12:23

## 2021-12-16 RX ADMIN — DEXAMETHASONE SODIUM PHOSPHATE 8 MG: 10 INJECTION INTRAMUSCULAR; INTRAVENOUS at 12:42

## 2021-12-16 RX ADMIN — SODIUM CHLORIDE 250 ML: 9 INJECTION, SOLUTION INTRAVENOUS at 12:23

## 2021-12-16 RX ADMIN — PACLITAXEL 150 MG: 6 INJECTION, SOLUTION INTRAVENOUS at 13:28

## 2021-12-16 RX ADMIN — SODIUM CHLORIDE, PRESERVATIVE FREE 10 ML: 5 INJECTION INTRAVENOUS at 14:31

## 2021-12-16 RX ADMIN — Medication 500 UNITS: at 14:31

## 2021-12-16 RX ADMIN — DIPHENHYDRAMINE HYDROCHLORIDE 12.5 MG: 50 INJECTION INTRAMUSCULAR; INTRAVENOUS at 12:25

## 2021-12-17 DIAGNOSIS — C50.111 MALIGNANT NEOPLASM OF CENTRAL PORTION OF RIGHT BREAST IN FEMALE, ESTROGEN RECEPTOR NEGATIVE (HCC): Primary | ICD-10-CM

## 2021-12-17 DIAGNOSIS — Z17.1 MALIGNANT NEOPLASM OF CENTRAL PORTION OF RIGHT BREAST IN FEMALE, ESTROGEN RECEPTOR NEGATIVE (HCC): Primary | ICD-10-CM

## 2021-12-17 RX ORDER — HYDROCODONE BITARTRATE AND ACETAMINOPHEN 10; 325 MG/1; MG/1
1 TABLET ORAL EVERY 6 HOURS PRN
Qty: 60 TABLET | Refills: 0 | Status: SHIPPED | OUTPATIENT
Start: 2021-12-17 | End: 2022-01-25 | Stop reason: SDUPTHER

## 2021-12-17 RX ORDER — DEXTROAMPHETAMINE SACCHARATE, AMPHETAMINE ASPARTATE, DEXTROAMPHETAMINE SULFATE AND AMPHETAMINE SULFATE 2.5; 2.5; 2.5; 2.5 MG/1; MG/1; MG/1; MG/1
10 TABLET ORAL DAILY
Qty: 30 TABLET | Refills: 0 | OUTPATIENT
Start: 2021-12-17 | End: 2022-12-17

## 2021-12-17 NOTE — NURSING NOTE
Patient in office yesterday for Taxol infusion.  Patient stated that she was having increased bone pain.  Patient stateed that sometimes she can't move because the pain is so severe. Pain rating 8 while in office.  Patient stated that pain had been increasing since starting her infusions.  Patient stated that she had been taking Norco 5mg Q8 hours PRN, but sometimes she has had to double up on her dose because the 5mg doesn't help.  Patient stated that she is also taking Ibuprofen in between Norco doses without relief.  Advised patient that message would be sent to provider to consider increasing her dose, increasing her frequency, or changing the medication.  Patient v/u.    Message sent to Dr. Butterfield today.  Per Dr. Code- RX to be changed to Norco 10mg C3rdqid PRN.  RX routed to Dr. Butterfield for approval.    Called patient and LM on VM that new RX was being sent to pharmacy.

## 2021-12-17 NOTE — TELEPHONE ENCOUNTER
Pt requesting refill of Adderall; too early for refill. Questions need for higher dosing. Will address at visit as scheduled.

## 2021-12-20 ENCOUNTER — TELEPHONE (OUTPATIENT)
Dept: ONCOLOGY | Facility: CLINIC | Age: 41
End: 2021-12-20

## 2021-12-20 NOTE — TELEPHONE ENCOUNTER
Follow up call to Sierra regarding MyChart messages.  I advised her that in her chart is shows that her PET has been authorized. I explained that if it was initially denied her insurance likely auto-generated a denial letter, however we were also still working towards authorization.  Also noted that an rx for pain medicine was sent in on Friday. She states she had contacted her pharmacy and it was ready for pickup, she did not receive a notification. She states this is a new pharmacy for her.  She v/u all of the above, thankful for the call.

## 2021-12-21 ENCOUNTER — HOSPITAL ENCOUNTER (OUTPATIENT)
Dept: PET IMAGING | Facility: HOSPITAL | Age: 41
Discharge: HOME | End: 2021-12-21

## 2021-12-21 ENCOUNTER — OFFICE VISIT (OUTPATIENT)
Dept: PSYCHIATRY | Facility: HOSPITAL | Age: 41
End: 2021-12-21

## 2021-12-21 DIAGNOSIS — F33.1 MODERATE EPISODE OF RECURRENT MAJOR DEPRESSIVE DISORDER (HCC): ICD-10-CM

## 2021-12-21 DIAGNOSIS — F41.1 GENERALIZED ANXIETY DISORDER: Primary | ICD-10-CM

## 2021-12-21 DIAGNOSIS — C50.111 MALIGNANT NEOPLASM OF CENTRAL PORTION OF RIGHT BREAST IN FEMALE, ESTROGEN RECEPTOR NEGATIVE (HCC): ICD-10-CM

## 2021-12-21 DIAGNOSIS — Z17.1 MALIGNANT NEOPLASM OF CENTRAL PORTION OF RIGHT BREAST IN FEMALE, ESTROGEN RECEPTOR NEGATIVE (HCC): ICD-10-CM

## 2021-12-21 LAB — GLUCOSE BLDC GLUCOMTR-MCNC: 155 MG/DL (ref 70–130)

## 2021-12-21 PROCEDURE — A9552 F18 FDG: HCPCS | Performed by: INTERNAL MEDICINE

## 2021-12-21 PROCEDURE — 0 FLUDEOXYGLUCOSE F18 SOLUTION: Performed by: INTERNAL MEDICINE

## 2021-12-21 PROCEDURE — 99214 OFFICE O/P EST MOD 30 MIN: CPT | Performed by: NURSE PRACTITIONER

## 2021-12-21 PROCEDURE — 78815 PET IMAGE W/CT SKULL-THIGH: CPT

## 2021-12-21 PROCEDURE — 82962 GLUCOSE BLOOD TEST: CPT

## 2021-12-21 RX ADMIN — FLUDEOXYGLUCOSE F18 1 DOSE: 300 INJECTION INTRAVENOUS at 09:02

## 2021-12-21 NOTE — PROGRESS NOTES
[facility]  In Person Session    Subjective  Patient ID: Sierra Mao is a 41 y.o. female is seen face to face in the [facility].    CC: Elevated anxiety, existential distress, disrupted sleep, difficulty concentrating and focusing    HPI: Pt noted to be alert, oriented, and engaged in conversation. Mood anxious and dysphoric, affect congruent.   Interval history reviewed; pt reports extreme anxiety today surrounding PET scan, brain scan, interpersonal conflict.  Continues to identify difficulty accepting current situation, exploring impact on self, professional identity, parents, and . Pt reviews significant pain throughout body, waxing and waning although present most days and worsening over time on treatment. Fortunately feels norco is helpful, although requiring more than prescribed.  Patient endorses labile sleep, only getting approximately 2 hours last night in anticipation of today's scans.  Patient reports taking gabapentin 300 mg at bedtime, finding fatigue disables her from taking this more during the day.  Does not feel it consistently assists with sleep.  Occasionally will use Ativan 1 mg as needed for insomnia, although does not feel this helps either.  Patient continues on Celexa 40 mg daily, although agrees mood and anxiety symptoms are poorly controlled.  Medication history again reviewed, and patient reports failed trial of Wellbutrin.  Has never been on Cymbalta.  Patient continues to find benefit to Adderall, although states 10 mg dose is not high enough and thus has increased use and is out early.  Patient does continue to participate in counseling on biweekly basis.    Review of Systems   Constitutional: Positive for fatigue. Negative for appetite change and unexpected weight change.   Psychiatric/Behavioral: Positive for agitation, decreased concentration, dysphoric mood and sleep disturbance. The patient is nervous/anxious and is  hyperactive.      Medications Reviewed:  Gabapentin 300 mg at bedtime  Celexa 40 mg daily   Ativan PRN insomnia  Adderall 10 mg q am    Diagnoses and all orders for this visit:    1. Generalized anxiety disorder (Primary)    2. Moderate episode of recurrent major depressive disorder (HCC)    Plan of Care  Pt with poorly managed mood and anxiety sx on current regimen of Celexa, gabapentin, as needed Ativan, and Adderall.  Reviewed with patient poorly managed anxiety symptoms, interpersonal sensitivity, and insomnia as well as requirement for higher dosing of Adderall.  Shared with patient my hesitancy for continued use of stimulant without better management of anxiety, insomnia, and affective of symptoms, with plan to discuss further with Dr. Denise Carroll prior to refilling.  Additionally reviewed controlled substance agreement, and expectation that medications be taken only as directed.  Patient identified frustration with my unwillingness to prescribe Adderall.  Reviewed goal of better managing anxiety and sleep symptoms, discussing options to include transition from Celexa to Cymbalta versus atypical versus lower dosing gabapentin throughout the day.  Patient is not interested in exploring alternate strategies and elected to terminate session.  Follow-up was not scheduled.    I spent 39 minutes caring for Sierra on this date of service. This time includes time spent by me in the following activities: preparing for the visit, obtaining and/or reviewing a separately obtained history, performing a medically appropriate examination and/or evaluation, counseling and educating the patient/family/caregiver, ordering medications, tests, or procedures and documenting information in the medical record.

## 2021-12-23 ENCOUNTER — INFUSION (OUTPATIENT)
Dept: ONCOLOGY | Facility: HOSPITAL | Age: 41
End: 2021-12-23

## 2021-12-23 VITALS
RESPIRATION RATE: 18 BRPM | WEIGHT: 160 LBS | SYSTOLIC BLOOD PRESSURE: 113 MMHG | BODY MASS INDEX: 22.4 KG/M2 | DIASTOLIC BLOOD PRESSURE: 56 MMHG | OXYGEN SATURATION: 99 % | HEART RATE: 82 BPM | TEMPERATURE: 97.1 F | HEIGHT: 71 IN

## 2021-12-23 DIAGNOSIS — Z45.2 ENCOUNTER FOR FITTING AND ADJUSTMENT OF VASCULAR CATHETER: ICD-10-CM

## 2021-12-23 DIAGNOSIS — Z17.1 MALIGNANT NEOPLASM OF CENTRAL PORTION OF RIGHT BREAST IN FEMALE, ESTROGEN RECEPTOR NEGATIVE (HCC): Primary | ICD-10-CM

## 2021-12-23 DIAGNOSIS — C50.111 MALIGNANT NEOPLASM OF CENTRAL PORTION OF RIGHT BREAST IN FEMALE, ESTROGEN RECEPTOR NEGATIVE (HCC): Primary | ICD-10-CM

## 2021-12-23 LAB
ALBUMIN SERPL-MCNC: 4.1 G/DL (ref 3.5–5.2)
ALBUMIN/GLOB SERPL: 2 G/DL
ALP SERPL-CCNC: 46 U/L (ref 39–117)
ALT SERPL W P-5'-P-CCNC: 14 U/L (ref 1–33)
ANION GAP SERPL CALCULATED.3IONS-SCNC: 11.8 MMOL/L (ref 5–15)
AST SERPL-CCNC: 15 U/L (ref 1–32)
BASOPHILS # BLD AUTO: 0.04 10*3/MM3 (ref 0–0.2)
BASOPHILS NFR BLD AUTO: 0.7 % (ref 0–1.5)
BILIRUB SERPL-MCNC: 0.2 MG/DL (ref 0–1.2)
BUN SERPL-MCNC: 9 MG/DL (ref 6–20)
BUN/CREAT SERPL: 13.6 (ref 7–25)
CALCIUM SPEC-SCNC: 9 MG/DL (ref 8.6–10.5)
CHLORIDE SERPL-SCNC: 104 MMOL/L (ref 98–107)
CO2 SERPL-SCNC: 25.2 MMOL/L (ref 22–29)
CREAT SERPL-MCNC: 0.66 MG/DL (ref 0.57–1)
DEPRECATED RDW RBC AUTO: 52.5 FL (ref 37–54)
EOSINOPHIL # BLD AUTO: 0.21 10*3/MM3 (ref 0–0.4)
EOSINOPHIL NFR BLD AUTO: 3.7 % (ref 0.3–6.2)
ERYTHROCYTE [DISTWIDTH] IN BLOOD BY AUTOMATED COUNT: 15.1 % (ref 12.3–15.4)
GFR SERPL CREATININE-BSD FRML MDRD: 99 ML/MIN/1.73
GLOBULIN UR ELPH-MCNC: 2.1 GM/DL
GLUCOSE SERPL-MCNC: 132 MG/DL (ref 65–99)
HCT VFR BLD AUTO: 34.1 % (ref 34–46.6)
HGB BLD-MCNC: 11.2 G/DL (ref 12–15.9)
IMM GRANULOCYTES # BLD AUTO: 0.01 10*3/MM3 (ref 0–0.05)
IMM GRANULOCYTES NFR BLD AUTO: 0.2 % (ref 0–0.5)
LYMPHOCYTES # BLD AUTO: 1.58 10*3/MM3 (ref 0.7–3.1)
LYMPHOCYTES NFR BLD AUTO: 27.5 % (ref 19.6–45.3)
MCH RBC QN AUTO: 31.2 PG (ref 26.6–33)
MCHC RBC AUTO-ENTMCNC: 32.8 G/DL (ref 31.5–35.7)
MCV RBC AUTO: 95 FL (ref 79–97)
MONOCYTES # BLD AUTO: 0.49 10*3/MM3 (ref 0.1–0.9)
MONOCYTES NFR BLD AUTO: 8.5 % (ref 5–12)
NEUTROPHILS NFR BLD AUTO: 3.42 10*3/MM3 (ref 1.7–7)
NEUTROPHILS NFR BLD AUTO: 59.4 % (ref 42.7–76)
NRBC BLD AUTO-RTO: 0 /100 WBC (ref 0–0.2)
PLATELET # BLD AUTO: 283 10*3/MM3 (ref 140–450)
PMV BLD AUTO: 9.8 FL (ref 6–12)
POTASSIUM SERPL-SCNC: 4 MMOL/L (ref 3.5–5.2)
PROT SERPL-MCNC: 6.2 G/DL (ref 6–8.5)
RBC # BLD AUTO: 3.59 10*6/MM3 (ref 3.77–5.28)
SODIUM SERPL-SCNC: 141 MMOL/L (ref 136–145)
WBC NRBC COR # BLD: 5.75 10*3/MM3 (ref 3.4–10.8)

## 2021-12-23 PROCEDURE — 85025 COMPLETE CBC W/AUTO DIFF WBC: CPT | Performed by: INTERNAL MEDICINE

## 2021-12-23 PROCEDURE — 96413 CHEMO IV INFUSION 1 HR: CPT

## 2021-12-23 PROCEDURE — 25010000002 DEXAMETHASONE PER 1 MG: Performed by: NURSE PRACTITIONER

## 2021-12-23 PROCEDURE — 25010000002 PACLITAXEL PER 1 MG: Performed by: NURSE PRACTITIONER

## 2021-12-23 PROCEDURE — 96375 TX/PRO/DX INJ NEW DRUG ADDON: CPT

## 2021-12-23 PROCEDURE — 25010000002 DIPHENHYDRAMINE PER 50 MG: Performed by: NURSE PRACTITIONER

## 2021-12-23 PROCEDURE — 25010000002 HEPARIN LOCK FLUSH PER 10 UNITS: Performed by: INTERNAL MEDICINE

## 2021-12-23 PROCEDURE — 80053 COMPREHEN METABOLIC PANEL: CPT | Performed by: INTERNAL MEDICINE

## 2021-12-23 RX ORDER — FAMOTIDINE 10 MG/ML
20 INJECTION, SOLUTION INTRAVENOUS AS NEEDED
Status: CANCELLED | OUTPATIENT
Start: 2021-12-23

## 2021-12-23 RX ORDER — SODIUM CHLORIDE 0.9 % (FLUSH) 0.9 %
10 SYRINGE (ML) INJECTION AS NEEDED
Status: CANCELLED | OUTPATIENT
Start: 2021-12-23

## 2021-12-23 RX ORDER — FAMOTIDINE 10 MG/ML
20 INJECTION, SOLUTION INTRAVENOUS ONCE
Status: COMPLETED | OUTPATIENT
Start: 2021-12-23 | End: 2021-12-23

## 2021-12-23 RX ORDER — DIPHENHYDRAMINE HYDROCHLORIDE 50 MG/ML
50 INJECTION INTRAMUSCULAR; INTRAVENOUS AS NEEDED
Status: CANCELLED | OUTPATIENT
Start: 2021-12-23

## 2021-12-23 RX ORDER — SODIUM CHLORIDE 0.9 % (FLUSH) 0.9 %
10 SYRINGE (ML) INJECTION AS NEEDED
Status: DISCONTINUED | OUTPATIENT
Start: 2021-12-23 | End: 2021-12-23 | Stop reason: HOSPADM

## 2021-12-23 RX ORDER — HEPARIN SODIUM (PORCINE) LOCK FLUSH IV SOLN 100 UNIT/ML 100 UNIT/ML
500 SOLUTION INTRAVENOUS AS NEEDED
Status: DISCONTINUED | OUTPATIENT
Start: 2021-12-23 | End: 2021-12-23 | Stop reason: HOSPADM

## 2021-12-23 RX ORDER — SODIUM CHLORIDE 9 MG/ML
250 INJECTION, SOLUTION INTRAVENOUS ONCE
Status: COMPLETED | OUTPATIENT
Start: 2021-12-23 | End: 2021-12-23

## 2021-12-23 RX ORDER — HEPARIN SODIUM (PORCINE) LOCK FLUSH IV SOLN 100 UNIT/ML 100 UNIT/ML
500 SOLUTION INTRAVENOUS AS NEEDED
Status: CANCELLED | OUTPATIENT
Start: 2021-12-23

## 2021-12-23 RX ADMIN — DIPHENHYDRAMINE HYDROCHLORIDE 12.5 MG: 50 INJECTION INTRAMUSCULAR; INTRAVENOUS at 13:12

## 2021-12-23 RX ADMIN — PACLITAXEL 150 MG: 6 INJECTION, SOLUTION INTRAVENOUS at 14:19

## 2021-12-23 RX ADMIN — Medication 500 UNITS: at 15:28

## 2021-12-23 RX ADMIN — DEXAMETHASONE SODIUM PHOSPHATE 8 MG: 10 INJECTION INTRAMUSCULAR; INTRAVENOUS at 13:34

## 2021-12-23 RX ADMIN — FAMOTIDINE 20 MG: 10 INJECTION INTRAVENOUS at 13:30

## 2021-12-23 RX ADMIN — SODIUM CHLORIDE 250 ML: 9 INJECTION, SOLUTION INTRAVENOUS at 13:12

## 2021-12-23 RX ADMIN — SODIUM CHLORIDE, PRESERVATIVE FREE 10 ML: 5 INJECTION INTRAVENOUS at 15:28

## 2021-12-23 NOTE — NURSING NOTE
Pt to infusion area for taxol cycle 4. tx parameters met with cold packs to hands and feet applied. Tolerated tx and discharged stable

## 2021-12-28 ENCOUNTER — OFFICE VISIT (OUTPATIENT)
Dept: PSYCHIATRY | Facility: HOSPITAL | Age: 41
End: 2021-12-28

## 2021-12-28 DIAGNOSIS — F90.1 ATTENTION DEFICIT HYPERACTIVITY DISORDER (ADHD), PREDOMINANTLY HYPERACTIVE TYPE: ICD-10-CM

## 2021-12-28 DIAGNOSIS — F41.1 GENERALIZED ANXIETY DISORDER: Primary | ICD-10-CM

## 2021-12-28 DIAGNOSIS — F33.1 MODERATE EPISODE OF RECURRENT MAJOR DEPRESSIVE DISORDER (HCC): ICD-10-CM

## 2021-12-28 PROCEDURE — 99214 OFFICE O/P EST MOD 30 MIN: CPT | Performed by: NURSE PRACTITIONER

## 2021-12-28 RX ORDER — DULOXETIN HYDROCHLORIDE 20 MG/1
20 CAPSULE, DELAYED RELEASE ORAL 2 TIMES DAILY
Qty: 60 CAPSULE | Refills: 2 | Status: SHIPPED | OUTPATIENT
Start: 2021-12-28 | End: 2022-03-22 | Stop reason: ALTCHOICE

## 2021-12-28 RX ORDER — ATOMOXETINE 40 MG/1
40 CAPSULE ORAL 2 TIMES DAILY
Qty: 60 CAPSULE | Refills: 2 | Status: ON HOLD | OUTPATIENT
Start: 2021-12-28 | End: 2022-10-22

## 2021-12-28 NOTE — PROGRESS NOTES
[facility]  In Person Session    Subjective  Patient ID: Sierra Mao is a 41 y.o. female is seen face to face in the [facility].    CC: Lethargy, anxiety, labile mood    HPI: Pt noted to be alert, oriented, and engaged in conversation.  Affect remains anxious, frustrated; mood reported as being appropriately anxious given current situation.   Interval history reviewed; pt reviews some leveling of anxiety following recent appointment.  Has reviewed scans independently with appreciation of response to treatment, although with some concern surrounding 1 new spot requiring follow-up.  Patient continues to identify challenges managing current situation--specific symptoms to include fatigue, difficulty engaging, anxiety, sleep disruption, and labile moods.  Endorses nausea, generally benefited by current interventions.  Appetite appropriate.  Patient does discuss significant impact of neuropathy on sleep, ability to walk/desire to engage in desirable activities.  Describes inability to tolerate higher dosing of gabapentin due to experience of fatigue.  Currently takes 300 mg nightly.  Pt remains frustrated with experience alongside stimulant use and has taken from external source a couple times while being out.  Continues to identify level of fatigue/unmanaged ADHD as being the most intrusive symptom to current quality of life experience, describing limited ability to participate at all without.    Review of Systems   Constitutional: Positive for fatigue.   Musculoskeletal: Positive for arthralgias and gait problem.   Psychiatric/Behavioral: Positive for decreased concentration, dysphoric mood and sleep disturbance. The patient is nervous/anxious.      Medications Reviewed:  Gabapentin 300 mg nightly  Celexa 40 mg daily    Diagnoses and all orders for this visit:    1. Generalized anxiety disorder (Primary)    2. Moderate episode of recurrent major depressive disorder  ([facility])    3. Attention deficit hyperactivity disorder (ADHD), predominantly hyperactive type    Other orders  -     DULoxetine (Cymbalta) 20 MG capsule; Take 1 capsule by mouth 2 (Two) Times a Day.  Dispense: 60 capsule; Refill: 2  -     atomoxetine (Strattera) 40 MG capsule; Take 1 capsule by mouth 2 (Two) Times a Day. 1 capsule PO daily in am for 7 days followed by 1 capsule PO bid  Dispense: 60 capsule; Refill: 2    Plan of Care  Patient with persistent symptoms of generalized anxiety disorder, mood disruption alongside cancer diagnosis, ADHD.  Reviewed potential benefit to pain, quality of life, as well as improved management of anxiety symptoms with transition from Celexa to Cymbalta.  Patient agreeable, and I have provided her with instruction for cross taper.  Discussed symptoms of ADHD, although with priority placed upon better managing symptoms of anxiety and mood disruption as well as sleep.  Continue to discuss expectations of controlled substances.  Considered benefit of initiating non stimulant Strattera, and will work towards prior authorization. Patient aware not to initiate this medication until our 2-week visit.  Follow-up arranged in clinic in 2 weeks via Zoom.    I spent 36 minutes caring for Sierra on this date of service. This time includes time spent by me in the following activities: preparing for the visit, obtaining and/or reviewing a separately obtained history, performing a medically appropriate examination and/or evaluation, counseling and educating the patient/family/caregiver, ordering medications, tests, or procedures and documenting information in the medical record.

## 2021-12-30 ENCOUNTER — OFFICE VISIT (OUTPATIENT)
Dept: ONCOLOGY | Facility: CLINIC | Age: 41
End: 2021-12-30

## 2021-12-30 ENCOUNTER — INFUSION (OUTPATIENT)
Dept: ONCOLOGY | Facility: HOSPITAL | Age: 41
End: 2021-12-30

## 2021-12-30 VITALS — HEART RATE: 82 BPM | SYSTOLIC BLOOD PRESSURE: 98 MMHG | DIASTOLIC BLOOD PRESSURE: 62 MMHG

## 2021-12-30 VITALS
HEIGHT: 71 IN | OXYGEN SATURATION: 95 % | TEMPERATURE: 96.9 F | DIASTOLIC BLOOD PRESSURE: 72 MMHG | RESPIRATION RATE: 18 BRPM | SYSTOLIC BLOOD PRESSURE: 105 MMHG | HEART RATE: 87 BPM | WEIGHT: 160 LBS | BODY MASS INDEX: 22.4 KG/M2

## 2021-12-30 DIAGNOSIS — C79.51 BONE METASTASIS: Primary | ICD-10-CM

## 2021-12-30 DIAGNOSIS — C50.111 MALIGNANT NEOPLASM OF CENTRAL PORTION OF RIGHT BREAST IN FEMALE, ESTROGEN RECEPTOR NEGATIVE (HCC): ICD-10-CM

## 2021-12-30 DIAGNOSIS — Z17.1 MALIGNANT NEOPLASM OF CENTRAL PORTION OF RIGHT BREAST IN FEMALE, ESTROGEN RECEPTOR NEGATIVE (HCC): ICD-10-CM

## 2021-12-30 DIAGNOSIS — C50.111 MALIGNANT NEOPLASM OF CENTRAL PORTION OF RIGHT BREAST IN FEMALE, ESTROGEN RECEPTOR NEGATIVE (HCC): Primary | ICD-10-CM

## 2021-12-30 DIAGNOSIS — Z45.2 ENCOUNTER FOR FITTING AND ADJUSTMENT OF VASCULAR CATHETER: ICD-10-CM

## 2021-12-30 DIAGNOSIS — R59.1 LYMPHADENOPATHY OF HEAD AND NECK: ICD-10-CM

## 2021-12-30 DIAGNOSIS — Z17.1 MALIGNANT NEOPLASM OF CENTRAL PORTION OF RIGHT BREAST IN FEMALE, ESTROGEN RECEPTOR NEGATIVE (HCC): Primary | ICD-10-CM

## 2021-12-30 LAB
ALBUMIN SERPL-MCNC: 3.8 G/DL (ref 3.5–5.2)
ALBUMIN/GLOB SERPL: 1.7 G/DL
ALP SERPL-CCNC: 41 U/L (ref 39–117)
ALT SERPL W P-5'-P-CCNC: 27 U/L (ref 1–33)
ANION GAP SERPL CALCULATED.3IONS-SCNC: 13.4 MMOL/L (ref 5–15)
AST SERPL-CCNC: 42 U/L (ref 1–32)
BASOPHILS # BLD AUTO: 0.02 10*3/MM3 (ref 0–0.2)
BASOPHILS NFR BLD AUTO: 0.4 % (ref 0–1.5)
BILIRUB SERPL-MCNC: 0.3 MG/DL (ref 0–1.2)
BUN SERPL-MCNC: 12 MG/DL (ref 6–20)
BUN/CREAT SERPL: 20.3 (ref 7–25)
CALCIUM SPEC-SCNC: 9 MG/DL (ref 8.6–10.5)
CHLORIDE SERPL-SCNC: 100 MMOL/L (ref 98–107)
CO2 SERPL-SCNC: 23.6 MMOL/L (ref 22–29)
CREAT SERPL-MCNC: 0.59 MG/DL (ref 0.57–1)
DEPRECATED RDW RBC AUTO: 52.8 FL (ref 37–54)
EOSINOPHIL # BLD AUTO: 0.16 10*3/MM3 (ref 0–0.4)
EOSINOPHIL NFR BLD AUTO: 3.4 % (ref 0.3–6.2)
ERYTHROCYTE [DISTWIDTH] IN BLOOD BY AUTOMATED COUNT: 15.3 % (ref 12.3–15.4)
GFR SERPL CREATININE-BSD FRML MDRD: 113 ML/MIN/1.73
GLOBULIN UR ELPH-MCNC: 2.3 GM/DL
GLUCOSE SERPL-MCNC: 318 MG/DL (ref 65–99)
HCT VFR BLD AUTO: 33 % (ref 34–46.6)
HGB BLD-MCNC: 10.7 G/DL (ref 12–15.9)
IMM GRANULOCYTES # BLD AUTO: 0.01 10*3/MM3 (ref 0–0.05)
IMM GRANULOCYTES NFR BLD AUTO: 0.2 % (ref 0–0.5)
LYMPHOCYTES # BLD AUTO: 1.4 10*3/MM3 (ref 0.7–3.1)
LYMPHOCYTES NFR BLD AUTO: 29.9 % (ref 19.6–45.3)
MCH RBC QN AUTO: 30.8 PG (ref 26.6–33)
MCHC RBC AUTO-ENTMCNC: 32.4 G/DL (ref 31.5–35.7)
MCV RBC AUTO: 95.1 FL (ref 79–97)
MONOCYTES # BLD AUTO: 0.39 10*3/MM3 (ref 0.1–0.9)
MONOCYTES NFR BLD AUTO: 8.3 % (ref 5–12)
NEUTROPHILS NFR BLD AUTO: 2.7 10*3/MM3 (ref 1.7–7)
NEUTROPHILS NFR BLD AUTO: 57.8 % (ref 42.7–76)
NRBC BLD AUTO-RTO: 0 /100 WBC (ref 0–0.2)
PLATELET # BLD AUTO: 258 10*3/MM3 (ref 140–450)
PMV BLD AUTO: 9.8 FL (ref 6–12)
POTASSIUM SERPL-SCNC: 4.3 MMOL/L (ref 3.5–5.2)
PROT SERPL-MCNC: 6.1 G/DL (ref 6–8.5)
RBC # BLD AUTO: 3.47 10*6/MM3 (ref 3.77–5.28)
SODIUM SERPL-SCNC: 137 MMOL/L (ref 136–145)
WBC NRBC COR # BLD: 4.68 10*3/MM3 (ref 3.4–10.8)

## 2021-12-30 PROCEDURE — 25010000002 DIPHENHYDRAMINE PER 50 MG: Performed by: INTERNAL MEDICINE

## 2021-12-30 PROCEDURE — 80053 COMPREHEN METABOLIC PANEL: CPT | Performed by: INTERNAL MEDICINE

## 2021-12-30 PROCEDURE — 25010000002 HEPARIN LOCK FLUSH PER 10 UNITS: Performed by: INTERNAL MEDICINE

## 2021-12-30 PROCEDURE — 96375 TX/PRO/DX INJ NEW DRUG ADDON: CPT

## 2021-12-30 PROCEDURE — 96413 CHEMO IV INFUSION 1 HR: CPT

## 2021-12-30 PROCEDURE — 25010000002 PACLITAXEL PER 1 MG: Performed by: INTERNAL MEDICINE

## 2021-12-30 PROCEDURE — 99215 OFFICE O/P EST HI 40 MIN: CPT | Performed by: INTERNAL MEDICINE

## 2021-12-30 PROCEDURE — 25010000002 DEXAMETHASONE PER 1 MG: Performed by: INTERNAL MEDICINE

## 2021-12-30 PROCEDURE — 85025 COMPLETE CBC W/AUTO DIFF WBC: CPT | Performed by: INTERNAL MEDICINE

## 2021-12-30 RX ORDER — SODIUM CHLORIDE 9 MG/ML
250 INJECTION, SOLUTION INTRAVENOUS ONCE
Status: COMPLETED | OUTPATIENT
Start: 2021-12-30 | End: 2021-12-30

## 2021-12-30 RX ORDER — SODIUM CHLORIDE 0.9 % (FLUSH) 0.9 %
10 SYRINGE (ML) INJECTION AS NEEDED
Status: CANCELLED | OUTPATIENT
Start: 2021-12-30

## 2021-12-30 RX ORDER — FAMOTIDINE 10 MG/ML
20 INJECTION, SOLUTION INTRAVENOUS AS NEEDED
Status: CANCELLED | OUTPATIENT
Start: 2021-12-30

## 2021-12-30 RX ORDER — FAMOTIDINE 10 MG/ML
20 INJECTION, SOLUTION INTRAVENOUS ONCE
Status: CANCELLED | OUTPATIENT
Start: 2021-12-30

## 2021-12-30 RX ORDER — DIPHENHYDRAMINE HYDROCHLORIDE 50 MG/ML
50 INJECTION INTRAMUSCULAR; INTRAVENOUS AS NEEDED
Status: CANCELLED | OUTPATIENT
Start: 2021-12-30

## 2021-12-30 RX ORDER — HEPARIN SODIUM (PORCINE) LOCK FLUSH IV SOLN 100 UNIT/ML 100 UNIT/ML
500 SOLUTION INTRAVENOUS AS NEEDED
Status: DISCONTINUED | OUTPATIENT
Start: 2021-12-30 | End: 2021-12-30 | Stop reason: HOSPADM

## 2021-12-30 RX ORDER — SODIUM CHLORIDE 9 MG/ML
250 INJECTION, SOLUTION INTRAVENOUS ONCE
Status: CANCELLED | OUTPATIENT
Start: 2021-12-30

## 2021-12-30 RX ORDER — SODIUM CHLORIDE 0.9 % (FLUSH) 0.9 %
10 SYRINGE (ML) INJECTION AS NEEDED
Status: DISCONTINUED | OUTPATIENT
Start: 2021-12-30 | End: 2021-12-30 | Stop reason: HOSPADM

## 2021-12-30 RX ORDER — HEPARIN SODIUM (PORCINE) LOCK FLUSH IV SOLN 100 UNIT/ML 100 UNIT/ML
500 SOLUTION INTRAVENOUS AS NEEDED
Status: CANCELLED | OUTPATIENT
Start: 2021-12-30

## 2021-12-30 RX ORDER — FAMOTIDINE 10 MG/ML
20 INJECTION, SOLUTION INTRAVENOUS ONCE
Status: COMPLETED | OUTPATIENT
Start: 2021-12-30 | End: 2021-12-30

## 2021-12-30 RX ORDER — HYDROCODONE BITARTRATE AND ACETAMINOPHEN 5; 325 MG/1; MG/1
1 TABLET ORAL EVERY 8 HOURS PRN
COMMUNITY
Start: 2021-12-17 | End: 2022-10-25 | Stop reason: HOSPADM

## 2021-12-30 RX ADMIN — PACLITAXEL 150 MG: 6 INJECTION, SOLUTION INTRAVENOUS at 13:19

## 2021-12-30 RX ADMIN — SODIUM CHLORIDE 250 ML: 9 INJECTION, SOLUTION INTRAVENOUS at 12:19

## 2021-12-30 RX ADMIN — Medication 500 UNITS: at 14:22

## 2021-12-30 RX ADMIN — DIPHENHYDRAMINE HYDROCHLORIDE 12.5 MG: 50 INJECTION, SOLUTION INTRAMUSCULAR; INTRAVENOUS at 12:35

## 2021-12-30 RX ADMIN — DEXAMETHASONE SODIUM PHOSPHATE 8 MG: 10 INJECTION INTRAMUSCULAR; INTRAVENOUS at 12:17

## 2021-12-30 RX ADMIN — SODIUM CHLORIDE, PRESERVATIVE FREE 10 ML: 5 INJECTION INTRAVENOUS at 14:22

## 2021-12-30 RX ADMIN — FAMOTIDINE 20 MG: 10 INJECTION INTRAVENOUS at 12:11

## 2021-12-30 NOTE — PROGRESS NOTES
This was an audio and video enabled telemedicine encounter.    Subjective   Sierra Mao is a 41 y.o. female. Referred by Dr. Price for right breast invasive ductal carcinoma triple negative, metastatic    Treatment  1.Taxol and carboplatin weekly started 10/13/2021      History of Present Illness     Ms. Mao is a 41-year-old Premenopausal  lady who palpated a mass in her right breast. She had a mass in a similar area in 2018 which was biopsied and benign. Further evaluation was performed.    08/18/2021-bilateral diagnostic mammogram-parenchyma is heterogeneously dense. In the palpable area of concern in the upper outer quadrant of the right breast there is an area of asymmetry with pleomorphic microcalcifications in the posterior one third of the upper outer quadrant of the right breast in the axillary region. There is also microcalcifications in the middle one third and anterior one third of the left breast at 12 o'clock position which are new. Architectural distortion in either breast.    Ultrasound-  Right breast at the site of palpable concern, at 11 o'clock, 1 cm from the nipple there is an irregular hypoechoic mass which measures up to 4 cm.  10 o'clock, 6 cm from the nipple there is an irregular mass which measures up to 1.6 cm  At 10 o'clock, 8 cm from the nipple there is a irregular mass which measures 1.8 cm.  Multiple right axillary lymph nodes demonstrate thickened cortices and rounded morphology largest of which measures 2 cm.  Left breast at 2 o'clock, 6 cm from the nipple there is a 1 cm heterogeneous hypoechoic masslike region.    09/07/2021-  1.right breast 11 o'clock, 1 cm from the nipple-invasive ductal carcinoma, poorly differentiated, grade 3, ER low +1 to 10%, weak, ND negative less than 1%, HER-2 negative.  2.right axillary lymph node-metastatic mammary carcinoma measuring 9 mm. ER negative, ND +5% three, HER-2 negative  3.left breast 2 o'clock, 6 cm from the nipple-cluster of  apocrine cyst  4.left breast 12 o'clock-apocrine cyst with polarizable calcium oxalate crystals.    Bilateral breast MRI 09/23/2021-biopsy-proven malignancy of the right breast occupying the middle and the posterior one thirds from 11 o'clock to 1 o'clock position and measuring up to 7.1 cm in greatest dimension. The lesion abuts the pectoralis fascia but there is no evidence of direct invasion. There are portions of the lesion that extended to the subpatellar soft tissues but there is no abnormal enhancement of the skin or skin thickening.  Bulky right axillary lymphadenopathy noted.  2.4 cm irregular mass in the lower outer quadrant of the right breast centered at 8 o'clock position which is separate from the known malignancy suspicious for additional malignancy.  Ultrasound and biopsy of this lesion recommended.  No findings suspicious for malignancy in the left breast.    09/23/2021-CT of the chest abdomen and pelvis and soft tissue neck-multiple mild to moderately enlarged subpectoral and right axillary lymph nodes consistent with metastatic disease. The subpectoral lymphadenopathy extends into the right supraclavicular region as well. There is no evidence of metastatic disease elsewhere within the neck. No evidence of metastatic disease in the chest abdomen or pelvis. 5 cm right ovarian cyst is noted. Suggest follow-up ultrasound in 6 weeks for further evaluation.    09/24/2021-bone scan without any evidence of metastatic disease.    9/28/2021-right cervical lymph node biopsy-pathology consistent with metastatic mammary carcinoma.    10/1/2021-PET/CT-there are 2 separate hypermetabolic lesions within the right breast.  1 measuring 6 cm and the other measuring 1.3 cm.  The SUV of 6 cm lesion of 5.6 and small lesion of 4.8.  Hypermetabolic right axillary, infra pectoral, right supraclavicular lymphadenopathy noted.  In addition there are hypermetabolic nodes at the right thoracic inlet, right base of the neck  and right posterior cervical triangle.  1.6 x 0.8 cm right posterior cervical triangle node with an SUV of 3.9.  Right axillary lymphadenopathy with lymphedema 6.4.  Hypermetabolic left hilar lymphadenopathy measuring 2 x 1.1 cm with an SUV of 5.8.  No hypermetabolic activity in the liver or evidence of metastatic disease in the abdomen or pelvis.  Hypermetabolic lesion in the right femoral neck measuring 1.5 cm.  SUV 5.4.    Mr. Mao reports that she initially felt good right breast mass in May 2021.  Starting July 2021 she had palpated something abnormal in her right neck.  Since noticing the mass in May 2021 she feels like the mass in the right breast has doubled in size.  She also feels like the right axillary lymph nodes have enlarged in size.    She had a 70 pound weight loss in 2019 due to poorly controlled diabetes.  She was initially diagnosed with a type 2 diabetes and subsequently determined to be type I and switched to insulin.  She had an admission to the hospital due to DKA.    She is unvaccinated for COVID-19.  She previously worked as a  however currently not working.    Family history significant for ovarian cancer in grandmother and bladder cancer in her father.      Interval history  History presents to the clinic today for follow-up.  She had a PET/CT performed on 12/22/2021 here to review the same.  She is complaining of arthralgias for which she has been taking Norco 10 mg as needed.  She is also complaining of significant tenderness in her toes which started last week.  She denies any tingling numbness or weakness of the extremities.    The following portions of the patient's history were reviewed and updated as appropriate: allergies, current medications, past family history, past medical history, past social history, past surgical history and problem list.    Past Medical History:   Diagnosis Date   • Anxiety    • Arthritis    • Breast cancer (HCC) 09/07/2021    Right  breast invasive ductal carcinoma ER low positive, [place] negative, [id number] negative, mercedes to lymph node (biopsy positive)   • Chronic fatigue    • Diabetes mellitus with stage 3 chronic kidney disease (HCC)    • Hyperlipidemia    • Leukocytosis    • Menorrhagia with regular cycle    • Seasonal allergies    • Type I diabetes mellitus (HCC)    • Vitamin D deficiency         Past Surgical History:   Procedure Laterality Date   • APPENDECTOMY N/A    • BREAST BIOPSY Right 2018   • BREAST BIOPSY Bilateral 2021   • US GUIDED LYMPH NODE BIOPSY  2021    Dr. Carol Terrazas, [facility]   • US GUIDED LYMPH NODE BIOPSY  2021   • VENOUS ACCESS DEVICE (PORT) INSERTION N/A 10/15/2021    Procedure: INSERTION VENOUS ACCESS DEVICE;  Surgeon: Mariposa Price MD;  Location: [facility];  Service: General;  Laterality: N/A;        Family History   Problem Relation Age of Onset   • Diabetes Mother    • Cervical cancer Maternal Grandmother         cervical/uterine    • Diabetes Maternal Grandfather    • Lymphoma Paternal Aunt 60   • Breast cancer Neg Hx    • Malig Hyperthermia Neg Hx         Social History     Socioeconomic History   • Marital status:    Tobacco Use   • Smoking status: Former Smoker     Packs/day: 0.50     Years: 8.00     Pack years: 4.00     Types: Cigarettes     Start date: 1995     Quit date: 2003     Years since quittin.0   • Smokeless tobacco: Never Used   • Tobacco comment: teen / young adult   Vaping Use   • Vaping Use: Never used   Substance and Sexual Activity   • Alcohol use: Yes     Alcohol/week: 1.0 standard drink     Types: 1 Standard drinks or equivalent per week     Comment: social   • Drug use: No   • Sexual activity: Defer     Partners: Male     Birth control/protection: None     Comment:         OB History        1    Para   1    Term   1            AB        Living           SAB        IAB        Ectopic        Molar        Multiple        Live  Births                 Age of menarche-14  Age at first live childbirth-24   one para one  zero  Oral contraceptive pill use for 20 years  She is premenopausal    No Known Allergies     Review of systems as mentioned in the HPI      Objective   not currently breastfeeding.     Physical Exam  Constitutional:       General: She is not in acute distress.     Appearance: She is well-developed.   HENT:      Head: Normocephalic.      Nose: Nose normal. No congestion.      Mouth/Throat:      Mouth: Mucous membranes are moist.      Pharynx: Oropharynx is clear. No oropharyngeal exudate.   Eyes:      Conjunctiva/sclera: Conjunctivae normal.      Pupils: Pupils are equal, round, and reactive to light.   Cardiovascular:      Rate and Rhythm: Regular rhythm.      Heart sounds: Normal heart sounds. No murmur heard.  No gallop.    Pulmonary:      Effort: Pulmonary effort is normal. No respiratory distress.      Breath sounds: Normal breath sounds. No wheezing.   Abdominal:      General: Bowel sounds are normal. There is no distension.      Palpations: Abdomen is soft.      Tenderness: There is no abdominal tenderness.   Musculoskeletal:         General: Normal range of motion.      Cervical back: Normal range of motion.      Right lower leg: No edema.      Left lower leg: No edema.   Skin:     General: Skin is warm and dry.      Findings: No rash.   Neurological:      General: No focal deficit present.      Mental Status: She is alert and oriented to person, place, and time. Mental status is at baseline.   Psychiatric:         Mood and Affect: Mood normal.         Behavior: Behavior normal.         Thought Content: Thought content normal.         Judgment: Judgment normal.       Breast Exam: Right breast-right breast.  On palpation there is a 4x3 cm mass in the upper outer quadrant, this has decreased in size and is not definitively measurable.  Previously patient had right axillary and supraclavicular  lymphadenopathy, which is decreased and is not palpable on exam today.   Left breast appears normal on inspection.  No palpable abnormalities of the breast or the axilla.   Breast exam not performed today    I have reexamined the patient and the results are consistent with the previously documented exam. Didi De La O MD       Infusion on 12/23/2021   Component Date Value Ref Range Status   • Glucose 12/23/2021 132* 65 - 99 mg/dL Final   • BUN 12/23/2021 9  6 - 20 mg/dL Final   • Creatinine 12/23/2021 0.66  0.57 - 1.00 mg/dL Final   • Sodium 12/23/2021 141  136 - 145 mmol/L Final   • Potassium 12/23/2021 4.0  3.5 - 5.2 mmol/L Final   • Chloride 12/23/2021 104  98 - 107 mmol/L Final   • CO2 12/23/2021 25.2  22.0 - 29.0 mmol/L Final   • Calcium 12/23/2021 9.0  8.6 - 10.5 mg/dL Final   • Total Protein 12/23/2021 6.2  6.0 - 8.5 g/dL Final   • Albumin 12/23/2021 4.10  3.50 - 5.20 g/dL Final   • ALT (SGPT) 12/23/2021 14  1 - 33 U/L Final   • AST (SGOT) 12/23/2021 15  1 - 32 U/L Final   • Alkaline Phosphatase 12/23/2021 46  39 - 117 U/L Final   • Total Bilirubin 12/23/2021 0.2  0.0 - 1.2 mg/dL Final   • eGFR Non  Amer 12/23/2021 99  >60 mL/min/1.73 Final   • Globulin 12/23/2021 2.1  gm/dL Final   • A/G Ratio 12/23/2021 2.0  g/dL Final   • BUN/Creatinine Ratio 12/23/2021 13.6  7.0 - 25.0 Final   • Anion Gap 12/23/2021 11.8  5.0 - 15.0 mmol/L Final   • WBC 12/23/2021 5.75  3.40 - 10.80 10*3/mm3 Final   • RBC 12/23/2021 3.59* 3.77 - 5.28 10*6/mm3 Final   • Hemoglobin 12/23/2021 11.2* 12.0 - 15.9 g/dL Final   • Hematocrit 12/23/2021 34.1  34.0 - 46.6 % Final   • MCV 12/23/2021 95.0  79.0 - 97.0 fL Final   • MCH 12/23/2021 31.2  26.6 - 33.0 pg Final   • MCHC 12/23/2021 32.8  31.5 - 35.7 g/dL Final   • RDW 12/23/2021 15.1  12.3 - 15.4 % Final   • RDW-SD 12/23/2021 52.5  37.0 - 54.0 fl Final   • MPV 12/23/2021 9.8  6.0 - 12.0 fL Final   • Platelets 12/23/2021 283  140 - 450 10*3/mm3 Final   • Neutrophil % 12/23/2021  59.4  42.7 - 76.0 % Final   • Lymphocyte % 12/23/2021 27.5  19.6 - 45.3 % Final   • Monocyte % 12/23/2021 8.5  5.0 - 12.0 % Final   • Eosinophil % 12/23/2021 3.7  0.3 - 6.2 % Final   • Basophil % 12/23/2021 0.7  0.0 - 1.5 % Final   • Immature Grans % 12/23/2021 0.2  0.0 - 0.5 % Final   • Neutrophils, Absolute 12/23/2021 3.42  1.70 - 7.00 10*3/mm3 Final   • Lymphocytes, Absolute 12/23/2021 1.58  0.70 - 3.10 10*3/mm3 Final   • Monocytes, Absolute 12/23/2021 0.49  0.10 - 0.90 10*3/mm3 Final   • Eosinophils, Absolute 12/23/2021 0.21  0.00 - 0.40 10*3/mm3 Final   • Basophils, Absolute 12/23/2021 0.04  0.00 - 0.20 10*3/mm3 Final   • Immature Grans, Absolute 12/23/2021 0.01  0.00 - 0.05 10*3/mm3 Final   • nRBC 12/23/2021 0.0  0.0 - 0.2 /100 WBC Final   Hospital Outpatient Visit on 12/21/2021   Component Date Value Ref Range Status   • Glucose 12/21/2021 155* 70 - 130 mg/dL Final    Meter: [id number] : [id number] Matthew Mujica RTCT   Infusion on 12/16/2021   Component Date Value Ref Range Status   • Glucose 12/16/2021 274* 65 - 99 mg/dL Final   • BUN 12/16/2021 14  6 - 20 mg/dL Final   • Creatinine 12/16/2021 0.73  0.57 - 1.00 mg/dL Final   • Sodium 12/16/2021 138  136 - 145 mmol/L Final   • Potassium 12/16/2021 4.6  3.5 - 5.2 mmol/L Final   • Chloride 12/16/2021 103  98 - 107 mmol/L Final   • CO2 12/16/2021 27.0  22.0 - 29.0 mmol/L Final   • Calcium 12/16/2021 9.1  8.6 - 10.5 mg/dL Final   • Total Protein 12/16/2021 6.5  6.0 - 8.5 g/dL Final   • Albumin 12/16/2021 4.20  3.50 - 5.20 g/dL Final   • ALT (SGPT) 12/16/2021 19  1 - 33 U/L Final   • AST (SGOT) 12/16/2021 18  1 - 32 U/L Final   • Alkaline Phosphatase 12/16/2021 42  39 - 117 U/L Final   • Total Bilirubin 12/16/2021 0.3  0.0 - 1.2 mg/dL Final   • eGFR Non  Amer 12/16/2021 88  >60 mL/min/1.73 Final   • Globulin 12/16/2021 2.3  gm/dL Final   • A/G Ratio 12/16/2021 1.8  g/dL Final   • BUN/Creatinine Ratio 12/16/2021 19.2  7.0 - 25.0 Final   • Anion Gap  12/16/2021 8.0  5.0 - 15.0 mmol/L Final   • WBC 12/16/2021 4.59  3.40 - 10.80 10*3/mm3 Final   • RBC 12/16/2021 3.69* 3.77 - 5.28 10*6/mm3 Final   • Hemoglobin 12/16/2021 11.4* 12.0 - 15.9 g/dL Final   • Hematocrit 12/16/2021 34.7  34.0 - 46.6 % Final   • MCV 12/16/2021 94.0  79.0 - 97.0 fL Final   • MCH 12/16/2021 30.9  26.6 - 33.0 pg Final   • MCHC 12/16/2021 32.9  31.5 - 35.7 g/dL Final   • RDW 12/16/2021 15.5* 12.3 - 15.4 % Final   • RDW-SD 12/16/2021 53.0  37.0 - 54.0 fl Final   • MPV 12/16/2021 9.4  6.0 - 12.0 fL Final   • Platelets 12/16/2021 254  140 - 450 10*3/mm3 Final   • Neutrophil % 12/16/2021 52.0  42.7 - 76.0 % Final   • Lymphocyte % 12/16/2021 33.1  19.6 - 45.3 % Final   • Monocyte % 12/16/2021 8.9  5.0 - 12.0 % Final   • Eosinophil % 12/16/2021 5.4  0.3 - 6.2 % Final   • Basophil % 12/16/2021 0.4  0.0 - 1.5 % Final   • Immature Grans % 12/16/2021 0.2  0.0 - 0.5 % Final   • Neutrophils, Absolute 12/16/2021 2.38  1.70 - 7.00 10*3/mm3 Final   • Lymphocytes, Absolute 12/16/2021 1.52  0.70 - 3.10 10*3/mm3 Final   • Monocytes, Absolute 12/16/2021 0.41  0.10 - 0.90 10*3/mm3 Final   • Eosinophils, Absolute 12/16/2021 0.25  0.00 - 0.40 10*3/mm3 Final   • Basophils, Absolute 12/16/2021 0.02  0.00 - 0.20 10*3/mm3 Final   • Immature Grans, Absolute 12/16/2021 0.01  0.00 - 0.05 10*3/mm3 Final   • nRBC 12/16/2021 0.0  0.0 - 0.2 /100 WBC Final   Infusion on 12/09/2021   Component Date Value Ref Range Status   • Glucose 12/09/2021 267* 65 - 99 mg/dL Final   • BUN 12/09/2021 11  6 - 20 mg/dL Final   • Creatinine 12/09/2021 0.60  0.57 - 1.00 mg/dL Final   • Sodium 12/09/2021 141  136 - 145 mmol/L Final   • Potassium 12/09/2021 3.9  3.5 - 5.2 mmol/L Final   • Chloride 12/09/2021 105  98 - 107 mmol/L Final   • CO2 12/09/2021 26.7  22.0 - 29.0 mmol/L Final   • Calcium 12/09/2021 8.8  8.6 - 10.5 mg/dL Final   • Total Protein 12/09/2021 6.3  6.0 - 8.5 g/dL Final   • Albumin 12/09/2021 4.00  3.50 - 5.20 g/dL Final   • ALT  (SGPT) 12/09/2021 14  1 - 33 U/L Final   • AST (SGOT) 12/09/2021 17  1 - 32 U/L Final   • Alkaline Phosphatase 12/09/2021 45  39 - 117 U/L Final   • Total Bilirubin 12/09/2021 0.2  0.0 - 1.2 mg/dL Final   • eGFR Non African Amer 12/09/2021 111  >60 mL/min/1.73 Final   • Globulin 12/09/2021 2.3  gm/dL Final   • A/G Ratio 12/09/2021 1.7  g/dL Final   • BUN/Creatinine Ratio 12/09/2021 18.3  7.0 - 25.0 Final   • Anion Gap 12/09/2021 9.3  5.0 - 15.0 mmol/L Final   • WBC 12/09/2021 6.33  3.40 - 10.80 10*3/mm3 Final   • RBC 12/09/2021 3.56* 3.77 - 5.28 10*6/mm3 Final   • Hemoglobin 12/09/2021 11.0* 12.0 - 15.9 g/dL Final   • Hematocrit 12/09/2021 33.4* 34.0 - 46.6 % Final   • MCV 12/09/2021 93.8  79.0 - 97.0 fL Final   • MCH 12/09/2021 30.9  26.6 - 33.0 pg Final   • MCHC 12/09/2021 32.9  31.5 - 35.7 g/dL Final   • RDW 12/09/2021 14.9  12.3 - 15.4 % Final   • RDW-SD 12/09/2021 51.3  37.0 - 54.0 fl Final   • MPV 12/09/2021 9.3  6.0 - 12.0 fL Final   • Platelets 12/09/2021 235  140 - 450 10*3/mm3 Final   • Neutrophil % 12/09/2021 64.3  42.7 - 76.0 % Final   • Lymphocyte % 12/09/2021 26.2  19.6 - 45.3 % Final   • Monocyte % 12/09/2021 4.4* 5.0 - 12.0 % Final   • Eosinophil % 12/09/2021 4.3  0.3 - 6.2 % Final   • Basophil % 12/09/2021 0.3  0.0 - 1.5 % Final   • Immature Grans % 12/09/2021 0.5  0.0 - 0.5 % Final   • Neutrophils, Absolute 12/09/2021 4.07  1.70 - 7.00 10*3/mm3 Final   • Lymphocytes, Absolute 12/09/2021 1.66  0.70 - 3.10 10*3/mm3 Final   • Monocytes, Absolute 12/09/2021 0.28  0.10 - 0.90 10*3/mm3 Final   • Eosinophils, Absolute 12/09/2021 0.27  0.00 - 0.40 10*3/mm3 Final   • Basophils, Absolute 12/09/2021 0.02  0.00 - 0.20 10*3/mm3 Final   • Immature Grans, Absolute 12/09/2021 0.03  0.00 - 0.05 10*3/mm3 Final   • nRBC 12/09/2021 0.0  0.0 - 0.2 /100 WBC Final   Infusion on 12/02/2021   Component Date Value Ref Range Status   • Glucose 12/02/2021 255* 65 - 99 mg/dL Final   • BUN 12/02/2021 11  6 - 20 mg/dL Final   •  Creatinine 12/02/2021 0.59  0.57 - 1.00 mg/dL Final   • Sodium 12/02/2021 138  136 - 145 mmol/L Final   • Potassium 12/02/2021 4.5  3.5 - 5.2 mmol/L Final   • Chloride 12/02/2021 103  98 - 107 mmol/L Final   • CO2 12/02/2021 26.0  22.0 - 29.0 mmol/L Final   • Calcium 12/02/2021 9.0  8.6 - 10.5 mg/dL Final   • Total Protein 12/02/2021 6.7  6.0 - 8.5 g/dL Final   • Albumin 12/02/2021 4.10  3.50 - 5.20 g/dL Final   • ALT (SGPT) 12/02/2021 14  1 - 33 U/L Final   • AST (SGOT) 12/02/2021 15  1 - 32 U/L Final   • Alkaline Phosphatase 12/02/2021 48  39 - 117 U/L Final   • Total Bilirubin 12/02/2021 0.2  0.0 - 1.2 mg/dL Final   • eGFR Non African Amer 12/02/2021 113  >60 mL/min/1.73 Final   • Globulin 12/02/2021 2.6  gm/dL Final   • A/G Ratio 12/02/2021 1.6  g/dL Final   • BUN/Creatinine Ratio 12/02/2021 18.6  7.0 - 25.0 Final   • Anion Gap 12/02/2021 9.0  5.0 - 15.0 mmol/L Final   • WBC 12/02/2021 7.77  3.40 - 10.80 10*3/mm3 Final   • RBC 12/02/2021 3.78  3.77 - 5.28 10*6/mm3 Final   • Hemoglobin 12/02/2021 11.7* 12.0 - 15.9 g/dL Final   • Hematocrit 12/02/2021 35.4  34.0 - 46.6 % Final   • MCV 12/02/2021 93.7  79.0 - 97.0 fL Final   • MCH 12/02/2021 31.0  26.6 - 33.0 pg Final   • MCHC 12/02/2021 33.1  31.5 - 35.7 g/dL Final   • RDW 12/02/2021 15.4  12.3 - 15.4 % Final   • RDW-SD 12/02/2021 53.2  37.0 - 54.0 fl Final   • MPV 12/02/2021 9.2  6.0 - 12.0 fL Final   • Platelets 12/02/2021 208  140 - 450 10*3/mm3 Final   • Neutrophil % 12/02/2021 67.3  42.7 - 76.0 % Final   • Lymphocyte % 12/02/2021 19.0* 19.6 - 45.3 % Final   • Monocyte % 12/02/2021 10.2  5.0 - 12.0 % Final   • Eosinophil % 12/02/2021 2.7  0.3 - 6.2 % Final   • Basophil % 12/02/2021 0.5  0.0 - 1.5 % Final   • Immature Grans % 12/02/2021 0.3  0.0 - 0.5 % Final   • Neutrophils, Absolute 12/02/2021 5.23  1.70 - 7.00 10*3/mm3 Final   • Lymphocytes, Absolute 12/02/2021 1.48  0.70 - 3.10 10*3/mm3 Final   • Monocytes, Absolute 12/02/2021 0.79  0.10 - 0.90 10*3/mm3  Final   • Eosinophils, Absolute 12/02/2021 0.21  0.00 - 0.40 10*3/mm3 Final   • Basophils, Absolute 12/02/2021 0.04  0.00 - 0.20 10*3/mm3 Final   • Immature Grans, Absolute 12/02/2021 0.02  0.00 - 0.05 10*3/mm3 Final   • nRBC 12/02/2021 0.0  0.0 - 0.2 /100 WBC Final        NM PET/CT Skull Base to Mid Thigh    Result Date: 12/22/2021  1.  Previously seen hypermetabolic right cervical, axillary and hilar adenopathy as well as the 2 foci of masslike FDG uptake within the right breast representing primary malignancy and metastatic disease no longer demonstrate FDG uptake significantly above that of mediastinal blood pool. Index areas also demonstrate significant size decrease as detailed above. The presumed right femoral neck metastasis has also decreased in FDG uptake. 2.  Minimal interval enlargement with new mild FDG uptake of a left cervical node. While findings are maybe reactive, they are indeterminate and metastatic disease cannot be excluded. Continued close attention on follow-up with CT neck in 8-12 weeks is recommended to ensure stability and/or resolution. 3.  Stable sub-6 mm left pulmonary nodules. 4.  No findings of FDG avid disease in the abdomen or pelvis. 5.  Other findings as above.  This report was finalized on 12/22/2021 1:18 PM by Dr. Rubens Trammell M.D.       CT of the chest abdomen pelvis-images independently reviewed and interpreted by me in detail summarized in the HPI  Bone scan-images independently reviewed and interpreted by me in detail summarized in the HPI    CT neck-images independently reviewed and interpreted by me in detail summarized in HPI.  I also discussed the CT neck with Dr. Alexander Blevins with radiology regarding the right side lymphadenopathy.     9/28/2021-right cervical lymph node biopsy-pathology consistent with metastatic mammary carcinoma.    10/1/2021-PET/CT-there are 2 separate hypermetabolic lesions within the right breast.  1 measuring 6 cm and the other measuring 1.3  cm.  The SUV of 6 cm lesion of 5.6 and small lesion of 4.8.  Hypermetabolic right axillary, infra pectoral, right supraclavicular lymphadenopathy noted.  In addition there are hypermetabolic nodes at the right thoracic inlet, right base of the neck and right posterior cervical triangle.  1.6 x 0.8 cm right posterior cervical triangle node with an SUV of 3.9.  Right axillary lymphadenopathy with lymphedema 6.4.  Hypermetabolic left hilar lymphadenopathy measuring 2 x 1.1 cm with an SUV of 5.8.  No hypermetabolic activity in the liver or evidence of metastatic disease in the abdomen or pelvis.  Hypermetabolic lesion in the right femoral neck measuring 1.5 cm.  SUV 5.4.    PET/CT images independently reviewed and interpreted by me in detail summarized above.  Images also reviewed with patient.    12/22/2021 PET/CT-images independently reviewed and interpreted by me.  The previous areas of hypermetabolic uptake have resolved completely.  There is a left cervical lymph node which measures 0.7 cm with an SUV of 3.3.  I think this is mostly reactive.    Assessment/Plan       *Invasive ductal carcinoma of the right breast  · Clinical T3 N3 cM1, stage IV  · The tumor is grade 3, poorly differentiated, ER 1 to 10% weak, RI negative, HER-2 negative, Ki-67 65%  · Cervical lymph node biopsy consistent with metastatic mammary carcinoma.  · PET/CT the abnormal uptake noted in the left hilar lymph node, right femoral neck, cervical lymph nodes.  · Since the cervical lymph node biopsy is positive as well as PET/CT with several abnormal findings I do believe that this is stage IV disease.  · Discussed case at multidisciplinary conference on 9/28/2021 and the initial plan was to proceed with neoadjuvant chemotherapy followed by surgery and radiation to the positive cervical nodes.  PET/CT results were not available at that time.  · The PET/CT showing other areas of metastatic disease unfortunately we will not be able to proceed with  curative intent treatment.  · Explained that the disease is incurable  · On hearing this news Sierra was overwhelmed and she decided to cancel the procedure for the port echo scheduled for later in the day.  · Caris testing requested  · On the subsequent visit I discussed with her about palliative intent chemotherapy with single agent Taxol and adding immunotherapy down the line once PD-L1 results available.  · Since she is complaining of significant pain in the right breast I would consider adding carboplatin for about 4 to 6 weeks to help get a quick response in the right breast tumor as well as right axillary lymphadenopathy.  · Adverse effects of Taxol including but not limited to myelosuppression, increased risk of infections, nausea, vomiting, arthralgias, neuropathy, nail changes, alopecia, diarrhea, possible mucositis discussed.  · Caris testing has been reviewed today and PD-L1 CPS was 3 and hence no additional benefit from immunotherapy.  Tumor mutational burden was also low and MSI stable.  Therefore there is no added benefit from immunotherapy.  · She received 4 doses of carboplatin Taxol  · Now continues on weekly Taxol alone.  · PET/CT 12/22/2021 with positive response  · Continue single agent Taxol      *Nail changes   · Secondary to Taxol  · Rec continue gabapentin/Norco to help with the pain    *Nausea  · She is experiencing headaches  · Discontinued Zofran and start Compazine  · Headaches improved but still present    *Headaches  · Secondary to Zofran and Aloxi  · Letter    *Diabetes  · Type I  · Poorly controlled  ·  notes reviewed   · Follow-up with endocrinology    * Cervical lymphadenopathy  · Biopsied and pathology consistent with metastatic breast cancer    *Anxiety  · She has been referred to supportive oncology  · Met with Tenisha Rizzo  · Started on gabapentin 300 mg 3 times daily  · She is currently on max dose of Celexa  · There is a level C interaction of Zyprexa with  Celexa.  · However I think Zyprexa would be reasonable choice given anxiety, insomnia, nausea.  · Currently using Ativan to help with sleep occasionally.  States she will need a refill in a few days.    · She followed up with Tenisha Rizzo with there were concerns regarding the dose of Adderall.  May need to see an outside provider or discuss this again with supportive oncology.    *Port placement-on 10/16/2021    *Breast pain-Norco every 8 hours prescribed, pain improved    *SRE prevention-hold off on Xgeva she needs some dental work.    * Arthralgia from Taxol: Could try Claritin, otherwise continue Norco if needed and occasional ibuprofen.    · Continues on Norco but increase it to 10 mg as needed.  · Sierra Mao reports a pain score of .  Given her pain assessment as noted, treatment options were discussed and the following options were decided upon as a follow-up plan to address the patient's pain: continuation of current treatment plan for pain.    PLAN:   1. Proceed today with week #8 single agent taxol.  2. Continue Norco every 8 hours as needed for pain.  3. Continue follow up with supportive oncology  4. Return weekly for taxol    Patient is on high risk medication requiring close monitoring for toxicity.     50 minutes spent on the encounter including reviewing the images, face-to-face time, reviewing the labs, documentation on the same day    Didi De La O MD  12/30/2021

## 2021-12-30 NOTE — NURSING NOTE
"Seen per Dr. De La O. Labs now reviewed and noted that glucose is elevated. Discussed with patient and she states that she would not need more insulin as she states that she was low at home and ate cereal and then took her am insulin which \"just hasnt had time to work\".   Copy of lab results given.    Talked with Dr. De La O re: glucose; she states that she is fine to proceed with treatment as ordered.  "

## 2022-01-03 ENCOUNTER — TELEPHONE (OUTPATIENT)
Dept: ONCOLOGY | Facility: CLINIC | Age: 42
End: 2022-01-03

## 2022-01-03 NOTE — TELEPHONE ENCOUNTER
[facility] left message on 12-31.  Pain med Dr. Butterfield wrote for pt is already on from a different doctor, different strength, should they still still fill it?

## 2022-01-03 NOTE — TELEPHONE ENCOUNTER
Returned call to pharmacy and per Dr. De La O's notes, patient should be taking the Norco as prescribed by her.

## 2022-01-05 ENCOUNTER — OFFICE VISIT (OUTPATIENT)
Dept: FAMILY MEDICINE CLINIC | Facility: CLINIC | Age: 42
End: 2022-01-05

## 2022-01-05 VITALS
HEIGHT: 71 IN | OXYGEN SATURATION: 98 % | DIASTOLIC BLOOD PRESSURE: 62 MMHG | TEMPERATURE: 97.3 F | BODY MASS INDEX: 22.88 KG/M2 | HEART RATE: 97 BPM | SYSTOLIC BLOOD PRESSURE: 124 MMHG | RESPIRATION RATE: 16 BRPM | WEIGHT: 163.4 LBS

## 2022-01-05 DIAGNOSIS — C50.919 METASTATIC BREAST CANCER: ICD-10-CM

## 2022-01-05 DIAGNOSIS — R41.840 INATTENTION: ICD-10-CM

## 2022-01-05 DIAGNOSIS — F41.9 ANXIETY: Primary | ICD-10-CM

## 2022-01-05 PROCEDURE — 99213 OFFICE O/P EST LOW 20 MIN: CPT | Performed by: INTERNAL MEDICINE

## 2022-01-05 NOTE — PROGRESS NOTES
"Chief Complaint  Follow-up ( celexa medication ) and Follow-up (pt states would like to give provider updated medical hx )    Subjective          Sierra Mao presents to [facility]  History of Present Illness  Has recently been diagnosed with stage 4 breast cancer and is seeing [facility] oncologist and psychiatrist.  She is looking to switch providers and going to [facility] for more aggressive treatment options.  The pysch provider at the [facility] had given her adderall for 2 months which really helped her with motivation and her thinking and focus.  She has had undiagnosed ADHD.  She is also forgetting things like if she took her insulin or not.  On 12/28/21, the psych stopped her adderall b/c she thought she was too anxious and wanted her to wean off celexa and start cymbalta, possibly due to pain benefits?,  but she isn't comfortable with that b/c she feels like she really did well on adderall and isn't sure why the cymbalta was prescribed over the celexa.  [facility] also sent in [facility] for the ADHD.  She has not started the Cymbalta nor the Strattera.  She maintains celexa treatment currently.  Wondering if it is best to continue celexa at this time until she sees her new psych provider.    Objective   Vital Signs:   /62   Pulse 97   Temp 97.3 °F (36.3 °C) (Temporal)   Resp 16   Ht 180 cm (70.87\")   Wt 74.1 kg (163 lb 6.4 oz)   SpO2 98%   BMI 22.87 kg/m²     Physical Exam  Constitutional:       Appearance: She is normal weight.      Comments: alopecia   HENT:      Head: Normocephalic and atraumatic.   Eyes:      Extraocular Movements: Extraocular movements intact.      Conjunctiva/sclera: Conjunctivae normal.   Neurological:      General: No focal deficit present.      Mental Status: She is oriented to person, place, and time.   Psychiatric:         Mood and Affect: Mood normal.         Behavior: Behavior normal.         Thought Content: Thought content " normal.         Judgment: Judgment normal.        Result Review :                 Assessment and Plan    Diagnoses and all orders for this visit:    1. Anxiety (Primary)    2. Inattention    3. Metastatic breast cancer (HCC)        Follow Up   No follow-ups on file.  Patient was given instructions and counseling regarding her condition or for health maintenance advice. Please see specific information pulled into the AVS if appropriate.     She also mentions that she is now in menopause after the CTX and is having hot flashes.  I have called her back and let her know that Effexor might be a good option for her when she sees her next psychiatric provider.  I also see that Cymbalta might be a good benefit with pain control as she is having quite a bit of bone pain from the bone mets.  She seems to be in very good spirits considering her diagnosis and considering what she has been through.  At this time I do recommend that she stay on the Celexa until she gets with her new psychiatric provider when she changes to [facility].  Total time spent coordinating care and updating history 30 minutes

## 2022-01-07 ENCOUNTER — TELEPHONE (OUTPATIENT)
Dept: GENERAL RADIOLOGY | Facility: HOSPITAL | Age: 42
End: 2022-01-07

## 2022-01-07 NOTE — TELEPHONE ENCOUNTER
----- Message from Didi De La O MD sent at 1/7/2022  7:04 AM EST -----  Regarding: RE: PLEASE REVIEW  If she comes in tomorrow, she ll have to keep her chemos on Friday and will have to see me on Thursdays, will be a separate day at [place].   Or the other option is to change her location to [facility].     ----- Message -----  From: Denise Bishop  Sent: 1/6/2022   5:02 PM EST  To: Sil Pederson RN, Didi De La O MD  Subject: PLEASE REVIEW                                    PLEASE REVIEW CAN WE BRING PT IN TOMORROW AS SHE HAS REQUESTED TO COME IN TOMORROW AS HER  COULD NOT BRING HER TODAY 1/6/22 - PLEASE ADVISE HOW TO PROCEED       THANK YOU  DENISE

## 2022-01-07 NOTE — TELEPHONE ENCOUNTER
I COULD NOT GET A HOLD OF THE PT NOT SURE HOW THIS WILL BE HANDLED, I HAVE ADDED THE PT ON FOR A TREATMENT NEXT Thursday AS WELL AS SEE DR GRANADO OR NP

## 2022-01-11 ENCOUNTER — OFFICE VISIT (OUTPATIENT)
Dept: ONCOLOGY | Facility: CLINIC | Age: 42
End: 2022-01-11

## 2022-01-11 ENCOUNTER — INFUSION (OUTPATIENT)
Dept: ONCOLOGY | Facility: HOSPITAL | Age: 42
End: 2022-01-11

## 2022-01-11 VITALS
TEMPERATURE: 97.1 F | RESPIRATION RATE: 18 BRPM | HEART RATE: 97 BPM | DIASTOLIC BLOOD PRESSURE: 57 MMHG | WEIGHT: 160.5 LBS | BODY MASS INDEX: 22.47 KG/M2 | HEIGHT: 71 IN | SYSTOLIC BLOOD PRESSURE: 113 MMHG | OXYGEN SATURATION: 98 %

## 2022-01-11 VITALS — SYSTOLIC BLOOD PRESSURE: 124 MMHG | DIASTOLIC BLOOD PRESSURE: 67 MMHG | HEART RATE: 82 BPM

## 2022-01-11 DIAGNOSIS — C79.51 BONE METASTASIS: Primary | ICD-10-CM

## 2022-01-11 DIAGNOSIS — Z17.1 MALIGNANT NEOPLASM OF CENTRAL PORTION OF RIGHT BREAST IN FEMALE, ESTROGEN RECEPTOR NEGATIVE: ICD-10-CM

## 2022-01-11 DIAGNOSIS — C50.111 MALIGNANT NEOPLASM OF CENTRAL PORTION OF RIGHT BREAST IN FEMALE, ESTROGEN RECEPTOR NEGATIVE: Primary | ICD-10-CM

## 2022-01-11 DIAGNOSIS — C50.111 MALIGNANT NEOPLASM OF CENTRAL PORTION OF RIGHT BREAST IN FEMALE, ESTROGEN RECEPTOR NEGATIVE: ICD-10-CM

## 2022-01-11 DIAGNOSIS — Z79.899 HIGH RISK MEDICATION USE: ICD-10-CM

## 2022-01-11 DIAGNOSIS — Z17.1 MALIGNANT NEOPLASM OF CENTRAL PORTION OF RIGHT BREAST IN FEMALE, ESTROGEN RECEPTOR NEGATIVE: Primary | ICD-10-CM

## 2022-01-11 LAB
ALBUMIN SERPL-MCNC: 4.3 G/DL (ref 3.5–5.2)
ALBUMIN/GLOB SERPL: 1.8 G/DL (ref 1.1–2.4)
ALP SERPL-CCNC: 51 U/L (ref 38–116)
ALT SERPL W P-5'-P-CCNC: 15 U/L (ref 0–33)
ANION GAP SERPL CALCULATED.3IONS-SCNC: 11.3 MMOL/L (ref 5–15)
AST SERPL-CCNC: 14 U/L (ref 0–32)
BASOPHILS # BLD AUTO: 0.04 10*3/MM3 (ref 0–0.2)
BASOPHILS NFR BLD AUTO: 0.5 % (ref 0–1.5)
BILIRUB SERPL-MCNC: 0.2 MG/DL (ref 0.2–1.2)
BUN SERPL-MCNC: 9 MG/DL (ref 6–20)
BUN/CREAT SERPL: 14.8 (ref 7.3–30)
CALCIUM SPEC-SCNC: 9.4 MG/DL (ref 8.5–10.2)
CHLORIDE SERPL-SCNC: 102 MMOL/L (ref 98–107)
CO2 SERPL-SCNC: 25.7 MMOL/L (ref 22–29)
CREAT SERPL-MCNC: 0.61 MG/DL (ref 0.6–1.1)
DEPRECATED RDW RBC AUTO: 52.3 FL (ref 37–54)
EOSINOPHIL # BLD AUTO: 0.26 10*3/MM3 (ref 0–0.4)
EOSINOPHIL NFR BLD AUTO: 3.5 % (ref 0.3–6.2)
ERYTHROCYTE [DISTWIDTH] IN BLOOD BY AUTOMATED COUNT: 14.6 % (ref 12.3–15.4)
GFR SERPL CREATININE-BSD FRML MDRD: 109 ML/MIN/1.73
GLOBULIN UR ELPH-MCNC: 2.4 GM/DL (ref 1.8–3.5)
GLUCOSE SERPL-MCNC: 217 MG/DL (ref 74–124)
HCT VFR BLD AUTO: 39.8 % (ref 34–46.6)
HGB BLD-MCNC: 12.6 G/DL (ref 12–15.9)
IMM GRANULOCYTES # BLD AUTO: 0.05 10*3/MM3 (ref 0–0.05)
IMM GRANULOCYTES NFR BLD AUTO: 0.7 % (ref 0–0.5)
LYMPHOCYTES # BLD AUTO: 1.91 10*3/MM3 (ref 0.7–3.1)
LYMPHOCYTES NFR BLD AUTO: 25.7 % (ref 19.6–45.3)
MCH RBC QN AUTO: 30.7 PG (ref 26.6–33)
MCHC RBC AUTO-ENTMCNC: 31.7 G/DL (ref 31.5–35.7)
MCV RBC AUTO: 96.8 FL (ref 79–97)
MONOCYTES # BLD AUTO: 0.84 10*3/MM3 (ref 0.1–0.9)
MONOCYTES NFR BLD AUTO: 11.3 % (ref 5–12)
NEUTROPHILS NFR BLD AUTO: 4.34 10*3/MM3 (ref 1.7–7)
NEUTROPHILS NFR BLD AUTO: 58.3 % (ref 42.7–76)
NRBC BLD AUTO-RTO: 0 /100 WBC (ref 0–0.2)
PLATELET # BLD AUTO: 298 10*3/MM3 (ref 140–450)
PMV BLD AUTO: 9.4 FL (ref 6–12)
POTASSIUM SERPL-SCNC: 4.1 MMOL/L (ref 3.5–4.7)
PROT SERPL-MCNC: 6.7 G/DL (ref 6.3–8)
RBC # BLD AUTO: 4.11 10*6/MM3 (ref 3.77–5.28)
SODIUM SERPL-SCNC: 139 MMOL/L (ref 134–145)
WBC NRBC COR # BLD: 7.44 10*3/MM3 (ref 3.4–10.8)

## 2022-01-11 PROCEDURE — 99214 OFFICE O/P EST MOD 30 MIN: CPT | Performed by: INTERNAL MEDICINE

## 2022-01-11 PROCEDURE — 25010000002 DIPHENHYDRAMINE PER 50 MG: Performed by: INTERNAL MEDICINE

## 2022-01-11 PROCEDURE — 25010000002 PACLITAXEL PER 1 MG: Performed by: INTERNAL MEDICINE

## 2022-01-11 PROCEDURE — 96413 CHEMO IV INFUSION 1 HR: CPT

## 2022-01-11 PROCEDURE — 96360 HYDRATION IV INFUSION INIT: CPT

## 2022-01-11 PROCEDURE — 96375 TX/PRO/DX INJ NEW DRUG ADDON: CPT

## 2022-01-11 PROCEDURE — 25010000002 DEXAMETHASONE PER 1 MG: Performed by: INTERNAL MEDICINE

## 2022-01-11 PROCEDURE — 85025 COMPLETE CBC W/AUTO DIFF WBC: CPT

## 2022-01-11 PROCEDURE — 80053 COMPREHEN METABOLIC PANEL: CPT

## 2022-01-11 PROCEDURE — 96361 HYDRATE IV INFUSION ADD-ON: CPT

## 2022-01-11 RX ORDER — FAMOTIDINE 10 MG/ML
20 INJECTION, SOLUTION INTRAVENOUS AS NEEDED
Status: CANCELLED | OUTPATIENT
Start: 2022-01-11

## 2022-01-11 RX ORDER — DIPHENHYDRAMINE HYDROCHLORIDE 50 MG/ML
50 INJECTION INTRAMUSCULAR; INTRAVENOUS AS NEEDED
Status: CANCELLED | OUTPATIENT
Start: 2022-01-11

## 2022-01-11 RX ORDER — FAMOTIDINE 10 MG/ML
20 INJECTION, SOLUTION INTRAVENOUS ONCE
Status: CANCELLED | OUTPATIENT
Start: 2022-01-11

## 2022-01-11 RX ORDER — FAMOTIDINE 10 MG/ML
20 INJECTION, SOLUTION INTRAVENOUS ONCE
Status: COMPLETED | OUTPATIENT
Start: 2022-01-11 | End: 2022-01-11

## 2022-01-11 RX ORDER — CITALOPRAM 40 MG/1
40 TABLET ORAL DAILY
COMMUNITY
Start: 2022-01-07 | End: 2022-03-22

## 2022-01-11 RX ORDER — SODIUM CHLORIDE 9 MG/ML
250 INJECTION, SOLUTION INTRAVENOUS ONCE
Status: CANCELLED | OUTPATIENT
Start: 2022-01-11

## 2022-01-11 RX ORDER — SODIUM CHLORIDE 9 MG/ML
250 INJECTION, SOLUTION INTRAVENOUS ONCE
Status: COMPLETED | OUTPATIENT
Start: 2022-01-11 | End: 2022-01-11

## 2022-01-11 RX ADMIN — PACLITAXEL 150 MG: 6 INJECTION, SOLUTION INTRAVENOUS at 13:25

## 2022-01-11 RX ADMIN — DIPHENHYDRAMINE HYDROCHLORIDE 12.5 MG: 50 INJECTION INTRAMUSCULAR; INTRAVENOUS at 12:34

## 2022-01-11 RX ADMIN — SODIUM CHLORIDE 250 ML: 9 INJECTION, SOLUTION INTRAVENOUS at 12:14

## 2022-01-11 RX ADMIN — FAMOTIDINE 20 MG: 10 INJECTION, SOLUTION INTRAVENOUS at 12:14

## 2022-01-11 RX ADMIN — DEXAMETHASONE SODIUM PHOSPHATE 8 MG: 10 INJECTION INTRAMUSCULAR; INTRAVENOUS at 12:18

## 2022-01-11 RX ADMIN — SODIUM CHLORIDE 500 ML: 9 INJECTION, SOLUTION INTRAVENOUS at 12:51

## 2022-01-11 NOTE — PROGRESS NOTES
This was an audio and video enabled telemedicine encounter.    Subjective   Sierra Mao is a 41 y.o. female. Referred by Dr. Price for right breast invasive ductal carcinoma triple negative, metastatic    Treatment  1.Taxol and carboplatin weekly started 10/13/2021  2.carboplatin discontinued after 4 weeks and now continues on weekly Taxol only.      History of Present Illness     Ms. Mao is a 41-year-old Premenopausal  lady who palpated a mass in her right breast. She had a mass in a similar area in 2018 which was biopsied and benign. Further evaluation was performed.    08/18/2021-bilateral diagnostic mammogram-parenchyma is heterogeneously dense. In the palpable area of concern in the upper outer quadrant of the right breast there is an area of asymmetry with pleomorphic microcalcifications in the posterior one third of the upper outer quadrant of the right breast in the axillary region. There is also microcalcifications in the middle one third and anterior one third of the left breast at 12 o'clock position which are new. Architectural distortion in either breast.    Ultrasound-  Right breast at the site of palpable concern, at 11 o'clock, 1 cm from the nipple there is an irregular hypoechoic mass which measures up to 4 cm.  10 o'clock, 6 cm from the nipple there is an irregular mass which measures up to 1.6 cm  At 10 o'clock, 8 cm from the nipple there is a irregular mass which measures 1.8 cm.  Multiple right axillary lymph nodes demonstrate thickened cortices and rounded morphology largest of which measures 2 cm.  Left breast at 2 o'clock, 6 cm from the nipple there is a 1 cm heterogeneous hypoechoic masslike region.    09/07/2021-  1.right breast 11 o'clock, 1 cm from the nipple-invasive ductal carcinoma, poorly differentiated, grade 3, ER low +1 to 10%, weak, CA negative less than 1%, HER-2 negative.  2.right axillary lymph node-metastatic mammary carcinoma measuring 9 mm. ER negative, CA +5%  three, HER-2 negative  3.left breast 2 o'clock, 6 cm from the nipple-cluster of apocrine cyst  4.left breast 12 o'clock-apocrine cyst with polarizable calcium oxalate crystals.    Bilateral breast MRI 09/23/2021-biopsy-proven malignancy of the right breast occupying the middle and the posterior one thirds from 11 o'clock to 1 o'clock position and measuring up to 7.1 cm in greatest dimension. The lesion abuts the pectoralis fascia but there is no evidence of direct invasion. There are portions of the lesion that extended to the subpatellar soft tissues but there is no abnormal enhancement of the skin or skin thickening.  Bulky right axillary lymphadenopathy noted.  2.4 cm irregular mass in the lower outer quadrant of the right breast centered at 8 o'clock position which is separate from the known malignancy suspicious for additional malignancy.  Ultrasound and biopsy of this lesion recommended.  No findings suspicious for malignancy in the left breast.    09/23/2021-CT of the chest abdomen and pelvis and soft tissue neck-multiple mild to moderately enlarged subpectoral and right axillary lymph nodes consistent with metastatic disease. The subpectoral lymphadenopathy extends into the right supraclavicular region as well. There is no evidence of metastatic disease elsewhere within the neck. No evidence of metastatic disease in the chest abdomen or pelvis. 5 cm right ovarian cyst is noted. Suggest follow-up ultrasound in 6 weeks for further evaluation.    09/24/2021-bone scan without any evidence of metastatic disease.    9/28/2021-right cervical lymph node biopsy-pathology consistent with metastatic mammary carcinoma.    10/1/2021-PET/CT-there are 2 separate hypermetabolic lesions within the right breast.  1 measuring 6 cm and the other measuring 1.3 cm.  The SUV of 6 cm lesion of 5.6 and small lesion of 4.8.  Hypermetabolic right axillary, infra pectoral, right supraclavicular lymphadenopathy noted.  In addition there  are hypermetabolic nodes at the right thoracic inlet, right base of the neck and right posterior cervical triangle.  1.6 x 0.8 cm right posterior cervical triangle node with an SUV of 3.9.  Right axillary lymphadenopathy with lymphedema 6.4.  Hypermetabolic left hilar lymphadenopathy measuring 2 x 1.1 cm with an SUV of 5.8.  No hypermetabolic activity in the liver or evidence of metastatic disease in the abdomen or pelvis.  Hypermetabolic lesion in the right femoral neck measuring 1.5 cm.  SUV 5.4.    Mr. Mao reports that she initially felt good right breast mass in May 2021.  Starting July 2021 she had palpated something abnormal in her right neck.  Since noticing the mass in May 2021 she feels like the mass in the right breast has doubled in size.  She also feels like the right axillary lymph nodes have enlarged in size.    She had a 70 pound weight loss in 2019 due to poorly controlled diabetes.  She was initially diagnosed with a type 2 diabetes and subsequently determined to be type I and switched to insulin.  She had an admission to the hospital due to DKA.    She is unvaccinated for COVID-19.  She previously worked as a  however currently not working.    Family history significant for ovarian cancer in grandmother and bladder cancer in her father.    Interval history  Sierra presents to the clinic today for follow-up.  She missed her treatment last week as she did not have a ride.  With treatment being held last week the pain in her fingers and toes has improved significantly.  Denies any tingling or numbness.  Headaches have also improved.  She does report bone pains which are persistent also reports feeling drowsy with gabapentin and Norco.  She has been using Norco for pain control.  She has met with Tenisha Rzizo on 12/28/2021 and has been recommended to start Cymbalta to help with anxiety and pain control.  She continues on Celexa and has not started Cymbalta yet.  No changes in the  right breast lesion.    The following portions of the patient's history were reviewed and updated as appropriate: allergies, current medications, past family history, past medical history, past social history, past surgical history and problem list.    Past Medical History:   Diagnosis Date   • Anxiety    • Arthritis    • Breast cancer (HCC) 2021    Right breast invasive ductal carcinoma ER low positive, AL negative, [id number] negative, mercedes to lymph node (biopsy positive)   • Chronic fatigue    • Diabetes mellitus with stage 3 chronic kidney disease (HCC)    • Hyperlipidemia    • Leukocytosis    • Menorrhagia with regular cycle    • Seasonal allergies    • Type I diabetes mellitus (HCC)    • Vitamin D deficiency         Past Surgical History:   Procedure Laterality Date   • APPENDECTOMY N/A    • BREAST BIOPSY Right 2018   • BREAST BIOPSY Bilateral 2021   • US GUIDED LYMPH NODE BIOPSY  2021    Dr. Carol Terrazas, [facility]   • US GUIDED LYMPH NODE BIOPSY  2021   • VENOUS ACCESS DEVICE (PORT) INSERTION N/A 10/15/2021    Procedure: INSERTION VENOUS ACCESS DEVICE;  Surgeon: Mariposa Price MD;  Location: [facility];  Service: General;  Laterality: N/A;        Family History   Problem Relation Age of Onset   • Diabetes Mother    • Cervical cancer Maternal Grandmother         cervical/uterine    • Diabetes Maternal Grandfather    • Lymphoma Paternal Aunt 60   • Breast cancer Neg Hx    • Malig Hyperthermia Neg Hx         Social History     Socioeconomic History   • Marital status:    Tobacco Use   • Smoking status: Former Smoker     Packs/day: 0.50     Years: 8.00     Pack years: 4.00     Types: Cigarettes     Start date: 1995     Quit date: 2003     Years since quittin.0   • Smokeless tobacco: Former User   • Tobacco comment: teen / young adult   Vaping Use   • Vaping Use: Never used   Substance and Sexual Activity   • Alcohol use: Yes     Alcohol/week: 1.0 standard drink      Types: 1 Standard drinks or equivalent per week     Comment: social   • Drug use: No   • Sexual activity: Defer     Partners: Male     Birth control/protection: None     Comment:         OB History        1    Para   1    Term   1            AB        Living           SAB        IAB        Ectopic        Molar        Multiple        Live Births                 Age of menarche-14  Age at first live childbirth-24   one para one  zero  Oral contraceptive pill use for 20 years  She is premenopausal    No Known Allergies     Review of systems as mentioned in the HPI      Objective   not currently breastfeeding.     Physical Exam  Constitutional:       General: She is not in acute distress.     Appearance: She is well-developed.   HENT:      Head: Normocephalic.      Nose: Nose normal. No congestion.      Mouth/Throat:      Mouth: Mucous membranes are moist.      Pharynx: Oropharynx is clear. No oropharyngeal exudate.   Eyes:      Conjunctiva/sclera: Conjunctivae normal.      Pupils: Pupils are equal, round, and reactive to light.   Cardiovascular:      Rate and Rhythm: Regular rhythm.      Heart sounds: Normal heart sounds. No murmur heard.  No gallop.    Pulmonary:      Effort: Pulmonary effort is normal. No respiratory distress.      Breath sounds: Normal breath sounds. No wheezing.   Abdominal:      General: Bowel sounds are normal. There is no distension.      Palpations: Abdomen is soft.      Tenderness: There is no abdominal tenderness.   Musculoskeletal:         General: Normal range of motion.      Cervical back: Normal range of motion.      Right lower leg: No edema.      Left lower leg: No edema.   Skin:     General: Skin is warm and dry.      Findings: No rash.   Neurological:      General: No focal deficit present.      Mental Status: She is alert and oriented to person, place, and time. Mental status is at baseline.   Psychiatric:         Mood and Affect: Mood normal.          Behavior: Behavior normal.         Thought Content: Thought content normal.         Judgment: Judgment normal.       Breast Exam: Right breast-right breast.  On palpation there is a 4x3 cm mass in the upper outer quadrant, this has decreased in size and is not definitively measurable.  Previously patient had right axillary and supraclavicular lymphadenopathy, which is decreased and is not palpable on exam today.   Left breast appears normal on inspection.  No palpable abnormalities of the breast or the axilla.   Breast exam not performed today    I have reexamined the patient and the results are consistent with the previously documented exam. Didi De La O MD       Infusion on 12/30/2021   Component Date Value Ref Range Status   • Glucose 12/30/2021 318* 65 - 99 mg/dL Final   • BUN 12/30/2021 12  6 - 20 mg/dL Final   • Creatinine 12/30/2021 0.59  0.57 - 1.00 mg/dL Final   • Sodium 12/30/2021 137  136 - 145 mmol/L Final   • Potassium 12/30/2021 4.3  3.5 - 5.2 mmol/L Final   • Chloride 12/30/2021 100  98 - 107 mmol/L Final   • CO2 12/30/2021 23.6  22.0 - 29.0 mmol/L Final   • Calcium 12/30/2021 9.0  8.6 - 10.5 mg/dL Final   • Total Protein 12/30/2021 6.1  6.0 - 8.5 g/dL Final   • Albumin 12/30/2021 3.80  3.50 - 5.20 g/dL Final   • ALT (SGPT) 12/30/2021 27  1 - 33 U/L Final   • AST (SGOT) 12/30/2021 42* 1 - 32 U/L Final   • Alkaline Phosphatase 12/30/2021 41  39 - 117 U/L Final   • Total Bilirubin 12/30/2021 0.3  0.0 - 1.2 mg/dL Final   • eGFR Non African Amer 12/30/2021 113  >60 mL/min/1.73 Final   • Globulin 12/30/2021 2.3  gm/dL Final   • A/G Ratio 12/30/2021 1.7  g/dL Final   • BUN/Creatinine Ratio 12/30/2021 20.3  7.0 - 25.0 Final   • Anion Gap 12/30/2021 13.4  5.0 - 15.0 mmol/L Final   • WBC 12/30/2021 4.68  3.40 - 10.80 10*3/mm3 Final   • RBC 12/30/2021 3.47* 3.77 - 5.28 10*6/mm3 Final   • Hemoglobin 12/30/2021 10.7* 12.0 - 15.9 g/dL Final   • Hematocrit 12/30/2021 33.0* 34.0 - 46.6 % Final   • MCV  12/30/2021 95.1  79.0 - 97.0 fL Final   • MCH 12/30/2021 30.8  26.6 - 33.0 pg Final   • MCHC 12/30/2021 32.4  31.5 - 35.7 g/dL Final   • RDW 12/30/2021 15.3  12.3 - 15.4 % Final   • RDW-SD 12/30/2021 52.8  37.0 - 54.0 fl Final   • MPV 12/30/2021 9.8  6.0 - 12.0 fL Final   • Platelets 12/30/2021 258  140 - 450 10*3/mm3 Final   • Neutrophil % 12/30/2021 57.8  42.7 - 76.0 % Final   • Lymphocyte % 12/30/2021 29.9  19.6 - 45.3 % Final   • Monocyte % 12/30/2021 8.3  5.0 - 12.0 % Final   • Eosinophil % 12/30/2021 3.4  0.3 - 6.2 % Final   • Basophil % 12/30/2021 0.4  0.0 - 1.5 % Final   • Immature Grans % 12/30/2021 0.2  0.0 - 0.5 % Final   • Neutrophils, Absolute 12/30/2021 2.70  1.70 - 7.00 10*3/mm3 Final   • Lymphocytes, Absolute 12/30/2021 1.40  0.70 - 3.10 10*3/mm3 Final   • Monocytes, Absolute 12/30/2021 0.39  0.10 - 0.90 10*3/mm3 Final   • Eosinophils, Absolute 12/30/2021 0.16  0.00 - 0.40 10*3/mm3 Final   • Basophils, Absolute 12/30/2021 0.02  0.00 - 0.20 10*3/mm3 Final   • Immature Grans, Absolute 12/30/2021 0.01  0.00 - 0.05 10*3/mm3 Final   • nRBC 12/30/2021 0.0  0.0 - 0.2 /100 WBC Final   Infusion on 12/23/2021   Component Date Value Ref Range Status   • Glucose 12/23/2021 132* 65 - 99 mg/dL Final   • BUN 12/23/2021 9  6 - 20 mg/dL Final   • Creatinine 12/23/2021 0.66  0.57 - 1.00 mg/dL Final   • Sodium 12/23/2021 141  136 - 145 mmol/L Final   • Potassium 12/23/2021 4.0  3.5 - 5.2 mmol/L Final   • Chloride 12/23/2021 104  98 - 107 mmol/L Final   • CO2 12/23/2021 25.2  22.0 - 29.0 mmol/L Final   • Calcium 12/23/2021 9.0  8.6 - 10.5 mg/dL Final   • Total Protein 12/23/2021 6.2  6.0 - 8.5 g/dL Final   • Albumin 12/23/2021 4.10  3.50 - 5.20 g/dL Final   • ALT (SGPT) 12/23/2021 14  1 - 33 U/L Final   • AST (SGOT) 12/23/2021 15  1 - 32 U/L Final   • Alkaline Phosphatase 12/23/2021 46  39 - 117 U/L Final   • Total Bilirubin 12/23/2021 0.2  0.0 - 1.2 mg/dL Final   • eGFR Non  Amer 12/23/2021 99  >60 mL/min/1.73  Final   • Globulin 12/23/2021 2.1  gm/dL Final   • A/G Ratio 12/23/2021 2.0  g/dL Final   • BUN/Creatinine Ratio 12/23/2021 13.6  7.0 - 25.0 Final   • Anion Gap 12/23/2021 11.8  5.0 - 15.0 mmol/L Final   • WBC 12/23/2021 5.75  3.40 - 10.80 10*3/mm3 Final   • RBC 12/23/2021 3.59* 3.77 - 5.28 10*6/mm3 Final   • Hemoglobin 12/23/2021 11.2* 12.0 - 15.9 g/dL Final   • Hematocrit 12/23/2021 34.1  34.0 - 46.6 % Final   • MCV 12/23/2021 95.0  79.0 - 97.0 fL Final   • MCH 12/23/2021 31.2  26.6 - 33.0 pg Final   • MCHC 12/23/2021 32.8  31.5 - 35.7 g/dL Final   • RDW 12/23/2021 15.1  12.3 - 15.4 % Final   • RDW-SD 12/23/2021 52.5  37.0 - 54.0 fl Final   • MPV 12/23/2021 9.8  6.0 - 12.0 fL Final   • Platelets 12/23/2021 283  140 - 450 10*3/mm3 Final   • Neutrophil % 12/23/2021 59.4  42.7 - 76.0 % Final   • Lymphocyte % 12/23/2021 27.5  19.6 - 45.3 % Final   • Monocyte % 12/23/2021 8.5  5.0 - 12.0 % Final   • Eosinophil % 12/23/2021 3.7  0.3 - 6.2 % Final   • Basophil % 12/23/2021 0.7  0.0 - 1.5 % Final   • Immature Grans % 12/23/2021 0.2  0.0 - 0.5 % Final   • Neutrophils, Absolute 12/23/2021 3.42  1.70 - 7.00 10*3/mm3 Final   • Lymphocytes, Absolute 12/23/2021 1.58  0.70 - 3.10 10*3/mm3 Final   • Monocytes, Absolute 12/23/2021 0.49  0.10 - 0.90 10*3/mm3 Final   • Eosinophils, Absolute 12/23/2021 0.21  0.00 - 0.40 10*3/mm3 Final   • Basophils, Absolute 12/23/2021 0.04  0.00 - 0.20 10*3/mm3 Final   • Immature Grans, Absolute 12/23/2021 0.01  0.00 - 0.05 10*3/mm3 Final   • nRBC 12/23/2021 0.0  0.0 - 0.2 /100 WBC Final   Hospital Outpatient Visit on 12/21/2021   Component Date Value Ref Range Status   • Glucose 12/21/2021 155* 70 - 130 mg/dL Final    Meter: [id number] : [id number] Matthew Mujica RTCT   Infusion on 12/16/2021   Component Date Value Ref Range Status   • Glucose 12/16/2021 274* 65 - 99 mg/dL Final   • BUN 12/16/2021 14  6 - 20 mg/dL Final   • Creatinine 12/16/2021 0.73  0.57 - 1.00 mg/dL Final   • Sodium  12/16/2021 138  136 - 145 mmol/L Final   • Potassium 12/16/2021 4.6  3.5 - 5.2 mmol/L Final   • Chloride 12/16/2021 103  98 - 107 mmol/L Final   • CO2 12/16/2021 27.0  22.0 - 29.0 mmol/L Final   • Calcium 12/16/2021 9.1  8.6 - 10.5 mg/dL Final   • Total Protein 12/16/2021 6.5  6.0 - 8.5 g/dL Final   • Albumin 12/16/2021 4.20  3.50 - 5.20 g/dL Final   • ALT (SGPT) 12/16/2021 19  1 - 33 U/L Final   • AST (SGOT) 12/16/2021 18  1 - 32 U/L Final   • Alkaline Phosphatase 12/16/2021 42  39 - 117 U/L Final   • Total Bilirubin 12/16/2021 0.3  0.0 - 1.2 mg/dL Final   • eGFR Non  Amer 12/16/2021 88  >60 mL/min/1.73 Final   • Globulin 12/16/2021 2.3  gm/dL Final   • A/G Ratio 12/16/2021 1.8  g/dL Final   • BUN/Creatinine Ratio 12/16/2021 19.2  7.0 - 25.0 Final   • Anion Gap 12/16/2021 8.0  5.0 - 15.0 mmol/L Final   • WBC 12/16/2021 4.59  3.40 - 10.80 10*3/mm3 Final   • RBC 12/16/2021 3.69* 3.77 - 5.28 10*6/mm3 Final   • Hemoglobin 12/16/2021 11.4* 12.0 - 15.9 g/dL Final   • Hematocrit 12/16/2021 34.7  34.0 - 46.6 % Final   • MCV 12/16/2021 94.0  79.0 - 97.0 fL Final   • MCH 12/16/2021 30.9  26.6 - 33.0 pg Final   • MCHC 12/16/2021 32.9  31.5 - 35.7 g/dL Final   • RDW 12/16/2021 15.5* 12.3 - 15.4 % Final   • RDW-SD 12/16/2021 53.0  37.0 - 54.0 fl Final   • MPV 12/16/2021 9.4  6.0 - 12.0 fL Final   • Platelets 12/16/2021 254  140 - 450 10*3/mm3 Final   • Neutrophil % 12/16/2021 52.0  42.7 - 76.0 % Final   • Lymphocyte % 12/16/2021 33.1  19.6 - 45.3 % Final   • Monocyte % 12/16/2021 8.9  5.0 - 12.0 % Final   • Eosinophil % 12/16/2021 5.4  0.3 - 6.2 % Final   • Basophil % 12/16/2021 0.4  0.0 - 1.5 % Final   • Immature Grans % 12/16/2021 0.2  0.0 - 0.5 % Final   • Neutrophils, Absolute 12/16/2021 2.38  1.70 - 7.00 10*3/mm3 Final   • Lymphocytes, Absolute 12/16/2021 1.52  0.70 - 3.10 10*3/mm3 Final   • Monocytes, Absolute 12/16/2021 0.41  0.10 - 0.90 10*3/mm3 Final   • Eosinophils, Absolute 12/16/2021 0.25  0.00 - 0.40  10*3/mm3 Final   • Basophils, Absolute 12/16/2021 0.02  0.00 - 0.20 10*3/mm3 Final   • Immature Grans, Absolute 12/16/2021 0.01  0.00 - 0.05 10*3/mm3 Final   • nRBC 12/16/2021 0.0  0.0 - 0.2 /100 WBC Final        NM PET/CT Skull Base to Mid Thigh    Result Date: 12/22/2021  1.  Previously seen hypermetabolic right cervical, axillary and hilar adenopathy as well as the 2 foci of masslike FDG uptake within the right breast representing primary malignancy and metastatic disease no longer demonstrate FDG uptake significantly above that of mediastinal blood pool. Index areas also demonstrate significant size decrease as detailed above. The presumed right femoral neck metastasis has also decreased in FDG uptake. 2.  Minimal interval enlargement with new mild FDG uptake of a left cervical node. While findings are maybe reactive, they are indeterminate and metastatic disease cannot be excluded. Continued close attention on follow-up with CT neck in 8-12 weeks is recommended to ensure stability and/or resolution. 3.  Stable sub-6 mm left pulmonary nodules. 4.  No findings of FDG avid disease in the abdomen or pelvis. 5.  Other findings as above.  This report was finalized on 12/22/2021 1:18 PM by Dr. Rubens Trammell M.D.       CT of the chest abdomen pelvis-images independently reviewed and interpreted by me in detail summarized in the HPI  Bone scan-images independently reviewed and interpreted by me in detail summarized in the HPI    CT neck-images independently reviewed and interpreted by me in detail summarized in HPI.  I also discussed the CT neck with Dr. Alexander Blevins with radiology regarding the right side lymphadenopathy.     9/28/2021-right cervical lymph node biopsy-pathology consistent with metastatic mammary carcinoma.    10/1/2021-PET/CT-there are 2 separate hypermetabolic lesions within the right breast.  1 measuring 6 cm and the other measuring 1.3 cm.  The SUV of 6 cm lesion of 5.6 and small lesion of 4.8.   Hypermetabolic right axillary, infra pectoral, right supraclavicular lymphadenopathy noted.  In addition there are hypermetabolic nodes at the right thoracic inlet, right base of the neck and right posterior cervical triangle.  1.6 x 0.8 cm right posterior cervical triangle node with an SUV of 3.9.  Right axillary lymphadenopathy with lymphedema 6.4.  Hypermetabolic left hilar lymphadenopathy measuring 2 x 1.1 cm with an SUV of 5.8.  No hypermetabolic activity in the liver or evidence of metastatic disease in the abdomen or pelvis.  Hypermetabolic lesion in the right femoral neck measuring 1.5 cm.  SUV 5.4.    PET/CT images independently reviewed and interpreted by me in detail summarized above.  Images also reviewed with patient.    12/22/2021 PET/CT-images independently reviewed and interpreted by me.  The previous areas of hypermetabolic uptake have resolved completely.  There is a left cervical lymph node which measures 0.7 cm with an SUV of 3.3.  I think this is mostly reactive.    CBC reviewed and within normal limits  CMP-Glucose 217 but otherwise within normal limits    Assessment/Plan       *Invasive ductal carcinoma of the right breast  · Clinical T3 N3 cM1, stage IV  · The tumor is grade 3, poorly differentiated, ER 1 to 10% weak, KS negative, HER-2 negative, Ki-67 65%  · Cervical lymph node biopsy consistent with metastatic mammary carcinoma.  · PET/CT the abnormal uptake noted in the left hilar lymph node, right femoral neck, cervical lymph nodes.  · Since the cervical lymph node biopsy is positive as well as PET/CT with several abnormal findings I do believe that this is stage IV disease.  · Discussed case at multidisciplinary conference on 9/28/2021 and the initial plan was to proceed with neoadjuvant chemotherapy followed by surgery and radiation to the positive cervical nodes.  PET/CT results were not available at that time.  · The PET/CT showing other areas of metastatic disease unfortunately we  will not be able to proceed with curative intent treatment.  · Explained that the disease is incurable  · On hearing this news Sierra was overwhelmed and she decided to cancel the procedure for the port echo scheduled for later in the day.  · Caris testing requested  · On the subsequent visit I discussed with her about palliative intent chemotherapy with single agent Taxol and adding immunotherapy down the line once PD-L1 results available.  · Since she is complaining of significant pain in the right breast I would consider adding carboplatin for about 4 to 6 weeks to help get a quick response in the right breast tumor as well as right axillary lymphadenopathy.  · Adverse effects of Taxol including but not limited to myelosuppression, increased risk of infections, nausea, vomiting, arthralgias, neuropathy, nail changes, alopecia, diarrhea, possible mucositis discussed.  · Caris testing has been reviewed today and PD-L1 CPS was 3 and hence no additional benefit from immunotherapy.  Tumor mutational burden was also low and MSI stable.  Therefore there is no added benefit from immunotherapy.  · She received 4 doses of carboplatin Taxol  · Now continues on weekly Taxol alone.  · PET/CT 12/22/2021 with positive response  · Taxol held last week as patient was unable to come for chemotherapy.  · Resume Taxol weekly.  The pain in her fingers and toes has improved since holding Taxol last week.  We discussed decreasing the dose by 10 to 20% to help with symptoms however she wants to continue with the full dose at this time.  · If the pain in the fingers and toes worsens we will plan to decrease Taxol by 10%.  · Labs reviewed and stable to proceed with Taxol today      *Nail changes   · Secondary to Taxol  · Continue Norco to help with pain    *Nausea  · Now on Compazine due to headache secondary to Zofran    *Headaches  · Secondary to Zofran and Aloxi  · Improved    *Diabetes  · Type I  ·  notes reviewed    · Continue follow-up with endocrinology    * Cervical lymphadenopathy  · Biopsied and pathology consistent with metastatic breast cancer    *Anxiety  · She has been referred to supportive oncology  · Met with Tenisha Rizzo  · Started on gabapentin 300 mg 3 times daily  · She is currently on max dose of Celexa  · Plan noted to discontinue Celexa and start Cymbalta.    *Port placement-on 10/16/2021    *Breast pain-Norco every 8 hours prescribed,  pain in the breast has resolved    *SRE prevention-hold off on Xgeva she needs some dental work.    * Arthralgia from Taxol: Could try Claritin, otherwise continue Norco if needed and occasional ibuprofen.    · Continues on Norco but increase it to 10 mg as needed.  · Sierra Mao reports a pain score of .  Given her pain assessment as noted, treatment options were discussed and the following options were decided upon as a follow-up plan to address the patient's pain: continuation of current treatment plan for pain.    PLAN:   1. Continue with weekly Taxol  2. Continue Norco every 8 hours as needed for pain.  3. Continue follow up with supportive oncology  4. Return weekly for taxol    Patient is on high risk medication requiring close monitoring for toxicity.       Didi De La O MD  01/11/2022

## 2022-01-17 DIAGNOSIS — F90.1 ATTENTION DEFICIT HYPERACTIVITY DISORDER (ADHD), PREDOMINANTLY HYPERACTIVE TYPE: ICD-10-CM

## 2022-01-18 RX ORDER — DEXTROAMPHETAMINE SACCHARATE, AMPHETAMINE ASPARTATE, DEXTROAMPHETAMINE SULFATE AND AMPHETAMINE SULFATE 2.5; 2.5; 2.5; 2.5 MG/1; MG/1; MG/1; MG/1
10 TABLET ORAL DAILY
Qty: 30 TABLET | Refills: 0 | OUTPATIENT
Start: 2022-01-18 | End: 2023-01-18

## 2022-01-20 ENCOUNTER — INFUSION (OUTPATIENT)
Dept: ONCOLOGY | Facility: HOSPITAL | Age: 42
End: 2022-01-20

## 2022-01-20 VITALS
HEART RATE: 94 BPM | OXYGEN SATURATION: 99 % | HEIGHT: 71 IN | DIASTOLIC BLOOD PRESSURE: 74 MMHG | BODY MASS INDEX: 22.51 KG/M2 | RESPIRATION RATE: 18 BRPM | WEIGHT: 160.8 LBS | TEMPERATURE: 96.6 F | SYSTOLIC BLOOD PRESSURE: 128 MMHG

## 2022-01-20 DIAGNOSIS — Z45.2 ENCOUNTER FOR FITTING AND ADJUSTMENT OF VASCULAR CATHETER: ICD-10-CM

## 2022-01-20 DIAGNOSIS — C50.111 MALIGNANT NEOPLASM OF CENTRAL PORTION OF RIGHT BREAST IN FEMALE, ESTROGEN RECEPTOR NEGATIVE: Primary | ICD-10-CM

## 2022-01-20 DIAGNOSIS — Z17.1 MALIGNANT NEOPLASM OF CENTRAL PORTION OF RIGHT BREAST IN FEMALE, ESTROGEN RECEPTOR NEGATIVE: Primary | ICD-10-CM

## 2022-01-20 LAB
ALBUMIN SERPL-MCNC: 4.1 G/DL (ref 3.5–5.2)
ALBUMIN/GLOB SERPL: 1.8 G/DL
ALP SERPL-CCNC: 51 U/L (ref 39–117)
ALT SERPL W P-5'-P-CCNC: 13 U/L (ref 1–33)
ANION GAP SERPL CALCULATED.3IONS-SCNC: 11.7 MMOL/L (ref 5–15)
AST SERPL-CCNC: 18 U/L (ref 1–32)
BASOPHILS # BLD AUTO: 0.03 10*3/MM3 (ref 0–0.2)
BASOPHILS NFR BLD AUTO: 0.4 % (ref 0–1.5)
BILIRUB SERPL-MCNC: 0.2 MG/DL (ref 0–1.2)
BUN SERPL-MCNC: 12 MG/DL (ref 6–20)
BUN/CREAT SERPL: 19.4 (ref 7–25)
CALCIUM SPEC-SCNC: 9.5 MG/DL (ref 8.6–10.5)
CHLORIDE SERPL-SCNC: 102 MMOL/L (ref 98–107)
CO2 SERPL-SCNC: 26.3 MMOL/L (ref 22–29)
CREAT SERPL-MCNC: 0.62 MG/DL (ref 0.57–1)
DEPRECATED RDW RBC AUTO: 48.8 FL (ref 37–54)
EOSINOPHIL # BLD AUTO: 0.32 10*3/MM3 (ref 0–0.4)
EOSINOPHIL NFR BLD AUTO: 4.1 % (ref 0.3–6.2)
ERYTHROCYTE [DISTWIDTH] IN BLOOD BY AUTOMATED COUNT: 14 % (ref 12.3–15.4)
GFR SERPL CREATININE-BSD FRML MDRD: 107 ML/MIN/1.73
GLOBULIN UR ELPH-MCNC: 2.3 GM/DL
GLUCOSE SERPL-MCNC: 174 MG/DL (ref 65–99)
HCT VFR BLD AUTO: 35.4 % (ref 34–46.6)
HGB BLD-MCNC: 11.6 G/DL (ref 12–15.9)
IMM GRANULOCYTES # BLD AUTO: 0.02 10*3/MM3 (ref 0–0.05)
IMM GRANULOCYTES NFR BLD AUTO: 0.3 % (ref 0–0.5)
LYMPHOCYTES # BLD AUTO: 1.73 10*3/MM3 (ref 0.7–3.1)
LYMPHOCYTES NFR BLD AUTO: 22.2 % (ref 19.6–45.3)
MCH RBC QN AUTO: 31 PG (ref 26.6–33)
MCHC RBC AUTO-ENTMCNC: 32.8 G/DL (ref 31.5–35.7)
MCV RBC AUTO: 94.7 FL (ref 79–97)
MONOCYTES # BLD AUTO: 0.67 10*3/MM3 (ref 0.1–0.9)
MONOCYTES NFR BLD AUTO: 8.6 % (ref 5–12)
NEUTROPHILS NFR BLD AUTO: 5.04 10*3/MM3 (ref 1.7–7)
NEUTROPHILS NFR BLD AUTO: 64.4 % (ref 42.7–76)
NRBC BLD AUTO-RTO: 0 /100 WBC (ref 0–0.2)
PLATELET # BLD AUTO: 279 10*3/MM3 (ref 140–450)
PMV BLD AUTO: 9.8 FL (ref 6–12)
POTASSIUM SERPL-SCNC: 4.1 MMOL/L (ref 3.5–5.2)
PROT SERPL-MCNC: 6.4 G/DL (ref 6–8.5)
RBC # BLD AUTO: 3.74 10*6/MM3 (ref 3.77–5.28)
SODIUM SERPL-SCNC: 140 MMOL/L (ref 136–145)
WBC NRBC COR # BLD: 7.81 10*3/MM3 (ref 3.4–10.8)

## 2022-01-20 PROCEDURE — 25010000002 PACLITAXEL PER 1 MG: Performed by: NURSE PRACTITIONER

## 2022-01-20 PROCEDURE — 96413 CHEMO IV INFUSION 1 HR: CPT

## 2022-01-20 PROCEDURE — 85025 COMPLETE CBC W/AUTO DIFF WBC: CPT | Performed by: INTERNAL MEDICINE

## 2022-01-20 PROCEDURE — 25010000002 DIPHENHYDRAMINE PER 50 MG: Performed by: NURSE PRACTITIONER

## 2022-01-20 PROCEDURE — 25010000002 HEPARIN LOCK FLUSH PER 10 UNITS: Performed by: INTERNAL MEDICINE

## 2022-01-20 PROCEDURE — 25010000002 DEXAMETHASONE PER 1 MG: Performed by: NURSE PRACTITIONER

## 2022-01-20 PROCEDURE — 96375 TX/PRO/DX INJ NEW DRUG ADDON: CPT

## 2022-01-20 PROCEDURE — 80053 COMPREHEN METABOLIC PANEL: CPT | Performed by: INTERNAL MEDICINE

## 2022-01-20 RX ORDER — SODIUM CHLORIDE 0.9 % (FLUSH) 0.9 %
10 SYRINGE (ML) INJECTION AS NEEDED
Status: DISCONTINUED | OUTPATIENT
Start: 2022-01-20 | End: 2022-01-20 | Stop reason: HOSPADM

## 2022-01-20 RX ORDER — DIPHENHYDRAMINE HYDROCHLORIDE 50 MG/ML
50 INJECTION INTRAMUSCULAR; INTRAVENOUS AS NEEDED
Status: CANCELLED | OUTPATIENT
Start: 2022-01-20

## 2022-01-20 RX ORDER — FAMOTIDINE 10 MG/ML
20 INJECTION, SOLUTION INTRAVENOUS AS NEEDED
Status: CANCELLED | OUTPATIENT
Start: 2022-01-20

## 2022-01-20 RX ORDER — HEPARIN SODIUM (PORCINE) LOCK FLUSH IV SOLN 100 UNIT/ML 100 UNIT/ML
500 SOLUTION INTRAVENOUS AS NEEDED
Status: CANCELLED | OUTPATIENT
Start: 2022-01-20

## 2022-01-20 RX ORDER — FAMOTIDINE 10 MG/ML
20 INJECTION, SOLUTION INTRAVENOUS ONCE
Status: COMPLETED | OUTPATIENT
Start: 2022-01-20 | End: 2022-01-20

## 2022-01-20 RX ORDER — SODIUM CHLORIDE 0.9 % (FLUSH) 0.9 %
10 SYRINGE (ML) INJECTION AS NEEDED
Status: CANCELLED | OUTPATIENT
Start: 2022-01-20

## 2022-01-20 RX ORDER — SODIUM CHLORIDE 9 MG/ML
250 INJECTION, SOLUTION INTRAVENOUS ONCE
Status: COMPLETED | OUTPATIENT
Start: 2022-01-20 | End: 2022-01-20

## 2022-01-20 RX ORDER — HEPARIN SODIUM (PORCINE) LOCK FLUSH IV SOLN 100 UNIT/ML 100 UNIT/ML
500 SOLUTION INTRAVENOUS AS NEEDED
Status: DISCONTINUED | OUTPATIENT
Start: 2022-01-20 | End: 2022-01-20 | Stop reason: HOSPADM

## 2022-01-20 RX ADMIN — DEXAMETHASONE SODIUM PHOSPHATE 8 MG: 10 INJECTION INTRAMUSCULAR; INTRAVENOUS at 11:00

## 2022-01-20 RX ADMIN — PACLITAXEL 150 MG: 6 INJECTION, SOLUTION INTRAVENOUS at 12:08

## 2022-01-20 RX ADMIN — SODIUM CHLORIDE 250 ML: 9 INJECTION, SOLUTION INTRAVENOUS at 10:57

## 2022-01-20 RX ADMIN — DIPHENHYDRAMINE HYDROCHLORIDE 12.5 MG: 50 INJECTION INTRAMUSCULAR; INTRAVENOUS at 11:18

## 2022-01-20 RX ADMIN — SODIUM CHLORIDE, PRESERVATIVE FREE 10 ML: 5 INJECTION INTRAVENOUS at 13:21

## 2022-01-20 RX ADMIN — Medication 500 UNITS: at 13:21

## 2022-01-20 RX ADMIN — FAMOTIDINE 20 MG: 10 INJECTION INTRAVENOUS at 10:58

## 2022-01-20 NOTE — NURSING NOTE
PT REPORTS THAT HER NEUROPATHY IS DOING PRETTY GOOD NOW AND SHE FEELS LIKE HER DOSE OF TAXOL SHOULD REMAIN THE SAME TODAY AS PREVIOUS.     PT DECLINED WEARING COLD MITTS ON HANDS AND FEET TODAY W/ TAXOL INFUSION.

## 2022-01-25 DIAGNOSIS — C50.111 MALIGNANT NEOPLASM OF CENTRAL PORTION OF RIGHT BREAST IN FEMALE, ESTROGEN RECEPTOR NEGATIVE: ICD-10-CM

## 2022-01-25 DIAGNOSIS — Z17.1 MALIGNANT NEOPLASM OF CENTRAL PORTION OF RIGHT BREAST IN FEMALE, ESTROGEN RECEPTOR NEGATIVE: ICD-10-CM

## 2022-01-26 RX ORDER — HYDROCODONE BITARTRATE AND ACETAMINOPHEN 10; 325 MG/1; MG/1
1 TABLET ORAL EVERY 6 HOURS PRN
Qty: 60 TABLET | Refills: 0 | Status: ON HOLD | OUTPATIENT
Start: 2022-01-26 | End: 2022-10-22

## 2022-01-27 ENCOUNTER — INFUSION (OUTPATIENT)
Dept: ONCOLOGY | Facility: HOSPITAL | Age: 42
End: 2022-01-27

## 2022-01-27 VITALS
WEIGHT: 158.2 LBS | DIASTOLIC BLOOD PRESSURE: 64 MMHG | BODY MASS INDEX: 22.15 KG/M2 | TEMPERATURE: 96.9 F | RESPIRATION RATE: 16 BRPM | HEART RATE: 71 BPM | OXYGEN SATURATION: 99 % | HEIGHT: 71 IN | SYSTOLIC BLOOD PRESSURE: 110 MMHG

## 2022-01-27 DIAGNOSIS — Z45.2 ENCOUNTER FOR FITTING AND ADJUSTMENT OF VASCULAR CATHETER: ICD-10-CM

## 2022-01-27 DIAGNOSIS — Z17.1 MALIGNANT NEOPLASM OF CENTRAL PORTION OF RIGHT BREAST IN FEMALE, ESTROGEN RECEPTOR NEGATIVE: Primary | ICD-10-CM

## 2022-01-27 DIAGNOSIS — C50.111 MALIGNANT NEOPLASM OF CENTRAL PORTION OF RIGHT BREAST IN FEMALE, ESTROGEN RECEPTOR NEGATIVE: Primary | ICD-10-CM

## 2022-01-27 LAB
ALBUMIN SERPL-MCNC: 3.9 G/DL (ref 3.5–5.2)
ALBUMIN/GLOB SERPL: 2.1 G/DL
ALP SERPL-CCNC: 43 U/L (ref 39–117)
ALT SERPL W P-5'-P-CCNC: 15 U/L (ref 1–33)
ANION GAP SERPL CALCULATED.3IONS-SCNC: 9.8 MMOL/L (ref 5–15)
AST SERPL-CCNC: 17 U/L (ref 1–32)
BASOPHILS # BLD AUTO: 0.03 10*3/MM3 (ref 0–0.2)
BASOPHILS NFR BLD AUTO: 0.6 % (ref 0–1.5)
BILIRUB SERPL-MCNC: 0.2 MG/DL (ref 0–1.2)
BUN SERPL-MCNC: 16 MG/DL (ref 6–20)
BUN/CREAT SERPL: 26.2 (ref 7–25)
CALCIUM SPEC-SCNC: 9 MG/DL (ref 8.6–10.5)
CHLORIDE SERPL-SCNC: 103 MMOL/L (ref 98–107)
CO2 SERPL-SCNC: 23.2 MMOL/L (ref 22–29)
CREAT SERPL-MCNC: 0.61 MG/DL (ref 0.57–1)
DEPRECATED RDW RBC AUTO: 46.7 FL (ref 37–54)
EOSINOPHIL # BLD AUTO: 0.29 10*3/MM3 (ref 0–0.4)
EOSINOPHIL NFR BLD AUTO: 5.8 % (ref 0.3–6.2)
ERYTHROCYTE [DISTWIDTH] IN BLOOD BY AUTOMATED COUNT: 13.6 % (ref 12.3–15.4)
GFR SERPL CREATININE-BSD FRML MDRD: 109 ML/MIN/1.73
GLOBULIN UR ELPH-MCNC: 1.9 GM/DL
GLUCOSE SERPL-MCNC: 359 MG/DL (ref 65–99)
HCT VFR BLD AUTO: 33.2 % (ref 34–46.6)
HGB BLD-MCNC: 10.8 G/DL (ref 12–15.9)
IMM GRANULOCYTES # BLD AUTO: 0.02 10*3/MM3 (ref 0–0.05)
IMM GRANULOCYTES NFR BLD AUTO: 0.4 % (ref 0–0.5)
LYMPHOCYTES # BLD AUTO: 1.38 10*3/MM3 (ref 0.7–3.1)
LYMPHOCYTES NFR BLD AUTO: 27.7 % (ref 19.6–45.3)
MCH RBC QN AUTO: 30.3 PG (ref 26.6–33)
MCHC RBC AUTO-ENTMCNC: 32.5 G/DL (ref 31.5–35.7)
MCV RBC AUTO: 93.3 FL (ref 79–97)
MONOCYTES # BLD AUTO: 0.4 10*3/MM3 (ref 0.1–0.9)
MONOCYTES NFR BLD AUTO: 8 % (ref 5–12)
NEUTROPHILS NFR BLD AUTO: 2.87 10*3/MM3 (ref 1.7–7)
NEUTROPHILS NFR BLD AUTO: 57.5 % (ref 42.7–76)
NRBC BLD AUTO-RTO: 0 /100 WBC (ref 0–0.2)
PLATELET # BLD AUTO: 245 10*3/MM3 (ref 140–450)
PMV BLD AUTO: 10.2 FL (ref 6–12)
POTASSIUM SERPL-SCNC: 4.2 MMOL/L (ref 3.5–5.2)
PROT SERPL-MCNC: 5.8 G/DL (ref 6–8.5)
RBC # BLD AUTO: 3.56 10*6/MM3 (ref 3.77–5.28)
SODIUM SERPL-SCNC: 136 MMOL/L (ref 136–145)
WBC NRBC COR # BLD: 4.99 10*3/MM3 (ref 3.4–10.8)

## 2022-01-27 PROCEDURE — 96375 TX/PRO/DX INJ NEW DRUG ADDON: CPT

## 2022-01-27 PROCEDURE — 25010000002 PACLITAXEL PER 1 MG: Performed by: NURSE PRACTITIONER

## 2022-01-27 PROCEDURE — 25010000002 DEXAMETHASONE PER 1 MG: Performed by: NURSE PRACTITIONER

## 2022-01-27 PROCEDURE — 80053 COMPREHEN METABOLIC PANEL: CPT | Performed by: INTERNAL MEDICINE

## 2022-01-27 PROCEDURE — 96413 CHEMO IV INFUSION 1 HR: CPT

## 2022-01-27 PROCEDURE — 85025 COMPLETE CBC W/AUTO DIFF WBC: CPT | Performed by: INTERNAL MEDICINE

## 2022-01-27 PROCEDURE — 25010000002 HEPARIN LOCK FLUSH PER 10 UNITS: Performed by: INTERNAL MEDICINE

## 2022-01-27 PROCEDURE — 25010000002 DIPHENHYDRAMINE PER 50 MG: Performed by: NURSE PRACTITIONER

## 2022-01-27 RX ORDER — DIPHENHYDRAMINE HYDROCHLORIDE 50 MG/ML
50 INJECTION INTRAMUSCULAR; INTRAVENOUS AS NEEDED
Status: CANCELLED | OUTPATIENT
Start: 2022-01-27

## 2022-01-27 RX ORDER — SODIUM CHLORIDE 9 MG/ML
250 INJECTION, SOLUTION INTRAVENOUS ONCE
Status: COMPLETED | OUTPATIENT
Start: 2022-01-27 | End: 2022-01-27

## 2022-01-27 RX ORDER — SODIUM CHLORIDE 0.9 % (FLUSH) 0.9 %
10 SYRINGE (ML) INJECTION AS NEEDED
Status: DISCONTINUED | OUTPATIENT
Start: 2022-01-27 | End: 2022-01-27 | Stop reason: HOSPADM

## 2022-01-27 RX ORDER — SODIUM CHLORIDE 0.9 % (FLUSH) 0.9 %
10 SYRINGE (ML) INJECTION AS NEEDED
Status: CANCELLED | OUTPATIENT
Start: 2022-01-27

## 2022-01-27 RX ORDER — FAMOTIDINE 10 MG/ML
20 INJECTION, SOLUTION INTRAVENOUS ONCE
Status: COMPLETED | OUTPATIENT
Start: 2022-01-27 | End: 2022-01-27

## 2022-01-27 RX ORDER — HEPARIN SODIUM (PORCINE) LOCK FLUSH IV SOLN 100 UNIT/ML 100 UNIT/ML
500 SOLUTION INTRAVENOUS AS NEEDED
Status: DISCONTINUED | OUTPATIENT
Start: 2022-01-27 | End: 2022-01-27 | Stop reason: HOSPADM

## 2022-01-27 RX ORDER — LORAZEPAM 1 MG/1
1 TABLET ORAL NIGHTLY PRN
Qty: 30 TABLET | Refills: 0 | Status: SHIPPED | OUTPATIENT
Start: 2022-01-27

## 2022-01-27 RX ORDER — HEPARIN SODIUM (PORCINE) LOCK FLUSH IV SOLN 100 UNIT/ML 100 UNIT/ML
500 SOLUTION INTRAVENOUS AS NEEDED
Status: CANCELLED | OUTPATIENT
Start: 2022-01-27

## 2022-01-27 RX ORDER — FAMOTIDINE 10 MG/ML
20 INJECTION, SOLUTION INTRAVENOUS AS NEEDED
Status: CANCELLED | OUTPATIENT
Start: 2022-01-27

## 2022-01-27 RX ADMIN — DIPHENHYDRAMINE HYDROCHLORIDE 12.5 MG: 50 INJECTION INTRAMUSCULAR; INTRAVENOUS at 14:43

## 2022-01-27 RX ADMIN — Medication 500 UNITS: at 16:40

## 2022-01-27 RX ADMIN — PACLITAXEL 150 MG: 6 INJECTION, SOLUTION INTRAVENOUS at 15:35

## 2022-01-27 RX ADMIN — DEXAMETHASONE SODIUM PHOSPHATE 8 MG: 10 INJECTION INTRAMUSCULAR; INTRAVENOUS at 14:25

## 2022-01-27 RX ADMIN — Medication 10 ML: at 16:42

## 2022-01-27 RX ADMIN — FAMOTIDINE 20 MG: 10 INJECTION INTRAVENOUS at 14:24

## 2022-01-27 RX ADMIN — SODIUM CHLORIDE 250 ML: 9 INJECTION, SOLUTION INTRAVENOUS at 14:24

## 2022-01-27 NOTE — NURSING NOTE
PT REPORTS THAT HER NEUROPATHY IN HANDS AND FEET ARE DOING OK AND SHE WANTS TO STAY AT FULL DOSE OF TAXOL TODAY.     PT DECLINED TO WEAR COLD MITTS ON HANDS AND FEET DURING TAXOL INFUSION.

## 2022-02-02 ENCOUNTER — TELEPHONE (OUTPATIENT)
Dept: ONCOLOGY | Facility: HOSPITAL | Age: 42
End: 2022-02-02

## 2022-02-02 ENCOUNTER — TELEPHONE (OUTPATIENT)
Dept: GENERAL RADIOLOGY | Facility: HOSPITAL | Age: 42
End: 2022-02-02

## 2022-02-02 NOTE — TELEPHONE ENCOUNTER
Pt unable to move appt to today in preparation for bad weather tomorrow.  She wishes to have her appt moved to Tuesday.  Schedulers made aware and to call pt with new time.

## 2022-02-02 NOTE — TELEPHONE ENCOUNTER
----- Message from Lelo Jha RN sent at 2/2/2022  9:37 AM EST -----  Pt wants to move appt from tomorrow to Tuesday of next week.  Please call when you can do this.  Thanks,  Lelo

## 2022-02-03 ENCOUNTER — APPOINTMENT (OUTPATIENT)
Dept: ONCOLOGY | Facility: HOSPITAL | Age: 42
End: 2022-02-03

## 2022-02-08 ENCOUNTER — DOCUMENTATION (OUTPATIENT)
Dept: ONCOLOGY | Facility: CLINIC | Age: 42
End: 2022-02-08

## 2022-02-08 ENCOUNTER — INFUSION (OUTPATIENT)
Dept: ONCOLOGY | Facility: HOSPITAL | Age: 42
End: 2022-02-08

## 2022-02-08 VITALS
TEMPERATURE: 96.9 F | RESPIRATION RATE: 18 BRPM | OXYGEN SATURATION: 99 % | HEART RATE: 92 BPM | SYSTOLIC BLOOD PRESSURE: 112 MMHG | BODY MASS INDEX: 22.41 KG/M2 | WEIGHT: 160.1 LBS | DIASTOLIC BLOOD PRESSURE: 69 MMHG | HEIGHT: 71 IN

## 2022-02-08 DIAGNOSIS — Z45.2 ENCOUNTER FOR FITTING AND ADJUSTMENT OF VASCULAR CATHETER: ICD-10-CM

## 2022-02-08 DIAGNOSIS — Z17.1 MALIGNANT NEOPLASM OF CENTRAL PORTION OF RIGHT BREAST IN FEMALE, ESTROGEN RECEPTOR NEGATIVE: Primary | ICD-10-CM

## 2022-02-08 DIAGNOSIS — C50.111 MALIGNANT NEOPLASM OF CENTRAL PORTION OF RIGHT BREAST IN FEMALE, ESTROGEN RECEPTOR NEGATIVE: Primary | ICD-10-CM

## 2022-02-08 LAB
ALBUMIN SERPL-MCNC: 4 G/DL (ref 3.5–5.2)
ALBUMIN/GLOB SERPL: 1.7 G/DL
ALP SERPL-CCNC: 47 U/L (ref 39–117)
ALT SERPL W P-5'-P-CCNC: 12 U/L (ref 1–33)
ANION GAP SERPL CALCULATED.3IONS-SCNC: 11 MMOL/L (ref 5–15)
AST SERPL-CCNC: 15 U/L (ref 1–32)
BASOPHILS # BLD AUTO: 0.03 10*3/MM3 (ref 0–0.2)
BASOPHILS NFR BLD AUTO: 0.3 % (ref 0–1.5)
BILIRUB SERPL-MCNC: 0.3 MG/DL (ref 0–1.2)
BUN SERPL-MCNC: 9 MG/DL (ref 6–20)
BUN/CREAT SERPL: 15.8 (ref 7–25)
CALCIUM SPEC-SCNC: 9.5 MG/DL (ref 8.6–10.5)
CHLORIDE SERPL-SCNC: 102 MMOL/L (ref 98–107)
CO2 SERPL-SCNC: 27 MMOL/L (ref 22–29)
CREAT SERPL-MCNC: 0.57 MG/DL (ref 0.57–1)
DEPRECATED RDW RBC AUTO: 45.6 FL (ref 37–54)
EOSINOPHIL # BLD AUTO: 0.25 10*3/MM3 (ref 0–0.4)
EOSINOPHIL NFR BLD AUTO: 2.6 % (ref 0.3–6.2)
ERYTHROCYTE [DISTWIDTH] IN BLOOD BY AUTOMATED COUNT: 13.4 % (ref 12.3–15.4)
GFR SERPL CREATININE-BSD FRML MDRD: 117 ML/MIN/1.73
GLOBULIN UR ELPH-MCNC: 2.3 GM/DL
GLUCOSE SERPL-MCNC: 208 MG/DL (ref 65–99)
HCT VFR BLD AUTO: 37.1 % (ref 34–46.6)
HGB BLD-MCNC: 12.1 G/DL (ref 12–15.9)
IMM GRANULOCYTES # BLD AUTO: 0.03 10*3/MM3 (ref 0–0.05)
IMM GRANULOCYTES NFR BLD AUTO: 0.3 % (ref 0–0.5)
LYMPHOCYTES # BLD AUTO: 1.78 10*3/MM3 (ref 0.7–3.1)
LYMPHOCYTES NFR BLD AUTO: 18.4 % (ref 19.6–45.3)
MCH RBC QN AUTO: 30 PG (ref 26.6–33)
MCHC RBC AUTO-ENTMCNC: 32.6 G/DL (ref 31.5–35.7)
MCV RBC AUTO: 92.1 FL (ref 79–97)
MONOCYTES # BLD AUTO: 0.68 10*3/MM3 (ref 0.1–0.9)
MONOCYTES NFR BLD AUTO: 7 % (ref 5–12)
NEUTROPHILS NFR BLD AUTO: 6.9 10*3/MM3 (ref 1.7–7)
NEUTROPHILS NFR BLD AUTO: 71.4 % (ref 42.7–76)
NRBC BLD AUTO-RTO: 0 /100 WBC (ref 0–0.2)
PLATELET # BLD AUTO: 268 10*3/MM3 (ref 140–450)
PMV BLD AUTO: 9.8 FL (ref 6–12)
POTASSIUM SERPL-SCNC: 4.4 MMOL/L (ref 3.5–5.2)
PROT SERPL-MCNC: 6.3 G/DL (ref 6–8.5)
RBC # BLD AUTO: 4.03 10*6/MM3 (ref 3.77–5.28)
SODIUM SERPL-SCNC: 140 MMOL/L (ref 136–145)
WBC NRBC COR # BLD: 9.67 10*3/MM3 (ref 3.4–10.8)

## 2022-02-08 PROCEDURE — 25010000002 HEPARIN LOCK FLUSH PER 10 UNITS: Performed by: INTERNAL MEDICINE

## 2022-02-08 PROCEDURE — 36591 DRAW BLOOD OFF VENOUS DEVICE: CPT

## 2022-02-08 PROCEDURE — 80053 COMPREHEN METABOLIC PANEL: CPT | Performed by: INTERNAL MEDICINE

## 2022-02-08 PROCEDURE — 85025 COMPLETE CBC W/AUTO DIFF WBC: CPT | Performed by: INTERNAL MEDICINE

## 2022-02-08 PROCEDURE — 96360 HYDRATION IV INFUSION INIT: CPT

## 2022-02-08 RX ORDER — HEPARIN SODIUM (PORCINE) LOCK FLUSH IV SOLN 100 UNIT/ML 100 UNIT/ML
500 SOLUTION INTRAVENOUS AS NEEDED
OUTPATIENT
Start: 2022-02-08

## 2022-02-08 RX ORDER — SODIUM CHLORIDE 0.9 % (FLUSH) 0.9 %
10 SYRINGE (ML) INJECTION AS NEEDED
OUTPATIENT
Start: 2022-02-08

## 2022-02-08 RX ORDER — SODIUM CHLORIDE 0.9 % (FLUSH) 0.9 %
10 SYRINGE (ML) INJECTION AS NEEDED
Status: DISCONTINUED | OUTPATIENT
Start: 2022-02-08 | End: 2022-02-08 | Stop reason: HOSPADM

## 2022-02-08 RX ORDER — SODIUM CHLORIDE 9 MG/ML
250 INJECTION, SOLUTION INTRAVENOUS ONCE
Status: COMPLETED | OUTPATIENT
Start: 2022-02-08 | End: 2022-02-08

## 2022-02-08 RX ORDER — HEPARIN SODIUM (PORCINE) LOCK FLUSH IV SOLN 100 UNIT/ML 100 UNIT/ML
500 SOLUTION INTRAVENOUS AS NEEDED
Status: DISCONTINUED | OUTPATIENT
Start: 2022-02-08 | End: 2022-02-08 | Stop reason: HOSPADM

## 2022-02-08 RX ADMIN — SODIUM CHLORIDE, PRESERVATIVE FREE 10 ML: 5 INJECTION INTRAVENOUS at 14:14

## 2022-02-08 RX ADMIN — Medication 500 UNITS: at 14:14

## 2022-02-08 RX ADMIN — SODIUM CHLORIDE 250 ML: 900 INJECTION, SOLUTION INTRAVENOUS at 13:30

## 2022-02-08 NOTE — PROGRESS NOTES
Patient here for Taxol infusion today.  She reports noticing a protruding nodule from her right axilla last night.  Is not painful.  She denies any other palpable mass or nodularity.  On exam patient does have a noticeable at least 1-1/2 cm protruding firm nodule concerning for progression.  The patient was also examined with Dr. Juarez who agrees.  We will hold Taxol today.  Dr. De La O was notified     LEX Vleiz

## 2022-02-10 ENCOUNTER — APPOINTMENT (OUTPATIENT)
Dept: CT IMAGING | Facility: HOSPITAL | Age: 42
End: 2022-02-10

## 2022-02-17 ENCOUNTER — APPOINTMENT (OUTPATIENT)
Dept: ONCOLOGY | Facility: HOSPITAL | Age: 42
End: 2022-02-17

## 2022-03-19 DIAGNOSIS — F41.9 ANXIETY DISORDER, UNSPECIFIED: ICD-10-CM

## 2022-03-21 NOTE — TELEPHONE ENCOUNTER
Rx Refill Note  Requested Prescriptions     Pending Prescriptions Disp Refills   • citalopram (CeleXA) 40 MG tablet [Pharmacy Med Name: citalopram 40 mg tablet] 90 tablet 0     Sig: TAKE ONE TABLET BY MOUTH DAILY      Last office visit with prescribing clinician: 1/5/2022      Next office visit with prescribing clinician: Visit date not found            Ghazal Bass MA  03/21/22, 10:45 EDT

## 2022-03-22 ENCOUNTER — TELEPHONE (OUTPATIENT)
Dept: FAMILY MEDICINE CLINIC | Facility: CLINIC | Age: 42
End: 2022-03-22

## 2022-03-22 RX ORDER — CITALOPRAM 40 MG/1
TABLET ORAL
Qty: 90 TABLET | Refills: 0 | Status: SHIPPED | OUTPATIENT
Start: 2022-03-22 | End: 2022-07-07

## 2022-07-07 DIAGNOSIS — F41.9 ANXIETY DISORDER, UNSPECIFIED: ICD-10-CM

## 2022-07-07 RX ORDER — CITALOPRAM 40 MG/1
TABLET ORAL
Qty: 90 TABLET | Refills: 1 | Status: SHIPPED | OUTPATIENT
Start: 2022-07-07 | End: 2022-11-02 | Stop reason: SDUPTHER

## 2022-07-07 NOTE — TELEPHONE ENCOUNTER
Rx Refill Note  Requested Prescriptions     Pending Prescriptions Disp Refills   • citalopram (CeleXA) 40 MG tablet [Pharmacy Med Name: citalopram 40 mg tablet] 90 tablet 0     Sig: TAKE ONE TABLET BY MOUTH EVERY DAY      Last office visit with prescribing clinician: 1/5/2022      Next office visit with prescribing clinician: Visit date not found            Ishmael Chavarria MA  07/07/22, 12:44 EDT

## 2022-10-22 ENCOUNTER — HOSPITAL ENCOUNTER (INPATIENT)
Facility: HOSPITAL | Age: 42
LOS: 3 days | Discharge: HOME | End: 2022-10-25
Attending: EMERGENCY MEDICINE | Admitting: INTERNAL MEDICINE

## 2022-10-22 DIAGNOSIS — Z85.3 HISTORY OF BREAST CANCER: ICD-10-CM

## 2022-10-22 DIAGNOSIS — H60.502 ACUTE OTITIS EXTERNA OF LEFT EAR, UNSPECIFIED TYPE: ICD-10-CM

## 2022-10-22 DIAGNOSIS — E10.10 DIABETIC KETOACIDOSIS WITHOUT COMA ASSOCIATED WITH TYPE 1 DIABETES MELLITUS: Primary | ICD-10-CM

## 2022-10-22 LAB
ALBUMIN SERPL-MCNC: 4.4 G/DL (ref 3.5–5.2)
ALBUMIN/GLOB SERPL: 1.6 G/DL
ALP SERPL-CCNC: 78 U/L (ref 39–117)
ALT SERPL W P-5'-P-CCNC: 23 U/L (ref 1–33)
ANION GAP SERPL CALCULATED.3IONS-SCNC: 12.2 MMOL/L (ref 5–15)
ANION GAP SERPL CALCULATED.3IONS-SCNC: 20.5 MMOL/L (ref 5–15)
ANION GAP SERPL CALCULATED.3IONS-SCNC: 25.5 MMOL/L (ref 5–15)
ANION GAP SERPL CALCULATED.3IONS-SCNC: 9.4 MMOL/L (ref 5–15)
AST SERPL-CCNC: 18 U/L (ref 1–32)
ATMOSPHERIC PRESS: 748.1 MMHG
BASE EXCESS BLDV CALC-SCNC: -13.7 MMOL/L (ref -2–2)
BASOPHILS # BLD AUTO: 0.02 10*3/MM3 (ref 0–0.2)
BASOPHILS # BLD AUTO: 0.02 10*3/MM3 (ref 0–0.2)
BASOPHILS NFR BLD AUTO: 0.2 % (ref 0–1.5)
BASOPHILS NFR BLD AUTO: 0.2 % (ref 0–1.5)
BDY SITE: ABNORMAL
BILIRUB SERPL-MCNC: 0.9 MG/DL (ref 0–1.2)
BUN SERPL-MCNC: 14 MG/DL (ref 6–20)
BUN SERPL-MCNC: 14 MG/DL (ref 6–20)
BUN SERPL-MCNC: 16 MG/DL (ref 6–20)
BUN SERPL-MCNC: 16 MG/DL (ref 6–20)
BUN/CREAT SERPL: 18.7 (ref 7–25)
BUN/CREAT SERPL: 20.5 (ref 7–25)
BUN/CREAT SERPL: 20.5 (ref 7–25)
BUN/CREAT SERPL: 21.9 (ref 7–25)
CALCIUM SPEC-SCNC: 8.3 MG/DL (ref 8.6–10.5)
CALCIUM SPEC-SCNC: 8.5 MG/DL (ref 8.6–10.5)
CALCIUM SPEC-SCNC: 8.7 MG/DL (ref 8.6–10.5)
CALCIUM SPEC-SCNC: 9 MG/DL (ref 8.6–10.5)
CHLORIDE SERPL-SCNC: 103 MMOL/L (ref 98–107)
CHLORIDE SERPL-SCNC: 105 MMOL/L (ref 98–107)
CHLORIDE SERPL-SCNC: 109 MMOL/L (ref 98–107)
CHLORIDE SERPL-SCNC: 93 MMOL/L (ref 98–107)
CO2 SERPL-SCNC: 10.5 MMOL/L (ref 22–29)
CO2 SERPL-SCNC: 11.5 MMOL/L (ref 22–29)
CO2 SERPL-SCNC: 18.6 MMOL/L (ref 22–29)
CO2 SERPL-SCNC: 18.8 MMOL/L (ref 22–29)
CREAT SERPL-MCNC: 0.64 MG/DL (ref 0.57–1)
CREAT SERPL-MCNC: 0.75 MG/DL (ref 0.57–1)
CREAT SERPL-MCNC: 0.78 MG/DL (ref 0.57–1)
CREAT SERPL-MCNC: 0.78 MG/DL (ref 0.57–1)
DEPRECATED RDW RBC AUTO: 54.4 FL (ref 37–54)
DEPRECATED RDW RBC AUTO: 55.7 FL (ref 37–54)
EGFRCR SERPLBLD CKD-EPI 2021: 102.7 ML/MIN/1.73
EGFRCR SERPLBLD CKD-EPI 2021: 114 ML/MIN/1.73
EGFRCR SERPLBLD CKD-EPI 2021: 98 ML/MIN/1.73
EGFRCR SERPLBLD CKD-EPI 2021: 98 ML/MIN/1.73
EOSINOPHIL # BLD AUTO: 0.05 10*3/MM3 (ref 0–0.4)
EOSINOPHIL # BLD AUTO: 0.07 10*3/MM3 (ref 0–0.4)
EOSINOPHIL NFR BLD AUTO: 0.6 % (ref 0.3–6.2)
EOSINOPHIL NFR BLD AUTO: 0.8 % (ref 0.3–6.2)
ERYTHROCYTE [DISTWIDTH] IN BLOOD BY AUTOMATED COUNT: 14.9 % (ref 12.3–15.4)
ERYTHROCYTE [DISTWIDTH] IN BLOOD BY AUTOMATED COUNT: 15.2 % (ref 12.3–15.4)
GLOBULIN UR ELPH-MCNC: 2.8 GM/DL
GLUCOSE BLDC GLUCOMTR-MCNC: 130 MG/DL (ref 70–130)
GLUCOSE BLDC GLUCOMTR-MCNC: 207 MG/DL (ref 70–130)
GLUCOSE BLDC GLUCOMTR-MCNC: 213 MG/DL (ref 70–130)
GLUCOSE BLDC GLUCOMTR-MCNC: 221 MG/DL (ref 70–130)
GLUCOSE BLDC GLUCOMTR-MCNC: 222 MG/DL (ref 70–130)
GLUCOSE BLDC GLUCOMTR-MCNC: 237 MG/DL (ref 70–130)
GLUCOSE BLDC GLUCOMTR-MCNC: 260 MG/DL (ref 70–130)
GLUCOSE BLDC GLUCOMTR-MCNC: 268 MG/DL (ref 70–130)
GLUCOSE BLDC GLUCOMTR-MCNC: 337 MG/DL (ref 70–130)
GLUCOSE BLDC GLUCOMTR-MCNC: 360 MG/DL (ref 70–130)
GLUCOSE BLDC GLUCOMTR-MCNC: 93 MG/DL (ref 70–130)
GLUCOSE BLDC GLUCOMTR-MCNC: 97 MG/DL (ref 70–130)
GLUCOSE SERPL-MCNC: 140 MG/DL (ref 65–99)
GLUCOSE SERPL-MCNC: 257 MG/DL (ref 65–99)
GLUCOSE SERPL-MCNC: 300 MG/DL (ref 65–99)
GLUCOSE SERPL-MCNC: 385 MG/DL (ref 65–99)
HBA1C MFR BLD: 8.4 % (ref 4.8–5.6)
HCO3 BLDV-SCNC: 12 MMOL/L (ref 22–28)
HCT VFR BLD AUTO: 36.6 % (ref 34–46.6)
HCT VFR BLD AUTO: 41.5 % (ref 34–46.6)
HGB BLD-MCNC: 11.8 G/DL (ref 12–15.9)
HGB BLD-MCNC: 13.5 G/DL (ref 12–15.9)
IMM GRANULOCYTES # BLD AUTO: 0.02 10*3/MM3 (ref 0–0.05)
IMM GRANULOCYTES # BLD AUTO: 0.04 10*3/MM3 (ref 0–0.05)
IMM GRANULOCYTES NFR BLD AUTO: 0.2 % (ref 0–0.5)
IMM GRANULOCYTES NFR BLD AUTO: 0.4 % (ref 0–0.5)
LIPASE SERPL-CCNC: 9 U/L (ref 13–60)
LYMPHOCYTES # BLD AUTO: 0.39 10*3/MM3 (ref 0.7–3.1)
LYMPHOCYTES # BLD AUTO: 0.46 10*3/MM3 (ref 0.7–3.1)
LYMPHOCYTES NFR BLD AUTO: 4.2 % (ref 19.6–45.3)
LYMPHOCYTES NFR BLD AUTO: 5.7 % (ref 19.6–45.3)
MAGNESIUM SERPL-MCNC: 1.6 MG/DL (ref 1.6–2.6)
MAGNESIUM SERPL-MCNC: 1.7 MG/DL (ref 1.6–2.6)
MAGNESIUM SERPL-MCNC: 1.8 MG/DL (ref 1.6–2.6)
MAGNESIUM SERPL-MCNC: 1.9 MG/DL (ref 1.6–2.6)
MCH RBC QN AUTO: 32.2 PG (ref 26.6–33)
MCH RBC QN AUTO: 32.5 PG (ref 26.6–33)
MCHC RBC AUTO-ENTMCNC: 32.2 G/DL (ref 31.5–35.7)
MCHC RBC AUTO-ENTMCNC: 32.5 G/DL (ref 31.5–35.7)
MCV RBC AUTO: 100 FL (ref 79–97)
MCV RBC AUTO: 100 FL (ref 79–97)
MODALITY: ABNORMAL
MONOCYTES # BLD AUTO: 0.16 10*3/MM3 (ref 0.1–0.9)
MONOCYTES # BLD AUTO: 0.31 10*3/MM3 (ref 0.1–0.9)
MONOCYTES NFR BLD AUTO: 2 % (ref 5–12)
MONOCYTES NFR BLD AUTO: 3.4 % (ref 5–12)
NEUTROPHILS NFR BLD AUTO: 7.3 10*3/MM3 (ref 1.7–7)
NEUTROPHILS NFR BLD AUTO: 8.42 10*3/MM3 (ref 1.7–7)
NEUTROPHILS NFR BLD AUTO: 91 % (ref 42.7–76)
NEUTROPHILS NFR BLD AUTO: 91.3 % (ref 42.7–76)
NRBC BLD AUTO-RTO: 0 /100 WBC (ref 0–0.2)
NRBC BLD AUTO-RTO: 0 /100 WBC (ref 0–0.2)
OSMOLALITY SERPL: 298 MOSM/KG (ref 275–300)
PCO2 BLDV: 27.7 MM HG (ref 41–51)
PH BLDV: 7.25 PH UNITS (ref 7.31–7.41)
PHOSPHATE SERPL-MCNC: 2.2 MG/DL (ref 2.5–4.5)
PHOSPHATE SERPL-MCNC: 2.5 MG/DL (ref 2.5–4.5)
PHOSPHATE SERPL-MCNC: 3.1 MG/DL (ref 2.5–4.5)
PHOSPHATE SERPL-MCNC: 3.5 MG/DL (ref 2.5–4.5)
PLATELET # BLD AUTO: 196 10*3/MM3 (ref 140–450)
PLATELET # BLD AUTO: 225 10*3/MM3 (ref 140–450)
PMV BLD AUTO: 9.4 FL (ref 6–12)
PMV BLD AUTO: 9.4 FL (ref 6–12)
PO2 BLDV: 48.6 MM HG (ref 35–45)
POTASSIUM SERPL-SCNC: 4.3 MMOL/L (ref 3.5–5.2)
POTASSIUM SERPL-SCNC: 4.8 MMOL/L (ref 3.5–5.2)
POTASSIUM SERPL-SCNC: 5.2 MMOL/L (ref 3.5–5.2)
POTASSIUM SERPL-SCNC: 5.2 MMOL/L (ref 3.5–5.2)
PROT SERPL-MCNC: 7.2 G/DL (ref 6–8.5)
QT INTERVAL: 338 MS
RBC # BLD AUTO: 3.66 10*6/MM3 (ref 3.77–5.28)
RBC # BLD AUTO: 4.15 10*6/MM3 (ref 3.77–5.28)
SAO2 % BLDCOA: 78.1 % (ref 92–99)
SODIUM SERPL-SCNC: 130 MMOL/L (ref 136–145)
SODIUM SERPL-SCNC: 134 MMOL/L (ref 136–145)
SODIUM SERPL-SCNC: 136 MMOL/L (ref 136–145)
SODIUM SERPL-SCNC: 137 MMOL/L (ref 136–145)
TOTAL RATE: 18 BREATHS/MINUTE
TROPONIN T SERPL-MCNC: <0.01 NG/ML (ref 0–0.03)
WBC NRBC COR # BLD: 8.01 10*3/MM3 (ref 3.4–10.8)
WBC NRBC COR # BLD: 9.25 10*3/MM3 (ref 3.4–10.8)

## 2022-10-22 PROCEDURE — 36415 COLL VENOUS BLD VENIPUNCTURE: CPT | Performed by: EMERGENCY MEDICINE

## 2022-10-22 PROCEDURE — 83690 ASSAY OF LIPASE: CPT | Performed by: EMERGENCY MEDICINE

## 2022-10-22 PROCEDURE — 83036 HEMOGLOBIN GLYCOSYLATED A1C: CPT | Performed by: EMERGENCY MEDICINE

## 2022-10-22 PROCEDURE — 83930 ASSAY OF BLOOD OSMOLALITY: CPT | Performed by: EMERGENCY MEDICINE

## 2022-10-22 PROCEDURE — 82803 BLOOD GASES ANY COMBINATION: CPT

## 2022-10-22 PROCEDURE — 84100 ASSAY OF PHOSPHORUS: CPT | Performed by: INTERNAL MEDICINE

## 2022-10-22 PROCEDURE — 82962 GLUCOSE BLOOD TEST: CPT

## 2022-10-22 PROCEDURE — 85025 COMPLETE CBC W/AUTO DIFF WBC: CPT | Performed by: EMERGENCY MEDICINE

## 2022-10-22 PROCEDURE — 99285 EMERGENCY DEPT VISIT HI MDM: CPT

## 2022-10-22 PROCEDURE — 83735 ASSAY OF MAGNESIUM: CPT | Performed by: INTERNAL MEDICINE

## 2022-10-22 PROCEDURE — 93010 ELECTROCARDIOGRAM REPORT: CPT | Performed by: STUDENT IN AN ORGANIZED HEALTH CARE EDUCATION/TRAINING PROGRAM

## 2022-10-22 PROCEDURE — 83735 ASSAY OF MAGNESIUM: CPT | Performed by: EMERGENCY MEDICINE

## 2022-10-22 PROCEDURE — 93005 ELECTROCARDIOGRAM TRACING: CPT | Performed by: EMERGENCY MEDICINE

## 2022-10-22 PROCEDURE — 80053 COMPREHEN METABOLIC PANEL: CPT | Performed by: EMERGENCY MEDICINE

## 2022-10-22 PROCEDURE — 84484 ASSAY OF TROPONIN QUANT: CPT | Performed by: EMERGENCY MEDICINE

## 2022-10-22 PROCEDURE — 25010000002 KETOROLAC TROMETHAMINE PER 15 MG: Performed by: EMERGENCY MEDICINE

## 2022-10-22 PROCEDURE — 84100 ASSAY OF PHOSPHORUS: CPT | Performed by: EMERGENCY MEDICINE

## 2022-10-22 RX ORDER — SODIUM CHLORIDE 0.9 % (FLUSH) 0.9 %
10 SYRINGE (ML) INJECTION AS NEEDED
Status: DISCONTINUED | OUTPATIENT
Start: 2022-10-22 | End: 2022-10-25 | Stop reason: HOSPADM

## 2022-10-22 RX ORDER — HYDROCODONE BITARTRATE AND ACETAMINOPHEN 7.5; 325 MG/1; MG/1
1 TABLET ORAL ONCE
Status: COMPLETED | OUTPATIENT
Start: 2022-10-22 | End: 2022-10-22

## 2022-10-22 RX ORDER — SODIUM CHLORIDE AND POTASSIUM CHLORIDE 150; 900 MG/100ML; MG/100ML
250 INJECTION, SOLUTION INTRAVENOUS CONTINUOUS PRN
Status: DISCONTINUED | OUTPATIENT
Start: 2022-10-22 | End: 2022-10-22

## 2022-10-22 RX ORDER — DEXTROSE, SODIUM CHLORIDE, AND POTASSIUM CHLORIDE 5; .45; .3 G/100ML; G/100ML; G/100ML
150 INJECTION INTRAVENOUS CONTINUOUS PRN
Status: DISCONTINUED | OUTPATIENT
Start: 2022-10-22 | End: 2022-10-22

## 2022-10-22 RX ORDER — SODIUM CHLORIDE 9 MG/ML
250 INJECTION, SOLUTION INTRAVENOUS CONTINUOUS PRN
Status: DISCONTINUED | OUTPATIENT
Start: 2022-10-22 | End: 2022-10-22

## 2022-10-22 RX ORDER — NICOTINE POLACRILEX 4 MG
15 LOZENGE BUCCAL
Status: DISCONTINUED | OUTPATIENT
Start: 2022-10-22 | End: 2022-10-22

## 2022-10-22 RX ORDER — SODIUM CHLORIDE 0.9 % (FLUSH) 0.9 %
10 SYRINGE (ML) INJECTION EVERY 12 HOURS SCHEDULED
Status: DISCONTINUED | OUTPATIENT
Start: 2022-10-22 | End: 2022-10-22

## 2022-10-22 RX ORDER — DEXTROSE MONOHYDRATE 25 G/50ML
25 INJECTION, SOLUTION INTRAVENOUS
Status: DISCONTINUED | OUTPATIENT
Start: 2022-10-22 | End: 2022-10-25 | Stop reason: HOSPADM

## 2022-10-22 RX ORDER — LORAZEPAM 1 MG/1
1 TABLET ORAL EVERY 12 HOURS PRN
Status: DISCONTINUED | OUTPATIENT
Start: 2022-10-22 | End: 2022-10-25 | Stop reason: HOSPADM

## 2022-10-22 RX ORDER — SODIUM CHLORIDE 0.9 % (FLUSH) 0.9 %
20 SYRINGE (ML) INJECTION AS NEEDED
Status: DISCONTINUED | OUTPATIENT
Start: 2022-10-22 | End: 2022-10-25 | Stop reason: HOSPADM

## 2022-10-22 RX ORDER — ONDANSETRON 2 MG/ML
4 INJECTION INTRAMUSCULAR; INTRAVENOUS EVERY 6 HOURS PRN
Status: DISCONTINUED | OUTPATIENT
Start: 2022-10-22 | End: 2022-10-25 | Stop reason: HOSPADM

## 2022-10-22 RX ORDER — SODIUM CHLORIDE 0.9 % (FLUSH) 0.9 %
10 SYRINGE (ML) INJECTION EVERY 12 HOURS SCHEDULED
Status: DISCONTINUED | OUTPATIENT
Start: 2022-10-22 | End: 2022-10-25 | Stop reason: HOSPADM

## 2022-10-22 RX ORDER — ACETAMINOPHEN 650 MG/1
650 SUPPOSITORY RECTAL EVERY 4 HOURS PRN
Status: DISCONTINUED | OUTPATIENT
Start: 2022-10-22 | End: 2022-10-25 | Stop reason: HOSPADM

## 2022-10-22 RX ORDER — DEXTROSE AND SODIUM CHLORIDE 5; .45 G/100ML; G/100ML
150 INJECTION, SOLUTION INTRAVENOUS CONTINUOUS PRN
Status: DISCONTINUED | OUTPATIENT
Start: 2022-10-22 | End: 2022-10-22

## 2022-10-22 RX ORDER — DEXTROSE, SODIUM CHLORIDE, AND POTASSIUM CHLORIDE 5; .45; .15 G/100ML; G/100ML; G/100ML
150 INJECTION INTRAVENOUS CONTINUOUS PRN
Status: DISCONTINUED | OUTPATIENT
Start: 2022-10-22 | End: 2022-10-22

## 2022-10-22 RX ORDER — DEXTROSE, SODIUM CHLORIDE, AND POTASSIUM CHLORIDE 5; .9; .15 G/100ML; G/100ML; G/100ML
150 INJECTION INTRAVENOUS CONTINUOUS PRN
Status: DISCONTINUED | OUTPATIENT
Start: 2022-10-22 | End: 2022-10-22

## 2022-10-22 RX ORDER — OXYCODONE HYDROCHLORIDE AND ACETAMINOPHEN 5; 325 MG/1; MG/1
1 TABLET ORAL EVERY 6 HOURS PRN
Status: DISCONTINUED | OUTPATIENT
Start: 2022-10-22 | End: 2022-10-25 | Stop reason: HOSPADM

## 2022-10-22 RX ORDER — NICOTINE POLACRILEX 4 MG
15 LOZENGE BUCCAL
Status: DISCONTINUED | OUTPATIENT
Start: 2022-10-22 | End: 2022-10-25 | Stop reason: HOSPADM

## 2022-10-22 RX ORDER — INSULIN GLARGINE 100 [IU]/ML
40 INJECTION, SOLUTION SUBCUTANEOUS NIGHTLY
Status: DISCONTINUED | OUTPATIENT
Start: 2022-10-22 | End: 2022-10-22 | Stop reason: CLARIF

## 2022-10-22 RX ORDER — ACETAMINOPHEN 325 MG/1
650 TABLET ORAL EVERY 4 HOURS PRN
Status: DISCONTINUED | OUTPATIENT
Start: 2022-10-22 | End: 2022-10-25 | Stop reason: HOSPADM

## 2022-10-22 RX ORDER — CIPROFLOXACIN AND FLUOCINOLONE ACETONIDE .75; .0625 MG/.25ML; MG/.25ML
0.25 SOLUTION AURICULAR (OTIC) 2 TIMES DAILY
Status: DISCONTINUED | OUTPATIENT
Start: 2022-10-22 | End: 2022-10-25 | Stop reason: HOSPADM

## 2022-10-22 RX ORDER — SODIUM CHLORIDE AND POTASSIUM CHLORIDE 300; 900 MG/100ML; MG/100ML
250 INJECTION, SOLUTION INTRAVENOUS CONTINUOUS PRN
Status: DISCONTINUED | OUTPATIENT
Start: 2022-10-22 | End: 2022-10-22

## 2022-10-22 RX ORDER — POTASSIUM CHLORIDE, DEXTROSE MONOHYDRATE AND SODIUM CHLORIDE 300; 5; 900 MG/100ML; G/100ML; MG/100ML
150 INJECTION, SOLUTION INTRAVENOUS CONTINUOUS PRN
Status: DISCONTINUED | OUTPATIENT
Start: 2022-10-22 | End: 2022-10-22

## 2022-10-22 RX ORDER — SODIUM CHLORIDE 0.9 % (FLUSH) 0.9 %
10 SYRINGE (ML) INJECTION AS NEEDED
Status: DISCONTINUED | OUTPATIENT
Start: 2022-10-22 | End: 2022-10-22

## 2022-10-22 RX ORDER — SODIUM CHLORIDE 450 MG/100ML
250 INJECTION, SOLUTION INTRAVENOUS CONTINUOUS PRN
Status: DISCONTINUED | OUTPATIENT
Start: 2022-10-22 | End: 2022-10-22

## 2022-10-22 RX ORDER — ONDANSETRON 4 MG/1
4 TABLET, FILM COATED ORAL EVERY 6 HOURS PRN
Status: DISCONTINUED | OUTPATIENT
Start: 2022-10-22 | End: 2022-10-25 | Stop reason: HOSPADM

## 2022-10-22 RX ORDER — DEXTROSE MONOHYDRATE 25 G/50ML
10-50 INJECTION, SOLUTION INTRAVENOUS
Status: DISCONTINUED | OUTPATIENT
Start: 2022-10-22 | End: 2022-10-22

## 2022-10-22 RX ORDER — SODIUM CHLORIDE AND POTASSIUM CHLORIDE 150; 450 MG/100ML; MG/100ML
250 INJECTION, SOLUTION INTRAVENOUS CONTINUOUS PRN
Status: DISCONTINUED | OUTPATIENT
Start: 2022-10-22 | End: 2022-10-22

## 2022-10-22 RX ORDER — KETOROLAC TROMETHAMINE 15 MG/ML
15 INJECTION, SOLUTION INTRAMUSCULAR; INTRAVENOUS ONCE
Status: COMPLETED | OUTPATIENT
Start: 2022-10-22 | End: 2022-10-22

## 2022-10-22 RX ORDER — HEPARIN SODIUM (PORCINE) LOCK FLUSH IV SOLN 100 UNIT/ML 100 UNIT/ML
5 SOLUTION INTRAVENOUS AS NEEDED
Status: DISCONTINUED | OUTPATIENT
Start: 2022-10-22 | End: 2022-10-25 | Stop reason: HOSPADM

## 2022-10-22 RX ORDER — DEXTROSE AND SODIUM CHLORIDE 5; .9 G/100ML; G/100ML
150 INJECTION, SOLUTION INTRAVENOUS CONTINUOUS PRN
Status: DISCONTINUED | OUTPATIENT
Start: 2022-10-22 | End: 2022-10-22

## 2022-10-22 RX ADMIN — CIPROFLOXACIN AND FLUOCINOLONE ACETONIDE 0.25 ML: .75; .0625 SOLUTION AURICULAR (OTIC) at 13:37

## 2022-10-22 RX ADMIN — POTASSIUM CHLORIDE, DEXTROSE MONOHYDRATE AND SODIUM CHLORIDE 150 ML/HR: 150; 5; 450 INJECTION, SOLUTION INTRAVENOUS at 18:50

## 2022-10-22 RX ADMIN — Medication 10 ML: at 10:22

## 2022-10-22 RX ADMIN — CIPROFLOXACIN AND FLUOCINOLONE ACETONIDE 0.25 ML: .75; .0625 SOLUTION AURICULAR (OTIC) at 20:19

## 2022-10-22 RX ADMIN — SODIUM CHLORIDE 1000 ML: 9 INJECTION, SOLUTION INTRAVENOUS at 09:06

## 2022-10-22 RX ADMIN — Medication 10 ML: at 21:27

## 2022-10-22 RX ADMIN — SODIUM CHLORIDE 1000 ML: 9 INJECTION, SOLUTION INTRAVENOUS at 10:34

## 2022-10-22 RX ADMIN — HYDROCODONE BITARTRATE AND ACETAMINOPHEN 1 TABLET: 7.5; 325 TABLET ORAL at 09:28

## 2022-10-22 RX ADMIN — Medication 10 ML: at 08:29

## 2022-10-22 RX ADMIN — OXYCODONE AND ACETAMINOPHEN 1 TABLET: 5; 325 TABLET ORAL at 16:26

## 2022-10-22 RX ADMIN — INSULIN GLARGINE-YFGN 20 UNITS: 100 INJECTION, SOLUTION SUBCUTANEOUS at 21:52

## 2022-10-22 RX ADMIN — LORAZEPAM 1 MG: 1 TABLET ORAL at 20:18

## 2022-10-22 RX ADMIN — OXYCODONE AND ACETAMINOPHEN 1 TABLET: 5; 325 TABLET ORAL at 21:55

## 2022-10-22 RX ADMIN — INSULIN HUMAN 0.3 UNITS/HR: 1 INJECTION, SOLUTION INTRAVENOUS at 10:42

## 2022-10-22 RX ADMIN — KETOROLAC TROMETHAMINE 15 MG: 15 INJECTION, SOLUTION INTRAMUSCULAR; INTRAVENOUS at 09:21

## 2022-10-22 RX ADMIN — Medication 10 ML: at 21:28

## 2022-10-22 RX ADMIN — POTASSIUM CHLORIDE, DEXTROSE MONOHYDRATE AND SODIUM CHLORIDE 150 ML/HR: 150; 5; 900 INJECTION, SOLUTION INTRAVENOUS at 14:40

## 2022-10-23 LAB
ANION GAP SERPL CALCULATED.3IONS-SCNC: 9.1 MMOL/L (ref 5–15)
BILIRUB UR QL STRIP: NEGATIVE
BUN SERPL-MCNC: 7 MG/DL (ref 6–20)
BUN/CREAT SERPL: 12.5 (ref 7–25)
CALCIUM SPEC-SCNC: 8.8 MG/DL (ref 8.6–10.5)
CHLORIDE SERPL-SCNC: 103 MMOL/L (ref 98–107)
CLARITY UR: ABNORMAL
CO2 SERPL-SCNC: 25.9 MMOL/L (ref 22–29)
COLOR UR: YELLOW
CREAT SERPL-MCNC: 0.56 MG/DL (ref 0.57–1)
EGFRCR SERPLBLD CKD-EPI 2021: 117.8 ML/MIN/1.73
GLUCOSE BLDC GLUCOMTR-MCNC: 118 MG/DL (ref 70–130)
GLUCOSE BLDC GLUCOMTR-MCNC: 205 MG/DL (ref 70–130)
GLUCOSE BLDC GLUCOMTR-MCNC: 263 MG/DL (ref 70–130)
GLUCOSE BLDC GLUCOMTR-MCNC: 266 MG/DL (ref 70–130)
GLUCOSE BLDC GLUCOMTR-MCNC: 305 MG/DL (ref 70–130)
GLUCOSE SERPL-MCNC: 166 MG/DL (ref 65–99)
GLUCOSE UR STRIP-MCNC: ABNORMAL MG/DL
HGB UR QL STRIP.AUTO: NEGATIVE
KETONES UR QL STRIP: ABNORMAL
LEUKOCYTE ESTERASE UR QL STRIP.AUTO: NEGATIVE
NITRITE UR QL STRIP: NEGATIVE
PH UR STRIP.AUTO: 5.5 [PH] (ref 5–8)
POTASSIUM SERPL-SCNC: 4 MMOL/L (ref 3.5–5.2)
PROT UR QL STRIP: NEGATIVE
SODIUM SERPL-SCNC: 138 MMOL/L (ref 136–145)
SP GR UR STRIP: >=1.03 (ref 1–1.03)
UROBILINOGEN UR QL STRIP: ABNORMAL

## 2022-10-23 PROCEDURE — 82962 GLUCOSE BLOOD TEST: CPT

## 2022-10-23 PROCEDURE — 25010000002 HYDROMORPHONE 1 MG/ML SOLUTION: Performed by: INTERNAL MEDICINE

## 2022-10-23 PROCEDURE — 25010000002 PIPERACILLIN SOD-TAZOBACTAM PER 1 G: Performed by: INTERNAL MEDICINE

## 2022-10-23 PROCEDURE — 63710000001 INSULIN REGULAR HUMAN PER 5 UNITS: Performed by: INTERNAL MEDICINE

## 2022-10-23 PROCEDURE — 25010000002 FENTANYL CITRATE (PF) 50 MCG/ML SOLUTION: Performed by: INTERNAL MEDICINE

## 2022-10-23 PROCEDURE — 80048 BASIC METABOLIC PNL TOTAL CA: CPT | Performed by: INTERNAL MEDICINE

## 2022-10-23 PROCEDURE — 81003 URINALYSIS AUTO W/O SCOPE: CPT | Performed by: EMERGENCY MEDICINE

## 2022-10-23 RX ORDER — FENTANYL CITRATE 50 UG/ML
25 INJECTION, SOLUTION INTRAMUSCULAR; INTRAVENOUS
Status: DISCONTINUED | OUTPATIENT
Start: 2022-10-23 | End: 2022-10-23

## 2022-10-23 RX ORDER — FENTANYL CITRATE 50 UG/ML
50 INJECTION, SOLUTION INTRAMUSCULAR; INTRAVENOUS
Status: DISCONTINUED | OUTPATIENT
Start: 2022-10-23 | End: 2022-10-23

## 2022-10-23 RX ADMIN — HYDROMORPHONE HYDROCHLORIDE 1 MG: 1 INJECTION, SOLUTION INTRAMUSCULAR; INTRAVENOUS; SUBCUTANEOUS at 21:15

## 2022-10-23 RX ADMIN — Medication 10 ML: at 20:19

## 2022-10-23 RX ADMIN — HYDROMORPHONE HYDROCHLORIDE 1 MG: 1 INJECTION, SOLUTION INTRAMUSCULAR; INTRAVENOUS; SUBCUTANEOUS at 09:14

## 2022-10-23 RX ADMIN — OXYCODONE AND ACETAMINOPHEN 1 TABLET: 5; 325 TABLET ORAL at 11:59

## 2022-10-23 RX ADMIN — INSULIN HUMAN 5 UNITS: 100 INJECTION, SOLUTION PARENTERAL at 16:45

## 2022-10-23 RX ADMIN — HYDROMORPHONE HYDROCHLORIDE 1 MG: 1 INJECTION, SOLUTION INTRAMUSCULAR; INTRAVENOUS; SUBCUTANEOUS at 05:02

## 2022-10-23 RX ADMIN — HYDROMORPHONE HYDROCHLORIDE 1 MG: 1 INJECTION, SOLUTION INTRAMUSCULAR; INTRAVENOUS; SUBCUTANEOUS at 13:15

## 2022-10-23 RX ADMIN — TAZOBACTAM SODIUM AND PIPERACILLIN SODIUM 3.38 G: 375; 3 INJECTION, SOLUTION INTRAVENOUS at 06:07

## 2022-10-23 RX ADMIN — TAZOBACTAM SODIUM AND PIPERACILLIN SODIUM 3.38 G: 375; 3 INJECTION, SOLUTION INTRAVENOUS at 20:18

## 2022-10-23 RX ADMIN — FENTANYL CITRATE 25 MCG: 50 INJECTION INTRAMUSCULAR; INTRAVENOUS at 04:53

## 2022-10-23 RX ADMIN — Medication 10 ML: at 08:46

## 2022-10-23 RX ADMIN — INSULIN GLARGINE-YFGN 20 UNITS: 100 INJECTION, SOLUTION SUBCUTANEOUS at 20:18

## 2022-10-23 RX ADMIN — HYDROMORPHONE HYDROCHLORIDE 1 MG: 1 INJECTION, SOLUTION INTRAMUSCULAR; INTRAVENOUS; SUBCUTANEOUS at 17:21

## 2022-10-23 RX ADMIN — CIPROFLOXACIN AND FLUOCINOLONE ACETONIDE 0.25 ML: .75; .0625 SOLUTION AURICULAR (OTIC) at 20:18

## 2022-10-23 RX ADMIN — INSULIN GLARGINE-YFGN 20 UNITS: 100 INJECTION, SOLUTION SUBCUTANEOUS at 08:45

## 2022-10-23 RX ADMIN — TAZOBACTAM SODIUM AND PIPERACILLIN SODIUM 3.38 G: 375; 3 INJECTION, SOLUTION INTRAVENOUS at 11:59

## 2022-10-23 RX ADMIN — LORAZEPAM 1 MG: 1 TABLET ORAL at 19:12

## 2022-10-23 RX ADMIN — INSULIN HUMAN 10 UNITS: 100 INJECTION, SOLUTION PARENTERAL at 10:23

## 2022-10-23 RX ADMIN — FENTANYL CITRATE 25 MCG: 50 INJECTION INTRAMUSCULAR; INTRAVENOUS at 03:01

## 2022-10-23 RX ADMIN — OXYCODONE AND ACETAMINOPHEN 1 TABLET: 5; 325 TABLET ORAL at 19:08

## 2022-10-24 LAB
ANION GAP SERPL CALCULATED.3IONS-SCNC: 5 MMOL/L (ref 5–15)
BUN SERPL-MCNC: 7 MG/DL (ref 6–20)
BUN/CREAT SERPL: 13 (ref 7–25)
CALCIUM SPEC-SCNC: 8.7 MG/DL (ref 8.6–10.5)
CHLORIDE SERPL-SCNC: 100 MMOL/L (ref 98–107)
CO2 SERPL-SCNC: 32 MMOL/L (ref 22–29)
CREAT SERPL-MCNC: 0.54 MG/DL (ref 0.57–1)
EGFRCR SERPLBLD CKD-EPI 2021: 118.8 ML/MIN/1.73
GLUCOSE BLDC GLUCOMTR-MCNC: 222 MG/DL (ref 70–130)
GLUCOSE BLDC GLUCOMTR-MCNC: 225 MG/DL (ref 70–130)
GLUCOSE BLDC GLUCOMTR-MCNC: 301 MG/DL (ref 70–130)
GLUCOSE BLDC GLUCOMTR-MCNC: 448 MG/DL (ref 70–130)
GLUCOSE BLDC GLUCOMTR-MCNC: 492 MG/DL (ref 70–130)
GLUCOSE BLDC GLUCOMTR-MCNC: 76 MG/DL (ref 70–130)
GLUCOSE BLDC GLUCOMTR-MCNC: 90 MG/DL (ref 70–130)
GLUCOSE SERPL-MCNC: 51 MG/DL (ref 65–99)
MAGNESIUM SERPL-MCNC: 1.8 MG/DL (ref 1.6–2.6)
PHOSPHATE SERPL-MCNC: 3.3 MG/DL (ref 2.5–4.5)
POTASSIUM SERPL-SCNC: 3.5 MMOL/L (ref 3.5–5.2)
POTASSIUM SERPL-SCNC: 4.7 MMOL/L (ref 3.5–5.2)
SODIUM SERPL-SCNC: 137 MMOL/L (ref 136–145)

## 2022-10-24 PROCEDURE — 84100 ASSAY OF PHOSPHORUS: CPT | Performed by: INTERNAL MEDICINE

## 2022-10-24 PROCEDURE — 63710000001 DIPHENHYDRAMINE PER 50 MG: Performed by: INTERNAL MEDICINE

## 2022-10-24 PROCEDURE — 25010000002 HYDROMORPHONE 1 MG/ML SOLUTION: Performed by: INTERNAL MEDICINE

## 2022-10-24 PROCEDURE — 80048 BASIC METABOLIC PNL TOTAL CA: CPT | Performed by: INTERNAL MEDICINE

## 2022-10-24 PROCEDURE — 25010000002 PIPERACILLIN SOD-TAZOBACTAM PER 1 G: Performed by: INTERNAL MEDICINE

## 2022-10-24 PROCEDURE — 84132 ASSAY OF SERUM POTASSIUM: CPT | Performed by: INTERNAL MEDICINE

## 2022-10-24 PROCEDURE — 63710000001 INSULIN REGULAR HUMAN PER 5 UNITS: Performed by: INTERNAL MEDICINE

## 2022-10-24 PROCEDURE — 83735 ASSAY OF MAGNESIUM: CPT | Performed by: INTERNAL MEDICINE

## 2022-10-24 PROCEDURE — 82962 GLUCOSE BLOOD TEST: CPT

## 2022-10-24 RX ORDER — MAGNESIUM SULFATE HEPTAHYDRATE 40 MG/ML
2 INJECTION, SOLUTION INTRAVENOUS AS NEEDED
Status: DISCONTINUED | OUTPATIENT
Start: 2022-10-24 | End: 2022-10-25 | Stop reason: HOSPADM

## 2022-10-24 RX ORDER — CALCIUM GLUCONATE 20 MG/ML
1 INJECTION, SOLUTION INTRAVENOUS AS NEEDED
Status: DISCONTINUED | OUTPATIENT
Start: 2022-10-24 | End: 2022-10-25 | Stop reason: HOSPADM

## 2022-10-24 RX ORDER — MAGNESIUM SULFATE HEPTAHYDRATE 40 MG/ML
4 INJECTION, SOLUTION INTRAVENOUS AS NEEDED
Status: DISCONTINUED | OUTPATIENT
Start: 2022-10-24 | End: 2022-10-25 | Stop reason: HOSPADM

## 2022-10-24 RX ORDER — POTASSIUM CHLORIDE 1.5 G/1.77G
40 POWDER, FOR SOLUTION ORAL AS NEEDED
Status: DISCONTINUED | OUTPATIENT
Start: 2022-10-24 | End: 2022-10-25 | Stop reason: HOSPADM

## 2022-10-24 RX ORDER — POTASSIUM CHLORIDE 750 MG/1
40 TABLET, FILM COATED, EXTENDED RELEASE ORAL AS NEEDED
Status: DISCONTINUED | OUTPATIENT
Start: 2022-10-24 | End: 2022-10-25 | Stop reason: HOSPADM

## 2022-10-24 RX ORDER — POTASSIUM CHLORIDE 7.45 MG/ML
10 INJECTION INTRAVENOUS
Status: DISCONTINUED | OUTPATIENT
Start: 2022-10-24 | End: 2022-10-25 | Stop reason: HOSPADM

## 2022-10-24 RX ORDER — DIPHENHYDRAMINE HCL 25 MG
25 CAPSULE ORAL EVERY 6 HOURS PRN
Status: DISCONTINUED | OUTPATIENT
Start: 2022-10-24 | End: 2022-10-25 | Stop reason: HOSPADM

## 2022-10-24 RX ORDER — CITALOPRAM 40 MG/1
40 TABLET ORAL DAILY
Status: DISCONTINUED | OUTPATIENT
Start: 2022-10-24 | End: 2022-10-25 | Stop reason: HOSPADM

## 2022-10-24 RX ADMIN — HYDROMORPHONE HYDROCHLORIDE 1 MG: 1 INJECTION, SOLUTION INTRAMUSCULAR; INTRAVENOUS; SUBCUTANEOUS at 15:35

## 2022-10-24 RX ADMIN — CIPROFLOXACIN AND FLUOCINOLONE ACETONIDE 0.25 ML: .75; .0625 SOLUTION AURICULAR (OTIC) at 08:00

## 2022-10-24 RX ADMIN — OXYCODONE AND ACETAMINOPHEN 1 TABLET: 5; 325 TABLET ORAL at 00:57

## 2022-10-24 RX ADMIN — Medication 10 ML: at 08:01

## 2022-10-24 RX ADMIN — HYDROMORPHONE HYDROCHLORIDE 1 MG: 1 INJECTION, SOLUTION INTRAMUSCULAR; INTRAVENOUS; SUBCUTANEOUS at 07:52

## 2022-10-24 RX ADMIN — CITALOPRAM 40 MG: 40 TABLET, FILM COATED ORAL at 14:02

## 2022-10-24 RX ADMIN — INSULIN GLARGINE-YFGN 20 UNITS: 100 INJECTION, SOLUTION SUBCUTANEOUS at 22:17

## 2022-10-24 RX ADMIN — Medication 10 ML: at 21:47

## 2022-10-24 RX ADMIN — POTASSIUM CHLORIDE 40 MEQ: 750 TABLET, EXTENDED RELEASE ORAL at 17:50

## 2022-10-24 RX ADMIN — INSULIN HUMAN 18 UNITS: 100 INJECTION, SOLUTION PARENTERAL at 23:38

## 2022-10-24 RX ADMIN — TAZOBACTAM SODIUM AND PIPERACILLIN SODIUM 3.38 G: 375; 3 INJECTION, SOLUTION INTRAVENOUS at 21:40

## 2022-10-24 RX ADMIN — HYDROMORPHONE HYDROCHLORIDE 1 MG: 1 INJECTION, SOLUTION INTRAMUSCULAR; INTRAVENOUS; SUBCUTANEOUS at 22:11

## 2022-10-24 RX ADMIN — HYDROMORPHONE HYDROCHLORIDE 1 MG: 1 INJECTION, SOLUTION INTRAMUSCULAR; INTRAVENOUS; SUBCUTANEOUS at 03:20

## 2022-10-24 RX ADMIN — DIPHENHYDRAMINE HYDROCHLORIDE 25 MG: 25 CAPSULE ORAL at 22:23

## 2022-10-24 RX ADMIN — LORAZEPAM 1 MG: 1 TABLET ORAL at 11:29

## 2022-10-24 RX ADMIN — CIPROFLOXACIN AND FLUOCINOLONE ACETONIDE 0.25 ML: .75; .0625 SOLUTION AURICULAR (OTIC) at 21:47

## 2022-10-24 RX ADMIN — INSULIN HUMAN 10 UNITS: 100 INJECTION, SOLUTION PARENTERAL at 11:11

## 2022-10-24 RX ADMIN — OXYCODONE AND ACETAMINOPHEN 1 TABLET: 5; 325 TABLET ORAL at 11:11

## 2022-10-24 RX ADMIN — INSULIN HUMAN 5 UNITS: 100 INJECTION, SOLUTION PARENTERAL at 17:28

## 2022-10-24 RX ADMIN — INSULIN GLARGINE-YFGN 20 UNITS: 100 INJECTION, SOLUTION SUBCUTANEOUS at 08:29

## 2022-10-24 RX ADMIN — POTASSIUM CHLORIDE 40 MEQ: 750 TABLET, EXTENDED RELEASE ORAL at 14:02

## 2022-10-24 RX ADMIN — TAZOBACTAM SODIUM AND PIPERACILLIN SODIUM 3.38 G: 375; 3 INJECTION, SOLUTION INTRAVENOUS at 05:21

## 2022-10-24 RX ADMIN — INSULIN HUMAN 5 UNITS: 100 INJECTION, SOLUTION PARENTERAL at 03:20

## 2022-10-24 RX ADMIN — OXYCODONE AND ACETAMINOPHEN 1 TABLET: 5; 325 TABLET ORAL at 17:50

## 2022-10-24 RX ADMIN — TAZOBACTAM SODIUM AND PIPERACILLIN SODIUM 3.38 G: 375; 3 INJECTION, SOLUTION INTRAVENOUS at 12:30

## 2022-10-25 ENCOUNTER — READMISSION MANAGEMENT (OUTPATIENT)
Dept: CALL CENTER | Facility: HOSPITAL | Age: 42
End: 2022-10-25

## 2022-10-25 VITALS
SYSTOLIC BLOOD PRESSURE: 106 MMHG | RESPIRATION RATE: 16 BRPM | BODY MASS INDEX: 23.02 KG/M2 | WEIGHT: 164.46 LBS | HEIGHT: 71 IN | DIASTOLIC BLOOD PRESSURE: 63 MMHG | TEMPERATURE: 98.6 F | HEART RATE: 73 BPM | OXYGEN SATURATION: 97 %

## 2022-10-25 LAB
GLUCOSE BLDC GLUCOMTR-MCNC: 201 MG/DL (ref 70–130)
GLUCOSE BLDC GLUCOMTR-MCNC: 218 MG/DL (ref 70–130)
GLUCOSE BLDC GLUCOMTR-MCNC: 404 MG/DL (ref 70–130)
GLUCOSE BLDC GLUCOMTR-MCNC: 65 MG/DL (ref 70–130)
GLUCOSE BLDC GLUCOMTR-MCNC: 98 MG/DL (ref 70–130)

## 2022-10-25 PROCEDURE — 82962 GLUCOSE BLOOD TEST: CPT

## 2022-10-25 PROCEDURE — 63710000001 INSULIN REGULAR HUMAN PER 5 UNITS: Performed by: INTERNAL MEDICINE

## 2022-10-25 PROCEDURE — 25010000002 HEPARIN LOCK FLUSH PER 10 UNITS: Performed by: INTERNAL MEDICINE

## 2022-10-25 PROCEDURE — 25010000002 HYDROMORPHONE 1 MG/ML SOLUTION: Performed by: INTERNAL MEDICINE

## 2022-10-25 PROCEDURE — 63710000001 DIPHENHYDRAMINE PER 50 MG: Performed by: INTERNAL MEDICINE

## 2022-10-25 PROCEDURE — 25010000002 PIPERACILLIN SOD-TAZOBACTAM PER 1 G: Performed by: INTERNAL MEDICINE

## 2022-10-25 RX ORDER — CIPROFLOXACIN AND FLUOCINOLONE ACETONIDE .75; .0625 MG/.25ML; MG/.25ML
0.25 SOLUTION AURICULAR (OTIC) 2 TIMES DAILY
Qty: 8 EACH | Refills: 0 | Status: SHIPPED | OUTPATIENT
Start: 2022-10-25 | End: 2022-10-29

## 2022-10-25 RX ADMIN — CITALOPRAM 40 MG: 40 TABLET, FILM COATED ORAL at 08:09

## 2022-10-25 RX ADMIN — INSULIN HUMAN 5 UNITS: 100 INJECTION, SOLUTION PARENTERAL at 12:20

## 2022-10-25 RX ADMIN — Medication 10 ML: at 08:10

## 2022-10-25 RX ADMIN — DIPHENHYDRAMINE HYDROCHLORIDE 25 MG: 25 CAPSULE ORAL at 09:42

## 2022-10-25 RX ADMIN — CIPROFLOXACIN AND FLUOCINOLONE ACETONIDE 0.25 ML: .75; .0625 SOLUTION AURICULAR (OTIC) at 08:09

## 2022-10-25 RX ADMIN — INSULIN GLARGINE-YFGN 20 UNITS: 100 INJECTION, SOLUTION SUBCUTANEOUS at 08:09

## 2022-10-25 RX ADMIN — LORAZEPAM 1 MG: 1 TABLET ORAL at 11:43

## 2022-10-25 RX ADMIN — Medication 500 UNITS: at 16:40

## 2022-10-25 RX ADMIN — INSULIN HUMAN 5 UNITS: 100 INJECTION, SOLUTION PARENTERAL at 09:49

## 2022-10-25 RX ADMIN — HYDROMORPHONE HYDROCHLORIDE 1 MG: 1 INJECTION, SOLUTION INTRAMUSCULAR; INTRAVENOUS; SUBCUTANEOUS at 09:38

## 2022-10-25 RX ADMIN — TAZOBACTAM SODIUM AND PIPERACILLIN SODIUM 3.38 G: 375; 3 INJECTION, SOLUTION INTRAVENOUS at 12:19

## 2022-10-25 RX ADMIN — TAZOBACTAM SODIUM AND PIPERACILLIN SODIUM 3.38 G: 375; 3 INJECTION, SOLUTION INTRAVENOUS at 05:41

## 2022-10-25 RX ADMIN — Medication 10 ML: at 08:09

## 2022-10-25 RX ADMIN — LORAZEPAM 1 MG: 1 TABLET ORAL at 00:07

## 2022-10-25 RX ADMIN — OXYCODONE AND ACETAMINOPHEN 1 TABLET: 5; 325 TABLET ORAL at 01:33

## 2022-10-25 NOTE — OUTREACH NOTE
Prep Survey    Flowsheet Row Responses   [facility] patient discharged from? [place]   Is LACE score < 7 ? No   Emergency Room discharge w/ pulse ox? No   Eligibility [facility]   Date of Admission 10/22/22   Date of Discharge 10/25/22   Discharge Disposition Home or Self Care   Discharge diagnosis Diabetic ketoacidosis without coma associated with type 1 diabetes mellitus    Does the patient have one of the following disease processes/diagnoses(primary or secondary)? Other   Does the patient have Home health ordered? No   Is there a DME ordered? No   Prep survey completed? Yes          VIRGINIA COSME - Registered Nurse

## 2022-10-26 ENCOUNTER — TRANSITIONAL CARE MANAGEMENT TELEPHONE ENCOUNTER (OUTPATIENT)
Dept: CALL CENTER | Facility: HOSPITAL | Age: 42
End: 2022-10-26

## 2022-10-26 ENCOUNTER — LAB REQUISITION (OUTPATIENT)
Dept: LAB | Facility: HOSPITAL | Age: 42
End: 2022-10-26

## 2022-10-26 DIAGNOSIS — Z00.00 ENCOUNTER FOR GENERAL ADULT MEDICAL EXAMINATION WITHOUT ABNORMAL FINDINGS: ICD-10-CM

## 2022-10-26 PROCEDURE — 87077 CULTURE AEROBIC IDENTIFY: CPT | Performed by: OTOLARYNGOLOGY

## 2022-10-26 PROCEDURE — 87205 SMEAR GRAM STAIN: CPT | Performed by: OTOLARYNGOLOGY

## 2022-10-26 PROCEDURE — 87186 SC STD MICRODIL/AGAR DIL: CPT | Performed by: OTOLARYNGOLOGY

## 2022-10-26 PROCEDURE — 87075 CULTR BACTERIA EXCEPT BLOOD: CPT | Performed by: OTOLARYNGOLOGY

## 2022-10-26 PROCEDURE — 87070 CULTURE OTHR SPECIMN AEROBIC: CPT | Performed by: OTOLARYNGOLOGY

## 2022-10-26 NOTE — OUTREACH NOTE
Call Center TCM Note    Flowsheet Row Responses   [facility] patient discharged from? [place]   Does the patient have one of the following disease processes/diagnoses(primary or secondary)? Other   TCM attempt successful? Yes   Call start time 1014   Call end time 1014   Discharge diagnosis Diabetic ketoacidosis without coma associated with type 1 diabetes mellitus    Person spoke with today (if not patient) and relationship Patient   Meds reviewed with patient/caregiver? Yes   Is the patient having any side effects they believe may be caused by any medication additions or changes? No   Does the patient have all medications ordered at discharge? Yes   Prescription comments Start taking OTOVEL   Is the patient taking all medications as directed (includes completed medication regime)? Yes   Does the patient have an appointment with their PCP within 7 days of discharge? No appointments available   Nursing Interventions Confirmed date/time of appointment, Routed TCM call to PCP office   Has home health visited the patient within 72 hours of discharge? N/A   Psychosocial issues? No   Did the patient receive a copy of their discharge instructions? Yes   Nursing interventions Reviewed instructions with patient   What is the patient's perception of their health status since discharge? Improving   Is the patient/caregiver able to teach back the hierarchy of who to call/visit for symptoms/problems? PCP, Specialist, Home health nurse, Urgent Care, ED, 911 Yes   TCM call completed? Yes   Wrap up additional comments Patient states she is doing well with no concerns. Attempted to make hospital fu no appt available will route call to office.   Call end time 1014   Would this patient benefit from a Referral to Amb Social Work? No   Is the patient interested in additional calls from an ambulatory ?  NOTE:  applies to high risk patients requiring additional follow-up. No          Millicent Prakash RN    10/26/2022,  10:15 EDT

## 2022-10-29 LAB
BACTERIA SPEC AEROBE CULT: ABNORMAL
GRAM STN SPEC: ABNORMAL

## 2022-10-31 LAB — BACTERIA SPEC ANAEROBE CULT: NORMAL

## 2022-11-02 ENCOUNTER — OFFICE VISIT (OUTPATIENT)
Dept: FAMILY MEDICINE CLINIC | Facility: CLINIC | Age: 42
End: 2022-11-02

## 2022-11-02 VITALS
BODY MASS INDEX: 23.19 KG/M2 | OXYGEN SATURATION: 99 % | DIASTOLIC BLOOD PRESSURE: 72 MMHG | HEIGHT: 70 IN | SYSTOLIC BLOOD PRESSURE: 117 MMHG | WEIGHT: 162 LBS | HEART RATE: 79 BPM | TEMPERATURE: 97.7 F

## 2022-11-02 DIAGNOSIS — E10.65 TYPE 1 DIABETES MELLITUS WITH HYPERGLYCEMIA: ICD-10-CM

## 2022-11-02 DIAGNOSIS — C50.919 METASTATIC BREAST CANCER: ICD-10-CM

## 2022-11-02 DIAGNOSIS — M79.645 THUMB PAIN, LEFT: ICD-10-CM

## 2022-11-02 DIAGNOSIS — F41.9 ANXIETY DISORDER, UNSPECIFIED: ICD-10-CM

## 2022-11-02 DIAGNOSIS — Z09 HOSPITAL DISCHARGE FOLLOW-UP: Primary | ICD-10-CM

## 2022-11-02 PROCEDURE — 99495 TRANSJ CARE MGMT MOD F2F 14D: CPT | Performed by: NURSE PRACTITIONER

## 2022-11-02 RX ORDER — BLOOD SUGAR DIAGNOSTIC
1 STRIP MISCELLANEOUS 4 TIMES DAILY
COMMUNITY
Start: 2022-10-28

## 2022-11-02 RX ORDER — CITALOPRAM 40 MG/1
40 TABLET ORAL DAILY
Qty: 90 TABLET | Refills: 1 | Status: SHIPPED | OUTPATIENT
Start: 2022-11-02

## 2022-11-02 RX ORDER — OXYCODONE HYDROCHLORIDE 5 MG/1
5 TABLET ORAL EVERY 6 HOURS PRN
COMMUNITY
Start: 2022-09-27

## 2022-11-02 NOTE — PROGRESS NOTES
"Chief Complaint  Hospital Follow Up Visit (Hospital f/u for ketoacidosis ) and Finger Injury (C/o L thumb pain, that pops from time to time )    Subjective        Sierra Mao presents to [facility]  History of Present Illness pt here for hospital follow up.  Admitted on 10/22/2022 and discharged on 10/25/2022.  Evidently she was admitted for diabetic ketoacidosis.  She has history of type 1 diabetes that has not been that well controlled.  She reports that her blood sugar has been fluctuating quite a bit with new cancer diagnosis and treatment for stage IV breast cancer.  She just started seeing new endocrinologist at [facility] and only had 1 appointment and not had meds adjusted yet.  She does report that prior to her previous endocrinologist retiring her long-acting insulin was decreased from 50-40 and she does not think she is getting enough insulin but not had time yet to follow-up with new Endo.  She very rarely has hypoglycemia.    Appears that left otitis externa triggered ketoacidosis and patient was treated with IV Zosyn and Cipro drops and has followed up with ENT as directed outpatient after discharge.  Symptoms are now resolved.      Her biggest concern today is left thumb pain that has been ongoing for a couple months.  She had an x-ray done by her PCP mid August due to a nodule on her finger to make sure it was not cancer.  No evidence of cancer on x-ray.  She does note some swelling and catching and feeling stuck at times due to nodule.  Nothing seems to help the pain and she would like this further evaluated    She needs refills on her citalopram which she has been taking and tolerating for several years.  She denies side effects.  Feels like dose is adequate and would like to continue.    Objective   Vital Signs:  /72   Pulse 79   Temp 97.7 °F (36.5 °C)   Ht 177.8 cm (70\")   Wt 73.5 kg (162 lb)   SpO2 99%   BMI 23.24 kg/m²   Estimated body mass index is " "23.24 kg/m² as calculated from the following:    Height as of this encounter: 177.8 cm (70\").    Weight as of this encounter: 73.5 kg (162 lb).    BMI is within normal parameters. No other follow-up for BMI required.      Physical Exam  Vitals and nursing note reviewed.   Constitutional:       General: She is not in acute distress.     Appearance: She is well-developed. She is not ill-appearing or diaphoretic.   HENT:      Head: Normocephalic and atraumatic.   Eyes:      General:         Right eye: No discharge.         Left eye: No discharge.      Conjunctiva/sclera: Conjunctivae normal.   Cardiovascular:      Rate and Rhythm: Normal rate and regular rhythm.      Heart sounds: Normal heart sounds.   Pulmonary:      Effort: Pulmonary effort is normal.      Breath sounds: Normal breath sounds.   Abdominal:      General: Bowel sounds are normal.      Palpations: Abdomen is soft.      Tenderness: There is no abdominal tenderness.   Musculoskeletal:         General: No deformity.      Left hand: Swelling and bony tenderness present. Decreased range of motion. Normal capillary refill. Normal pulse.      Comments: Gait smooth and steady   Skin:     General: Skin is warm and dry.   Neurological:      General: No focal deficit present.      Mental Status: She is alert and oriented to person, place, and time.   Psychiatric:         Mood and Affect: Mood normal.         Behavior: Behavior normal.         Thought Content: Thought content normal.        Result Review :                Assessment and Plan   Diagnoses and all orders for this visit:    1. Hospital discharge follow-up (Primary)    2. Type 1 diabetes mellitus with hyperglycemia (HCC)    3. Thumb pain, left  -     Ambulatory Referral to Hand Surgery    4. Anxiety disorder, unspecified  -     citalopram (CeleXA) 40 MG tablet; Take 1 tablet by mouth Daily.  Dispense: 90 tablet; Refill: 1    5. Metastatic breast cancer (HCC)    Reviewed hospital course, discharge " instructions with patient.  Medications reconciled.  Diabetes does seem to be a little labile and has been poorly controlled.  Most recent A1c was 8.4 which was down from 10 2 years ago and had been higher than this previously.  Labs are stable.  Mild anemia noted.  May have been due to illness.  Recommend patient to schedule follow-up with endocrinology soon as possible.  If not able to get in soon recommend follow-up with PCP to further discuss possible titration of insulin.    Will refer to hand specialty for left thumb nodule and pain.  Reviewed x-ray which was negative.  Suspect arthritic changes.  Would benefit from further evaluation per    Anxiety seems to be well controlled with current citalopram and will continue this.    Patient receiving chemotherapy for metastatic breast cancer.  Not yet started any radiation which is pending.  Seems to be doing as well as could be expected.           Follow Up   Return if symptoms worsen or fail to improve.  Patient was given instructions and counseling regarding her condition or for health maintenance advice. Please see specific information pulled into the AVS if appropriate.

## 2022-11-03 ENCOUNTER — READMISSION MANAGEMENT (OUTPATIENT)
Dept: CALL CENTER | Facility: HOSPITAL | Age: 42
End: 2022-11-03

## 2022-11-03 NOTE — OUTREACH NOTE
Medical Week 2 Survey    Flowsheet Row Responses   [facility] patient discharged from? [place]   Does the patient have one of the following disease processes/diagnoses(primary or secondary)? Other   Week 2 attempt successful? Yes   Call start time 1451   Discharge diagnosis Diabetic ketoacidosis without coma associated with type 1 diabetes mellitus    Call end time 1454   Person spoke with today (if not patient) and relationship Patient   Meds reviewed with patient/caregiver? Yes   Is the patient taking all medications as directed (includes completed medication regime)? Yes   Comments regarding PCP Patient had fu yesterday with pcp   Has the patient kept scheduled appointments due by today? N/A   Has home health visited the patient within 72 hours of discharge? N/A   Psychosocial issues? No   Did the patient receive a copy of their discharge instructions? Yes   Nursing interventions Reviewed instructions with patient   What is the patient's perception of their health status since discharge? Returned to baseline/stable   Is the patient/caregiver able to teach back the hierarchy of who to call/visit for symptoms/problems? PCP, Specialist, Home health nurse, Urgent Care, ED, 911 Yes   Week 2 Call Completed? Yes   Graduated Yes   Did the patient feel the follow up calls were helpful during their recovery period? Yes   Was the number of calls appropriate? Yes   Wrap up additional comments Patient seen pcp yesterday for hiospital fu patient states she is doing well with no concerns.          TORRIE DELUNA - Registered Nurse

## 2023-02-05 NOTE — H&P
Name: Sierra Mao ADMIT: 2021   : 1980  PCP: Stephania Swain MD    MRN: [id number] LOS: 0 days   AGE/SEX: 41 y.o. female  ROOM: Room/bed info not found       Chief complaint L breast calcs and mass, Right breast masses and abnormal ALNs    Present Illness or Internal History:  Patient is a 41 y.o. female presents with L breast calcs for stereo bx, L breast mass for US bx, R breast masses for 1-2 US bx, R ALN for US bx.     Past Surgical History:  Past Surgical History:   Procedure Laterality Date   • APPENDECTOMY     • BREAST BIOPSY Right        Past Medical History:  Past Medical History:   Diagnosis Date   • Anxiety    • Chronic fatigue    • Hyperlipidemia    • Leukocytosis    • Menorrhagia with regular cycle    • Seasonal allergies    • Type I diabetes mellitus (CMS/HCC)    • Vitamin D deficiency        Home Medications:  (Not in a hospital admission)      Allergies:  Patient has no known allergies.    Family History:  Family History   Problem Relation Age of Onset   • Diabetes Mother    • Cancer Maternal Grandmother         cervical/uterine    • Hearing loss Maternal Grandfather    • Diabetes Maternal Grandfather    • Breast cancer Neg Hx        Social History:  Social History     Tobacco Use   • Smoking status: Former Smoker     Packs/day: 0.50     Years: 12.00     Pack years: 6.00     Types: Cigarettes   • Smokeless tobacco: Never Used   Substance Use Topics   • Alcohol use: Yes     Comment: social   • Drug use: No        Objective     Physical Exam:    No exam performed today,    Vital Signs  Temp:  [98.2 °F (36.8 °C)] 98.2 °F (36.8 °C)  Heart Rate:  [76] 76  Resp:  [16] 16  BP: (116)/(64) 116/64    Anticipated Surgical Procedure:  Left breast stereotactic core needle biopsy, Left breast ultrasound guided core needle biopsy, Right breast ultrasound guided core needle biopsy 1-2 site, Right axillary lymph node ultrasound guided core needle biopsy    The risks, benefits and  alternatives of this procedure have been discussed with the patient or responsible party: Yes        Carol Terrazas MD  09/07/21  12:51 EDT                                           05-Feb-2023

## 2023-04-18 ENCOUNTER — OFFICE VISIT (OUTPATIENT)
Dept: ENDOCRINOLOGY | Age: 43
End: 2023-04-18
Payer: COMMERCIAL

## 2023-04-18 VITALS
BODY MASS INDEX: 22.48 KG/M2 | HEART RATE: 98 BPM | TEMPERATURE: 97.7 F | SYSTOLIC BLOOD PRESSURE: 110 MMHG | HEIGHT: 70 IN | DIASTOLIC BLOOD PRESSURE: 70 MMHG | WEIGHT: 157 LBS | OXYGEN SATURATION: 97 %

## 2023-04-18 DIAGNOSIS — E10.65 TYPE 1 DIABETES MELLITUS WITH HYPERGLYCEMIA: Primary | ICD-10-CM

## 2023-04-18 RX ORDER — INSULIN ASPART 100 [IU]/ML
INJECTION, SOLUTION INTRAVENOUS; SUBCUTANEOUS
Qty: 15 ML | Refills: 11 | Status: SHIPPED | OUTPATIENT
Start: 2023-04-18

## 2023-04-18 RX ORDER — PEN NEEDLE, DIABETIC 32GX 5/32"
1 NEEDLE, DISPOSABLE MISCELLANEOUS 4 TIMES DAILY
Qty: 120 EACH | Refills: 11 | Status: SHIPPED | OUTPATIENT
Start: 2023-04-18

## 2023-04-18 RX ORDER — BLOOD-GLUCOSE SENSOR
1 EACH MISCELLANEOUS
Qty: 3 EACH | Refills: 11 | Status: SHIPPED | OUTPATIENT
Start: 2023-04-18

## 2023-04-18 RX ORDER — DEXTROAMPHETAMINE SACCHARATE, AMPHETAMINE ASPARTATE, DEXTROAMPHETAMINE SULFATE AND AMPHETAMINE SULFATE 5; 5; 5; 5 MG/1; MG/1; MG/1; MG/1
TABLET ORAL
COMMUNITY
Start: 2023-03-23

## 2023-04-18 RX ORDER — ALPRAZOLAM 0.5 MG/1
0.5 TABLET ORAL 3 TIMES DAILY PRN
COMMUNITY
Start: 2023-03-22

## 2023-04-18 RX ORDER — BLOOD SUGAR DIAGNOSTIC
1 STRIP MISCELLANEOUS 4 TIMES DAILY
Qty: 120 EACH | Refills: 11 | Status: SHIPPED | OUTPATIENT
Start: 2023-04-18

## 2023-04-18 RX ORDER — BLOOD-GLUCOSE,RECEIVER,CONT
1 EACH MISCELLANEOUS ONCE
Qty: 1 EACH | Refills: 0 | Status: SHIPPED | OUTPATIENT
Start: 2023-04-18 | End: 2023-04-18

## 2023-04-18 RX ORDER — INSULIN GLARGINE 100 [IU]/ML
30 INJECTION, SOLUTION SUBCUTANEOUS NIGHTLY
Qty: 9 ML | Refills: 11 | Status: SHIPPED | OUTPATIENT
Start: 2023-04-18

## 2023-04-18 NOTE — PROGRESS NOTES
Referring provider: Lissette Humphries MD     Reason for consult: T1DM    HPI:   - 42 year old female here for management of diabetes mellitus type 1  - Has had diabetes for 5 years  - No known complications to date  - Is currently taking Toujeo 30 units daily and regular insulin 1 unit for every 10 grams and 1 unit for ever 50 over 150 in BG  - She is having an eye exam this week  - I do not have any recent BG data  - She is undergoing treatment for stage 4 breast cancer right now      The following portions of the patient's history were reviewed and updated as appropriate: allergies, current medications, past family history, past medical history, past social history, past surgical history and problem list.      Objective     Vitals:    04/18/23 1417   BP: 110/70   Pulse: 98   Temp: 97.7 °F (36.5 °C)   SpO2: 97%        Physical Exam  Constitutional:       Appearance: Normal appearance.   HENT:      Head: Normocephalic and atraumatic.   Eyes:      General: No scleral icterus.     Conjunctiva/sclera: Conjunctivae normal.   Pulmonary:      Effort: Pulmonary effort is normal. No respiratory distress.   Neurological:      Mental Status: She is alert.   Psychiatric:         Mood and Affect: Mood normal.         Behavior: Behavior normal.         Thought Content: Thought content normal.         Judgment: Judgment normal.         Assessment & Plan   1. Type 1 DM, uncontrolled  - Her last A1c was 8.4% in 10/2022  - Will start Basaglar 30 units nightly and Novolog 1 unit for every 10 grams and 1 unit for ever 50 over 150 in BG  - Will send in prescription for DexCom G7    - Return to clinic in 3 months

## 2023-05-04 ENCOUNTER — PRIOR AUTHORIZATION (OUTPATIENT)
Dept: ENDOCRINOLOGY | Age: 43
End: 2023-05-04
Payer: COMMERCIAL

## 2023-10-16 ENCOUNTER — PRIOR AUTHORIZATION (OUTPATIENT)
Dept: ENDOCRINOLOGY | Age: 43
End: 2023-10-16
Payer: COMMERCIAL

## 2023-10-16 NOTE — TELEPHONE ENCOUNTER
DAT MACK Key: [id number] - PA Case ID: [id number] - Rx #: [id number] help? Call us at [phone]  Status  Sent to [employer]  Drug  Insulin Aspart FlexPen 100UNIT/ML pen-injectors  Form  [employer] PA Form (2017 NCPDP)  Original Claim Info  Branden ORTIZ TRY GENERICS FIRST OR CALL [id number].NON-FORMULARY DRUG, CONTACT PRESCRIBER For [employer] Coupon Price of: $143.52 submit to BIN: [id number] PCN: [id number] Group: [id number] --Service provided at no cost and no switch fee to the pharmacy--

## 2023-11-26 ENCOUNTER — APPOINTMENT (OUTPATIENT)
Dept: CT IMAGING | Facility: HOSPITAL | Age: 43
End: 2023-11-26
Payer: COMMERCIAL

## 2023-11-26 ENCOUNTER — HOSPITAL ENCOUNTER (EMERGENCY)
Facility: HOSPITAL | Age: 43
Discharge: HOME | End: 2023-11-27
Attending: EMERGENCY MEDICINE | Admitting: EMERGENCY MEDICINE
Payer: COMMERCIAL

## 2023-11-26 ENCOUNTER — APPOINTMENT (OUTPATIENT)
Dept: GENERAL RADIOLOGY | Facility: HOSPITAL | Age: 43
End: 2023-11-26
Payer: COMMERCIAL

## 2023-11-26 VITALS
SYSTOLIC BLOOD PRESSURE: 125 MMHG | DIASTOLIC BLOOD PRESSURE: 68 MMHG | HEART RATE: 112 BPM | OXYGEN SATURATION: 100 % | RESPIRATION RATE: 18 BRPM | BODY MASS INDEX: 22.4 KG/M2 | TEMPERATURE: 99 F | WEIGHT: 160 LBS | HEIGHT: 71 IN

## 2023-11-26 DIAGNOSIS — R10.32 LEFT LOWER QUADRANT ABDOMINAL PAIN: Primary | ICD-10-CM

## 2023-11-26 DIAGNOSIS — C79.81 MALIGNANT NEOPLASM METASTATIC TO BREAST: ICD-10-CM

## 2023-11-26 DIAGNOSIS — K59.03 DRUG-INDUCED CONSTIPATION: ICD-10-CM

## 2023-11-26 LAB
ALBUMIN SERPL-MCNC: 3.1 G/DL (ref 3.5–5.2)
ALBUMIN/GLOB SERPL: 0.9 G/DL
ALP SERPL-CCNC: 293 U/L (ref 39–117)
ALT SERPL W P-5'-P-CCNC: 42 U/L (ref 1–33)
ANION GAP SERPL CALCULATED.3IONS-SCNC: 10.8 MMOL/L (ref 5–15)
AST SERPL-CCNC: 76 U/L (ref 1–32)
BASOPHILS # BLD AUTO: 0.01 10*3/MM3 (ref 0–0.2)
BASOPHILS NFR BLD AUTO: 0.1 % (ref 0–1.5)
BILIRUB SERPL-MCNC: 0.3 MG/DL (ref 0–1.2)
BILIRUB UR QL STRIP: ABNORMAL
BUN SERPL-MCNC: 9 MG/DL (ref 6–20)
BUN/CREAT SERPL: 15.5 (ref 7–25)
CALCIUM SPEC-SCNC: 9 MG/DL (ref 8.6–10.5)
CHLORIDE SERPL-SCNC: 94 MMOL/L (ref 98–107)
CLARITY UR: CLEAR
CO2 SERPL-SCNC: 26.2 MMOL/L (ref 22–29)
COLOR UR: ABNORMAL
CREAT SERPL-MCNC: 0.58 MG/DL (ref 0.57–1)
DEPRECATED RDW RBC AUTO: 57.5 FL (ref 37–54)
EGFRCR SERPLBLD CKD-EPI 2021: 116 ML/MIN/1.73
EOSINOPHIL # BLD AUTO: 0.05 10*3/MM3 (ref 0–0.4)
EOSINOPHIL NFR BLD AUTO: 0.7 % (ref 0.3–6.2)
ERYTHROCYTE [DISTWIDTH] IN BLOOD BY AUTOMATED COUNT: 17 % (ref 12.3–15.4)
GLOBULIN UR ELPH-MCNC: 3.5 GM/DL
GLUCOSE SERPL-MCNC: 201 MG/DL (ref 65–99)
GLUCOSE UR STRIP-MCNC: ABNORMAL MG/DL
HCG SERPL QL: NEGATIVE
HCT VFR BLD AUTO: 39.9 % (ref 34–46.6)
HGB BLD-MCNC: 12 G/DL (ref 12–15.9)
HGB UR QL STRIP.AUTO: NEGATIVE
HOLD SPECIMEN: NORMAL
HOLD SPECIMEN: NORMAL
IMM GRANULOCYTES # BLD AUTO: 0.02 10*3/MM3 (ref 0–0.05)
IMM GRANULOCYTES NFR BLD AUTO: 0.3 % (ref 0–0.5)
KETONES UR QL STRIP: ABNORMAL
LEUKOCYTE ESTERASE UR QL STRIP.AUTO: NEGATIVE
LIPASE SERPL-CCNC: 7 U/L (ref 13–60)
LYMPHOCYTES # BLD AUTO: 0.6 10*3/MM3 (ref 0.7–3.1)
LYMPHOCYTES NFR BLD AUTO: 8.8 % (ref 19.6–45.3)
MCH RBC QN AUTO: 27.9 PG (ref 26.6–33)
MCHC RBC AUTO-ENTMCNC: 30.1 G/DL (ref 31.5–35.7)
MCV RBC AUTO: 92.8 FL (ref 79–97)
MONOCYTES # BLD AUTO: 0.25 10*3/MM3 (ref 0.1–0.9)
MONOCYTES NFR BLD AUTO: 3.7 % (ref 5–12)
NEUTROPHILS NFR BLD AUTO: 5.88 10*3/MM3 (ref 1.7–7)
NEUTROPHILS NFR BLD AUTO: 86.4 % (ref 42.7–76)
NITRITE UR QL STRIP: NEGATIVE
PH UR STRIP.AUTO: 6 [PH] (ref 5–8)
PLATELET # BLD AUTO: 277 10*3/MM3 (ref 140–450)
PMV BLD AUTO: 10.7 FL (ref 6–12)
POTASSIUM SERPL-SCNC: 4.4 MMOL/L (ref 3.5–5.2)
PROT SERPL-MCNC: 6.6 G/DL (ref 6–8.5)
PROT UR QL STRIP: ABNORMAL
RBC # BLD AUTO: 4.3 10*6/MM3 (ref 3.77–5.28)
SODIUM SERPL-SCNC: 131 MMOL/L (ref 136–145)
SP GR UR STRIP: 1.02 (ref 1–1.03)
UROBILINOGEN UR QL STRIP: ABNORMAL
WBC NRBC COR # BLD AUTO: 6.81 10*3/MM3 (ref 3.4–10.8)
WHOLE BLOOD HOLD SPECIMEN: NORMAL

## 2023-11-26 PROCEDURE — 74177 CT ABD & PELVIS W/CONTRAST: CPT

## 2023-11-26 PROCEDURE — 96361 HYDRATE IV INFUSION ADD-ON: CPT

## 2023-11-26 PROCEDURE — 96376 TX/PRO/DX INJ SAME DRUG ADON: CPT

## 2023-11-26 PROCEDURE — 99285 EMERGENCY DEPT VISIT HI MDM: CPT

## 2023-11-26 PROCEDURE — 83690 ASSAY OF LIPASE: CPT | Performed by: EMERGENCY MEDICINE

## 2023-11-26 PROCEDURE — 25010000002 HYDROMORPHONE 1 MG/ML SOLUTION: Performed by: EMERGENCY MEDICINE

## 2023-11-26 PROCEDURE — 96374 THER/PROPH/DIAG INJ IV PUSH: CPT

## 2023-11-26 PROCEDURE — 25510000001 IOPAMIDOL PER 1 ML: Performed by: EMERGENCY MEDICINE

## 2023-11-26 PROCEDURE — 80053 COMPREHEN METABOLIC PANEL: CPT | Performed by: EMERGENCY MEDICINE

## 2023-11-26 PROCEDURE — 36415 COLL VENOUS BLD VENIPUNCTURE: CPT

## 2023-11-26 PROCEDURE — 84703 CHORIONIC GONADOTROPIN ASSAY: CPT | Performed by: EMERGENCY MEDICINE

## 2023-11-26 PROCEDURE — 85025 COMPLETE CBC W/AUTO DIFF WBC: CPT | Performed by: EMERGENCY MEDICINE

## 2023-11-26 PROCEDURE — 82550 ASSAY OF CK (CPK): CPT | Performed by: EMERGENCY MEDICINE

## 2023-11-26 PROCEDURE — 81001 URINALYSIS AUTO W/SCOPE: CPT | Performed by: EMERGENCY MEDICINE

## 2023-11-26 PROCEDURE — 25810000003 SODIUM CHLORIDE 0.9 % SOLUTION: Performed by: EMERGENCY MEDICINE

## 2023-11-26 PROCEDURE — 71045 X-RAY EXAM CHEST 1 VIEW: CPT

## 2023-11-26 RX ORDER — SODIUM CHLORIDE 0.9 % (FLUSH) 0.9 %
10 SYRINGE (ML) INJECTION AS NEEDED
Status: DISCONTINUED | OUTPATIENT
Start: 2023-11-26 | End: 2023-11-27 | Stop reason: HOSPADM

## 2023-11-26 RX ADMIN — IOPAMIDOL 100 ML: 755 INJECTION, SOLUTION INTRAVENOUS at 22:55

## 2023-11-26 RX ADMIN — HYDROMORPHONE HYDROCHLORIDE 1 MG: 1 INJECTION, SOLUTION INTRAMUSCULAR; INTRAVENOUS; SUBCUTANEOUS at 23:51

## 2023-11-26 RX ADMIN — HYDROMORPHONE HYDROCHLORIDE 1 MG: 1 INJECTION, SOLUTION INTRAMUSCULAR; INTRAVENOUS; SUBCUTANEOUS at 22:14

## 2023-11-26 RX ADMIN — SODIUM CHLORIDE 1000 ML: 9 INJECTION, SOLUTION INTRAVENOUS at 22:13

## 2023-11-26 RX ADMIN — SODIUM CHLORIDE 1000 ML: 9 INJECTION, SOLUTION INTRAVENOUS at 23:32

## 2023-11-27 LAB
BACTERIA UR QL AUTO: ABNORMAL /HPF
CK SERPL-CCNC: 30 U/L (ref 20–180)
GLUCOSE BLDC GLUCOMTR-MCNC: 146 MG/DL (ref 70–130)
HYALINE CASTS UR QL AUTO: ABNORMAL /LPF
RBC # UR STRIP: ABNORMAL /HPF
REF LAB TEST METHOD: ABNORMAL
SQUAMOUS #/AREA URNS HPF: ABNORMAL /HPF
WBC # UR STRIP: ABNORMAL /HPF

## 2023-11-27 PROCEDURE — 82948 REAGENT STRIP/BLOOD GLUCOSE: CPT

## 2023-11-27 RX ORDER — POLYETHYLENE GLYCOL 3350 17 G/17G
17 POWDER, FOR SOLUTION ORAL DAILY
Qty: 7 PACKET | Refills: 0 | Status: ON HOLD | OUTPATIENT
Start: 2023-11-27 | End: 2023-12-04

## 2023-11-27 RX ORDER — ONDANSETRON 4 MG/1
4 TABLET, ORALLY DISINTEGRATING ORAL EVERY 8 HOURS PRN
Qty: 6 TABLET | Refills: 0 | Status: ON HOLD | OUTPATIENT
Start: 2023-11-27

## 2023-11-27 RX ORDER — DOCUSATE SODIUM 100 MG/1
100 CAPSULE, LIQUID FILLED ORAL 2 TIMES DAILY
Qty: 20 CAPSULE | Refills: 0 | Status: ON HOLD | OUTPATIENT
Start: 2023-11-27

## 2023-11-27 NOTE — FSED PROVIDER NOTE
"Subjective   History of Present Illness  42yo female pmh significant dm1/stage IV breast ca/appendectomy presents ED c/o 2d hx progressive LLQ abdominal pain characterized as \"burning\"/radiating thru to back/associated dysuria, \"dark/malodorous\" urine.  ROS neg fever/chills/chest pain/soa/cough/n/v/d/melena/hematochoezia/hematemesis.    History provided by:  Patient  Abdominal Pain  Pain location:  LLQ  Pain quality: burning    Pain radiates to:  Back  Pain severity:  Severe  Onset quality:  Gradual  Duration:  2 days  Associated symptoms: dysuria    Associated symptoms: no diarrhea, no nausea and no vomiting        Review of Systems   Constitutional: Negative.    HENT: Negative.     Eyes: Negative.    Respiratory: Negative.     Cardiovascular: Negative.    Gastrointestinal:  Positive for abdominal pain. Negative for diarrhea, nausea and vomiting.   Genitourinary:  Positive for dysuria.   Musculoskeletal:  Positive for back pain.   Allergic/Immunologic: Positive for immunocompromised state.   All other systems reviewed and are negative.      Past Medical History:   Diagnosis Date    Anxiety     Arthritis     Breast cancer 09/07/2021    Right breast invasive ductal carcinoma ER low positive, [place] negative, CLZ5ofp negative, mercedes to lymph node (biopsy positive)    Chronic fatigue     Diabetes mellitus with stage 3 chronic kidney disease     Encounter for fitting and adjustment of vascular catheter 10/21/2021    Hyperlipidemia     Leukocytosis     Menorrhagia with regular cycle     Seasonal allergies     Type I diabetes mellitus     Vitamin D deficiency        No Known Allergies    Past Surgical History:   Procedure Laterality Date    APPENDECTOMY N/A 1995    BREAST BIOPSY Right 2018    BREAST BIOPSY Bilateral 09/07/2021    US GUIDED LYMPH NODE BIOPSY  09/07/2021    Dr. Carol Terrazas, [facility]    US GUIDED LYMPH NODE BIOPSY  09/28/2021    VENOUS ACCESS DEVICE (PORT) INSERTION N/A 10/15/2021    Procedure: INSERTION VENOUS " ACCESS DEVICE;  Surgeon: Mariposa Price MD;  Location: [facility] OR;  Service: General;  Laterality: N/A;       Family History   Problem Relation Age of Onset    Diabetes Mother     Cervical cancer Maternal Grandmother         cervical/uterine     Diabetes Maternal Grandfather     Cancer Father     Lymphoma Paternal Aunt 60    Breast cancer Neg Hx     Malig Hyperthermia Neg Hx        Social History     Socioeconomic History    Marital status:    Tobacco Use    Smoking status: Former     Packs/day: 0.50     Years: 8.00     Additional pack years: 0.00     Total pack years: 4.00     Types: Cigarettes     Start date: 1995     Quit date: 2003     Years since quittin.9    Smokeless tobacco: Former    Tobacco comments:     teen / young adult   Vaping Use    Vaping Use: Never used   Substance and Sexual Activity    Alcohol use: Yes     Comment: social    Drug use: No    Sexual activity: Defer     Partners: Male     Birth control/protection: None     Comment:            Objective   Physical Exam  Vitals and nursing note reviewed.   Constitutional:       General: She is in acute distress.      Appearance: She is well-developed. She is ill-appearing.   HENT:      Head: Normocephalic and atraumatic.      Right Ear: External ear normal.      Left Ear: External ear normal.      Nose: Nose normal.      Mouth/Throat:      Mouth: Mucous membranes are moist.   Eyes:      Pupils: Pupils are equal, round, and reactive to light.   Cardiovascular:      Rate and Rhythm: Normal rate and regular rhythm.      Pulses: Normal pulses.      Heart sounds: Normal heart sounds. No murmur heard.     No friction rub. No gallop.   Pulmonary:      Effort: Pulmonary effort is normal.      Breath sounds: Normal breath sounds. No wheezing, rhonchi or rales.   Chest:       Abdominal:      General: Abdomen is flat. There is no distension.      Palpations: Abdomen is soft. There is no mass.      Tenderness: There is  abdominal tenderness in the left lower quadrant. There is no guarding or rebound. Negative signs include Lebron's sign.      Hernia: There is no hernia in the umbilical area or ventral area.       Musculoskeletal:      Cervical back: Normal range of motion and neck supple. No rigidity.      Right lower leg: No edema.      Left lower leg: No edema.   Lymphadenopathy:      Cervical: No cervical adenopathy.   Skin:     General: Skin is warm and dry.   Neurological:      General: No focal deficit present.      Mental Status: She is alert and oriented to person, place, and time.      GCS: GCS eye subscore is 4. GCS verbal subscore is 5. GCS motor subscore is 6.         Procedures           ED Course  ED Course as of 11/27/23 0046   Mon Nov 27, 2023   0045 Repeat accucheck 146mg/dl at time of discharge [SD]      ED Course User Index  [SD] Jack Phelps MD      Labs Reviewed   URINALYSIS WITHOUT MICROSCOPIC (NO CULTURE) - Abnormal; Notable for the following components:       Result Value    Color, UA Dark Yellow (*)     Glucose,  mg/dL (2+) (*)     Ketones, UA 40 mg/dL (2+) (*)     Bilirubin, UA Small (1+) (*)     Protein, UA 30 mg/dL (1+) (*)     Urobilinogen, UA 4.0 E.U./dL (*)     All other components within normal limits   COMPREHENSIVE METABOLIC PANEL - Abnormal; Notable for the following components:    Glucose 201 (*)     Sodium 131 (*)     Chloride 94 (*)     Albumin 3.1 (*)     ALT (SGPT) 42 (*)     AST (SGOT) 76 (*)     Alkaline Phosphatase 293 (*)     All other components within normal limits    Narrative:     GFR Normal >60  Chronic Kidney Disease <60  Kidney Failure <15     LIPASE - Abnormal; Notable for the following components:    Lipase 7 (*)     All other components within normal limits   CBC WITH AUTO DIFFERENTIAL - Abnormal; Notable for the following components:    MCHC 30.1 (*)     RDW 17.0 (*)     RDW-SD 57.5 (*)     Neutrophil % 86.4 (*)     Lymphocyte % 8.8 (*)     Monocyte % 3.7 (*)      Lymphocytes, Absolute 0.60 (*)     All other components within normal limits   HCG, SERUM, QUALITATIVE - Normal   RAINBOW URINE CULTURE TUBE   RAINBOW DRAW    Narrative:     The following orders were created for panel order [place] Draw.  Procedure                               Abnormality         Status                     ---------                               -----------         ------                     Green Top (Gel)[[id number]]                                  Final result               Lavender Top[[id number]]                                     Final result               Gold Top - SST[[id number]]                                                              Light Blue Top[[id number]]                                                              Green Top (Gel)[[id number]]                                                               Please view results for these tests on the individual orders.   URINALYSIS, MICROSCOPIC ONLY   CK   CBC AND DIFFERENTIAL    Narrative:     The following orders were created for panel order CBC & Differential.  Procedure                               Abnormality         Status                     ---------                               -----------         ------                     CBC Auto Differential[[id number]]        Abnormal            Final result                 Please view results for these tests on the individual orders.   GREEN TOP   LAVENDER TOP   LIGHT BLUE TOP     CT Abdomen Pelvis With Contrast    Result Date: 11/26/2023  Narrative: CT OF THE ABDOMEN AND PELVIS WITH CONTRAST  HISTORY: Flank pain  COMPARISON: 7/21 2021  TECHNIQUE: Axial CT imaging was obtained through the abdomen and pelvis. IV contrast was administered.  FINDINGS: The patient right breast is replaced with lobulated soft tissue. This extends into the right axilla. There is asymmetric thickening of the posterolateral thoracic wall musculature, as well as the right pectoralis musculature. The skin is  thickened. Areas of subcutaneous gas are noted more superficially within the right breast, suggesting necrosis. Appearance is overall in keeping with diffuse malignant replacement. There is dependent atelectasis which is noted at both lung bases. There is a trace right pleural effusion. There is hepatic metastatic disease. For example, a lesion within the right lobe of the liver measures at least 7.3 x 6.0 cm. This was not visible on prior PET. Calcified granulomata are noted within the spleen. The stomach, duodenum, adrenal glands, and pancreas are normal. There is some mild nonspecific gallbladder wall edema. Liver is enlarged, measuring up to 20.8 cm in craniocaudal dimensions. There is suspected soft tissue within the angela hepatis, which mildly attenuates the main portal vein. It does appear to remain patent. There is a prominent node which is seen anterior to the common hepatic artery, measuring at least 1.5 cm in short axis dimensions. The kidneys enhance symmetrically. There is no hydronephrosis. No distal ureteral or bladder stones are seen. Uterus appears normal. There is no bowel obstruction. Increase stool burden is seen within the colon, which may reflect constipation. There are enlarged retroperitoneal lymph nodes. For example, a left para-aortic node measures up to 2.4 x 2.0 cm. Patient does appear osteopenic.       Impression:  1. Fungating/infiltrating right chest wall/breast mass 2. Hepatic metastatic disease, as well as retroperitoneal and upper abdominal adenopathy. 3. Increased stool burden within the colon may reflect some constipation.  Radiation dose reduction techniques were utilized, including automated exposure control and exposure modulation based on body size.   This report was finalized on 11/26/2023 11:55 PM by Dr. Marleny Ayon M.D on Workstation: [id number]      XR Chest 1 View    Result Date: 11/26/2023  Narrative: SINGLE VIEW OF THE CHEST  HISTORY: Abdominal pain  COMPARISON:  August 12, 2023  FINDINGS: Left internal jugular vein Mediport is stable in position. There is irregularity of the right chest wall. Cardiac silhouette is stable. No pneumothorax or pleural effusion is seen. Calcified right paratracheal nodes are noted. No definite acute infiltrates are seen.      Impression: No acute intrathoracic findings.    This report was finalized on 11/26/2023 11:15 PM by Dr. Marleny Ayon M.D on Workstation: [id number]                                          Medical Decision Making  Labs/radiographic findings reviewed.  CBC: unremarkable.  CMP: notable for glucose 201mg/dl without evidence dka/hhs; elevated LFT.  CXR: no active disease.  CT abd/pelvis: significant hepatic metastases/abdominal adenopathy; no evidence bowel obstruction/incidental constipation.  UA: significant urobilinogen/glucosuria/ketonuria.  No evidence peritonitis/obstruction/perforation/hemorrhage/sepsis.  Pt stable discharge with oncology followup 11.28.2023.  plan miralax 17g po daily/colace 100mg po bid/zofran 4mg odt prn.  Return precautions provided.    Problems Addressed:  Drug-induced constipation: complicated acute illness or injury  Left lower quadrant abdominal pain: complicated acute illness or injury  Malignant neoplasm metastatic to breast: complicated acute illness or injury    Amount and/or Complexity of Data Reviewed  Labs: ordered.  Radiology: ordered.    Risk  OTC drugs.  Prescription drug management.        Final diagnoses:   Left lower quadrant abdominal pain   Drug-induced constipation   Malignant neoplasm metastatic to breast       ED Disposition  ED Disposition       ED Disposition   Discharge    Condition   Good    Comment   --               Stephania Swain MD  [address] [address]  [phone]    In 1 day           Medication List        New Prescriptions      docusate sodium 100 MG capsule  Commonly known as: COLACE  Take 1 capsule by mouth 2 (Two) Times a  Day.     ondansetron ODT 4 MG disintegrating tablet  Commonly known as: ZOFRAN-ODT  Place 1 tablet on the tongue Every 8 (Eight) Hours As Needed for Nausea or Vomiting.     polyethylene glycol 17 g packet  Commonly known as: MIRALAX  Take 17 g by mouth Daily for 7 days.               Where to Get Your Medications        These medications were sent to [facility] - [place], [place] - [address] [facility] - [phone]  - [phone] [address], [facility] [address]      Phone: [phone]   docusate sodium 100 MG capsule  ondansetron ODT 4 MG disintegrating tablet  polyethylene glycol 17 g packet

## 2023-12-01 ENCOUNTER — APPOINTMENT (OUTPATIENT)
Dept: GENERAL RADIOLOGY | Facility: HOSPITAL | Age: 43
End: 2023-12-01
Payer: COMMERCIAL

## 2023-12-01 ENCOUNTER — HOSPITAL ENCOUNTER (EMERGENCY)
Facility: HOSPITAL | Age: 43
Discharge: HOME | End: 2023-12-01
Attending: EMERGENCY MEDICINE
Payer: COMMERCIAL

## 2023-12-01 ENCOUNTER — APPOINTMENT (OUTPATIENT)
Dept: CT IMAGING | Facility: HOSPITAL | Age: 43
End: 2023-12-01
Payer: COMMERCIAL

## 2023-12-01 VITALS
BODY MASS INDEX: 23.8 KG/M2 | HEART RATE: 90 BPM | SYSTOLIC BLOOD PRESSURE: 113 MMHG | DIASTOLIC BLOOD PRESSURE: 67 MMHG | RESPIRATION RATE: 18 BRPM | HEIGHT: 71 IN | OXYGEN SATURATION: 96 % | TEMPERATURE: 98.3 F | WEIGHT: 170 LBS

## 2023-12-01 DIAGNOSIS — R93.89 ABNORMAL CT OF THE CHEST: ICD-10-CM

## 2023-12-01 DIAGNOSIS — R91.8 PULMONARY INFILTRATES: ICD-10-CM

## 2023-12-01 DIAGNOSIS — C50.919 MALIGNANT NEOPLASM OF FEMALE BREAST, UNSPECIFIED ESTROGEN RECEPTOR STATUS, UNSPECIFIED LATERALITY, UNSPECIFIED SITE OF BREAST: ICD-10-CM

## 2023-12-01 DIAGNOSIS — R09.02 HYPOXIA: Primary | ICD-10-CM

## 2023-12-01 LAB
ALBUMIN SERPL-MCNC: 2.9 G/DL (ref 3.5–5.2)
ALBUMIN/GLOB SERPL: 1 G/DL
ALP SERPL-CCNC: 241 U/L (ref 39–117)
ALT SERPL W P-5'-P-CCNC: 29 U/L (ref 1–33)
ANION GAP SERPL CALCULATED.3IONS-SCNC: 10.1 MMOL/L (ref 5–15)
AST SERPL-CCNC: 74 U/L (ref 1–32)
BASOPHILS # BLD AUTO: 0.01 10*3/MM3 (ref 0–0.2)
BASOPHILS NFR BLD AUTO: 0.1 % (ref 0–1.5)
BILIRUB SERPL-MCNC: 0.3 MG/DL (ref 0–1.2)
BUN SERPL-MCNC: 11 MG/DL (ref 6–20)
BUN/CREAT SERPL: 19 (ref 7–25)
CALCIUM SPEC-SCNC: 8.4 MG/DL (ref 8.6–10.5)
CHLORIDE SERPL-SCNC: 96 MMOL/L (ref 98–107)
CO2 SERPL-SCNC: 23.9 MMOL/L (ref 22–29)
CREAT SERPL-MCNC: 0.58 MG/DL (ref 0.57–1)
DEPRECATED RDW RBC AUTO: 57.7 FL (ref 37–54)
EGFRCR SERPLBLD CKD-EPI 2021: 116 ML/MIN/1.73
EOSINOPHIL # BLD AUTO: 0.06 10*3/MM3 (ref 0–0.4)
EOSINOPHIL NFR BLD AUTO: 0.7 % (ref 0.3–6.2)
ERYTHROCYTE [DISTWIDTH] IN BLOOD BY AUTOMATED COUNT: 17.6 % (ref 12.3–15.4)
FLUAV SUBTYP SPEC NAA+PROBE: NOT DETECTED
FLUBV RNA ISLT QL NAA+PROBE: NOT DETECTED
GLOBULIN UR ELPH-MCNC: 2.9 GM/DL
GLUCOSE BLDC GLUCOMTR-MCNC: 263 MG/DL (ref 70–130)
GLUCOSE SERPL-MCNC: 211 MG/DL (ref 65–99)
HCG SERPL QL: NEGATIVE
HCT VFR BLD AUTO: 30.1 % (ref 34–46.6)
HGB BLD-MCNC: 9.4 G/DL (ref 12–15.9)
IMM GRANULOCYTES # BLD AUTO: 0.05 10*3/MM3 (ref 0–0.05)
IMM GRANULOCYTES NFR BLD AUTO: 0.6 % (ref 0–0.5)
LYMPHOCYTES # BLD AUTO: 0.85 10*3/MM3 (ref 0.7–3.1)
LYMPHOCYTES NFR BLD AUTO: 9.4 % (ref 19.6–45.3)
MCH RBC QN AUTO: 28.3 PG (ref 26.6–33)
MCHC RBC AUTO-ENTMCNC: 31.2 G/DL (ref 31.5–35.7)
MCV RBC AUTO: 90.7 FL (ref 79–97)
MONOCYTES # BLD AUTO: 0.75 10*3/MM3 (ref 0.1–0.9)
MONOCYTES NFR BLD AUTO: 8.3 % (ref 5–12)
NEUTROPHILS NFR BLD AUTO: 7.33 10*3/MM3 (ref 1.7–7)
NEUTROPHILS NFR BLD AUTO: 80.9 % (ref 42.7–76)
NT-PROBNP SERPL-MCNC: 223.2 PG/ML (ref 0–450)
PLATELET # BLD AUTO: 343 10*3/MM3 (ref 140–450)
PMV BLD AUTO: 9.8 FL (ref 6–12)
POTASSIUM SERPL-SCNC: 4.7 MMOL/L (ref 3.5–5.2)
PROT SERPL-MCNC: 5.8 G/DL (ref 6–8.5)
QT INTERVAL: 343 MS
QTC INTERVAL: 443 MS
RBC # BLD AUTO: 3.32 10*6/MM3 (ref 3.77–5.28)
SARS-COV-2 RNA RESP QL NAA+PROBE: NOT DETECTED
SODIUM SERPL-SCNC: 130 MMOL/L (ref 136–145)
TROPONIN T SERPL HS-MCNC: 13 NG/L
WBC NRBC COR # BLD AUTO: 9.05 10*3/MM3 (ref 3.4–10.8)

## 2023-12-01 PROCEDURE — 99285 EMERGENCY DEPT VISIT HI MDM: CPT

## 2023-12-01 PROCEDURE — 25510000001 IOPAMIDOL PER 1 ML: Performed by: EMERGENCY MEDICINE

## 2023-12-01 PROCEDURE — 84484 ASSAY OF TROPONIN QUANT: CPT | Performed by: EMERGENCY MEDICINE

## 2023-12-01 PROCEDURE — 87636 SARSCOV2 & INF A&B AMP PRB: CPT | Performed by: EMERGENCY MEDICINE

## 2023-12-01 PROCEDURE — 84703 CHORIONIC GONADOTROPIN ASSAY: CPT | Performed by: EMERGENCY MEDICINE

## 2023-12-01 PROCEDURE — 93005 ELECTROCARDIOGRAM TRACING: CPT | Performed by: EMERGENCY MEDICINE

## 2023-12-01 PROCEDURE — 80053 COMPREHEN METABOLIC PANEL: CPT | Performed by: EMERGENCY MEDICINE

## 2023-12-01 PROCEDURE — 71275 CT ANGIOGRAPHY CHEST: CPT

## 2023-12-01 PROCEDURE — 83880 ASSAY OF NATRIURETIC PEPTIDE: CPT | Performed by: EMERGENCY MEDICINE

## 2023-12-01 PROCEDURE — 82948 REAGENT STRIP/BLOOD GLUCOSE: CPT

## 2023-12-01 PROCEDURE — 85025 COMPLETE CBC W/AUTO DIFF WBC: CPT | Performed by: EMERGENCY MEDICINE

## 2023-12-01 RX ORDER — SODIUM CHLORIDE 0.9 % (FLUSH) 0.9 %
10 SYRINGE (ML) INJECTION AS NEEDED
Status: DISCONTINUED | OUTPATIENT
Start: 2023-12-01 | End: 2023-12-01 | Stop reason: HOSPADM

## 2023-12-01 RX ORDER — DOXYCYCLINE 100 MG/1
100 CAPSULE ORAL 2 TIMES DAILY
Qty: 14 CAPSULE | Refills: 0 | Status: SHIPPED | OUTPATIENT
Start: 2023-12-01 | End: 2023-12-10 | Stop reason: HOSPADM

## 2023-12-01 RX ORDER — AMOXICILLIN AND CLAVULANATE POTASSIUM 875; 125 MG/1; MG/1
1 TABLET, FILM COATED ORAL 2 TIMES DAILY
Qty: 14 TABLET | Refills: 0 | Status: SHIPPED | OUTPATIENT
Start: 2023-12-01 | End: 2023-12-10 | Stop reason: HOSPADM

## 2023-12-01 RX ADMIN — IOPAMIDOL 67 ML: 755 INJECTION, SOLUTION INTRAVENOUS at 16:49

## 2023-12-01 NOTE — FSED PROVIDER NOTE
Subjective   History of Present Illness  Patient complains of shortness of breath which began last night and some swelling of the lower extremities.  She was hypoxic earlier today and came here for assessment.  Concern was a PE.  She denies any chest pain or pressure.  Patient has stage IV breast cancer and is currently on chemotherapy.  She is aware of her disease with known metastasis to many other parts of her body.  She is typically seen at larger facilities.      Review of Systems    Past Medical History:   Diagnosis Date    Anxiety     Arthritis     Breast cancer 09/07/2021    Right breast invasive ductal carcinoma ER low positive, [place] negative, RFB6hta negative, mercedes to lymph node (biopsy positive)    Chronic fatigue     Diabetes mellitus with stage 3 chronic kidney disease     Encounter for fitting and adjustment of vascular catheter 10/21/2021    Hyperlipidemia     Leukocytosis     Menorrhagia with regular cycle     Seasonal allergies     Type I diabetes mellitus     Vitamin D deficiency        No Known Allergies    Past Surgical History:   Procedure Laterality Date    APPENDECTOMY N/A 1995    BREAST BIOPSY Right 2018    BREAST BIOPSY Bilateral 09/07/2021    US GUIDED LYMPH NODE BIOPSY  09/07/2021    Dr. Carol Terrazas, [facility]    US GUIDED LYMPH NODE BIOPSY  09/28/2021    VENOUS ACCESS DEVICE (PORT) INSERTION N/A 10/15/2021    Procedure: INSERTION VENOUS ACCESS DEVICE;  Surgeon: Mariposa Price MD;  Location: [facility];  Service: General;  Laterality: N/A;       Family History   Problem Relation Age of Onset    Diabetes Mother     Cervical cancer Maternal Grandmother         cervical/uterine     Diabetes Maternal Grandfather     Cancer Father     Lymphoma Paternal Aunt 60    Breast cancer Neg Hx     Malig Hyperthermia Neg Hx        Social History     Socioeconomic History    Marital status:    Tobacco Use    Smoking status: Former     Packs/day: 0.50     Years: 8.00     Additional pack years: 0.00      Total pack years: 4.00     Types: Cigarettes     Start date: 1995     Quit date: 2003     Years since quittin.9    Smokeless tobacco: Former    Tobacco comments:     teen / young adult   Vaping Use    Vaping Use: Never used   Substance and Sexual Activity    Alcohol use: Yes     Comment: social    Drug use: No    Sexual activity: Defer     Partners: Male     Birth control/protection: None     Comment:            Objective   Physical Exam  Vitals and nursing note reviewed.   Constitutional:       Appearance: Normal appearance.   HENT:      Head: Normocephalic.      Right Ear: External ear normal.      Left Ear: External ear normal.      Nose: Nose normal.   Eyes:      Conjunctiva/sclera: Conjunctivae normal.   Cardiovascular:      Rate and Rhythm: Normal rate.   Pulmonary:      Effort: Pulmonary effort is normal.   Abdominal:      General: There is no distension.   Musculoskeletal:      Cervical back: Normal range of motion.   Skin:     Findings: No rash.      Comments: Destructive breast cancer to chest wall   Neurological:      Mental Status: She is alert and oriented to person, place, and time.   Psychiatric:         Mood and Affect: Mood normal.         Procedures           ED Course                                           Medical Decision Making  Mother at bedside during entire conversation.  Patient has no PE today.  She was given some supplemental oxygen initially as she arrived hypoxic.  However this was then removed and she no longer needed it.  CT chest shows some multifocal disease which could be pneumonia and/or cancer.  She has recently been hospitalized and is on chemotherapy so I thought it might be in her best interest to at least undergo observation for the recent hypoxia and start some IV, broad-spectrum antibiotics.  However she declined and refused.  She has spent time in the hospital recently and is self aware that she does not have much more time in her life left and  she does not want to spend potentially unneeded days in the hospital.  She would like to return back to the hospital if worse.  She did consent though to me sending some antibiotics home for her for pneumonia.  We will send Augmentin and doxycycline.  Technically today she is leaving against our advice.  I did see the patient ambulate down the alex gently and easily without hypoxia or distress at discharge    Problems Addressed:  Abnormal CT of the chest: complicated acute illness or injury  Hypoxia: complicated acute illness or injury  Malignant neoplasm of female breast, unspecified estrogen receptor status, unspecified laterality, unspecified site of breast: complicated acute illness or injury  Pulmonary infiltrates: complicated acute illness or injury    Amount and/or Complexity of Data Reviewed  Labs: ordered.  Radiology: ordered.  ECG/medicine tests: ordered.    Risk  Prescription drug management.        Final diagnoses:   Hypoxia   Malignant neoplasm of female breast, unspecified estrogen receptor status, unspecified laterality, unspecified site of breast   Pulmonary infiltrates   Abnormal CT of the chest       ED Disposition  ED Disposition       ED Disposition   Discharge    Condition   Stable    Comment   --               Stephania Swain MD  [address] [address]  [phone]               Medication List        New Prescriptions      amoxicillin-clavulanate 875-125 MG per tablet  Commonly known as: AUGMENTIN  Take 1 tablet by mouth 2 (Two) Times a Day for 7 days.     doxycycline 100 MG capsule  Commonly known as: MONODOX  Take 1 capsule by mouth 2 (Two) Times a Day for 7 days.               Where to Get Your Medications        These medications were sent to [facility] - [place], [place] - [address] [facility] - [phone]  - [phone]   [address] [facility] [address]      Phone: [phone]   amoxicillin-clavulanate 875-125 MG per  tablet  doxycycline 100 MG capsule

## 2023-12-01 NOTE — DISCHARGE INSTRUCTIONS
Today we suggested/offered hospitalization for the abnormal CT of the chest findings and the hypoxia.  However you pleasantly declined.  Please return to hospital if worse    Come back to an ER if worse. Larger/full service hospitals have more resources than us here.

## 2023-12-01 NOTE — ED NOTES
This nurse asked janina Matta to check pt;s BG to ensure the patient is WNL. Paxton is at bedside at this time

## 2023-12-04 ENCOUNTER — APPOINTMENT (OUTPATIENT)
Dept: GENERAL RADIOLOGY | Facility: HOSPITAL | Age: 43
DRG: 637 | End: 2023-12-04
Payer: COMMERCIAL

## 2023-12-04 ENCOUNTER — HOSPITAL ENCOUNTER (INPATIENT)
Facility: HOSPITAL | Age: 43
LOS: 6 days | Discharge: HOME | DRG: 637 | End: 2023-12-10
Attending: STUDENT IN AN ORGANIZED HEALTH CARE EDUCATION/TRAINING PROGRAM | Admitting: INTERNAL MEDICINE
Payer: COMMERCIAL

## 2023-12-04 DIAGNOSIS — E13.10 DIABETIC KETOACIDOSIS WITHOUT COMA ASSOCIATED WITH OTHER SPECIFIED DIABETES MELLITUS: Primary | ICD-10-CM

## 2023-12-04 DIAGNOSIS — J18.9 PNEUMONIA OF BOTH LOWER LOBES DUE TO INFECTIOUS ORGANISM: ICD-10-CM

## 2023-12-04 DIAGNOSIS — E10.65 TYPE 1 DIABETES MELLITUS WITH HYPERGLYCEMIA: ICD-10-CM

## 2023-12-04 DIAGNOSIS — G89.3 CANCER-RELATED PAIN: ICD-10-CM

## 2023-12-04 PROBLEM — E11.10 DKA (DIABETIC KETOACIDOSIS): Status: ACTIVE | Noted: 2023-12-04

## 2023-12-04 LAB
ALBUMIN SERPL-MCNC: 3.3 G/DL (ref 3.5–5.2)
ALBUMIN/GLOB SERPL: 1 G/DL
ALP SERPL-CCNC: 359 U/L (ref 39–117)
ALT SERPL W P-5'-P-CCNC: 27 U/L (ref 1–33)
ANION GAP SERPL CALCULATED.3IONS-SCNC: 11.6 MMOL/L (ref 5–15)
ANION GAP SERPL CALCULATED.3IONS-SCNC: 20.6 MMOL/L (ref 5–15)
ANION GAP SERPL CALCULATED.3IONS-SCNC: 25.7 MMOL/L (ref 5–15)
AST SERPL-CCNC: 25 U/L (ref 1–32)
ATMOSPHERIC PRESS: 749.2 MMHG
B-OH-BUTYR SERPL-SCNC: 6.46 MMOL/L (ref 0.02–0.27)
BASE EXCESS BLDV CALC-SCNC: -13.2 MMOL/L (ref -2–2)
BASOPHILS # BLD AUTO: 0.09 10*3/MM3 (ref 0–0.2)
BASOPHILS NFR BLD AUTO: 0.6 % (ref 0–1.5)
BILIRUB SERPL-MCNC: 0.3 MG/DL (ref 0–1.2)
BUN SERPL-MCNC: 10 MG/DL (ref 6–20)
BUN SERPL-MCNC: 11 MG/DL (ref 6–20)
BUN SERPL-MCNC: 8 MG/DL (ref 6–20)
BUN/CREAT SERPL: 12.1 (ref 7–25)
BUN/CREAT SERPL: 13.6 (ref 7–25)
BUN/CREAT SERPL: 14.3 (ref 7–25)
CALCIUM SPEC-SCNC: 8.8 MG/DL (ref 8.6–10.5)
CALCIUM SPEC-SCNC: 8.8 MG/DL (ref 8.6–10.5)
CALCIUM SPEC-SCNC: 9.3 MG/DL (ref 8.6–10.5)
CHLORIDE SERPL-SCNC: 101 MMOL/L (ref 98–107)
CHLORIDE SERPL-SCNC: 94 MMOL/L (ref 98–107)
CHLORIDE SERPL-SCNC: 99 MMOL/L (ref 98–107)
CO2 BLDA-SCNC: 13.1 MMOL/L (ref 23–27)
CO2 SERPL-SCNC: 10.3 MMOL/L (ref 22–29)
CO2 SERPL-SCNC: 12.4 MMOL/L (ref 22–29)
CO2 SERPL-SCNC: 16.4 MMOL/L (ref 22–29)
CREAT SERPL-MCNC: 0.59 MG/DL (ref 0.57–1)
CREAT SERPL-MCNC: 0.7 MG/DL (ref 0.57–1)
CREAT SERPL-MCNC: 0.91 MG/DL (ref 0.57–1)
D-LACTATE SERPL-SCNC: 1.9 MMOL/L (ref 0.5–2)
D-LACTATE SERPL-SCNC: 1.9 MMOL/L (ref 0.5–2)
D-LACTATE SERPL-SCNC: 2.4 MMOL/L (ref 0.5–2)
D-LACTATE SERPL-SCNC: 3.5 MMOL/L (ref 0.5–2)
DEPRECATED RDW RBC AUTO: 51.4 FL (ref 37–54)
DEVICE COMMENT: ABNORMAL
EGFRCR SERPLBLD CKD-EPI 2021: 110.9 ML/MIN/1.73
EGFRCR SERPLBLD CKD-EPI 2021: 115.6 ML/MIN/1.73
EGFRCR SERPLBLD CKD-EPI 2021: 80.9 ML/MIN/1.73
EOSINOPHIL # BLD AUTO: 0.01 10*3/MM3 (ref 0–0.4)
EOSINOPHIL NFR BLD AUTO: 0.1 % (ref 0.3–6.2)
ERYTHROCYTE [DISTWIDTH] IN BLOOD BY AUTOMATED COUNT: 15.9 % (ref 12.3–15.4)
GAS FLOW AIRWAY: 2 LPM
GLOBULIN UR ELPH-MCNC: 3.3 GM/DL
GLUCOSE BLDC GLUCOMTR-MCNC: 202 MG/DL (ref 70–130)
GLUCOSE BLDC GLUCOMTR-MCNC: 227 MG/DL (ref 70–130)
GLUCOSE BLDC GLUCOMTR-MCNC: 252 MG/DL (ref 70–130)
GLUCOSE BLDC GLUCOMTR-MCNC: 269 MG/DL (ref 70–130)
GLUCOSE BLDC GLUCOMTR-MCNC: 306 MG/DL (ref 70–130)
GLUCOSE BLDC GLUCOMTR-MCNC: 337 MG/DL (ref 70–130)
GLUCOSE BLDC GLUCOMTR-MCNC: 342 MG/DL (ref 70–130)
GLUCOSE BLDC GLUCOMTR-MCNC: 484 MG/DL (ref 70–130)
GLUCOSE SERPL-MCNC: 266 MG/DL (ref 65–99)
GLUCOSE SERPL-MCNC: 380 MG/DL (ref 65–99)
GLUCOSE SERPL-MCNC: 449 MG/DL (ref 65–99)
HBA1C MFR BLD: 9.4 % (ref 4.8–5.6)
HCG SERPL QL: NEGATIVE
HCO3 BLDV-SCNC: 12.2 MMOL/L (ref 22–28)
HCT VFR BLD AUTO: 37.1 % (ref 34–46.6)
HGB BLD-MCNC: 11.1 G/DL (ref 12–15.9)
IMM GRANULOCYTES # BLD AUTO: 0.81 10*3/MM3 (ref 0–0.05)
IMM GRANULOCYTES NFR BLD AUTO: 5 % (ref 0–0.5)
LYMPHOCYTES # BLD AUTO: 0.8 10*3/MM3 (ref 0.7–3.1)
LYMPHOCYTES NFR BLD AUTO: 4.9 % (ref 19.6–45.3)
MAGNESIUM SERPL-MCNC: 1.7 MG/DL (ref 1.6–2.6)
MAGNESIUM SERPL-MCNC: 1.7 MG/DL (ref 1.6–2.6)
MAGNESIUM SERPL-MCNC: 1.9 MG/DL (ref 1.6–2.6)
MCH RBC QN AUTO: 26.9 PG (ref 26.6–33)
MCHC RBC AUTO-ENTMCNC: 29.9 G/DL (ref 31.5–35.7)
MCV RBC AUTO: 90 FL (ref 79–97)
MODALITY: ABNORMAL
MONOCYTES # BLD AUTO: 1.35 10*3/MM3 (ref 0.1–0.9)
MONOCYTES NFR BLD AUTO: 8.3 % (ref 5–12)
NEUTROPHILS NFR BLD AUTO: 13.21 10*3/MM3 (ref 1.7–7)
NEUTROPHILS NFR BLD AUTO: 81.1 % (ref 42.7–76)
NOTIFIED WHO: ABNORMAL
NRBC BLD AUTO-RTO: 0.1 /100 WBC (ref 0–0.2)
NT-PROBNP SERPL-MCNC: 351 PG/ML (ref 0–450)
OSMOLALITY SERPL: 294 MOSM/KG (ref 275–300)
PCO2 BLDV: 26.8 MM HG (ref 41–51)
PH BLDV: 7.27 PH UNITS (ref 7.31–7.41)
PHOSPHATE SERPL-MCNC: 2.4 MG/DL (ref 2.5–4.5)
PHOSPHATE SERPL-MCNC: 3.5 MG/DL (ref 2.5–4.5)
PHOSPHATE SERPL-MCNC: 4.2 MG/DL (ref 2.5–4.5)
PLATELET # BLD AUTO: 500 10*3/MM3 (ref 140–450)
PMV BLD AUTO: 9 FL (ref 6–12)
PO2 BLDV: 60.7 MM HG (ref 35–45)
POTASSIUM SERPL-SCNC: 4.6 MMOL/L (ref 3.5–5.2)
POTASSIUM SERPL-SCNC: 5.2 MMOL/L (ref 3.5–5.2)
POTASSIUM SERPL-SCNC: 5.2 MMOL/L (ref 3.5–5.2)
PROT SERPL-MCNC: 6.6 G/DL (ref 6–8.5)
QT INTERVAL: 289 MS
QTC INTERVAL: 403 MS
RBC # BLD AUTO: 4.12 10*6/MM3 (ref 3.77–5.28)
SAO2 % BLDCOV: 87.9 % (ref 45–75)
SODIUM SERPL-SCNC: 129 MMOL/L (ref 136–145)
SODIUM SERPL-SCNC: 130 MMOL/L (ref 136–145)
SODIUM SERPL-SCNC: 132 MMOL/L (ref 136–145)
TROPONIN T SERPL HS-MCNC: 22 NG/L
WBC NRBC COR # BLD AUTO: 16.27 10*3/MM3 (ref 3.4–10.8)

## 2023-12-04 PROCEDURE — 82010 KETONE BODYS QUAN: CPT | Performed by: STUDENT IN AN ORGANIZED HEALTH CARE EDUCATION/TRAINING PROGRAM

## 2023-12-04 PROCEDURE — 83930 ASSAY OF BLOOD OSMOLALITY: CPT | Performed by: STUDENT IN AN ORGANIZED HEALTH CARE EDUCATION/TRAINING PROGRAM

## 2023-12-04 PROCEDURE — 87186 SC STD MICRODIL/AGAR DIL: CPT | Performed by: STUDENT IN AN ORGANIZED HEALTH CARE EDUCATION/TRAINING PROGRAM

## 2023-12-04 PROCEDURE — 83735 ASSAY OF MAGNESIUM: CPT | Performed by: INTERNAL MEDICINE

## 2023-12-04 PROCEDURE — 84100 ASSAY OF PHOSPHORUS: CPT | Performed by: STUDENT IN AN ORGANIZED HEALTH CARE EDUCATION/TRAINING PROGRAM

## 2023-12-04 PROCEDURE — 83735 ASSAY OF MAGNESIUM: CPT | Performed by: STUDENT IN AN ORGANIZED HEALTH CARE EDUCATION/TRAINING PROGRAM

## 2023-12-04 PROCEDURE — 82948 REAGENT STRIP/BLOOD GLUCOSE: CPT

## 2023-12-04 PROCEDURE — 93005 ELECTROCARDIOGRAM TRACING: CPT | Performed by: STUDENT IN AN ORGANIZED HEALTH CARE EDUCATION/TRAINING PROGRAM

## 2023-12-04 PROCEDURE — 82803 BLOOD GASES ANY COMBINATION: CPT

## 2023-12-04 PROCEDURE — 85025 COMPLETE CBC W/AUTO DIFF WBC: CPT | Performed by: STUDENT IN AN ORGANIZED HEALTH CARE EDUCATION/TRAINING PROGRAM

## 2023-12-04 PROCEDURE — 87040 BLOOD CULTURE FOR BACTERIA: CPT | Performed by: STUDENT IN AN ORGANIZED HEALTH CARE EDUCATION/TRAINING PROGRAM

## 2023-12-04 PROCEDURE — 87205 SMEAR GRAM STAIN: CPT | Performed by: STUDENT IN AN ORGANIZED HEALTH CARE EDUCATION/TRAINING PROGRAM

## 2023-12-04 PROCEDURE — 25010000002 MORPHINE PER 10 MG: Performed by: INTERNAL MEDICINE

## 2023-12-04 PROCEDURE — 84484 ASSAY OF TROPONIN QUANT: CPT | Performed by: STUDENT IN AN ORGANIZED HEALTH CARE EDUCATION/TRAINING PROGRAM

## 2023-12-04 PROCEDURE — 83880 ASSAY OF NATRIURETIC PEPTIDE: CPT | Performed by: STUDENT IN AN ORGANIZED HEALTH CARE EDUCATION/TRAINING PROGRAM

## 2023-12-04 PROCEDURE — 25810000003 SODIUM CHLORIDE 0.9 % SOLUTION 250 ML FLEX CONT: Performed by: INTERNAL MEDICINE

## 2023-12-04 PROCEDURE — 25010000002 AZITHROMYCIN PER 500 MG: Performed by: INTERNAL MEDICINE

## 2023-12-04 PROCEDURE — 25010000002 CEFTRIAXONE PER 250 MG: Performed by: STUDENT IN AN ORGANIZED HEALTH CARE EDUCATION/TRAINING PROGRAM

## 2023-12-04 PROCEDURE — 83605 ASSAY OF LACTIC ACID: CPT | Performed by: STUDENT IN AN ORGANIZED HEALTH CARE EDUCATION/TRAINING PROGRAM

## 2023-12-04 PROCEDURE — 99285 EMERGENCY DEPT VISIT HI MDM: CPT

## 2023-12-04 PROCEDURE — 25810000003 DEXTROSE-NACL PER 500 ML: Performed by: INTERNAL MEDICINE

## 2023-12-04 PROCEDURE — 87070 CULTURE OTHR SPECIMN AEROBIC: CPT | Performed by: STUDENT IN AN ORGANIZED HEALTH CARE EDUCATION/TRAINING PROGRAM

## 2023-12-04 PROCEDURE — 71045 X-RAY EXAM CHEST 1 VIEW: CPT

## 2023-12-04 PROCEDURE — 83605 ASSAY OF LACTIC ACID: CPT | Performed by: INTERNAL MEDICINE

## 2023-12-04 PROCEDURE — 87077 CULTURE AEROBIC IDENTIFY: CPT | Performed by: STUDENT IN AN ORGANIZED HEALTH CARE EDUCATION/TRAINING PROGRAM

## 2023-12-04 PROCEDURE — 25810000003 SODIUM CHLORIDE 0.9 % SOLUTION: Performed by: STUDENT IN AN ORGANIZED HEALTH CARE EDUCATION/TRAINING PROGRAM

## 2023-12-04 PROCEDURE — 84703 CHORIONIC GONADOTROPIN ASSAY: CPT | Performed by: STUDENT IN AN ORGANIZED HEALTH CARE EDUCATION/TRAINING PROGRAM

## 2023-12-04 PROCEDURE — 84100 ASSAY OF PHOSPHORUS: CPT | Performed by: INTERNAL MEDICINE

## 2023-12-04 PROCEDURE — 93010 ELECTROCARDIOGRAM REPORT: CPT | Performed by: INTERNAL MEDICINE

## 2023-12-04 PROCEDURE — 36415 COLL VENOUS BLD VENIPUNCTURE: CPT

## 2023-12-04 PROCEDURE — 36415 COLL VENOUS BLD VENIPUNCTURE: CPT | Performed by: STUDENT IN AN ORGANIZED HEALTH CARE EDUCATION/TRAINING PROGRAM

## 2023-12-04 PROCEDURE — 25010000002 FENTANYL CITRATE (PF) 50 MCG/ML SOLUTION: Performed by: INTERNAL MEDICINE

## 2023-12-04 PROCEDURE — 25010000002 POTASSIUM CHLORIDE PER 2 MEQ: Performed by: INTERNAL MEDICINE

## 2023-12-04 PROCEDURE — 83036 HEMOGLOBIN GLYCOSYLATED A1C: CPT | Performed by: STUDENT IN AN ORGANIZED HEALTH CARE EDUCATION/TRAINING PROGRAM

## 2023-12-04 PROCEDURE — 80053 COMPREHEN METABOLIC PANEL: CPT | Performed by: STUDENT IN AN ORGANIZED HEALTH CARE EDUCATION/TRAINING PROGRAM

## 2023-12-04 PROCEDURE — 87147 CULTURE TYPE IMMUNOLOGIC: CPT | Performed by: STUDENT IN AN ORGANIZED HEALTH CARE EDUCATION/TRAINING PROGRAM

## 2023-12-04 RX ORDER — SODIUM CHLORIDE 450 MG/100ML
250 INJECTION, SOLUTION INTRAVENOUS CONTINUOUS PRN
Status: DISCONTINUED | OUTPATIENT
Start: 2023-12-04 | End: 2023-12-06

## 2023-12-04 RX ORDER — SODIUM CHLORIDE AND POTASSIUM CHLORIDE 150; 900 MG/100ML; MG/100ML
250 INJECTION, SOLUTION INTRAVENOUS CONTINUOUS PRN
Status: DISCONTINUED | OUTPATIENT
Start: 2023-12-04 | End: 2023-12-06

## 2023-12-04 RX ORDER — FENTANYL CITRATE 50 UG/ML
50 INJECTION, SOLUTION INTRAMUSCULAR; INTRAVENOUS
Status: DISCONTINUED | OUTPATIENT
Start: 2023-12-04 | End: 2023-12-05

## 2023-12-04 RX ORDER — MORPHINE SULFATE 15 MG/1
60 TABLET, FILM COATED, EXTENDED RELEASE ORAL EVERY 8 HOURS SCHEDULED
Status: DISCONTINUED | OUTPATIENT
Start: 2023-12-04 | End: 2023-12-06

## 2023-12-04 RX ORDER — NICOTINE POLACRILEX 4 MG
15 LOZENGE BUCCAL
Status: DISCONTINUED | OUTPATIENT
Start: 2023-12-04 | End: 2023-12-05 | Stop reason: SDUPTHER

## 2023-12-04 RX ORDER — SODIUM CHLORIDE AND POTASSIUM CHLORIDE 150; 450 MG/100ML; MG/100ML
250 INJECTION, SOLUTION INTRAVENOUS CONTINUOUS PRN
Status: DISCONTINUED | OUTPATIENT
Start: 2023-12-04 | End: 2023-12-06

## 2023-12-04 RX ORDER — DEXTROSE AND SODIUM CHLORIDE 5; .45 G/100ML; G/100ML
150 INJECTION, SOLUTION INTRAVENOUS CONTINUOUS PRN
Status: DISCONTINUED | OUTPATIENT
Start: 2023-12-04 | End: 2023-12-06

## 2023-12-04 RX ORDER — DEXTROSE MONOHYDRATE, SODIUM CHLORIDE, AND POTASSIUM CHLORIDE 50; 1.49; 4.5 G/1000ML; G/1000ML; G/1000ML
150 INJECTION, SOLUTION INTRAVENOUS CONTINUOUS PRN
Status: DISCONTINUED | OUTPATIENT
Start: 2023-12-04 | End: 2023-12-06

## 2023-12-04 RX ORDER — SODIUM CHLORIDE 9 MG/ML
40 INJECTION, SOLUTION INTRAVENOUS AS NEEDED
Status: DISCONTINUED | OUTPATIENT
Start: 2023-12-04 | End: 2023-12-06

## 2023-12-04 RX ORDER — DEXTROSE MONOHYDRATE, SODIUM CHLORIDE, AND POTASSIUM CHLORIDE 50; 2.98; 4.5 G/1000ML; G/1000ML; G/1000ML
150 INJECTION, SOLUTION INTRAVENOUS CONTINUOUS PRN
Status: DISCONTINUED | OUTPATIENT
Start: 2023-12-04 | End: 2023-12-06

## 2023-12-04 RX ORDER — METHADONE HYDROCHLORIDE 10 MG/1
10 TABLET ORAL
COMMUNITY
Start: 2023-11-29

## 2023-12-04 RX ORDER — SODIUM CHLORIDE 0.9 % (FLUSH) 0.9 %
10 SYRINGE (ML) INJECTION EVERY 12 HOURS SCHEDULED
Status: DISCONTINUED | OUTPATIENT
Start: 2023-12-04 | End: 2023-12-06

## 2023-12-04 RX ORDER — SODIUM CHLORIDE 9 MG/ML
250 INJECTION, SOLUTION INTRAVENOUS CONTINUOUS PRN
Status: DISCONTINUED | OUTPATIENT
Start: 2023-12-04 | End: 2023-12-06

## 2023-12-04 RX ORDER — MORPHINE SULFATE 60 MG/1
1 TABLET, FILM COATED, EXTENDED RELEASE ORAL 3 TIMES DAILY
Status: ON HOLD | COMMUNITY
Start: 2023-11-18 | End: 2023-12-06

## 2023-12-04 RX ORDER — DEXTROSE MONOHYDRATE 25 G/50ML
10-50 INJECTION, SOLUTION INTRAVENOUS
Status: DISCONTINUED | OUTPATIENT
Start: 2023-12-04 | End: 2023-12-05 | Stop reason: SDUPTHER

## 2023-12-04 RX ORDER — SODIUM CHLORIDE 0.9 % (FLUSH) 0.9 %
10 SYRINGE (ML) INJECTION AS NEEDED
Status: DISCONTINUED | OUTPATIENT
Start: 2023-12-04 | End: 2023-12-06

## 2023-12-04 RX ORDER — MORPHINE SULFATE 2 MG/ML
2 INJECTION, SOLUTION INTRAMUSCULAR; INTRAVENOUS ONCE
Status: COMPLETED | OUTPATIENT
Start: 2023-12-04 | End: 2023-12-04

## 2023-12-04 RX ORDER — SODIUM CHLORIDE AND POTASSIUM CHLORIDE 300; 900 MG/100ML; MG/100ML
250 INJECTION, SOLUTION INTRAVENOUS CONTINUOUS PRN
Status: DISCONTINUED | OUTPATIENT
Start: 2023-12-04 | End: 2023-12-06

## 2023-12-04 RX ORDER — DEXTROSE AND SODIUM CHLORIDE 5; .9 G/100ML; G/100ML
150 INJECTION, SOLUTION INTRAVENOUS CONTINUOUS PRN
Status: DISCONTINUED | OUTPATIENT
Start: 2023-12-04 | End: 2023-12-06

## 2023-12-04 RX ORDER — SODIUM CHLORIDE 0.9 % (FLUSH) 0.9 %
10 SYRINGE (ML) INJECTION AS NEEDED
Status: DISCONTINUED | OUTPATIENT
Start: 2023-12-04 | End: 2023-12-10 | Stop reason: HOSPADM

## 2023-12-04 RX ORDER — DEXTROSE MONOHYDRATE, SODIUM CHLORIDE, AND POTASSIUM CHLORIDE 50; 1.49; 9 G/1000ML; G/1000ML; G/1000ML
150 INJECTION, SOLUTION INTRAVENOUS CONTINUOUS PRN
Status: DISCONTINUED | OUTPATIENT
Start: 2023-12-04 | End: 2023-12-06

## 2023-12-04 RX ORDER — IBUPROFEN 600 MG/1
1 TABLET ORAL
Status: DISCONTINUED | OUTPATIENT
Start: 2023-12-04 | End: 2023-12-05 | Stop reason: SDUPTHER

## 2023-12-04 RX ORDER — DEXTROSE MONOHYDRATE, SODIUM CHLORIDE, AND POTASSIUM CHLORIDE 50; 2.98; 9 G/1000ML; G/1000ML; G/1000ML
150 INJECTION, SOLUTION INTRAVENOUS CONTINUOUS PRN
Status: DISCONTINUED | OUTPATIENT
Start: 2023-12-04 | End: 2023-12-06

## 2023-12-04 RX ADMIN — POTASSIUM CHLORIDE AND SODIUM CHLORIDE 250 ML/HR: 450; 150 INJECTION, SOLUTION INTRAVENOUS at 19:33

## 2023-12-04 RX ADMIN — AZITHROMYCIN MONOHYDRATE 500 MG: 500 INJECTION, POWDER, LYOPHILIZED, FOR SOLUTION INTRAVENOUS at 23:15

## 2023-12-04 RX ADMIN — MORPHINE SULFATE 60 MG: 30 TABLET, FILM COATED, EXTENDED RELEASE ORAL at 17:38

## 2023-12-04 RX ADMIN — SODIUM CHLORIDE 1000 ML: 9 INJECTION, SOLUTION INTRAVENOUS at 14:56

## 2023-12-04 RX ADMIN — SODIUM CHLORIDE 1000 ML/HR: 9 INJECTION, SOLUTION INTRAVENOUS at 16:17

## 2023-12-04 RX ADMIN — FENTANYL CITRATE 50 MCG: 50 INJECTION, SOLUTION INTRAMUSCULAR; INTRAVENOUS at 20:24

## 2023-12-04 RX ADMIN — CEFTRIAXONE 2000 MG: 2 INJECTION, POWDER, FOR SOLUTION INTRAMUSCULAR; INTRAVENOUS at 16:15

## 2023-12-04 RX ADMIN — MORPHINE SULFATE 2 MG: 2 INJECTION, SOLUTION INTRAMUSCULAR; INTRAVENOUS at 18:21

## 2023-12-04 RX ADMIN — DEXTROSE AND SODIUM CHLORIDE 150 ML/HR: 5; 900 INJECTION, SOLUTION INTRAVENOUS at 22:29

## 2023-12-04 RX ADMIN — Medication 10 ML: at 20:37

## 2023-12-04 RX ADMIN — POTASSIUM CHLORIDE, DEXTROSE MONOHYDRATE AND SODIUM CHLORIDE 150 ML/HR: 150; 5; 900 INJECTION, SOLUTION INTRAVENOUS at 23:14

## 2023-12-04 RX ADMIN — INSULIN HUMAN 2.8 UNITS/HR: 1 INJECTION, SOLUTION INTRAVENOUS at 16:53

## 2023-12-04 RX ADMIN — OXYCODONE HYDROCHLORIDE 20 MG: 5 TABLET ORAL at 19:46

## 2023-12-04 RX ADMIN — SILVER SULFADIAZINE 1 APPLICATION: 10 CREAM TOPICAL at 22:31

## 2023-12-04 NOTE — ED NOTES
"Nursing report ED to floor  Sierra Mao  43 y.o.  female    HPI :   Chief Complaint   Patient presents with    Shortness of Breath    Wound Check       Admitting doctor:   Yulia Savage MD    Admitting diagnosis:   The primary encounter diagnosis was Diabetic ketoacidosis without coma associated with other specified diabetes mellitus. A diagnosis of Pneumonia of both lower lobes due to infectious organism was also pertinent to this visit.    Code status:   Current Code Status       Date Active Code Status Order ID Comments User Context       Prior            Allergies:   Patient has no known allergies.    Isolation:   No active isolations    Intake and Output    Intake/Output Summary (Last 24 hours) at 12/4/2023 1618  Last data filed at 12/4/2023 1609  Gross per 24 hour   Intake 1000 ml   Output --   Net 1000 ml       Weight:       12/04/23  1302   Weight: 77.1 kg (170 lb)       Most recent vitals:   Vitals:    12/04/23 1302 12/04/23 1505 12/04/23 1608 12/04/23 1609   BP:   132/85    Pulse:  96 102    Resp:    22   Temp:       SpO2:  97% 97%    Weight: 77.1 kg (170 lb)      Height: 180.3 cm (71\")          Active LDAs/IV Access:   Lines, Drains & Airways       Active LDAs       Name Placement date Placement time Site Days    Peripheral IV 12/04/23 1326 Right Antecubital 12/04/23  1326  Antecubital  less than 1    Peripheral IV 12/04/23 1613 Left Antecubital 12/04/23  1613  Antecubital  less than 1    Single Lumen Implantable Port 10/15/21 Left Chest 10/15/21  0855  Chest  780                    Labs (abnormal labs have a star):   Labs Reviewed   COMPREHENSIVE METABOLIC PANEL - Abnormal; Notable for the following components:       Result Value    Glucose 449 (*)     Sodium 130 (*)     Chloride 94 (*)     CO2 10.3 (*)     Albumin 3.3 (*)     Alkaline Phosphatase 359 (*)     Anion Gap 25.7 (*)     All other components within normal limits    Narrative:     GFR Normal >60  Chronic Kidney Disease <60  Kidney Failure " <15     LACTIC ACID, PLASMA - Abnormal; Notable for the following components:    Lactate 3.5 (*)     All other components within normal limits   SINGLE HSTROPONIN T - Abnormal; Notable for the following components:    HS Troponin T 22 (*)     All other components within normal limits    Narrative:     High Sensitive Troponin T Reference Range:  <14.0 ng/L- Negative Female for AMI  <22.0 ng/L- Negative Male for AMI  >=14 - Abnormal Female indicating possible myocardial injury.  >=22 - Abnormal Male indicating possible myocardial injury.   Clinicians would have to utilize clinical acumen, EKG, Troponin, and serial changes to determine if it is an Acute Myocardial Infarction or myocardial injury due to an underlying chronic condition.        CBC WITH AUTO DIFFERENTIAL - Abnormal; Notable for the following components:    WBC 16.27 (*)     Hemoglobin 11.1 (*)     MCHC 29.9 (*)     RDW 15.9 (*)     Platelets 500 (*)     Neutrophil % 81.1 (*)     Lymphocyte % 4.9 (*)     Eosinophil % 0.1 (*)     Immature Grans % 5.0 (*)     Neutrophils, Absolute 13.21 (*)     Monocytes, Absolute 1.35 (*)     Immature Grans, Absolute 0.81 (*)     All other components within normal limits   HEMOGLOBIN A1C - Abnormal; Notable for the following components:    Hemoglobin A1C 9.40 (*)     All other components within normal limits    Narrative:     Hemoglobin A1C Ranges:    Increased Risk for Diabetes  5.7% to 6.4%  Diabetes                     >= 6.5%  Diabetic Goal                < 7.0%   BETA HYDROXYBUTYRATE QUANTITATIVE - Abnormal; Notable for the following components:    Beta-Hydroxybutyrate Quant 6.461 (*)     All other components within normal limits    Narrative:     In the assessment of possible diabetic ketoacidosis, the test should be interpreted along with other clinical and laboratory findings.  A level greater than 1 mmol/L should require further evaluation and levels of more than 3 mmol/L require immediate medical review.   BLOOD  GAS, VENOUS - Abnormal; Notable for the following components:    pH, Venous 7.268 (*)     pCO2, Venous 26.8 (*)     pO2, Venous 60.7 (*)     HCO3, Venous 12.2 (*)     Base Excess, Venous -13.2 (*)     O2 Saturation, Venous 87.9 (*)     CO2 Content 13.1 (*)     All other components within normal limits   POCT GLUCOSE FINGERSTICK - Abnormal; Notable for the following components:    Glucose 484 (*)     All other components within normal limits   HCG, SERUM, QUALITATIVE - Normal   BNP (IN-HOUSE) - Normal    Narrative:     This assay is used as an aid in the diagnosis of individuals suspected of having heart failure. It can be used as an aid in the diagnosis of acute decompensated heart failure (ADHF) in patients presenting with signs and symptoms of ADHF to the emergency department (ED). In addition, NT-proBNP of <300 pg/mL indicates ADHF is not likely.    Age Range Result Interpretation  NT-proBNP Concentration (pg/mL:      <50             Positive            >450                   Gray                 300-450                    Negative             <300    50-75           Positive            >900                  Gray                300-900                  Negative            <300      >75             Positive            >1800                  Gray                300-1800                  Negative            <300   PHOSPHORUS - Normal   MAGNESIUM - Normal   BLOOD CULTURE   BLOOD CULTURE   WOUND CULTURE   BLOOD GAS, VENOUS   LACTIC ACID, REFLEX   OSMOLALITY, SERUM   BASIC METABOLIC PANEL   BASIC METABOLIC PANEL   MAGNESIUM   MAGNESIUM   PHOSPHORUS   PHOSPHORUS   BASIC METABOLIC PANEL   MAGNESIUM   PHOSPHORUS   LACTIC ACID, PLASMA   LACTIC ACID, PLASMA   CBC AND DIFFERENTIAL    Narrative:     The following orders were created for panel order CBC & Differential.  Procedure                               Abnormality         Status                     ---------                               -----------         ------                      CBC Auto Differential[[id number]]        Abnormal            Final result                 Please view results for these tests on the individual orders.       EKG:   ECG 12 Lead Dyspnea   Preliminary Result   HEART RATE= 117  bpm   RR Interval= 513  ms   IL Interval= 135  ms   P Horizontal Axis= -1  deg   P Front Axis= 72  deg   QRSD Interval= 86  ms   QT Interval= 289  ms   QTcB= 403  ms   QRS Axis= 48  deg   T Wave Axis= 59  deg   - BORDERLINE ECG -   Sinus tachycardia   Ventricular premature complex   Low voltage, precordial leads   Electronically Signed By:    Date and Time of Study: 2023-12-04 12:26:09          Meds given in ED:   Medications   sodium chloride 0.9 % flush 10 mL (has no administration in time range)   sodium chloride 0.9 % flush 10 mL (has no administration in time range)   sodium chloride 0.9 % flush 10 mL (has no administration in time range)   sodium chloride 0.9 % infusion 40 mL (has no administration in time range)   dextrose (GLUTOSE) oral gel 15 g (has no administration in time range)   dextrose (D50W) (25 g/50 mL) IV injection 10-50 mL (has no administration in time range)   glucagon (GLUCAGEN) injection 1 mg (has no administration in time range)   sodium chloride 0.9 % bolus (1,000 mL/hr Intravenous New Bag 12/4/23 1617)   sodium chloride 0.9 % infusion (has no administration in time range)   sodium chloride 0.9 % with KCl 20 mEq/L infusion (has no administration in time range)   sodium chloride 0.9 % with KCl 40 mEq/L infusion (has no administration in time range)   dextrose 5 % and sodium chloride 0.9 % infusion (has no administration in time range)   dextrose 5 % and sodium chloride 0.9 % with KCl 20 mEq/L infusion (has no administration in time range)   dextrose 5 % and sodium chloride 0.9 % with KCl 40 mEq/L infusion (has no administration in time range)   sodium chloride 0.45 % infusion (has no administration in time range)   sodium chloride 0.45 % with KCl 20 mEq/L  infusion (has no administration in time range)   sodium chloride 0.45 % 1,000 mL with potassium chloride 40 mEq infusion (has no administration in time range)   dextrose 5 % and sodium chloride 0.45 % infusion (has no administration in time range)   dextrose 5 % and sodium chloride 0.45 % with KCl 20 mEq/L infusion (has no administration in time range)   dextrose 5 % and sodium chloride 0.45 % with KCl 40 mEq/L infusion (has no administration in time range)   insulin regular 1 unit/mL in 0.9% sodium chloride (Glucommander) (has no administration in time range)   Potassium Replacement - Follow Nurse / BPA Driven Protocol (has no administration in time range)   Magnesium Standard Dose Replacement - Follow Nurse / BPA Driven Protocol (has no administration in time range)   Phosphorus Replacement - Follow Nurse / BPA Driven Protocol (has no administration in time range)   Calcium Replacement - Follow Nurse / BPA Driven Protocol (has no administration in time range)   cefTRIAXone (ROCEPHIN) 2,000 mg in sodium chloride 0.9 % 100 mL IVPB-VTB (2,000 mg Intravenous New Bag 12/4/23 1615)   azithromycin (ZITHROMAX) 500 mg in sodium chloride 0.9 % 250 mL IVPB-VTB (has no administration in time range)   cefTRIAXone (ROCEPHIN) 1,000 mg in sodium chloride 0.9 % 100 mL IVPB-VTB (has no administration in time range)   Morphine (MS CONTIN) 12 hr tablet 60 mg (has no administration in time range)   oxyCODONE (ROXICODONE) immediate release tablet 20 mg (has no administration in time range)   sodium chloride 0.9 % bolus 1,000 mL (0 mL Intravenous Stopped 12/4/23 1609)       Imaging results:  XR Chest 1 View    Result Date: 12/4/2023  Hazy left greater than right bibasilar predominant opacities with more defined right lower lobe linear atelectasis have not significantly changed from recent CT chest, accounting for differences in imaging techniques. No pleural effusion or pneumothorax.  Left IJ port with tip at the brachiocephalic-SVC  confluence. Stable normal size cardiomediastinal silhouette. No focal osseous abnormality.    This report was finalized on 2023 12:58 PM by Dr. Jose Hoffmann M.D on Workstation: [id number]       Ambulatory status:   - bedrest    Social issues:   Social History     Socioeconomic History    Marital status:    Tobacco Use    Smoking status: Former     Packs/day: 0.50     Years: 8.00     Additional pack years: 0.00     Total pack years: 4.00     Types: Cigarettes     Start date: 1995     Quit date: 2003     Years since quittin.9    Smokeless tobacco: Former    Tobacco comments:     teen / young adult   Vaping Use    Vaping Use: Never used   Substance and Sexual Activity    Alcohol use: Yes     Comment: social    Drug use: No    Sexual activity: Defer     Partners: Male     Birth control/protection: None     Comment:        NIH Stroke Scale:       Amada Leslie RN  23 16:18 EST

## 2023-12-04 NOTE — ED NOTES
Patient to ER via ems from home for SOA   Was here 2 days ago and dx with pneumonia and was unable to  medications    Patient has stage 4 breast cancer and has foul smelling wound on R side     Room air was 91% placed on 2L nc with EMS

## 2023-12-04 NOTE — ED PROVIDER NOTES
EMERGENCY DEPARTMENT ENCOUNTER    Room Number:  19/19  PCP: Stephania Swain MD  Historian: Patient      HPI:  Chief Complaint: Shortness of breath    Context: Sierra Mao is a 43 y.o. female who presents to the ED c/o shortness of breath.  Patient has a history of metastatic breast cancer and was recently diagnosed with pneumonia.  Patient had not initiated antibiotic therapy and presents for further evaluation due to worsening shortness of breath.  Patient is also known diabetic.            PAST MEDICAL HISTORY  Active Ambulatory Problems     Diagnosis Date Noted    Vitamin D deficiency 02/01/2018    Dyslipidemia 02/01/2018    Chronic fatigue 02/01/2018    Anxiety 03/29/2019    Well adult exam 07/09/2019    Dense breast tissue 07/09/2019    Diabetic ketoacidosis without coma associated with type 2 diabetes mellitus 10/26/2019    Type 1 diabetes mellitus with hyperglycemia 10/29/2019    Malignant neoplasm of central portion of right breast in female, estrogen receptor negative 09/13/2021    Malignant neoplasm metastatic to bone 10/11/2021    Encounter for fitting and adjustment of vascular catheter 10/21/2021    Metastatic breast cancer 01/05/2022    Diabetic ketoacidosis without coma associated with type 1 diabetes mellitus 10/22/2022     Resolved Ambulatory Problems     Diagnosis Date Noted    Type 2 diabetes mellitus with hyperglycemia, without long-term current use of insulin 01/17/2018    Type 2 diabetes mellitus without complication, without long-term current use of insulin 02/01/2018     Past Medical History:   Diagnosis Date    Arthritis     Breast cancer 09/07/2021    Diabetes mellitus with stage 3 chronic kidney disease     Hyperlipidemia     Leukocytosis     Menorrhagia with regular cycle     Seasonal allergies     Type I diabetes mellitus          PAST SURGICAL HISTORY  Past Surgical History:   Procedure Laterality Date    APPENDECTOMY N/A 1995    BREAST BIOPSY Right 2018    BREAST BIOPSY Bilateral  2021    US GUIDED LYMPH NODE BIOPSY  2021    Dr. Carol Terrazas, [facility]    US GUIDED LYMPH NODE BIOPSY  2021    VENOUS ACCESS DEVICE (PORT) INSERTION N/A 10/15/2021    Procedure: INSERTION VENOUS ACCESS DEVICE;  Surgeon: Mariposa Price MD;  Location: [facility] MAIN OR;  Service: General;  Laterality: N/A;         FAMILY HISTORY  Family History   Problem Relation Age of Onset    Diabetes Mother     Cervical cancer Maternal Grandmother         cervical/uterine     Diabetes Maternal Grandfather     Cancer Father     Lymphoma Paternal Aunt 60    Breast cancer Neg Hx     Malig Hyperthermia Neg Hx          SOCIAL HISTORY  Social History     Socioeconomic History    Marital status:    Tobacco Use    Smoking status: Former     Packs/day: 0.50     Years: 8.00     Additional pack years: 0.00     Total pack years: 4.00     Types: Cigarettes     Start date: 1995     Quit date: 2003     Years since quittin.9    Smokeless tobacco: Former    Tobacco comments:     teen / young adult   Vaping Use    Vaping Use: Never used   Substance and Sexual Activity    Alcohol use: Yes     Comment: social    Drug use: No    Sexual activity: Defer     Partners: Male     Birth control/protection: None     Comment:          ALLERGIES  Patient has no known allergies.        REVIEW OF SYSTEMS  Review of Systems   Respiratory:  Positive for shortness of breath.    Gastrointestinal:  Positive for nausea.            PHYSICAL EXAM  ED Triage Vitals [23 1155]   Temp Heart Rate Resp BP SpO2   98.1 °F (36.7 °C) (!) 125 (!) 30 147/73 96 %      Temp src Heart Rate Source Patient Position BP Location FiO2 (%)   -- -- -- -- --       Physical Exam      GENERAL: no acute distress, ill-appearing  HENT: nares patent  EYES: no scleral icterus  CV: regular rhythm, normal rate  RESPIRATORY: normal effort, tachypneic  ABDOMEN: soft  MUSCULOSKELETAL: no deformity.  Large necrotic mass in the right breast extending along  the right chest wall  NEURO: alert, moves all extremities, follows commands  PSYCH:  calm, cooperative  SKIN: warm, dry    Vital signs and nursing notes reviewed.          LAB RESULTS  Recent Results (from the past 24 hour(s))   ECG 12 Lead Dyspnea    Collection Time: 12/04/23 12:26 PM   Result Value Ref Range    QT Interval 289 ms    QTC Interval 403 ms   POC Glucose Once    Collection Time: 12/04/23 12:31 PM    Specimen: Blood   Result Value Ref Range    Glucose 484 (C) 70 - 130 mg/dL   Comprehensive Metabolic Panel    Collection Time: 12/04/23  1:44 PM    Specimen: Blood   Result Value Ref Range    Glucose 449 (C) 65 - 99 mg/dL    BUN 11 6 - 20 mg/dL    Creatinine 0.91 0.57 - 1.00 mg/dL    Sodium 130 (L) 136 - 145 mmol/L    Potassium 5.2 3.5 - 5.2 mmol/L    Chloride 94 (L) 98 - 107 mmol/L    CO2 10.3 (L) 22.0 - 29.0 mmol/L    Calcium 9.3 8.6 - 10.5 mg/dL    Total Protein 6.6 6.0 - 8.5 g/dL    Albumin 3.3 (L) 3.5 - 5.2 g/dL    ALT (SGPT) 27 1 - 33 U/L    AST (SGOT) 25 1 - 32 U/L    Alkaline Phosphatase 359 (H) 39 - 117 U/L    Total Bilirubin 0.3 0.0 - 1.2 mg/dL    Globulin 3.3 gm/dL    A/G Ratio 1.0 g/dL    BUN/Creatinine Ratio 12.1 7.0 - 25.0    Anion Gap 25.7 (H) 5.0 - 15.0 mmol/L    eGFR 80.9 >60.0 mL/min/1.73   hCG, Serum, Qualitative    Collection Time: 12/04/23  1:44 PM    Specimen: Blood   Result Value Ref Range    HCG Qualitative Negative Negative   Lactic Acid, Plasma    Collection Time: 12/04/23  1:44 PM    Specimen: Blood   Result Value Ref Range    Lactate 3.5 (C) 0.5 - 2.0 mmol/L   Single High Sensitivity Troponin T    Collection Time: 12/04/23  1:44 PM    Specimen: Blood   Result Value Ref Range    HS Troponin T 22 (H) <14 ng/L   BNP    Collection Time: 12/04/23  1:44 PM    Specimen: Blood   Result Value Ref Range    proBNP 351.0 0.0 - 450.0 pg/mL   CBC Auto Differential    Collection Time: 12/04/23  1:44 PM    Specimen: Blood   Result Value Ref Range    WBC 16.27 (H) 3.40 - 10.80 10*3/mm3    RBC  4.12 3.77 - 5.28 10*6/mm3    Hemoglobin 11.1 (L) 12.0 - 15.9 g/dL    Hematocrit 37.1 34.0 - 46.6 %    MCV 90.0 79.0 - 97.0 fL    MCH 26.9 26.6 - 33.0 pg    MCHC 29.9 (L) 31.5 - 35.7 g/dL    RDW 15.9 (H) 12.3 - 15.4 %    RDW-SD 51.4 37.0 - 54.0 fl    MPV 9.0 6.0 - 12.0 fL    Platelets 500 (H) 140 - 450 10*3/mm3    Neutrophil % 81.1 (H) 42.7 - 76.0 %    Lymphocyte % 4.9 (L) 19.6 - 45.3 %    Monocyte % 8.3 5.0 - 12.0 %    Eosinophil % 0.1 (L) 0.3 - 6.2 %    Basophil % 0.6 0.0 - 1.5 %    Immature Grans % 5.0 (H) 0.0 - 0.5 %    Neutrophils, Absolute 13.21 (H) 1.70 - 7.00 10*3/mm3    Lymphocytes, Absolute 0.80 0.70 - 3.10 10*3/mm3    Monocytes, Absolute 1.35 (H) 0.10 - 0.90 10*3/mm3    Eosinophils, Absolute 0.01 0.00 - 0.40 10*3/mm3    Basophils, Absolute 0.09 0.00 - 0.20 10*3/mm3    Immature Grans, Absolute 0.81 (H) 0.00 - 0.05 10*3/mm3    nRBC 0.1 0.0 - 0.2 /100 WBC   Phosphorus    Collection Time: 12/04/23  1:44 PM    Specimen: Blood   Result Value Ref Range    Phosphorus 4.2 2.5 - 4.5 mg/dL   Magnesium    Collection Time: 12/04/23  1:44 PM    Specimen: Blood   Result Value Ref Range    Magnesium 1.9 1.6 - 2.6 mg/dL   Beta Hydroxybutyrate Quantitative    Collection Time: 12/04/23  1:44 PM    Specimen: Blood   Result Value Ref Range    Beta-Hydroxybutyrate Quant 6.461 (H) 0.020 - 0.270 mmol/L       Ordered the above labs and reviewed the results.        RADIOLOGY  XR Chest 1 View    Result Date: 12/4/2023  EXAM: XR CHEST 1 VW-  INDICATION: Shortness of breath  COMPARISON: Chest radiographs dating back to 10/26/2019. CT chest 12/1/2023.       Hazy left greater than right bibasilar predominant opacities with more defined right lower lobe linear atelectasis have not significantly changed from recent CT chest, accounting for differences in imaging techniques. No pleural effusion or pneumothorax.  Left IJ port with tip at the brachiocephalic-SVC confluence. Stable normal size cardiomediastinal silhouette. No focal osseous  abnormality.    This report was finalized on 12/4/2023 12:58 PM by Dr. Jose Hoffmann M.D on Workstation: [id number]       Ordered the above noted radiological studies. Reviewed by me in PACS.            PROCEDURES  Critical Care    Performed by: Jose Carter MD  Authorized by: Jose Carter MD    Critical care provider statement:     Critical care time (minutes):  35    Critical care was necessary to treat or prevent imminent or life-threatening deterioration of the following conditions:  Circulatory failure, endocrine crisis and respiratory failure    Critical care was time spent personally by me on the following activities:  Development of treatment plan with patient or surrogate, discussions with consultants, evaluation of patient's response to treatment, examination of patient, obtaining history from patient or surrogate, ordering and performing treatments and interventions, ordering and review of laboratory studies, ordering and review of radiographic studies, re-evaluation of patient's condition and review of old charts    Care discussed with: admitting provider        MEDICATIONS GIVEN IN ER  Medications   sodium chloride 0.9 % flush 10 mL (has no administration in time range)   sodium chloride 0.9 % bolus 1,000 mL (1,000 mL Intravenous New Bag 12/4/23 1456)   sodium chloride 0.9 % flush 10 mL (has no administration in time range)   sodium chloride 0.9 % flush 10 mL (has no administration in time range)   sodium chloride 0.9 % infusion 40 mL (has no administration in time range)   dextrose (GLUTOSE) oral gel 15 g (has no administration in time range)   dextrose (D50W) (25 g/50 mL) IV injection 10-50 mL (has no administration in time range)   glucagon (GLUCAGEN) injection 1 mg (has no administration in time range)   sodium chloride 0.9 % bolus (has no administration in time range)   sodium chloride 0.9 % infusion (has no administration in time range)   sodium chloride 0.9 % with KCl 20 mEq/L  infusion (has no administration in time range)   sodium chloride 0.9 % with KCl 40 mEq/L infusion (has no administration in time range)   dextrose 5 % and sodium chloride 0.9 % infusion (has no administration in time range)   dextrose 5 % and sodium chloride 0.9 % with KCl 20 mEq/L infusion (has no administration in time range)   dextrose 5 % and sodium chloride 0.9 % with KCl 40 mEq/L infusion (has no administration in time range)   sodium chloride 0.45 % infusion (has no administration in time range)   sodium chloride 0.45 % with KCl 20 mEq/L infusion (has no administration in time range)   sodium chloride 0.45 % 1,000 mL with potassium chloride 40 mEq infusion (has no administration in time range)   dextrose 5 % and sodium chloride 0.45 % infusion (has no administration in time range)   dextrose 5 % and sodium chloride 0.45 % with KCl 20 mEq/L infusion (has no administration in time range)   dextrose 5 % and sodium chloride 0.45 % with KCl 40 mEq/L infusion (has no administration in time range)   insulin regular 1 unit/mL in 0.9% sodium chloride (Glucommander) (has no administration in time range)   Potassium Replacement - Follow Nurse / BPA Driven Protocol (has no administration in time range)   Magnesium Standard Dose Replacement - Follow Nurse / BPA Driven Protocol (has no administration in time range)   Phosphorus Replacement - Follow Nurse / BPA Driven Protocol (has no administration in time range)   Calcium Replacement - Follow Nurse / BPA Driven Protocol (has no administration in time range)   cefTRIAXone (ROCEPHIN) 2,000 mg in sodium chloride 0.9 % 100 mL IVPB-VTB (has no administration in time range)   azithromycin (ZITHROMAX) 500 mg in sodium chloride 0.9 % 250 mL IVPB-VTB (has no administration in time range)                   MEDICAL DECISION MAKING, PROGRESS, and CONSULTS    All labs have been independently reviewed by me.  All radiology studies have been reviewed by me and I have also reviewed the  radiology report.   EKG's independently viewed and interpreted by me.  Discussion below represents my analysis of pertinent findings related to patient's condition, differential diagnosis, treatment plan and final disposition.      Additional sources:  - Discussed/ obtained information from independent historians: Family members at bedside  - Chronic or social conditions impacting care: Metastatic breast cancer, diabetes    - Shared decision making: Utilizing shared decision making we elected to proceed with inpatient management at this time      Orders placed during this visit:  Orders Placed This Encounter   Procedures    Critical Care    Blood Culture - Blood,    Blood Culture - Blood,    Wound Culture - Wound, Breast, Right    XR Chest 1 View    Comprehensive Metabolic Panel    hCG, Serum, Qualitative    Lactic Acid, Plasma    Single High Sensitivity Troponin T    BNP    CBC Auto Differential    STAT Lactic Acid, Reflex    Blood Gas, Venous -    Phosphorus    Magnesium    Osmolality, Serum    Hemoglobin A1c    Basic Metabolic Panel    Magnesium    Phosphorus    Beta Hydroxybutyrate Quantitative    NPO Diet NPO Type: Strict NPO    Connectors / Hubs Must Be Scrubbed 15 Seconds Using 70% Alcohol Before Access - Allow to Dry Before Accessing Line    Change Dressing to IV Site As Needed When Damp, Loose or Soiled    Change Needleless Connectors    Vital Signs    Strict Intake & Output    Daily Weights    Continuous Pulse Oximetry    Saline Lock & Maintain IV Access    Corrected Serum Sodium = Measured Sodium + [1.6 x ((Glucose - 100)/100)]    Do NOT Discontinue Insulin Infusion Until 2 Hours After First Dose of Basal SQ Insulin    Prior to Initiating Glucommander™, Ensure All Prior Insulin Orders Are Discontinued    Do Not Start Insulin Infusion if Potassium < 3.3    Use a Dedicated Line for Insulin Infusion (If Possible).  May Use a Carrier Fluid of NS at KVO Rate if Insulin Rate is Insufficient to Maintain IV  Patency.  Prime IV Line With Insulin Infusion    Glucommander Must Be Discontinued if Insulin Infusion is Discontinued.  If Insulin Infusion is Restarted, Previous Glucommander Settings Must Be Discontinued and Re-Entered From New Order    Once DKA Meets Resolution Criteria - Call Provider for Transition Orders From IV to SQ Insulin    Utilize the Start Meal Feature / Meal Bolus Feature in Glucommander if Patient Starts a Diet or Bolus Tube Feedings    Notify Provider - Insulin Infusion    RN to Release PRN POC Glucose Orders Per Glucommander    RN to Order STAT Glucose For Any POC Glucose <10 or >600    If Insulin Infusion is Paused - Follow Glucommander Instructions    DKA Patients With Phosphorus Level 1 or Higher Do NOT Require Replacement (While Insulin Infusing)    Inpatient Diabetes Educator Consult    Pulmonology (on-call MD unless specified)    Oxygen Therapy- Nasal Cannula; Titrate 1-6 LPM Per SpO2; 90 - 95%    POC Glucose Once    POC Glucose PRN    ECG 12 Lead Dyspnea    Access VAD    Insert Peripheral IV x2    Inpatient Admission    CBC & Differential       Differential diagnosis includes but is not limited to:    Pneumonia, DKA, sepsis    Independent interpretation of labs, radiology studies, and discussions with consultants:  ED Course as of 12/04/23 1523   Mon Dec 04, 2023   1409 EKG interpreted by me demonstrates sinus rhythm, rate of 170, no WV/QT prolongation, no ST elevation [MW]   1410 WBC(!): 16.27 [MW]   1411 Glucose(!!): 484 [MW]   1444 CO2(!): 10.3 [MW]   1444 Glucose(!!): 449 [MW]   1444 Lactate(!!): 3.5 [MW]   1521 Chest x-ray interpreted by me and demonstrates bilateral lower lobe infiltrates [MW]   1521 Laboratory evaluation is concerning for DKA with a bicarb of 10, leukocytosis and lactic acid.  Suspect this is secondary to pneumonia.  Will initiate antibiotic therapy [MW]   1522 DKA protocol with IV fluid boluses initiated.  We will place patient in ICU for further evaluation and  management [MW]   1522 Discussed with Dr. Bush of ICU who agrees to admit for further evaluation and management [MW]      ED Course User Index  [MW] Jose Carter MD         DIAGNOSIS  Final diagnoses:   Diabetic ketoacidosis without coma associated with other specified diabetes mellitus   Pneumonia of both lower lobes due to infectious organism         DISPOSITION  ED Disposition       ED Disposition   Decision to Admit    Condition   --    Comment   Level of Care: Critical Care [6]   Diagnosis: DKA (diabetic ketoacidosis) [361657]   Admitting Physician: JENNIE BUSH [[id number]]   Attending Physician: JENNIE BUSH [id number]]   Certification: I Certify That Inpatient Hospital Services Are Medically Necessary For Greater Than 2 Midnights                           Latest Documented Vital Signs:  As of 15:23 EST  BP- 147/73 HR- 96 Temp- 98.1 °F (36.7 °C) O2 sat- 97%              --    Please note that portions of this were completed with a voice recognition program.       Note Disclaimer: At [facility], we believe that sharing information builds trust and better relationships. You are receiving this note because you are receiving care at [facility] or recently visited. It is possible you will see health information before a provider has talked with you about it. This kind of information can be easy to misunderstand. To help you fully understand what it means for your health, we urge you to discuss this note with your provider.             Jose Carter MD  12/04/23 1523

## 2023-12-04 NOTE — H&P
History and Physical    Patient Name: Sierra Mao  Age/Sex: 43 y.o. female  : 1980  MRN: [id number]    Date of Admission: 2023  Date of Encounter Visit: 23  Encounter Provider: Yulia Savage MD  Place of Service: [facility]   Patient Care Team:  Stephania Swain MD as PCP - General (Internal Medicine)  Shaila Willard MD as Consulting Physician (Endocrinology)  Richy Walker MD as Consulting Physician (Hematology and Oncology)  Mariposa Price MD as Referring Physician (General Surgery)  Ddii De La O MD as Consulting Physician (Hematology and Oncology)      Subjective:     Chief Complaint: Altered mental status    History of Present Illness:  Sierra Mao is a 43 y.o. female with history of diabetes mellitus with prior history of DKA in the past who has end-stage metastatic  breast cancer and was given Keytruda just recently.  Patient has chronic pain from her extensive metastatic disease and she is on high-dose morphine for chronic pain control.  She was having some labored breathing over the last 24 hours and she was brought in for evaluation, she had a recent admission at [facility] for pain control.  She did not to have lactic acidosis with high anion gap with elevated Dr. Hydroxybutyrate at 6.46.  Hemoglobin A1c was 9.4 reflecting an adequate control of the diabetes with a blood sugar of 449.  Her lactate was only 3.5, high-sensitivity troponin was borderline at 22.  White count was elevated at 16.27  The chest x-ray was showing some possible artifact versus infiltrate  She was labored but this can be acidosis driven  Patient was started on IV fluid per the DKA protocol we were consulted for further management  The patient is lethargic but she does respond if properly stimulated and encouraged to respond.  History was obtained from talking to the family at the bedside  CODE STATUS up to this point is a full code  Pulmonary Functions Testing  "Results:    No results found for: \"FEV1\", \"FVC\", \"TOU6XON\", \"TLC\", \"DLCO\"    Review of Systems:   Review of Systems   Unable to perform ROS: Mental status change         Past Medical History:  Past Medical History:   Diagnosis Date    Anxiety     Arthritis     Breast cancer 09/07/2021    Right breast invasive ductal carcinoma ER low positive, LA negative, [id number] negative, mercedes to lymph node (biopsy positive)    Chronic fatigue     Diabetes mellitus with stage 3 chronic kidney disease     Encounter for fitting and adjustment of vascular catheter 10/21/2021    Hyperlipidemia     Leukocytosis     Menorrhagia with regular cycle     Seasonal allergies     Type I diabetes mellitus     Vitamin D deficiency        Past Surgical History:   Procedure Laterality Date    APPENDECTOMY N/A 1995    BREAST BIOPSY Right 2018    BREAST BIOPSY Bilateral 09/07/2021    US GUIDED LYMPH NODE BIOPSY  09/07/2021    Dr. Carol Terrazas, [facility]    US GUIDED LYMPH NODE BIOPSY  09/28/2021    VENOUS ACCESS DEVICE (PORT) INSERTION N/A 10/15/2021    Procedure: INSERTION VENOUS ACCESS DEVICE;  Surgeon: Mariposa Price MD;  Location: [facility];  Service: General;  Laterality: N/A;       Home Medications:   (Not in a hospital admission)      Patient is altered but she is able to wake up if prompted  Inpatient Medications:  Scheduled Meds:azithromycin, 500 mg, Intravenous, Once  cefTRIAXone, 2,000 mg, Intravenous, Once  sodium chloride, 1,000 mL, Intravenous, Once  sodium chloride, 10 mL, Intravenous, Q12H      Continuous Infusions:dextrose 5 % and sodium chloride 0.45 %, 150 mL/hr  dextrose 5 % and sodium chloride 0.45 % with KCl 20 mEq/L, 150 mL/hr  dextrose 5 % and sodium chloride 0.45 % with KCl 40 mEq/L, 150 mL/hr  dextrose 5 % and sodium chloride 0.9 %, 150 mL/hr  dextrose 5 % and sodium chloride 0.9 % with KCl 20 mEq, 150 mL/hr  dextrose 5% and sodium chloride 0.9% with KCl 40 mEq/L, 150 mL/hr  insulin, 0-100 Units/hr  sodium chloride 0.45 % " 1,000 mL with potassium chloride 40 mEq infusion, 250 mL/hr  sodium chloride, 250 mL/hr  sodium chloride 0.45 % with KCl 20 mEq, 250 mL/hr  sodium chloride, 1,000 mL/hr  sodium chloride, 250 mL/hr  sodium chloride 0.9 % with KCl 20 mEq, 250 mL/hr  sodium chloride 0.9 % with KCl 40 mEq/L, 250 mL/hr      PRN Meds:.  Calcium Replacement - Follow Nurse / BPA Driven Protocol    dextrose    dextrose    dextrose 5 % and sodium chloride 0.45 %    dextrose 5 % and sodium chloride 0.45 % with KCl 20 mEq/L    dextrose 5 % and sodium chloride 0.45 % with KCl 40 mEq/L    dextrose 5 % and sodium chloride 0.9 %    dextrose 5 % and sodium chloride 0.9 % with KCl 20 mEq    dextrose 5% and sodium chloride 0.9% with KCl 40 mEq/L    glucagon (human recombinant)    Magnesium Standard Dose Replacement - Follow Nurse / BPA Driven Protocol    Phosphorus Replacement - Follow Nurse / BPA Driven Protocol    Potassium Replacement - Follow Nurse / BPA Driven Protocol    sodium chloride 0.45 % 1,000 mL with potassium chloride 40 mEq infusion    sodium chloride    sodium chloride 0.45 % with KCl 20 mEq    Access VAD **AND** sodium chloride    sodium chloride    sodium chloride    sodium chloride    sodium chloride 0.9 % with KCl 20 mEq    sodium chloride 0.9 % with KCl 40 mEq/L    Allergies:  No Known Allergies    Past Social History:  Social History     Socioeconomic History    Marital status:    Tobacco Use    Smoking status: Former     Packs/day: 0.50     Years: 8.00     Additional pack years: 0.00     Total pack years: 4.00     Types: Cigarettes     Start date: 1995     Quit date: 2003     Years since quittin.9    Smokeless tobacco: Former    Tobacco comments:     teen / young adult   Vaping Use    Vaping Use: Never used   Substance and Sexual Activity    Alcohol use: Yes     Comment: social    Drug use: No    Sexual activity: Defer     Partners: Male     Birth control/protection: None     Comment:        Past  Family History:  Family History   Problem Relation Age of Onset    Diabetes Mother     Cervical cancer Maternal Grandmother         cervical/uterine     Diabetes Maternal Grandfather     Cancer Father     Lymphoma Paternal Aunt 60    Breast cancer Neg Hx     Malig Hyperthermia Neg Hx            Objective:   Temp:  [98.1 °F (36.7 °C)] 98.1 °F (36.7 °C)  Heart Rate:  [125] 125  Resp:  [30] 30  BP: (147)/(73) 147/73   SpO2:  [96 %] 96 %  on  Flow (L/min):  [2] 2 Device (Oxygen Therapy): nasal cannula  No intake or output data in the 24 hours ending 12/04/23 1506  Body mass index is 23.71 kg/m².      12/04/23  1302   Weight: 77.1 kg (170 lb)     Weight change:     Physical Exam:   Physical Exam   General:  Sick looking, tachypneic, but otherwise arousable to stimulation and can follow commands                   Head:    Normocephalic, atraumatic.   Eyes:          Conjunctivae and sclerae normal, no icterus, PERRLA   Throat:   No oral lesions, no thrush, oral mucosa moist.    Neck:   Supple, trachea midline.   Lungs:      he has extensive necrosis of the skin on the right side of the breast going all the way back to the axillary area but no evidence of any significant discharge or foul smell, tachypneic with use of accessory muscles get clear to auscultation, no wheezes. No rhonchi. No crackles.     Heart:    Regular rhythm and rapid heart rate.  No murmurs, gallops, or rubs noted.   Abdomen:     Soft, non-tender, non-distended, positive bowel sounds.    Extremities:   No clubbing, cyanosis, or edema.     Pulses:   Pulses palpable and equal bilaterally.    Skin:   No bleeding or rash.  See above regarding the chest skin on the right side   Neuro:   Non-focal.  Moves all extremities well.  But she is lethargic   Psychiatric: Very lethargic     Lab Review:   Results from last 7 days   Lab Units 12/04/23  1344 12/01/23  1504   SODIUM mmol/L 130* 130*   POTASSIUM mmol/L 5.2 4.7   CHLORIDE mmol/L 94* 96*   CO2 mmol/L 10.3*  "23.9   BUN mg/dL 11 11   CREATININE mg/dL 0.91 0.58   GLUCOSE mg/dL 449* 211*   CALCIUM mg/dL 9.3 8.4*   AST (SGOT) U/L 25 74*   ALT (SGPT) U/L 27 29   ALBUMIN g/dL 3.3* 2.9*     Results from last 7 days   Lab Units 12/04/23  1344 12/01/23  1504   HSTROP T ng/L 22* 13     Results from last 7 days   Lab Units 12/04/23  1344 12/01/23  1504   WBC 10*3/mm3 16.27* 9.05   HEMOGLOBIN g/dL 11.1* 9.4*   HEMATOCRIT % 37.1 30.1*   PLATELETS 10*3/mm3 500* 343   MCV fL 90.0 90.7   MCH pg 26.9 28.3   MCHC g/dL 29.9* 31.2*   RDW % 15.9* 17.6*   RDW-SD fl 51.4 57.7*   MPV fL 9.0 9.8   NEUTROPHIL % % 81.1* 80.9*   LYMPHOCYTE % % 4.9* 9.4*   MONOCYTES % % 8.3 8.3   EOSINOPHIL % % 0.1* 0.7   BASOPHIL % % 0.6 0.1   IMM GRAN % % 5.0* 0.6*   NEUTROS ABS 10*3/mm3 13.21* 7.33*   LYMPHS ABS 10*3/mm3 0.80 0.85   MONOS ABS 10*3/mm3 1.35* 0.75   EOS ABS 10*3/mm3 0.01 0.06   BASOS ABS 10*3/mm3 0.09 0.01   IMMATURE GRANS (ABS) 10*3/mm3 0.81* 0.05   NRBC /100 WBC 0.1  --                    Invalid input(s): \"LDLCALC\"  Results from last 7 days   Lab Units 12/04/23  1344 12/01/23  1504   PROBNP pg/mL 351.0 223.2             Results from last 7 days   Lab Units 12/04/23  1344   LACTATE mmol/L 3.5*                     Results from last 7 days   Lab Units 12/01/23  1516   INFLUENZA B PCR  Not Detected           Imaging:  Imaging Results (Most Recent)       Procedure Component Value Units Date/Time    XR Chest 1 View [[id number]] Collected: 12/04/23 1252     Updated: 12/04/23 1301    Narrative:      EXAM: XR CHEST 1 VW-     INDICATION: Shortness of breath     COMPARISON: Chest radiographs dating back to 10/26/2019. CT chest  12/1/2023.          Impression:      Hazy left greater than right bibasilar predominant opacities  with more defined right lower lobe linear atelectasis have not  significantly changed from recent CT chest, accounting for differences  in imaging techniques. No pleural effusion or pneumothorax.     Left IJ port with tip at the " brachiocephalic-SVC confluence. Stable  normal size cardiomediastinal silhouette. No focal osseous abnormality.           This report was finalized on 12/4/2023 12:58 PM by Dr. Jose Hoffmann M.D on Workstation: [id number]               I personally viewed and interpreted the patient's imaging studies.    Assessment:     Diabetic ketoacidosis  Lactic acidosis  Poorly controlled diabetes  Leukocytosis with possible sepsis  Metastatic breast cancer  Chronic opioid use with high opioid tolerance  Possible pneumonia      Plan:     Patient is vented to the ICU and started on the DKA protocol  We will start with antibiotic coverage for pneumonia and possible coverage for cellulitis/skin infection given the finding on the physical exam  Patient is on a high dose of opioid with extended release morphine 60 mg taken 3 pills a day which is total of 180 mg and she takes also oxycodone 20 mg every 4 hours as needed.  She was supposed to be started on methadone but the mother is not sure about the dose  Patient will be back on that regimen, she is arousable enough to take her medication orally  She will be followed per the DKA protocol with close monitoring of the sugar profile and the acidosis  We will keep the patient n.p.o. except for her medication for now  We will check procalcitonin level  We will consult wound nurse to manage and follow on the chest skin lesion  Patient was on Keytruda and she got her dose 2 weeks ago, does not seem to be related to her DKA because she was on insulin before the Keytruda and she had a DKA episode 2 years ago before she was ever on that medication according to the family.      Time: Critical care 38 min    Yulia Savage MD  [facility]   12/04/23  15:06 EST    Dictated utilizing Dragon dictation

## 2023-12-05 ENCOUNTER — APPOINTMENT (OUTPATIENT)
Dept: GENERAL RADIOLOGY | Facility: HOSPITAL | Age: 43
DRG: 637 | End: 2023-12-05
Payer: COMMERCIAL

## 2023-12-05 LAB
ANION GAP SERPL CALCULATED.3IONS-SCNC: 10.5 MMOL/L (ref 5–15)
ANION GAP SERPL CALCULATED.3IONS-SCNC: 7 MMOL/L (ref 5–15)
ANION GAP SERPL CALCULATED.3IONS-SCNC: 7.4 MMOL/L (ref 5–15)
BUN SERPL-MCNC: 6 MG/DL (ref 6–20)
BUN SERPL-MCNC: 6 MG/DL (ref 6–20)
BUN SERPL-MCNC: 7 MG/DL (ref 6–20)
BUN/CREAT SERPL: 12.2 (ref 7–25)
BUN/CREAT SERPL: 15.8 (ref 7–25)
BUN/CREAT SERPL: 15.9 (ref 7–25)
CALCIUM SPEC-SCNC: 8.4 MG/DL (ref 8.6–10.5)
CALCIUM SPEC-SCNC: 8.7 MG/DL (ref 8.6–10.5)
CALCIUM SPEC-SCNC: 8.7 MG/DL (ref 8.6–10.5)
CHLORIDE SERPL-SCNC: 103 MMOL/L (ref 98–107)
CHLORIDE SERPL-SCNC: 106 MMOL/L (ref 98–107)
CHLORIDE SERPL-SCNC: 107 MMOL/L (ref 98–107)
CO2 SERPL-SCNC: 17.5 MMOL/L (ref 22–29)
CO2 SERPL-SCNC: 18.6 MMOL/L (ref 22–29)
CO2 SERPL-SCNC: 21 MMOL/L (ref 22–29)
CREAT SERPL-MCNC: 0.38 MG/DL (ref 0.57–1)
CREAT SERPL-MCNC: 0.44 MG/DL (ref 0.57–1)
CREAT SERPL-MCNC: 0.49 MG/DL (ref 0.57–1)
DEPRECATED RDW RBC AUTO: 51 FL (ref 37–54)
EGFRCR SERPLBLD CKD-EPI 2021: 120.9 ML/MIN/1.73
EGFRCR SERPLBLD CKD-EPI 2021: 124 ML/MIN/1.73
EGFRCR SERPLBLD CKD-EPI 2021: 128.5 ML/MIN/1.73
ERYTHROCYTE [DISTWIDTH] IN BLOOD BY AUTOMATED COUNT: 16.1 % (ref 12.3–15.4)
GLUCOSE BLDC GLUCOMTR-MCNC: 102 MG/DL (ref 70–130)
GLUCOSE BLDC GLUCOMTR-MCNC: 112 MG/DL (ref 70–130)
GLUCOSE BLDC GLUCOMTR-MCNC: 120 MG/DL (ref 70–130)
GLUCOSE BLDC GLUCOMTR-MCNC: 122 MG/DL (ref 70–130)
GLUCOSE BLDC GLUCOMTR-MCNC: 129 MG/DL (ref 70–130)
GLUCOSE BLDC GLUCOMTR-MCNC: 139 MG/DL (ref 70–130)
GLUCOSE BLDC GLUCOMTR-MCNC: 146 MG/DL (ref 70–130)
GLUCOSE BLDC GLUCOMTR-MCNC: 168 MG/DL (ref 70–130)
GLUCOSE BLDC GLUCOMTR-MCNC: 172 MG/DL (ref 70–130)
GLUCOSE BLDC GLUCOMTR-MCNC: 174 MG/DL (ref 70–130)
GLUCOSE BLDC GLUCOMTR-MCNC: 198 MG/DL (ref 70–130)
GLUCOSE BLDC GLUCOMTR-MCNC: 83 MG/DL (ref 70–130)
GLUCOSE BLDC GLUCOMTR-MCNC: 97 MG/DL (ref 70–130)
GLUCOSE SERPL-MCNC: 101 MG/DL (ref 65–99)
GLUCOSE SERPL-MCNC: 104 MG/DL (ref 65–99)
GLUCOSE SERPL-MCNC: 193 MG/DL (ref 65–99)
HCT VFR BLD AUTO: 28.8 % (ref 34–46.6)
HGB BLD-MCNC: 9.2 G/DL (ref 12–15.9)
MAGNESIUM SERPL-MCNC: 1.6 MG/DL (ref 1.6–2.6)
MAGNESIUM SERPL-MCNC: 1.7 MG/DL (ref 1.6–2.6)
MAGNESIUM SERPL-MCNC: 1.7 MG/DL (ref 1.6–2.6)
MCH RBC QN AUTO: 28.1 PG (ref 26.6–33)
MCHC RBC AUTO-ENTMCNC: 31.9 G/DL (ref 31.5–35.7)
MCV RBC AUTO: 88.1 FL (ref 79–97)
PHOSPHATE SERPL-MCNC: 2 MG/DL (ref 2.5–4.5)
PHOSPHATE SERPL-MCNC: 2.1 MG/DL (ref 2.5–4.5)
PHOSPHATE SERPL-MCNC: 2.4 MG/DL (ref 2.5–4.5)
PLATELET # BLD AUTO: 306 10*3/MM3 (ref 140–450)
PMV BLD AUTO: 9.1 FL (ref 6–12)
POTASSIUM SERPL-SCNC: 4 MMOL/L (ref 3.5–5.2)
POTASSIUM SERPL-SCNC: 4.3 MMOL/L (ref 3.5–5.2)
POTASSIUM SERPL-SCNC: 4.4 MMOL/L (ref 3.5–5.2)
PROCALCITONIN SERPL-MCNC: 1.29 NG/ML (ref 0–0.25)
RBC # BLD AUTO: 3.27 10*6/MM3 (ref 3.77–5.28)
SODIUM SERPL-SCNC: 131 MMOL/L (ref 136–145)
SODIUM SERPL-SCNC: 133 MMOL/L (ref 136–145)
SODIUM SERPL-SCNC: 134 MMOL/L (ref 136–145)
WBC NRBC COR # BLD AUTO: 10.25 10*3/MM3 (ref 3.4–10.8)

## 2023-12-05 PROCEDURE — 25010000002 ENOXAPARIN PER 10 MG: Performed by: INTERNAL MEDICINE

## 2023-12-05 PROCEDURE — 25010000002 FENTANYL CITRATE (PF) 50 MCG/ML SOLUTION: Performed by: INTERNAL MEDICINE

## 2023-12-05 PROCEDURE — 80048 BASIC METABOLIC PNL TOTAL CA: CPT | Performed by: INTERNAL MEDICINE

## 2023-12-05 PROCEDURE — 25010000002 MORPHINE PER 10 MG: Performed by: INTERNAL MEDICINE

## 2023-12-05 PROCEDURE — 84100 ASSAY OF PHOSPHORUS: CPT | Performed by: INTERNAL MEDICINE

## 2023-12-05 PROCEDURE — 83735 ASSAY OF MAGNESIUM: CPT | Performed by: INTERNAL MEDICINE

## 2023-12-05 PROCEDURE — 63710000001 INSULIN LISPRO (HUMAN) PER 5 UNITS: Performed by: INTERNAL MEDICINE

## 2023-12-05 PROCEDURE — 25010000002 CEFTRIAXONE PER 250 MG: Performed by: INTERNAL MEDICINE

## 2023-12-05 PROCEDURE — 82948 REAGENT STRIP/BLOOD GLUCOSE: CPT

## 2023-12-05 PROCEDURE — 63710000001 INSULIN GLARGINE PER 5 UNITS: Performed by: INTERNAL MEDICINE

## 2023-12-05 PROCEDURE — 25810000003 SODIUM CHLORIDE 0.9 % SOLUTION: Performed by: INTERNAL MEDICINE

## 2023-12-05 PROCEDURE — 71045 X-RAY EXAM CHEST 1 VIEW: CPT

## 2023-12-05 PROCEDURE — 85027 COMPLETE CBC AUTOMATED: CPT | Performed by: INTERNAL MEDICINE

## 2023-12-05 PROCEDURE — 84145 PROCALCITONIN (PCT): CPT | Performed by: INTERNAL MEDICINE

## 2023-12-05 RX ORDER — INSULIN LISPRO 100 [IU]/ML
3-14 INJECTION, SOLUTION INTRAVENOUS; SUBCUTANEOUS
Status: DISCONTINUED | OUTPATIENT
Start: 2023-12-05 | End: 2023-12-07

## 2023-12-05 RX ORDER — IBUPROFEN 600 MG/1
1 TABLET ORAL
Status: DISCONTINUED | OUTPATIENT
Start: 2023-12-05 | End: 2023-12-10 | Stop reason: HOSPADM

## 2023-12-05 RX ORDER — SODIUM PHOSPHATE IN 0.9 % NACL 15MMOL/100
15 PLASTIC BAG, INJECTION (ML) INTRAVENOUS ONCE
Status: COMPLETED | OUTPATIENT
Start: 2023-12-05 | End: 2023-12-05

## 2023-12-05 RX ORDER — AMOXICILLIN 250 MG
2 CAPSULE ORAL 2 TIMES DAILY
Status: DISCONTINUED | OUTPATIENT
Start: 2023-12-05 | End: 2023-12-10 | Stop reason: HOSPADM

## 2023-12-05 RX ORDER — BISACODYL 5 MG/1
5 TABLET, DELAYED RELEASE ORAL DAILY PRN
Status: DISCONTINUED | OUTPATIENT
Start: 2023-12-05 | End: 2023-12-10 | Stop reason: HOSPADM

## 2023-12-05 RX ORDER — FLUOXETINE HYDROCHLORIDE 20 MG/1
40 CAPSULE ORAL DAILY
COMMUNITY

## 2023-12-05 RX ORDER — MORPHINE SULFATE 2 MG/ML
4 INJECTION, SOLUTION INTRAMUSCULAR; INTRAVENOUS
Status: DISCONTINUED | OUTPATIENT
Start: 2023-12-05 | End: 2023-12-10 | Stop reason: HOSPADM

## 2023-12-05 RX ORDER — METRONIDAZOLE 500 MG/1
500 TABLET ORAL EVERY 12 HOURS SCHEDULED
Status: DISCONTINUED | OUTPATIENT
Start: 2023-12-05 | End: 2023-12-09

## 2023-12-05 RX ORDER — INSULIN LISPRO 100 [IU]/ML
6 INJECTION, SOLUTION INTRAVENOUS; SUBCUTANEOUS
Status: DISCONTINUED | OUTPATIENT
Start: 2023-12-05 | End: 2023-12-09

## 2023-12-05 RX ORDER — ENOXAPARIN SODIUM 100 MG/ML
40 INJECTION SUBCUTANEOUS EVERY 24 HOURS
Status: DISCONTINUED | OUTPATIENT
Start: 2023-12-05 | End: 2023-12-10 | Stop reason: HOSPADM

## 2023-12-05 RX ORDER — METHADONE HYDROCHLORIDE 5 MG/5ML
15 SOLUTION ORAL EVERY 12 HOURS
Status: DISCONTINUED | OUTPATIENT
Start: 2023-12-05 | End: 2023-12-06

## 2023-12-05 RX ORDER — FENTANYL CITRATE 50 UG/ML
100 INJECTION, SOLUTION INTRAMUSCULAR; INTRAVENOUS
Status: DISCONTINUED | OUTPATIENT
Start: 2023-12-05 | End: 2023-12-05

## 2023-12-05 RX ORDER — NICOTINE POLACRILEX 4 MG
15 LOZENGE BUCCAL
Status: DISCONTINUED | OUTPATIENT
Start: 2023-12-05 | End: 2023-12-10 | Stop reason: HOSPADM

## 2023-12-05 RX ORDER — DEXTROSE MONOHYDRATE 25 G/50ML
25 INJECTION, SOLUTION INTRAVENOUS
Status: DISCONTINUED | OUTPATIENT
Start: 2023-12-05 | End: 2023-12-10 | Stop reason: HOSPADM

## 2023-12-05 RX ORDER — POLYETHYLENE GLYCOL 3350 17 G/17G
17 POWDER, FOR SOLUTION ORAL DAILY PRN
Status: DISCONTINUED | OUTPATIENT
Start: 2023-12-05 | End: 2023-12-10 | Stop reason: HOSPADM

## 2023-12-05 RX ORDER — BISACODYL 10 MG
10 SUPPOSITORY, RECTAL RECTAL DAILY PRN
Status: DISCONTINUED | OUTPATIENT
Start: 2023-12-05 | End: 2023-12-10 | Stop reason: HOSPADM

## 2023-12-05 RX ORDER — FLUOXETINE HYDROCHLORIDE 20 MG/1
40 CAPSULE ORAL DAILY
Status: DISCONTINUED | OUTPATIENT
Start: 2023-12-05 | End: 2023-12-10 | Stop reason: HOSPADM

## 2023-12-05 RX ADMIN — MORPHINE SULFATE 4 MG: 2 INJECTION, SOLUTION INTRAMUSCULAR; INTRAVENOUS at 15:56

## 2023-12-05 RX ADMIN — METHADONE HYDROCHLORIDE 15 MG: 5 SOLUTION ORAL at 14:54

## 2023-12-05 RX ADMIN — ENOXAPARIN SODIUM 40 MG: 100 INJECTION SUBCUTANEOUS at 12:06

## 2023-12-05 RX ADMIN — OXYCODONE HYDROCHLORIDE 20 MG: 5 TABLET ORAL at 03:10

## 2023-12-05 RX ADMIN — INSULIN GLARGINE 10 UNITS: 100 INJECTION, SOLUTION SUBCUTANEOUS at 20:42

## 2023-12-05 RX ADMIN — INSULIN GLARGINE 10 UNITS: 100 INJECTION, SOLUTION SUBCUTANEOUS at 06:07

## 2023-12-05 RX ADMIN — SODIUM PHOSPHATE, MONOBASIC, MONOHYDRATE AND SODIUM PHOSPHATE, DIBASIC, ANHYDROUS 15 MMOL: 142; 276 INJECTION, SOLUTION INTRAVENOUS at 09:01

## 2023-12-05 RX ADMIN — MORPHINE SULFATE 60 MG: 30 TABLET, FILM COATED, EXTENDED RELEASE ORAL at 08:31

## 2023-12-05 RX ADMIN — Medication 10 ML: at 20:42

## 2023-12-05 RX ADMIN — METRONIDAZOLE 500 MG: 500 TABLET, FILM COATED ORAL at 11:49

## 2023-12-05 RX ADMIN — MORPHINE SULFATE 60 MG: 30 TABLET, FILM COATED, EXTENDED RELEASE ORAL at 16:37

## 2023-12-05 RX ADMIN — MORPHINE SULFATE 60 MG: 30 TABLET, FILM COATED, EXTENDED RELEASE ORAL at 01:33

## 2023-12-05 RX ADMIN — FENTANYL CITRATE 100 MCG: 50 INJECTION, SOLUTION INTRAMUSCULAR; INTRAVENOUS at 13:37

## 2023-12-05 RX ADMIN — FLUOXETINE HYDROCHLORIDE 40 MG: 20 CAPSULE ORAL at 18:36

## 2023-12-05 RX ADMIN — OXYCODONE HYDROCHLORIDE 20 MG: 5 TABLET ORAL at 11:46

## 2023-12-05 RX ADMIN — INSULIN LISPRO 6 UNITS: 100 INJECTION, SOLUTION INTRAVENOUS; SUBCUTANEOUS at 09:06

## 2023-12-05 RX ADMIN — Medication 10 ML: at 08:24

## 2023-12-05 RX ADMIN — INSULIN LISPRO 3 UNITS: 100 INJECTION, SOLUTION INTRAVENOUS; SUBCUTANEOUS at 20:43

## 2023-12-05 RX ADMIN — FENTANYL CITRATE 50 MCG: 50 INJECTION, SOLUTION INTRAMUSCULAR; INTRAVENOUS at 11:46

## 2023-12-05 RX ADMIN — SILVER SULFADIAZINE 1 APPLICATION: 10 CREAM TOPICAL at 12:06

## 2023-12-05 RX ADMIN — CEFTRIAXONE SODIUM 1000 MG: 1 INJECTION, POWDER, FOR SOLUTION INTRAMUSCULAR; INTRAVENOUS at 16:25

## 2023-12-05 RX ADMIN — DOCUSATE SODIUM 50MG AND SENNOSIDES 8.6MG 2 TABLET: 8.6; 5 TABLET, FILM COATED ORAL at 12:06

## 2023-12-05 RX ADMIN — FENTANYL CITRATE 50 MCG: 50 INJECTION, SOLUTION INTRAMUSCULAR; INTRAVENOUS at 08:36

## 2023-12-05 RX ADMIN — OXYCODONE HYDROCHLORIDE 20 MG: 5 TABLET ORAL at 18:42

## 2023-12-05 NOTE — NURSING NOTE
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 Pt c/o pain and does not want dressing changed at this time. Photo reviewed. We discussed her home wound care regimen which sounds reasonable for this type of fungating tumor, we will continue same here, including use of metronidazole crushed and applied to tumor for odor control and topical antibiotic therapy, and silvadene cream on Vaseline guaze, covered with ABD pads. This can be changed BID. Wound care orders placed into EPIC. Discussed with RN and MD. Please re-consult for any additional needs.

## 2023-12-05 NOTE — PROGRESS NOTES
"  PROGRESS NOTE  Patient Name: Sierra Mao  Age/Sex: 43 y.o. female  : 1980  MRN: [id number]    Date of Admission: 2023  Date of Encounter Visit: 23   LOS: 1 day   Patient Care Team:  Stephania Swain MD as PCP - General (Internal Medicine)  Shaila Willard MD as Consulting Physician (Endocrinology)  Richy Walker MD as Consulting Physician (Hematology and Oncology)  Mariposa Price MD as Referring Physician (General Surgery)  Didi De La O MD as Consulting Physician (Hematology and Oncology)    Chief Complaint: DKA    Hospital course: Patient came in with altered mental status, was treated for DKA and she is doing better, she had significant pain and she had chronic pain issues from the obvious finding on exam was torn out right-sided chest wall with metastatic cancer.         REVIEW OF SYSTEMS:   CONSTITUTIONAL: no fever or chills  CARDIOVASCULAR: Positive for chest pain and discomfort over the prostatic area. No palpitations or edema.   RESPIRATORY: She does have some shortness of breath and some pleuritic type of pain  GASTROINTESTINAL: No anorexia, nausea, vomiting or diarrhea. No abdominal pain or blood.   HEMATOLOGIC: No bleeding or bruising.     Ventilator/Non-Invasive Ventilation Settings (From admission, onward)      None              Vital Signs  Temp:  [98.6 °F (37 °C)-99.8 °F (37.7 °C)] 99.3 °F (37.4 °C)  Heart Rate:  [] 114  Resp:  [22] 22  BP: (106-149)/(56-85) 126/76  SpO2:  [92 %-98 %] 92 %  on  Flow (L/min):  [2] 2 Device (Oxygen Therapy): room air    Intake/Output Summary (Last 24 hours) at 2023 1316  Last data filed at 2023 0400  Gross per 24 hour   Intake 2585.13 ml   Output --   Net 2585.13 ml     Flowsheet Rows      Flowsheet Row First Filed Value   Admission Height 180.3 cm (71\") Documented at 2023 1302   Admission Weight 77.1 kg (170 lb) Documented at 2023 1302          Body mass index is 23.03 kg/m².      23  1302 " 12/05/23  0400   Weight: 77.1 kg (170 lb) 74.9 kg (165 lb 2 oz)       Physical Exam:  GEN:  No acute distress, alert, cooperative, well developed, tearful in pain  EYES:   Sclerae clear. No icterus. PERRL. Normal EOM  ENT:   External ears/nose normal, no oral lesions, no thrush, mucous membranes moist  NECK:  Supple, midline trachea, no JVD  LUNGS: Extensive chest wall involvement with the metastatic cancer on the right side with tissue necrosis CTAB, no wheezes.  Minimal rhonchi only upon coughing. No crackles. Respirations regular, even and unlabored.  Painful breathing  CV:  Regular rhythm and rate. Normal S1/S2. No murmurs, gallops, or rubs noted.  ABD:  Soft, nontender and nondistended. Normal bowel sounds. No guarding  EXT:  Moves all extremities well. No cyanosis. No redness. No edema.   Skin: Extensive skin necrosis from the metastatically skin lesion on the right side of the chest    Results Review:    Results From Last 14 Days   Lab Units 12/04/23 2027 12/04/23  1602 12/04/23  1344   LACTATE mmol/L 1.9  1.9 2.4* 3.5*     Results from last 7 days   Lab Units 12/05/23  0831 12/05/23  0411 12/05/23  0017 12/04/23 2027 12/04/23  1602 12/04/23  1344 12/01/23  1504   SODIUM mmol/L 134* 133* 131* 129* 132* 130* 130*   POTASSIUM mmol/L 4.0 4.3 4.4 4.6 5.2 5.2 4.7   CHLORIDE mmol/L 106 107 103 101 99 94* 96*   CO2 mmol/L 21.0* 18.6* 17.5* 16.4* 12.4* 10.3* 23.9   BUN mg/dL 6 6 7 8 10 11 11   CREATININE mg/dL 0.49* 0.38* 0.44* 0.59 0.70 0.91 0.58   CALCIUM mg/dL 8.7 8.7 8.4* 8.8 8.8 9.3 8.4*   AST (SGOT) U/L  --   --   --   --   --  25 74*   ALT (SGPT) U/L  --   --   --   --   --  27 29   ANION GAP mmol/L 7.0 7.4 10.5 11.6 20.6* 25.7* 10.1   ALBUMIN g/dL  --   --   --   --   --  3.3* 2.9*     Results from last 7 days   Lab Units 12/04/23  1344 12/01/23  1504   HSTROP T ng/L 22* 13         Results from last 7 days   Lab Units 12/04/23  1344 12/01/23  1504   PROBNP pg/mL 351.0 223.2     Results from last 7 days  "  Lab Units 12/05/23  0411 12/04/23  1344 12/01/23  1504   WBC 10*3/mm3 10.25 16.27* 9.05   HEMOGLOBIN g/dL 9.2* 11.1* 9.4*   HEMATOCRIT % 28.8* 37.1 30.1*   PLATELETS 10*3/mm3 306 500* 343   MCV fL 88.1 90.0 90.7   NEUTROPHIL % %  --  81.1* 80.9*   LYMPHOCYTE % %  --  4.9* 9.4*   MONOCYTES % %  --  8.3 8.3   EOSINOPHIL % %  --  0.1* 0.7   BASOPHIL % %  --  0.6 0.1   IMM GRAN % %  --  5.0* 0.6*         Results from last 7 days   Lab Units 12/05/23  0831 12/05/23 0411 12/05/23  0017 12/04/23 2027 12/04/23  1602 12/04/23  1344   MAGNESIUM mg/dL 1.7 1.6 1.7 1.7 1.7 1.9           Invalid input(s): \"LDLCALC\"      Results from last 7 days   Lab Units 12/04/23  1344   HEMOGLOBIN A1C % 9.40*     Glucose   Date/Time Value Ref Range Status   12/05/2023 1132 83 70 - 130 mg/dL Final   12/05/2023 0805 112 70 - 130 mg/dL Final   12/05/2023 0659 122 70 - 130 mg/dL Final   12/05/2023 0602 97 70 - 130 mg/dL Final   12/05/2023 0427 120 70 - 130 mg/dL Final   12/05/2023 0408 102 70 - 130 mg/dL Final   12/05/2023 0305 139 (H) 70 - 130 mg/dL Final   12/05/2023 0211 168 (H) 70 - 130 mg/dL Final     Results from last 7 days   Lab Units 12/05/23 0411 12/04/23 2027 12/04/23  1602 12/04/23  1344   PROCALCITONIN ng/mL 1.29*  --   --   --    LACTATE mmol/L  --  1.9  1.9 2.4* 3.5*     Results from last 7 days   Lab Units 12/04/23  1301   WOUNDCX  Moderate growth (3+) Gram Negative Bacilli*         Results from last 7 days   Lab Units 12/01/23  1516   COVID19  Not Detected   INFLUENZA B PCR  Not Detected               Imaging:   Imaging Results (All)       Procedure Component Value Units Date/Time    XR Chest 1 View [[id number]] Collected: 12/05/23 0732     Updated: 12/05/23 0738    Narrative:      XR CHEST 1 VW-     Clinical: Respiratory failure     COMPARISON 12/4/2023     FINDINGS: Mediport catheter in position as before. Elevation of the  right hemidiaphragm is again demonstrated. The cardiomediastinal  silhouette is unremarkable. No " gross vascular congestion or pleural  effusion seen. Bilateral areas of infiltrate and consolidation similar  to the previous examinations. Seen best on prior CT. No new area of  airspace disease has developed. The remainder is unremarkable.     This report was finalized on 12/5/2023 7:35 AM by Dr. Trenton Lyn M.D  on Workstation: [id number]       XR Chest 1 View [[id number]] Collected: 12/04/23 1252     Updated: 12/04/23 1301    Narrative:      EXAM: XR CHEST 1 VW-     INDICATION: Shortness of breath     COMPARISON: Chest radiographs dating back to 10/26/2019. CT chest  12/1/2023.          Impression:      Hazy left greater than right bibasilar predominant opacities  with more defined right lower lobe linear atelectasis have not  significantly changed from recent CT chest, accounting for differences  in imaging techniques. No pleural effusion or pneumothorax.     Left IJ port with tip at the brachiocephalic-SVC confluence. Stable  normal size cardiomediastinal silhouette. No focal osseous abnormality.           This report was finalized on 12/4/2023 12:58 PM by Dr. Jose Hoffmann M.D on Workstation: [id number]               I reviewed the patient's new clinical results.  I personally viewed and interpreted the patient's imaging results:        Medication Review:   cefTRIAXone, 1,000 mg, Intravenous, Q24H  enoxaparin, 40 mg, Subcutaneous, Q24H  insulin glargine, 10 Units, Subcutaneous, Q12H  insulin lispro, 3-14 Units, Subcutaneous, 4x Daily AC & at Bedtime  insulin lispro, 6 Units, Subcutaneous, TID With Meals  methadone, 15 mg, Oral, Q12H  metroNIDAZOLE, 500 mg, Topical, Q12H  Morphine, 60 mg, Oral, Q8H  senna-docusate sodium, 2 tablet, Oral, BID  silver sulfadiazine, 1 application , Topical, Q12H  sodium chloride, 10 mL, Intravenous, Q12H        dextrose 5 % and sodium chloride 0.45 %, 150 mL/hr  dextrose 5 % and sodium chloride 0.45 % with KCl 20 mEq/L, 150 mL/hr  dextrose 5 % and sodium chloride 0.45 % with  KCl 40 mEq/L, 150 mL/hr  dextrose 5 % and sodium chloride 0.9 %, 150 mL/hr, Last Rate: 150 mL/hr (12/04/23 2229)  dextrose 5 % and sodium chloride 0.9 % with KCl 20 mEq, 150 mL/hr, Last Rate: Stopped (12/05/23 0800)  dextrose 5% and sodium chloride 0.9% with KCl 40 mEq/L, 150 mL/hr  sodium chloride 0.45 % 1,000 mL with potassium chloride 40 mEq infusion, 250 mL/hr  sodium chloride, 250 mL/hr  sodium chloride 0.45 % with KCl 20 mEq, 250 mL/hr, Last Rate: 250 mL/hr (12/04/23 1933)  sodium chloride, 250 mL/hr  sodium chloride 0.9 % with KCl 20 mEq, 250 mL/hr  sodium chloride 0.9 % with KCl 40 mEq/L, 250 mL/hr        ASSESSMENT:   Diabetic ketoacidosis  Lactic acidosis  Poorly controlled diabetes  Leukocytosis with possible sepsis  Metastatic breast cancer  Chronic opioid use with high opioid tolerance  Possible pneumonia  Metabolic encephalopathy    PLAN:  Patient is out of the DKA will do now on scheduled insulin with good glycemic control with resolved encephalopathy as a result of that.  Her main issue at this point is the pain control.  The metronidazole is used topically by sprinkled over the skin incision to take care of the foul smell which is something that she has been doing for a while and we will be continued as such  Her main issue at this point is the pain control, she was in tears and it is obvious why she has so much pain given the skin involvement of that metastatic cancer.  Patient was resumed on her home regimen of long-acting morphine and short acting narcotics, we will resume her home regimen of methadone and we will continue with the as needed pain medication for breakthrough pain with IV fentanyl and we will transition to a different oral regimen as she becomes more pain controlled.  We will continue to follow-up on the glycemic profile and consider further adjustment on her insulin regimen if needed  Her altered mental status has resolved and this was consistent with metabolic  encephalopathy  Discussed with   Labs/Notes/films were independently reviewed and pertinent results are summarized above  The copied texts in this note were reviewed and they are accurate as of 12/05/23    Disposition: Home once her pain is under better control and her lactic acidosis optimally controlled    Yulia Savage MD  12/05/23  13:16 EST         Dictated utilizing Dragon dictation

## 2023-12-05 NOTE — PLAN OF CARE
Goal Outcome Evaluation:  Plan of Care Reviewed With: patient        Progress: improving          Pt admitted on 12/4 with SOA , current  of RT breast cancer with mets to Bone ,   in ED in DKA, previous  dx of PNA 2 days ago at [facility], had not picked up antibiotics yet.  Lactic elevated  started on azithromycin iv,  Started on insulin Drip. Wound consult placed for  large RT breast wound , Wound cultures obtained from oozing sites, DKA resolved overnight, transition to sliding scale.

## 2023-12-05 NOTE — NURSING NOTE
Pt arrived from ICU at this time with pain to left upper back/rib area. Pt tearful. Call placed to ICU and order was placed for a CXR Stat.

## 2023-12-06 LAB
ANION GAP SERPL CALCULATED.3IONS-SCNC: 12 MMOL/L (ref 5–15)
BUN SERPL-MCNC: 7 MG/DL (ref 6–20)
BUN/CREAT SERPL: 15.6 (ref 7–25)
CALCIUM SPEC-SCNC: 8.4 MG/DL (ref 8.6–10.5)
CHLORIDE SERPL-SCNC: 100 MMOL/L (ref 98–107)
CO2 SERPL-SCNC: 20 MMOL/L (ref 22–29)
CREAT SERPL-MCNC: 0.45 MG/DL (ref 0.57–1)
EGFRCR SERPLBLD CKD-EPI 2021: 123.4 ML/MIN/1.73
GLUCOSE BLDC GLUCOMTR-MCNC: 104 MG/DL (ref 70–130)
GLUCOSE BLDC GLUCOMTR-MCNC: 200 MG/DL (ref 70–130)
GLUCOSE BLDC GLUCOMTR-MCNC: 201 MG/DL (ref 70–130)
GLUCOSE BLDC GLUCOMTR-MCNC: 241 MG/DL (ref 70–130)
GLUCOSE SERPL-MCNC: 219 MG/DL (ref 65–99)
MAGNESIUM SERPL-MCNC: 1.6 MG/DL (ref 1.6–2.6)
PHOSPHATE SERPL-MCNC: 3.2 MG/DL (ref 2.5–4.5)
POTASSIUM SERPL-SCNC: 4.1 MMOL/L (ref 3.5–5.2)
SODIUM SERPL-SCNC: 132 MMOL/L (ref 136–145)

## 2023-12-06 PROCEDURE — 25010000002 ENOXAPARIN PER 10 MG: Performed by: INTERNAL MEDICINE

## 2023-12-06 PROCEDURE — 25010000002 MORPHINE PER 10 MG: Performed by: INTERNAL MEDICINE

## 2023-12-06 PROCEDURE — 83735 ASSAY OF MAGNESIUM: CPT | Performed by: INTERNAL MEDICINE

## 2023-12-06 PROCEDURE — 63710000001 INSULIN LISPRO (HUMAN) PER 5 UNITS: Performed by: INTERNAL MEDICINE

## 2023-12-06 PROCEDURE — 63710000001 PREDNISONE PER 1 MG: Performed by: INTERNAL MEDICINE

## 2023-12-06 PROCEDURE — 80048 BASIC METABOLIC PNL TOTAL CA: CPT | Performed by: INTERNAL MEDICINE

## 2023-12-06 PROCEDURE — 63710000001 INSULIN GLARGINE PER 5 UNITS: Performed by: INTERNAL MEDICINE

## 2023-12-06 PROCEDURE — 84100 ASSAY OF PHOSPHORUS: CPT | Performed by: INTERNAL MEDICINE

## 2023-12-06 PROCEDURE — 25010000002 CEFTRIAXONE PER 250 MG: Performed by: INTERNAL MEDICINE

## 2023-12-06 PROCEDURE — 82948 REAGENT STRIP/BLOOD GLUCOSE: CPT

## 2023-12-06 RX ORDER — OXYCODONE HYDROCHLORIDE 15 MG/1
30 TABLET ORAL EVERY 4 HOURS PRN
Status: DISCONTINUED | OUTPATIENT
Start: 2023-12-06 | End: 2023-12-10 | Stop reason: HOSPADM

## 2023-12-06 RX ORDER — OXYBUTYNIN CHLORIDE 5 MG/1
5 TABLET ORAL NIGHTLY
COMMUNITY
Start: 2023-11-29

## 2023-12-06 RX ORDER — PREDNISONE 20 MG/1
40 TABLET ORAL DAILY
Status: DISCONTINUED | OUTPATIENT
Start: 2023-12-06 | End: 2023-12-09

## 2023-12-06 RX ORDER — METRONIDAZOLE 500 MG/1
500 TABLET ORAL
COMMUNITY
Start: 2023-11-29 | End: 2023-12-10 | Stop reason: HOSPADM

## 2023-12-06 RX ORDER — MORPHINE SULFATE 15 MG/1
60 TABLET, FILM COATED, EXTENDED RELEASE ORAL EVERY 12 HOURS SCHEDULED
Status: COMPLETED | OUTPATIENT
Start: 2023-12-06 | End: 2023-12-07

## 2023-12-06 RX ORDER — METHADONE HYDROCHLORIDE 5 MG/5ML
20 SOLUTION ORAL EVERY 12 HOURS
Status: DISCONTINUED | OUTPATIENT
Start: 2023-12-06 | End: 2023-12-10 | Stop reason: HOSPADM

## 2023-12-06 RX ORDER — PREGABALIN 50 MG/1
50 CAPSULE ORAL EVERY 12 HOURS SCHEDULED
Status: DISCONTINUED | OUTPATIENT
Start: 2023-12-06 | End: 2023-12-07

## 2023-12-06 RX ORDER — ALPRAZOLAM 0.5 MG/1
0.5 TABLET ORAL 3 TIMES DAILY PRN
Status: DISCONTINUED | OUTPATIENT
Start: 2023-12-06 | End: 2023-12-10 | Stop reason: HOSPADM

## 2023-12-06 RX ADMIN — OXYCODONE HYDROCHLORIDE 30 MG: 15 TABLET ORAL at 13:59

## 2023-12-06 RX ADMIN — INSULIN LISPRO 5 UNITS: 100 INJECTION, SOLUTION INTRAVENOUS; SUBCUTANEOUS at 21:29

## 2023-12-06 RX ADMIN — Medication 10 ML: at 08:26

## 2023-12-06 RX ADMIN — MORPHINE SULFATE 60 MG: 15 TABLET, FILM COATED, EXTENDED RELEASE ORAL at 17:29

## 2023-12-06 RX ADMIN — OXYCODONE HYDROCHLORIDE 20 MG: 5 TABLET ORAL at 08:25

## 2023-12-06 RX ADMIN — CEFTRIAXONE SODIUM 1000 MG: 1 INJECTION, POWDER, FOR SOLUTION INTRAMUSCULAR; INTRAVENOUS at 14:00

## 2023-12-06 RX ADMIN — METRONIDAZOLE 500 MG: 500 TABLET, FILM COATED ORAL at 20:50

## 2023-12-06 RX ADMIN — DOCUSATE SODIUM 50MG AND SENNOSIDES 8.6MG 2 TABLET: 8.6; 5 TABLET, FILM COATED ORAL at 21:29

## 2023-12-06 RX ADMIN — METHADONE HYDROCHLORIDE 15 MG: 5 SOLUTION ORAL at 02:56

## 2023-12-06 RX ADMIN — DOCUSATE SODIUM 50MG AND SENNOSIDES 8.6MG 2 TABLET: 8.6; 5 TABLET, FILM COATED ORAL at 08:25

## 2023-12-06 RX ADMIN — MORPHINE SULFATE 4 MG: 2 INJECTION, SOLUTION INTRAMUSCULAR; INTRAVENOUS at 08:25

## 2023-12-06 RX ADMIN — MORPHINE SULFATE 60 MG: 30 TABLET, FILM COATED, EXTENDED RELEASE ORAL at 00:32

## 2023-12-06 RX ADMIN — INSULIN LISPRO 5 UNITS: 100 INJECTION, SOLUTION INTRAVENOUS; SUBCUTANEOUS at 17:29

## 2023-12-06 RX ADMIN — INSULIN LISPRO 6 UNITS: 100 INJECTION, SOLUTION INTRAVENOUS; SUBCUTANEOUS at 17:29

## 2023-12-06 RX ADMIN — OXYCODONE HYDROCHLORIDE 30 MG: 15 TABLET ORAL at 21:29

## 2023-12-06 RX ADMIN — MORPHINE SULFATE 4 MG: 2 INJECTION, SOLUTION INTRAMUSCULAR; INTRAVENOUS at 13:58

## 2023-12-06 RX ADMIN — METRONIDAZOLE 500 MG: 500 TABLET, FILM COATED ORAL at 00:08

## 2023-12-06 RX ADMIN — INSULIN LISPRO 5 UNITS: 100 INJECTION, SOLUTION INTRAVENOUS; SUBCUTANEOUS at 08:26

## 2023-12-06 RX ADMIN — OXYCODONE HYDROCHLORIDE 30 MG: 15 TABLET ORAL at 17:29

## 2023-12-06 RX ADMIN — SILVER SULFADIAZINE 1 APPLICATION: 10 CREAM TOPICAL at 20:51

## 2023-12-06 RX ADMIN — PREGABALIN 50 MG: 50 CAPSULE ORAL at 21:29

## 2023-12-06 RX ADMIN — INSULIN GLARGINE 10 UNITS: 100 INJECTION, SOLUTION SUBCUTANEOUS at 21:28

## 2023-12-06 RX ADMIN — PREDNISONE 40 MG: 20 TABLET ORAL at 17:28

## 2023-12-06 RX ADMIN — ENOXAPARIN SODIUM 40 MG: 100 INJECTION SUBCUTANEOUS at 13:58

## 2023-12-06 RX ADMIN — INSULIN GLARGINE 10 UNITS: 100 INJECTION, SOLUTION SUBCUTANEOUS at 08:26

## 2023-12-06 RX ADMIN — METHADONE HYDROCHLORIDE 20 MG: 5 SOLUTION ORAL at 13:58

## 2023-12-06 RX ADMIN — ALPRAZOLAM 0.5 MG: 0.5 TABLET ORAL at 17:29

## 2023-12-06 RX ADMIN — INSULIN LISPRO 6 UNITS: 100 INJECTION, SOLUTION INTRAVENOUS; SUBCUTANEOUS at 08:26

## 2023-12-06 RX ADMIN — METRONIDAZOLE 500 MG: 500 TABLET, FILM COATED ORAL at 08:25

## 2023-12-06 RX ADMIN — FLUOXETINE HYDROCHLORIDE 40 MG: 20 CAPSULE ORAL at 08:25

## 2023-12-06 NOTE — SIGNIFICANT NOTE
12/06/23 1622   OTHER   Discipline physical therapist   Rehab Time/Intention   Session Not Performed patient/family declined evaluation  (patient and family report no mobility concerns, no indication for acute PT. notified ANTONIO Levine. PT will sign off)

## 2023-12-06 NOTE — PROGRESS NOTES
PROGRESS NOTE  Patient Name: Sierra Mao  Age/Sex: 43 y.o. female  : 1980  MRN: [id number]    Date of Admission: 2023  Date of Encounter Visit: 23   LOS: 2 days   Patient Care Team:  Stephania Swain MD as PCP - General (Internal Medicine)  Shaila Willard MD as Consulting Physician (Endocrinology)  Richy Walker MD as Consulting Physician (Hematology and Oncology)  Mariposa Price MD as Referring Physician (General Surgery)  Didi De La O MD as Consulting Physician (Hematology and Oncology)    Chief Complaint: DKA, metastatic breast CA     Hospital course: Patient came in with altered mental status, was treated for DKA and she is doing better, she had significant pain and she had chronic pain issues from the obvious finding on exam was torn out right-sided chest wall with metastatic cancer.  Patient is also complaining of new type of chest pain on the left side which is pleuritic in nature and is making her concerned  She does have tumor in the left lung apex but no involvement of the chest wall over the lower part of the chest where the pain is present and this might be due to the pleurisy related to the pneumonia itself  Her pain medication regimen is being modified for the plan is already set in place by [facility] basically by increasing the dose of the methadone and trying to get her off the extended release morphine and using the Lyrica in addition to the  the oxycodon for breakthrough allen  Mental status changes resolved after correcting the metabolic ketoacidosis and her oxygen requirements are currently at 2 L/min and expected to continue to improve         REVIEW OF SYSTEMS:   CONSTITUTIONAL: no fever or chills  CARDIOVASCULAR: Positive for chest pain and discomfort over the metastatic area and also present on the left side she still oxygen dependent and has pleuritic chest pain. No palpitations or edema.   RESPIRATORY: Shortness of breath still on  "oxygen  GASTROINTESTINAL: No anorexia, nausea, vomiting or diarrhea. No abdominal pain or blood.   HEMATOLOGIC: No bleeding or bruising.     Ventilator/Non-Invasive Ventilation Settings (From admission, onward)      None              Vital Signs  Temp:  [98.4 °F (36.9 °C)-99.7 °F (37.6 °C)] 98.4 °F (36.9 °C)  Heart Rate:  [100-108] 105  Resp:  [16-22] 16  BP: (124-134)/(68-89) 124/73  SpO2:  [92 %-95 %] 93 %  on  Flow (L/min):  [2] 2 Device (Oxygen Therapy): nasal cannula    Intake/Output Summary (Last 24 hours) at 12/6/2023 1508  Last data filed at 12/6/2023 0400  Gross per 24 hour   Intake 120 ml   Output 450 ml   Net -330 ml     Flowsheet Rows      Flowsheet Row First Filed Value   Admission Height 180.3 cm (71\") Documented at 12/04/2023 1302   Admission Weight 77.1 kg (170 lb) Documented at 12/04/2023 1302          Body mass index is 22.88 kg/m².      12/04/23  1302 12/05/23  0400 12/06/23  0500   Weight: 77.1 kg (170 lb) 74.9 kg (165 lb 2 oz) 74.4 kg (164 lb 0.4 oz)       Physical Exam:  GEN:  No acute distress, alert, cooperative, well developed, tearful in pain  EYES:   Sclerae clear. No icterus. PERRL. Normal EOM  ENT:   External ears/nose normal, no oral lesions, no thrush, mucous membranes moist  NECK:  Supple, midline trachea, no JVD  LUNGS: Extensive chest wall involvement with the metastatic cancer on the right side with tissue necrosis minimal crackles on the left side posteriorly with good breath sounds otherwise no more rhonchi. No crackles. Respirations regular, even and unlabored.  Painful breathing  CV:  Regular rhythm and rate. Normal S1/S2. No murmurs, gallops, or rubs noted.  ABD:  Soft, nontender and nondistended. Normal bowel sounds. No guarding  EXT:  Moves all extremities well. No cyanosis. No redness. No edema.   Skin: Extensive skin necrosis from the metastatically skin lesion on the right side of the chest    Results Review:    Results From Last 14 Days   Lab Units 12/04/23 2027 " "12/04/23  1602 12/04/23  1344   LACTATE mmol/L 1.9  1.9 2.4* 3.5*     Results from last 7 days   Lab Units 12/06/23  0634 12/05/23  0831 12/05/23  0411 12/05/23  0017 12/04/23 2027 12/04/23  1602 12/04/23  1344 12/01/23  1504   SODIUM mmol/L 132* 134* 133* 131* 129* 132* 130* 130*   POTASSIUM mmol/L 4.1 4.0 4.3 4.4 4.6 5.2 5.2 4.7   CHLORIDE mmol/L 100 106 107 103 101 99 94* 96*   CO2 mmol/L 20.0* 21.0* 18.6* 17.5* 16.4* 12.4* 10.3* 23.9   BUN mg/dL 7 6 6 7 8 10 11 11   CREATININE mg/dL 0.45* 0.49* 0.38* 0.44* 0.59 0.70 0.91 0.58   CALCIUM mg/dL 8.4* 8.7 8.7 8.4* 8.8 8.8 9.3 8.4*   AST (SGOT) U/L  --   --   --   --   --   --  25 74*   ALT (SGPT) U/L  --   --   --   --   --   --  27 29   ANION GAP mmol/L 12.0 7.0 7.4 10.5 11.6 20.6* 25.7* 10.1   ALBUMIN g/dL  --   --   --   --   --   --  3.3* 2.9*     Results from last 7 days   Lab Units 12/04/23  1344 12/01/23  1504   HSTROP T ng/L 22* 13         Results from last 7 days   Lab Units 12/04/23  1344 12/01/23  1504   PROBNP pg/mL 351.0 223.2     Results from last 7 days   Lab Units 12/05/23  0411 12/04/23  1344 12/01/23  1504   WBC 10*3/mm3 10.25 16.27* 9.05   HEMOGLOBIN g/dL 9.2* 11.1* 9.4*   HEMATOCRIT % 28.8* 37.1 30.1*   PLATELETS 10*3/mm3 306 500* 343   MCV fL 88.1 90.0 90.7   NEUTROPHIL % %  --  81.1* 80.9*   LYMPHOCYTE % %  --  4.9* 9.4*   MONOCYTES % %  --  8.3 8.3   EOSINOPHIL % %  --  0.1* 0.7   BASOPHIL % %  --  0.6 0.1   IMM GRAN % %  --  5.0* 0.6*         Results from last 7 days   Lab Units 12/06/23  0634 12/05/23  0831 12/05/23  0411 12/05/23  0017 12/04/23  2027 12/04/23  1602 12/04/23  1344   MAGNESIUM mg/dL 1.6 1.7 1.6 1.7 1.7 1.7 1.9           Invalid input(s): \"LDLCALC\"      Results from last 7 days   Lab Units 12/04/23  1344   HEMOGLOBIN A1C % 9.40*     Glucose   Date/Time Value Ref Range Status   12/06/2023 1111 104 70 - 130 mg/dL Final   12/06/2023 0629 241 (H) 70 - 130 mg/dL Final   12/05/2023 2328 129 70 - 130 mg/dL Final   12/05/2023 2015 " 198 (H) 70 - 130 mg/dL Final   12/05/2023 1640 146 (H) 70 - 130 mg/dL Final   12/05/2023 1132 83 70 - 130 mg/dL Final   12/05/2023 0805 112 70 - 130 mg/dL Final   12/05/2023 0659 122 70 - 130 mg/dL Final     Results from last 7 days   Lab Units 12/05/23  0411 12/04/23 2027 12/04/23  1602 12/04/23  1344   PROCALCITONIN ng/mL 1.29*  --   --   --    LACTATE mmol/L  --  1.9  1.9 2.4* 3.5*     Results from last 7 days   Lab Units 12/04/23  1602 12/04/23  1301   BLOODCX  No growth at 24 hours  No growth at 24 hours  --    WOUNDCX   --  Light growth (2+) Staphylococcus aureus*  Heavy growth (4+) Gram Negative Bacilli*  Light growth (2+) Normal Skin Martha         Results from last 7 days   Lab Units 12/01/23  1516   COVID19  Not Detected   INFLUENZA B PCR  Not Detected               Imaging:   Imaging Results (All)       Procedure Component Value Units Date/Time    XR Chest 1 View [[id number]] Collected: 12/05/23 0732     Updated: 12/05/23 0738    Narrative:      XR CHEST 1 VW-     Clinical: Respiratory failure     COMPARISON 12/4/2023     FINDINGS: Mediport catheter in position as before. Elevation of the  right hemidiaphragm is again demonstrated. The cardiomediastinal  silhouette is unremarkable. No gross vascular congestion or pleural  effusion seen. Bilateral areas of infiltrate and consolidation similar  to the previous examinations. Seen best on prior CT. No new area of  airspace disease has developed. The remainder is unremarkable.     This report was finalized on 12/5/2023 7:35 AM by Dr. Trenton Lyn M.D  on Workstation: [id number]       XR Chest 1 View [[id number]] Collected: 12/04/23 1252     Updated: 12/04/23 1301    Narrative:      EXAM: XR CHEST 1 VW-     INDICATION: Shortness of breath     COMPARISON: Chest radiographs dating back to 10/26/2019. CT chest  12/1/2023.          Impression:      Hazy left greater than right bibasilar predominant opacities  with more defined right lower lobe linear atelectasis  have not  significantly changed from recent CT chest, accounting for differences  in imaging techniques. No pleural effusion or pneumothorax.     Left IJ port with tip at the brachiocephalic-SVC confluence. Stable  normal size cardiomediastinal silhouette. No focal osseous abnormality.           This report was finalized on 12/4/2023 12:58 PM by Dr. Jose Hoffmann M.D on Workstation: [id number]               I reviewed the patient's new clinical results.  I personally viewed and interpreted the patient's imaging results:        Medication Review:   cefTRIAXone, 1,000 mg, Intravenous, Q24H  enoxaparin, 40 mg, Subcutaneous, Q24H  FLUoxetine, 40 mg, Oral, Daily  insulin glargine, 10 Units, Subcutaneous, Q12H  insulin lispro, 3-14 Units, Subcutaneous, 4x Daily AC & at Bedtime  insulin lispro, 6 Units, Subcutaneous, TID With Meals  methadone, 20 mg, Oral, Q12H  metroNIDAZOLE, 500 mg, Topical, Q12H  Morphine, 60 mg, Oral, Q12H  senna-docusate sodium, 2 tablet, Oral, BID  silver sulfadiazine, 1 application , Topical, Q12H  sodium chloride, 10 mL, Intravenous, Q12H        dextrose 5 % and sodium chloride 0.45 %, 150 mL/hr  dextrose 5 % and sodium chloride 0.45 % with KCl 20 mEq/L, 150 mL/hr  dextrose 5 % and sodium chloride 0.45 % with KCl 40 mEq/L, 150 mL/hr  dextrose 5 % and sodium chloride 0.9 %, 150 mL/hr, Last Rate: 150 mL/hr (12/04/23 2229)  dextrose 5 % and sodium chloride 0.9 % with KCl 20 mEq, 150 mL/hr, Last Rate: Stopped (12/05/23 0800)  dextrose 5% and sodium chloride 0.9% with KCl 40 mEq/L, 150 mL/hr  sodium chloride 0.45 % 1,000 mL with potassium chloride 40 mEq infusion, 250 mL/hr  sodium chloride, 250 mL/hr  sodium chloride 0.45 % with KCl 20 mEq, 250 mL/hr, Last Rate: 250 mL/hr (12/04/23 1933)  sodium chloride, 250 mL/hr  sodium chloride 0.9 % with KCl 20 mEq, 250 mL/hr  sodium chloride 0.9 % with KCl 40 mEq/L, 250 mL/hr        ASSESSMENT:   Diabetic ketoacidosis  Lactic acidosis  Poorly controlled  diabetes  Leukocytosis with possible sepsis  Metastatic breast cancer  Chronic opioid use with high opioid tolerance  Possible pneumonia  Metabolic encephalopathy    PLAN:  DKA has resolved  As far as the pleurisy, it is on the left side and unrelated to the metastatic cancer on the right and it is far away from the left apical metastatic lesion.  This can be pneumonia related pleurisy due to the inflammation and patient will be given prednisone.  Otherwise the plan is to gradually wean off the long-acting morphine and substitute with the methadone while using Lyrica scheduled and will increase the dose on the oxycodone while trying to adjust her regimen  Expect the glycemic control to be slightly off while on the prednisone but hopefully that should help with the  pleurisy pain which seems to be her major concern  Patient was resumed back on her antianxiety medication as well  Discussed with the patient in details  Discussed with the palliative care team, patient would like to continue with [facility] as her primary oncology provider for the time being  Discussed with the  and the mother as well    Labs/Notes/films were independently reviewed and pertinent results are summarized above  The copied texts in this note were reviewed and they are accurate as of 12/06/23    Disposition: Home once her pain is under better control and her lactic acidosis optimally controlled    Yulia Savage MD  12/06/23  15:08 EST         Dictated utilizing Dragon dictation

## 2023-12-06 NOTE — CONSULTS
"Purpose of the visit was to evaluate for: goals of care/advanced care planning and support for patient/family. Spoke with MD, RN, and pharmacist well as patient and family and discussed palliative care, goals of care, and care options.      Assessment:  Patient is 43 year old female with metastatic breast cancer currently on keytruda, admitted with DKA. Also being treated for pneumonia. Patient is alert and oriented, states pain is her biggest complaint. Currently she rates it 8/10 and reports significant area is left rib/breast area. Her right breast also has significant ongoing pain, that has recently prevented her from sleeping peacefully at night. She states her breathing is \"not great\" and also endorses anxiety. Appetite is poor although she denies nausea. Working on getting down some nutritional supplements her  brings from home. PPS 50%    Recommendations/Plan: Changes made to patient's pain and anxiety medications in accordance with patient's wishes and in conversation with Pharmacist Atiya Diaz and Dr. Savage. Palliative care team will follow up tomorrow.     Other Comments: Spoke with patient and her  Marvin at bedside. Patient gets her oncology care at [facility], she has been through multiple treatments and a clinical trial. She is now taking keytruda which started last week and is supposed to be a weekly infusion. Patient tearful that she is missing her infusion this week.  understands this is a last resort kind of treatment. Patient states the goal is to \"keep me comfortable for as long as we can\". Patient has seen palliative care team at [facility], but states it's too soon to say if they have been helpful or meeting her needs with symptom management. For today the focus was on adjusting medications for symptom management, and they are waiting on speaking with physician further regarding treatment plan. Answered all questions and provided support, advised of ongoing availability and encouraged " them to reach out for any needs.

## 2023-12-06 NOTE — PLAN OF CARE
Problem: Adult Inpatient Plan of Care  Goal: Plan of Care Review  Outcome: Ongoing, Progressing  Flowsheets (Taken 12/6/2023 1705)  Plan of Care Reviewed With: patient  Outcome Evaluation: Pt AxOx4, medications given as scheduled, pains PRN given, 2L NC, VSS  Goal: Patient-Specific Goal (Individualized)  Outcome: Ongoing, Progressing  Goal: Absence of Hospital-Acquired Illness or Injury  Outcome: Ongoing, Progressing  Intervention: Identify and Manage Fall Risk  Recent Flowsheet Documentation  Taken 12/6/2023 1400 by Abner Choi RN  Safety Promotion/Fall Prevention:   activity supervised   assistive device/personal items within reach   clutter free environment maintained   fall prevention program maintained   nonskid shoes/slippers when out of bed   safety round/check completed  Taken 12/6/2023 1210 by Abner Choi RN  Safety Promotion/Fall Prevention:   activity supervised   assistive device/personal items within reach   clutter free environment maintained   fall prevention program maintained   nonskid shoes/slippers when out of bed   safety round/check completed  Taken 12/6/2023 1009 by Abner Choi RN  Safety Promotion/Fall Prevention:   activity supervised   assistive device/personal items within reach   clutter free environment maintained   fall prevention program maintained   nonskid shoes/slippers when out of bed   safety round/check completed  Intervention: Prevent Skin Injury  Recent Flowsheet Documentation  Taken 12/6/2023 1400 by Abner Choi RN  Body Position:   side-lying   right  Taken 12/6/2023 1210 by Abner Choi RN  Body Position: sitting up in bed  Taken 12/6/2023 1009 by Abner Choi RN  Body Position: dangle, side of bed  Intervention: Prevent and Manage VTE (Venous Thromboembolism) Risk  Recent Flowsheet Documentation  Taken 12/6/2023 1400 by Abner Choi RN  Activity Management: activity encouraged  Range of Motion: active ROM (range of motion) encouraged  Taken  12/6/2023 1210 by Abner Choi RN  Activity Management: activity encouraged  Taken 12/6/2023 1009 by Abner Choi RN  Activity Management: activity encouraged  Goal: Optimal Comfort and Wellbeing  Outcome: Ongoing, Progressing  Intervention: Provide Person-Centered Care  Recent Flowsheet Documentation  Taken 12/6/2023 1400 by Abnre Choi RN  Trust Relationship/Rapport:   care explained   choices provided   emotional support provided   empathic listening provided   questions answered   questions encouraged   reassurance provided   thoughts/feelings acknowledged  Taken 12/6/2023 0843 by Abner Choi RN  Trust Relationship/Rapport:   choices provided   care explained   emotional support provided   empathic listening provided   questions encouraged   questions answered   reassurance provided   thoughts/feelings acknowledged  Goal: Readiness for Transition of Care  Outcome: Ongoing, Progressing     Problem: Skin Injury Risk Increased  Goal: Skin Health and Integrity  Outcome: Ongoing, Progressing  Intervention: Optimize Skin Protection  Recent Flowsheet Documentation  Taken 12/6/2023 1400 by Abner Choi RN  Head of Bed (HOB) Positioning: HOB at 30-45 degrees  Taken 12/6/2023 1210 by Abner Choi RN  Head of Bed (HOB) Positioning: HOB at 60 degrees  Taken 12/6/2023 1009 by Abner Choi RN  Head of Bed (HOB) Positioning: HOB at 30-45 degrees     Problem: Adjustment to Illness (Sepsis/Septic Shock)  Goal: Optimal Coping  Outcome: Ongoing, Progressing     Problem: Bleeding (Sepsis/Septic Shock)  Goal: Absence of Bleeding  Outcome: Ongoing, Progressing     Problem: Glycemic Control Impaired (Sepsis/Septic Shock)  Goal: Blood Glucose Level Within Desired Range  Outcome: Ongoing, Progressing  Intervention: Optimize Glycemic Control  Recent Flowsheet Documentation  Taken 12/6/2023 1400 by Abner Choi RN  Glycemic Management: blood glucose monitored     Problem: Infection Progression (Sepsis/Septic  Shock)  Goal: Absence of Infection Signs and Symptoms  Outcome: Ongoing, Progressing  Intervention: Promote Recovery  Recent Flowsheet Documentation  Taken 12/6/2023 1400 by Abner Choi RN  Activity Management: activity encouraged  Taken 12/6/2023 1210 by Abner Choi RN  Activity Management: activity encouraged  Taken 12/6/2023 1009 by Abner Choi RN  Activity Management: activity encouraged     Problem: Nutrition Impaired (Sepsis/Septic Shock)  Goal: Optimal Nutrition Intake  Outcome: Ongoing, Progressing     Problem: Diabetic Ketoacidosis  Goal: Fluid and Electrolyte Balance with Absence of Ketosis  Outcome: Ongoing, Progressing  Intervention: Monitor and Manage Ketoacidosis  Recent Flowsheet Documentation  Taken 12/6/2023 1400 by Abner Choi RN  Glycemic Management: blood glucose monitored     Problem: Fall Injury Risk  Goal: Absence of Fall and Fall-Related Injury  Outcome: Ongoing, Progressing  Intervention: Promote Injury-Free Environment  Recent Flowsheet Documentation  Taken 12/6/2023 1400 by Abner Choi RN  Safety Promotion/Fall Prevention:   activity supervised   assistive device/personal items within reach   clutter free environment maintained   fall prevention program maintained   nonskid shoes/slippers when out of bed   safety round/check completed  Taken 12/6/2023 1210 by Abner Choi RN  Safety Promotion/Fall Prevention:   activity supervised   assistive device/personal items within reach   clutter free environment maintained   fall prevention program maintained   nonskid shoes/slippers when out of bed   safety round/check completed  Taken 12/6/2023 1009 by Abner Choi RN  Safety Promotion/Fall Prevention:   activity supervised   assistive device/personal items within reach   clutter free environment maintained   fall prevention program maintained   nonskid shoes/slippers when out of bed   safety round/check completed   Goal Outcome Evaluation:  Plan of Care Reviewed With:  patient           Outcome Evaluation: Pt AxOx4, medications given as scheduled, pains PRN given, 2L NC, VSS

## 2023-12-06 NOTE — PROGRESS NOTES
Clinical Pharmacy Services: Medication History    Sierra Mao is a 43 y.o. female presenting to [facility] for DKA (diabetic ketoacidosis) [E11.10]  Pneumonia of both lower lobes due to infectious organism [J18.9]  Diabetic ketoacidosis without coma associated with other specified diabetes mellitus [E13.10]    She  has a past medical history of Anxiety, Arthritis, Breast cancer (09/07/2021), Chronic fatigue, Diabetes mellitus with stage 3 chronic kidney disease, Encounter for fitting and adjustment of vascular catheter (10/21/2021), Hyperlipidemia, Leukocytosis, Menorrhagia with regular cycle, Seasonal allergies, Type I diabetes mellitus, and Vitamin D deficiency.    Allergies as of 12/04/2023    (No Known Allergies)       Medication information was obtained from: Patient, previous note from /[facility], palliative care pharmacist at .     Pharmacy and Phone Number:    [facility] - [place], [place] - [address] [facility] [phone] [facility] [phone]    [address] [address] [address]   Phone: [phone] Fax: [phone]   Hours: Not open 24 hours       Prior to Admission Medications       Prescriptions Last Dose Informant Patient Reported? Taking?    ALPRAZolam (XANAX) 0.5 MG tablet   Yes Yes    Take 1 tablet by mouth 3 (Three) Times a Day As Needed. for anxiety    amphetamine-dextroamphetamine (ADDERALL) 20 MG tablet   Yes Yes    Take 1 tablet by mouth 3 (Three) Times a Day.    docusate sodium (COLACE) 100 MG capsule   No Yes    Take 1 capsule by mouth 2 (Two) Times a Day.    FLUoxetine (PROzac) 20 MG capsule   Yes Yes    Take 2 capsules by mouth Daily.    Insulin Glargine (BASAGLAR KWIKPEN) 100 UNIT/ML injection pen   No Yes    Inject 30 Units under the skin into the appropriate area as directed Every Night.    Patient taking differently:  Inject 40 Units under the skin into the appropriate area as directed Every Night.    methadone (DOLOPHINE) 10 MG tablet    Yes Yes    1 tablet,    metroNIDAZOLE (FLAGYL) 500 MG tablet   Yes Yes    1 tablet. Crushes tablet and adds to wound once daily    NovoLOG FlexPen 100 UNIT/ML solution pen-injector sc pen   No Yes    Amount based on carb ratio up to 60 units daily    Patient taking differently:  Amount based on carb ratio up to 60 units daily  1 unit per 5 g carbs    oxybutynin (DITROPAN) 5 MG tablet   Yes Yes    Take 1 tablet by mouth Every Night.    oxyCODONE (ROXICODONE) 5 MG immediate release tablet   Yes Yes    Take 6 tablets by mouth Every 6 (Six) Hours As Needed for Severe Pain. for pain    silver sulfadiazine (SILVADENE, SSD) 1 % cream   Yes Yes    Apply 1 application  topically to the appropriate area as directed Daily.    amoxicillin-clavulanate (AUGMENTIN) 875-125 MG per tablet   No No    Take 1 tablet by mouth 2 (Two) Times a Day for 7 days.    doxycycline (MONODOX) 100 MG capsule   No No    Take 1 capsule by mouth 2 (Two) Times a Day for 7 days.    Gemcitabine HCl (GEMZAR IV)   Yes No    Infuse  into a venous catheter.    Glucagon 3 MG/DOSE powder   Yes No      0 Refill(s)    Patient not taking:  Reported on 4/18/2023    Insulin Pen Needle (BD Pen Needle Kaley U/F) 32G X 4 MM misc   No No    Inject 1 each under the skin into the appropriate area as directed 4 (Four) Times a Day.    lidocaine-prilocaine (EMLA) 2.5-2.5 % cream   No No    Apply 1 application topically to the appropriate area as directed As Needed for Mild Pain .    Patient not taking:  Reported on 4/18/2023    ondansetron (ZOFRAN) 8 MG tablet   No No    Take 1 tablet by mouth Every 8 (Eight) Hours As Needed for Nausea or Vomiting.    ondansetron ODT (ZOFRAN-ODT) 4 MG disintegrating tablet   No No    Place 1 tablet on the tongue Every 8 (Eight) Hours As Needed for Nausea or Vomiting.    OneTouch Verio test strip   No No    1 each by Other route 4 (Four) Times a Day.    polyethylene glycol (MIRALAX) 17 g packet   No No    Take 17 g by mouth Daily for 7  days.              Medication notes: Pt recently seen at ; medications changed due to uncontrolled pain. Patient recently stopped taking MS Contin and pregabalin and increased methadone dose. Spoke with  palliative care/pain pharmacist to confirm changes.     This medication list is complete to the best of my knowledge as of 12/6/2023    Please call if questions.    Atiya Diaz, Pharm.D., [facility]  Oncology Pharmacy Specialist  [phone]    12/6/2023 13:31 EST

## 2023-12-06 NOTE — CONSULTS
"Diabetes Education  Assessment/Teaching    Patient Name:  Sierra Mao  YOB: 1980  MRN: [id number]  Admit Date:  12/4/2023      Assessment Date:  12/6/2023  Flowsheet Row Most Recent Value   General Information     Referral From: MD order   Height 180.3 cm (71\")   Height Method Stated   Weight 74.4 kg (164 lb 0.4 oz)   Weight Method Bed scale   Diabetes History    What type of diabetes do you have? Type 1   Education Preferences    Barriers to Learning other (comment)  [None noted]       Flowsheet Row Most Recent Value   DM Education Needs    Patient Response Other (comment)  [Pt states no DM educational needs at this time.  Pt states onset of DKA was rapic and unable to test for ketones.  Will order.]     Electronically signed by:  Don Awan RN, [facility]  12/06/23 11:25 EST  "

## 2023-12-06 NOTE — PLAN OF CARE
Goal Outcome Evaluation:  Plan of Care Reviewed With: patient        Progress: improving  Outcome Evaluation: Pt admitted 12/4/2023 from ICU for DKA .  Vital stable . A & O X4 . Pain controled with morphine 60 mg every 8 h , Methadone 15 mg every 12 hr. Rocephin every 24  continued. Wound dressing changed( cleans fungating breast tumor with NS or pt may shower prior to dressing gonzalez - apply crushed metronidazole to the tumor apply silvadene cream to Vaseline guaze or adaptic lay over the tumor and cover right lower breast.)  Wound dressing change BID. No new compliant or concerns per pt. Pt mother at the bed side . Safety maintained.

## 2023-12-07 ENCOUNTER — APPOINTMENT (OUTPATIENT)
Dept: GENERAL RADIOLOGY | Facility: HOSPITAL | Age: 43
DRG: 637 | End: 2023-12-07
Payer: COMMERCIAL

## 2023-12-07 LAB
BACTERIA SPEC AEROBE CULT: ABNORMAL
GLUCOSE BLDC GLUCOMTR-MCNC: 280 MG/DL (ref 70–130)
GLUCOSE BLDC GLUCOMTR-MCNC: 286 MG/DL (ref 70–130)
GLUCOSE BLDC GLUCOMTR-MCNC: 370 MG/DL (ref 70–130)
GLUCOSE BLDC GLUCOMTR-MCNC: 389 MG/DL (ref 70–130)
GLUCOSE BLDC GLUCOMTR-MCNC: 424 MG/DL (ref 70–130)
GRAM STN SPEC: ABNORMAL

## 2023-12-07 PROCEDURE — 82948 REAGENT STRIP/BLOOD GLUCOSE: CPT

## 2023-12-07 PROCEDURE — 63710000001 INSULIN GLARGINE PER 5 UNITS: Performed by: INTERNAL MEDICINE

## 2023-12-07 PROCEDURE — 25010000002 ENOXAPARIN PER 10 MG: Performed by: INTERNAL MEDICINE

## 2023-12-07 PROCEDURE — 63710000001 INSULIN LISPRO (HUMAN) PER 5 UNITS: Performed by: INTERNAL MEDICINE

## 2023-12-07 PROCEDURE — 63710000001 PREDNISONE PER 1 MG: Performed by: INTERNAL MEDICINE

## 2023-12-07 PROCEDURE — 25010000002 CEFTRIAXONE PER 250 MG: Performed by: INTERNAL MEDICINE

## 2023-12-07 PROCEDURE — 25010000002 MORPHINE PER 10 MG: Performed by: INTERNAL MEDICINE

## 2023-12-07 PROCEDURE — 71046 X-RAY EXAM CHEST 2 VIEWS: CPT

## 2023-12-07 RX ORDER — DEXTROSE MONOHYDRATE 25 G/50ML
25 INJECTION, SOLUTION INTRAVENOUS
Status: DISCONTINUED | OUTPATIENT
Start: 2023-12-07 | End: 2023-12-10 | Stop reason: HOSPADM

## 2023-12-07 RX ORDER — NICOTINE POLACRILEX 4 MG
15 LOZENGE BUCCAL
Status: DISCONTINUED | OUTPATIENT
Start: 2023-12-07 | End: 2023-12-10 | Stop reason: HOSPADM

## 2023-12-07 RX ORDER — INSULIN LISPRO 100 [IU]/ML
20 INJECTION, SOLUTION INTRAVENOUS; SUBCUTANEOUS ONCE
Status: COMPLETED | OUTPATIENT
Start: 2023-12-07 | End: 2023-12-07

## 2023-12-07 RX ORDER — IBUPROFEN 600 MG/1
1 TABLET ORAL
Status: DISCONTINUED | OUTPATIENT
Start: 2023-12-07 | End: 2023-12-10 | Stop reason: HOSPADM

## 2023-12-07 RX ORDER — INSULIN LISPRO 100 [IU]/ML
4-24 INJECTION, SOLUTION INTRAVENOUS; SUBCUTANEOUS
Status: DISCONTINUED | OUTPATIENT
Start: 2023-12-08 | End: 2023-12-10 | Stop reason: HOSPADM

## 2023-12-07 RX ADMIN — SILVER SULFADIAZINE 1 APPLICATION: 10 CREAM TOPICAL at 19:10

## 2023-12-07 RX ADMIN — INSULIN LISPRO 14 UNITS: 100 INJECTION, SOLUTION INTRAVENOUS; SUBCUTANEOUS at 21:07

## 2023-12-07 RX ADMIN — MORPHINE SULFATE 4 MG: 2 INJECTION, SOLUTION INTRAMUSCULAR; INTRAVENOUS at 17:18

## 2023-12-07 RX ADMIN — INSULIN LISPRO 8 UNITS: 100 INJECTION, SOLUTION INTRAVENOUS; SUBCUTANEOUS at 17:18

## 2023-12-07 RX ADMIN — METHADONE HYDROCHLORIDE 20 MG: 5 SOLUTION ORAL at 01:48

## 2023-12-07 RX ADMIN — INSULIN GLARGINE 10 UNITS: 100 INJECTION, SOLUTION SUBCUTANEOUS at 08:36

## 2023-12-07 RX ADMIN — INSULIN LISPRO 12 UNITS: 100 INJECTION, SOLUTION INTRAVENOUS; SUBCUTANEOUS at 07:07

## 2023-12-07 RX ADMIN — METRONIDAZOLE 500 MG: 500 TABLET, FILM COATED ORAL at 19:10

## 2023-12-07 RX ADMIN — CEFTRIAXONE SODIUM 1000 MG: 1 INJECTION, POWDER, FOR SOLUTION INTRAMUSCULAR; INTRAVENOUS at 17:26

## 2023-12-07 RX ADMIN — SILVER SULFADIAZINE 1 APPLICATION: 10 CREAM TOPICAL at 08:36

## 2023-12-07 RX ADMIN — ALPRAZOLAM 0.5 MG: 0.5 TABLET ORAL at 08:35

## 2023-12-07 RX ADMIN — FLUOXETINE HYDROCHLORIDE 40 MG: 20 CAPSULE ORAL at 08:35

## 2023-12-07 RX ADMIN — MORPHINE SULFATE 4 MG: 2 INJECTION, SOLUTION INTRAMUSCULAR; INTRAVENOUS at 01:47

## 2023-12-07 RX ADMIN — INSULIN LISPRO 6 UNITS: 100 INJECTION, SOLUTION INTRAVENOUS; SUBCUTANEOUS at 12:59

## 2023-12-07 RX ADMIN — MORPHINE SULFATE 4 MG: 2 INJECTION, SOLUTION INTRAMUSCULAR; INTRAVENOUS at 21:45

## 2023-12-07 RX ADMIN — MORPHINE SULFATE 60 MG: 15 TABLET, FILM COATED, EXTENDED RELEASE ORAL at 08:35

## 2023-12-07 RX ADMIN — PREGABALIN 50 MG: 50 CAPSULE ORAL at 08:35

## 2023-12-07 RX ADMIN — DOCUSATE SODIUM 50MG AND SENNOSIDES 8.6MG 2 TABLET: 8.6; 5 TABLET, FILM COATED ORAL at 08:35

## 2023-12-07 RX ADMIN — MORPHINE SULFATE 4 MG: 2 INJECTION, SOLUTION INTRAMUSCULAR; INTRAVENOUS at 08:35

## 2023-12-07 RX ADMIN — PREDNISONE 40 MG: 20 TABLET ORAL at 08:35

## 2023-12-07 RX ADMIN — INSULIN LISPRO 6 UNITS: 100 INJECTION, SOLUTION INTRAVENOUS; SUBCUTANEOUS at 17:18

## 2023-12-07 RX ADMIN — INSULIN LISPRO 20 UNITS: 100 INJECTION, SOLUTION INTRAVENOUS; SUBCUTANEOUS at 23:28

## 2023-12-07 RX ADMIN — METRONIDAZOLE 500 MG: 500 TABLET, FILM COATED ORAL at 08:35

## 2023-12-07 RX ADMIN — ALPRAZOLAM 0.5 MG: 0.5 TABLET ORAL at 17:18

## 2023-12-07 RX ADMIN — METHADONE HYDROCHLORIDE 20 MG: 5 SOLUTION ORAL at 14:16

## 2023-12-07 RX ADMIN — INSULIN GLARGINE 10 UNITS: 100 INJECTION, SOLUTION SUBCUTANEOUS at 23:28

## 2023-12-07 RX ADMIN — OXYCODONE HYDROCHLORIDE 30 MG: 15 TABLET ORAL at 08:35

## 2023-12-07 RX ADMIN — OXYCODONE HYDROCHLORIDE 30 MG: 15 TABLET ORAL at 12:59

## 2023-12-07 RX ADMIN — OXYCODONE HYDROCHLORIDE 30 MG: 15 TABLET ORAL at 01:48

## 2023-12-07 RX ADMIN — OXYCODONE HYDROCHLORIDE 30 MG: 15 TABLET ORAL at 23:27

## 2023-12-07 RX ADMIN — INSULIN LISPRO 8 UNITS: 100 INJECTION, SOLUTION INTRAVENOUS; SUBCUTANEOUS at 13:00

## 2023-12-07 RX ADMIN — ENOXAPARIN SODIUM 40 MG: 100 INJECTION SUBCUTANEOUS at 13:00

## 2023-12-07 RX ADMIN — INSULIN LISPRO 6 UNITS: 100 INJECTION, SOLUTION INTRAVENOUS; SUBCUTANEOUS at 08:35

## 2023-12-07 RX ADMIN — OXYCODONE HYDROCHLORIDE 30 MG: 15 TABLET ORAL at 19:11

## 2023-12-07 RX ADMIN — INSULIN GLARGINE 10 UNITS: 100 INJECTION, SOLUTION SUBCUTANEOUS at 21:07

## 2023-12-07 NOTE — PROGRESS NOTES
"  PROGRESS NOTE  Patient Name: Sierra Mao  Age/Sex: 43 y.o. female  : 1980  MRN: [id number]    Date of Admission: 2023  Date of Encounter Visit: 23   LOS: 3 days   Patient Care Team:  Stephania Swain MD as PCP - General (Internal Medicine)  Shalia Willard MD as Consulting Physician (Endocrinology)  Richy Walker MD as Consulting Physician (Hematology and Oncology)  Mariposa Price MD as Referring Physician (General Surgery)  Didi De La O MD as Consulting Physician (Hematology and Oncology)    Chief Complaint: DKA, metastatic breast CA     Hospital course: Patient feels better since we started the steroids, the left-sided pleuritic chest pain  Afebrile  Lung exam is even better  has decreased but she is still oxygen dependent on 3 L/min.         REVIEW OF SYSTEMS:   CONSTITUTIONAL: no fever or chills  CARDIOVASCULAR: Chest pain is less prominent but still present. No palpitations or edema.   RESPIRATORY: Shortness of breath still on oxygen  GASTROINTESTINAL: No anorexia, nausea, vomiting or diarrhea. No abdominal pain or blood.   HEMATOLOGIC: No bleeding or bruising.     Ventilator/Non-Invasive Ventilation Settings (From admission, onward)      None              Vital Signs  Temp:  [98.1 °F (36.7 °C)-98.6 °F (37 °C)] 98.4 °F (36.9 °C)  Heart Rate:  [] 94  Resp:  [16] 16  BP: (109-124)/(63-73) 109/63  SpO2:  [91 %-95 %] 91 %  on  Flow (L/min):  [2] 2 Device (Oxygen Therapy): nasal cannula  No intake or output data in the 24 hours ending 23 1214    Flowsheet Rows      Flowsheet Row First Filed Value   Admission Height 180.3 cm (71\") Documented at 2023 1302   Admission Weight 77.1 kg (170 lb) Documented at 2023 1302          Body mass index is 22.88 kg/m².      23  1302 23  0400 23  0500   Weight: 77.1 kg (170 lb) 74.9 kg (165 lb 2 oz) 74.4 kg (164 lb 0.4 oz)       Physical Exam:  GEN:  No acute distress, alert, cooperative, well " developed, tearful in pain  EYES:   Sclerae clear. No icterus. PERRL. Normal EOM  ENT:   External ears/nose normal, no oral lesions, no thrush, mucous membranes moist  NECK:  Supple, midline trachea, no JVD  LUNGS: Extensive chest wall involvement with the metastatic cancer on the right side with tissue necrosis.  Crackles are less prominent today's, no wheezing. Respirations regular, even and unlabored.   CV:  Regular rhythm and rate. Normal S1/S2. No murmurs, gallops, or rubs noted.  ABD:  Soft, nontender and nondistended. Normal bowel sounds. No guarding  EXT:  Moves all extremities well. No cyanosis. No redness. No edema.   Skin: Extensive skin necrosis from the metastatically skin lesion on the right side of the chest    Results Review:    Results From Last 14 Days   Lab Units 12/04/23 2027 12/04/23  1602 12/04/23  1344   LACTATE mmol/L 1.9  1.9 2.4* 3.5*     Results from last 7 days   Lab Units 12/06/23  0634 12/05/23  0831 12/05/23  0411 12/05/23  0017 12/04/23 2027 12/04/23  1602 12/04/23  1344 12/01/23  1504   SODIUM mmol/L 132* 134* 133* 131* 129* 132* 130* 130*   POTASSIUM mmol/L 4.1 4.0 4.3 4.4 4.6 5.2 5.2 4.7   CHLORIDE mmol/L 100 106 107 103 101 99 94* 96*   CO2 mmol/L 20.0* 21.0* 18.6* 17.5* 16.4* 12.4* 10.3* 23.9   BUN mg/dL 7 6 6 7 8 10 11 11   CREATININE mg/dL 0.45* 0.49* 0.38* 0.44* 0.59 0.70 0.91 0.58   CALCIUM mg/dL 8.4* 8.7 8.7 8.4* 8.8 8.8 9.3 8.4*   AST (SGOT) U/L  --   --   --   --   --   --  25 74*   ALT (SGPT) U/L  --   --   --   --   --   --  27 29   ANION GAP mmol/L 12.0 7.0 7.4 10.5 11.6 20.6* 25.7* 10.1   ALBUMIN g/dL  --   --   --   --   --   --  3.3* 2.9*     Results from last 7 days   Lab Units 12/04/23  1344 12/01/23  1504   HSTROP T ng/L 22* 13         Results from last 7 days   Lab Units 12/04/23  1344 12/01/23  1504   PROBNP pg/mL 351.0 223.2     Results from last 7 days   Lab Units 12/05/23  0411 12/04/23  1344 12/01/23  1504   WBC 10*3/mm3 10.25 16.27* 9.05   HEMOGLOBIN  "g/dL 9.2* 11.1* 9.4*   HEMATOCRIT % 28.8* 37.1 30.1*   PLATELETS 10*3/mm3 306 500* 343   MCV fL 88.1 90.0 90.7   NEUTROPHIL % %  --  81.1* 80.9*   LYMPHOCYTE % %  --  4.9* 9.4*   MONOCYTES % %  --  8.3 8.3   EOSINOPHIL % %  --  0.1* 0.7   BASOPHIL % %  --  0.6 0.1   IMM GRAN % %  --  5.0* 0.6*         Results from last 7 days   Lab Units 12/06/23  0634 12/05/23  0831 12/05/23  0411 12/05/23  0017 12/04/23 2027 12/04/23  1602 12/04/23  1344   MAGNESIUM mg/dL 1.6 1.7 1.6 1.7 1.7 1.7 1.9           Invalid input(s): \"LDLCALC\"      Results from last 7 days   Lab Units 12/04/23  1344   HEMOGLOBIN A1C % 9.40*     Glucose   Date/Time Value Ref Range Status   12/07/2023 1106 286 (H) 70 - 130 mg/dL Final   12/07/2023 0630 370 (H) 70 - 130 mg/dL Final   12/06/2023 2036 200 (H) 70 - 130 mg/dL Final   12/06/2023 1556 201 (H) 70 - 130 mg/dL Final   12/06/2023 1111 104 70 - 130 mg/dL Final   12/06/2023 0629 241 (H) 70 - 130 mg/dL Final   12/05/2023 2328 129 70 - 130 mg/dL Final   12/05/2023 2015 198 (H) 70 - 130 mg/dL Final     Results from last 7 days   Lab Units 12/05/23  0411 12/04/23 2027 12/04/23  1602 12/04/23  1344   PROCALCITONIN ng/mL 1.29*  --   --   --    LACTATE mmol/L  --  1.9  1.9 2.4* 3.5*     Results from last 7 days   Lab Units 12/04/23  1602 12/04/23  1301   BLOODCX  No growth at 2 days  No growth at 2 days  --    WOUNDCX   --  Light growth (2+) Staphylococcus aureus*  Heavy growth (4+) Pseudomonas aeruginosa*  Light growth (2+) Normal Skin Martha         Results from last 7 days   Lab Units 12/01/23  1516   COVID19  Not Detected   INFLUENZA B PCR  Not Detected               Imaging:   Imaging Results (All)       Procedure Component Value Units Date/Time    XR Chest 1 View [[id number]] Collected: 12/05/23 0732     Updated: 12/05/23 0738    Narrative:      XR CHEST 1 VW-     Clinical: Respiratory failure     COMPARISON 12/4/2023     FINDINGS: Mediport catheter in position as before. Elevation of the  right " hemidiaphragm is again demonstrated. The cardiomediastinal  silhouette is unremarkable. No gross vascular congestion or pleural  effusion seen. Bilateral areas of infiltrate and consolidation similar  to the previous examinations. Seen best on prior CT. No new area of  airspace disease has developed. The remainder is unremarkable.     This report was finalized on 12/5/2023 7:35 AM by Dr. Trenton Lyn M.D  on Workstation: [id number]       XR Chest 1 View [[id number]] Collected: 12/04/23 1252     Updated: 12/04/23 1301    Narrative:      EXAM: XR CHEST 1 VW-     INDICATION: Shortness of breath     COMPARISON: Chest radiographs dating back to 10/26/2019. CT chest  12/1/2023.          Impression:      Hazy left greater than right bibasilar predominant opacities  with more defined right lower lobe linear atelectasis have not  significantly changed from recent CT chest, accounting for differences  in imaging techniques. No pleural effusion or pneumothorax.     Left IJ port with tip at the brachiocephalic-SVC confluence. Stable  normal size cardiomediastinal silhouette. No focal osseous abnormality.           This report was finalized on 12/4/2023 12:58 PM by Dr. Jose Hoffmann M.D on Workstation: [id number]               I reviewed the patient's new clinical results.  I personally viewed and interpreted the patient's imaging results:        Medication Review:   cefTRIAXone, 1,000 mg, Intravenous, Q24H  enoxaparin, 40 mg, Subcutaneous, Q24H  FLUoxetine, 40 mg, Oral, Daily  insulin glargine, 10 Units, Subcutaneous, Q12H  insulin lispro, 3-14 Units, Subcutaneous, 4x Daily AC & at Bedtime  insulin lispro, 6 Units, Subcutaneous, TID With Meals  methadone, 20 mg, Oral, Q12H  metroNIDAZOLE, 500 mg, Topical, Q12H  predniSONE, 40 mg, Oral, Daily  pregabalin, 50 mg, Oral, Q12H  senna-docusate sodium, 2 tablet, Oral, BID  silver sulfadiazine, 1 application , Topical, Q12H               ASSESSMENT:   Diabetic ketoacidosis  Lactic  acidosis  Poorly controlled diabetes  Leukocytosis with possible sepsis  Metastatic breast cancer  Chronic opioid use with high opioid tolerance  Possible pneumonia  Metabolic encephalopathy    PLAN:  DKA has resolved, the blood sugar went up because of the steroids and she had a pie that she ate yesterday without adjusting preprandial insulin.  The pleurisy is better which is a good sign because that was a major source of distress however despite the improvement on the lung exam the patient is still oxygen dependent on 3 L/min  We will repeat the chest x-ray in a.m. with PA and lateral imaging  The Lyrica according to the patient was never started and we can try to stop it in order to simplify her regimen  Patient reported that she did try Cymbalta in the past and it had to be stopped because she could not tolerate the side effect.    Discussed with the patient in details  Discussed with the palliative care team, patient would like to continue with [facility] as her primary oncology provider for the time being  Discussed with the  and the mother as well    Labs/Notes/films were independently reviewed and pertinent results are summarized above  The copied texts in this note were reviewed and they are accurate as of 12/07/23    Disposition: Home once her pain is under better control and her lactic acidosis optimally controlled    Yulia Savage MD  12/07/23  12:14 EST         Dictated utilizing Dragon dictation

## 2023-12-07 NOTE — PLAN OF CARE
Problem: Adult Inpatient Plan of Care  Goal: Plan of Care Review  Outcome: Ongoing, Progressing  Flowsheets (Taken 12/6/2023 1705)  Outcome Evaluation: Pt AxOx4, medications given as scheduled, pains PRN given, 2L NC, VSS  Goal: Patient-Specific Goal (Individualized)  Outcome: Ongoing, Progressing  Goal: Absence of Hospital-Acquired Illness or Injury  Outcome: Ongoing, Progressing  Intervention: Identify and Manage Fall Risk  Recent Flowsheet Documentation  Taken 12/7/2023 0802 by Abner Choi RN  Safety Promotion/Fall Prevention:   activity supervised   assistive device/personal items within reach   clutter free environment maintained   fall prevention program maintained   nonskid shoes/slippers when out of bed   safety round/check completed  Intervention: Prevent Skin Injury  Recent Flowsheet Documentation  Taken 12/7/2023 0802 by Abner Choi RN  Body Position: weight shifting  Intervention: Prevent and Manage VTE (Venous Thromboembolism) Risk  Recent Flowsheet Documentation  Taken 12/7/2023 0802 by Abner Choi RN  Activity Management: activity encouraged  Goal: Optimal Comfort and Wellbeing  Outcome: Ongoing, Progressing  Intervention: Provide Person-Centered Care  Recent Flowsheet Documentation  Taken 12/7/2023 0802 by Abner Choi RN  Trust Relationship/Rapport:   care explained   choices provided   emotional support provided   empathic listening provided   questions answered   questions encouraged   reassurance provided   thoughts/feelings acknowledged  Goal: Readiness for Transition of Care  Outcome: Ongoing, Progressing     Problem: Skin Injury Risk Increased  Goal: Skin Health and Integrity  Outcome: Ongoing, Progressing  Intervention: Optimize Skin Protection  Recent Flowsheet Documentation  Taken 12/7/2023 0802 by Abner Choi RN  Head of Bed (HOB) Positioning: HOB at 30-45 degrees     Problem: Adjustment to Illness (Sepsis/Septic Shock)  Goal: Optimal Coping  Outcome: Ongoing,  Progressing  Intervention: Optimize Psychosocial Adjustment to Illness  Recent Flowsheet Documentation  Taken 12/7/2023 0802 by Abner Choi RN  Supportive Measures: active listening utilized  Family/Support System Care:   self-care encouraged   support provided     Problem: Bleeding (Sepsis/Septic Shock)  Goal: Absence of Bleeding  Outcome: Ongoing, Progressing     Problem: Glycemic Control Impaired (Sepsis/Septic Shock)  Goal: Blood Glucose Level Within Desired Range  Outcome: Ongoing, Progressing     Problem: Infection Progression (Sepsis/Septic Shock)  Goal: Absence of Infection Signs and Symptoms  Outcome: Ongoing, Progressing  Intervention: Promote Recovery  Recent Flowsheet Documentation  Taken 12/7/2023 0802 by Abner Choi RN  Activity Management: activity encouraged     Problem: Nutrition Impaired (Sepsis/Septic Shock)  Goal: Optimal Nutrition Intake  Outcome: Ongoing, Progressing     Problem: Diabetic Ketoacidosis  Goal: Fluid and Electrolyte Balance with Absence of Ketosis  Outcome: Ongoing, Progressing     Problem: Fall Injury Risk  Goal: Absence of Fall and Fall-Related Injury  Outcome: Ongoing, Progressing  Intervention: Promote Injury-Free Environment  Recent Flowsheet Documentation  Taken 12/7/2023 0802 by Abner Choi RN  Safety Promotion/Fall Prevention:   activity supervised   assistive device/personal items within reach   clutter free environment maintained   fall prevention program maintained   nonskid shoes/slippers when out of bed   safety round/check completed   Goal Outcome Evaluation:  Plan of Care Reviewed With: patient           Outcome Evaluation: Pt AxOx4, medications given as scheduled, pains PRN given, 2L NC, VSS

## 2023-12-07 NOTE — NURSING NOTE
Spoke with patient and her mother at bedside. Sierra is feeling better today, states pain and anxiety are both better controlled with medication adjustments yesterday and addition of steroid. She still complains of some shortness of breath when oxygen is removed. Plans to get up this afternoon and take a shower. Appetite ok.     I also spoke with Dr. Panda with [facility] Palliative Medicine by phone, update provided. She asked to be informed of any medication changes going forward.     Support provided to Sierra and her mother, advised of ongoing availability. Goal is to treat acute issues and get back on track with her Keytruda infusions.

## 2023-12-07 NOTE — PAYOR COMM NOTE
"Sierra Mao (43 y.o. Female)      CONTINUED STAY CLINICALS: REF#  [id number]     REPLY: [phone],  [phone]    NIKHIL WRIGHT RN,[facility]       Date of Birth   1980    Social Security Number       Address   [address] [place] [address]    Home Phone   [phone]    MRN   [id number]       Restoration   None    Marital Status                               Admission Date   12/4/23    Admission Type   Emergency    Admitting Provider   Yulia Savage MD    Attending Provider   Yulia Savage MD    Department, Room/Bed   [address], S416/1       Discharge Date       Discharge Disposition       Discharge Destination                                 Attending Provider: Yulia Savage MD    Allergies: No Known Allergies    Isolation: None   Infection: None   Code Status: CPR    Ht: 180.3 cm (71\")   Wt: 74.4 kg (164 lb 0.4 oz)    Admission Cmt: None   Principal Problem: DKA (diabetic ketoacidosis) [E11.10]                   Active Insurance as of 12/4/2023       Primary Coverage       Payor Plan Insurance Group Employer/Plan Group    [employer] EMPLOYEE [id number]       Payor Plan Address Payor Plan Phone Number Payor Plan Fax Number Effective Dates    [id number] [phone]  1/1/2022 - None Entered    [place] [address]         Subscriber Name Subscriber Birth Date Member ID       JENNIFER RAO [birth year]1969 [id number]                     Emergency Contacts        (Rel.) Home Phone Work Phone Mobile Phone    Jennifer Rao (Spouse) [phone] -- --              [place]: [facility] [id number]  Tax ID [id number]  Vital Signs (last 2 days)       Date/Time Temp Temp src Pulse Resp BP Patient Position SpO2    12/07/23 1345 98.4 (36.9) Oral 94 16 119/74 Lying 91    12/07/23 0735 98.4 (36.9) Oral 94 16 109/63 Lying 91    12/07/23 0209 -- -- -- -- -- -- 93    12/07/23 0000 98.1 (36.7) Oral 90 16 113/70 Lying 95    12/06/23 " 1936 98.6 (37) Oral 102 16 118/73 Lying 93    12/06/23 1320 98.4 (36.9) Oral 105 16 124/73 Lying 93    12/06/23 0810 99.7 (37.6) Oral 100 18 129/74 Lying 93    12/05/23 2326 99.1 (37.3) Oral 102 22 126/68 Lying 93    12/05/23 2014 99.3 (37.4) Oral 102 22 132/72 Lying 92    12/05/23 1538 98.4 (36.9) Oral 108 22 134/89 Lying 95    12/05/23 1500 -- -- 108 -- -- -- 95    12/05/23 1400 -- -- 105 -- -- -- 96    12/05/23 1300 -- -- 109 -- 121/86 -- 96    12/05/23 1200 -- -- 114 -- -- -- 92    12/05/23 1131 99.3 (37.4) Oral 101 -- 126/76 -- 96    12/05/23 1100 -- -- 101 -- 133/71 -- 96    12/05/23 1000 -- -- 104 -- -- -- 97    12/05/23 0900 -- -- 95 -- 140/82 -- 92    12/05/23 0800 -- -- 95 -- 138/83 -- 96    12/05/23 0731 99.4 (37.4) Oral -- -- -- -- --    12/05/23 0700 -- -- 96 -- 142/77 -- 96    12/05/23 0630 -- -- 97 -- 135/82 -- 95    12/05/23 0600 -- -- 96 -- 138/84 -- 96    12/05/23 0530 -- -- 97 -- 142/76 -- 96    12/05/23 0500 -- -- 95 -- 128/79 -- 96    12/05/23 0430 -- -- 95 -- 119/74 -- 96    12/05/23 0400 -- -- 95 -- 132/81 -- 97    12/05/23 0330 -- -- 93 -- 144/81 -- 98    12/05/23 0300 -- -- 94 -- 149/78 -- 97    12/05/23 0230 -- -- 94 -- 140/79 -- 96    12/05/23 0200 -- -- 95 -- 123/80 -- 97    12/05/23 0130 -- -- 94 -- 144/77 -- 97    12/05/23 0100 -- -- 96 -- 141/77 -- 96    12/05/23 0030 -- -- 96 -- 148/77 -- 96    12/05/23 0000 -- -- 96 -- 147/77 -- 97          Oxygen Therapy (last 2 days)       Date/Time SpO2 Device (Oxygen Therapy) Flow (L/min) Oxygen Concentration (%) ETCO2 (mmHg)    12/07/23 1417 -- nasal cannula 2 -- --    12/07/23 1345 91 nasal cannula 2 -- --    12/07/23 0802 -- nasal cannula 2 -- --    12/07/23 0735 91 nasal cannula 2 -- --    12/07/23 0209 93 -- -- -- --    12/07/23 0035 -- nasal cannula 2 -- --    12/07/23 0000 95 nasal cannula 2 -- --    12/06/23 2050 -- nasal cannula 2 -- --    12/06/23 1936 93 nasal cannula 2 -- --    12/06/23 1400 -- nasal cannula 2 -- --    12/06/23 1320  93 nasal cannula 2 -- --    12/06/23 0843 -- nasal cannula 2 -- --    12/06/23 0810 93 nasal cannula 2 -- --    12/06/23 0000 -- nasal cannula 2 -- --    12/05/23 2326 93 nasal cannula 2 -- --    12/05/23 2030 -- nasal cannula 2 -- --    12/05/23 2014 92 nasal cannula 2 -- --    12/05/23 1538 95 nasal cannula 2 -- --    12/05/23 1500 95 -- -- -- --    12/05/23 1400 96 nasal cannula 2 -- --    12/05/23 1300 96 -- -- -- --    12/05/23 1200 92 -- -- -- --    12/05/23 1131 96 -- -- -- --    12/05/23 1100 96 -- -- -- --    12/05/23 1000 97 -- -- -- --    12/05/23 0900 92 -- -- -- --    12/05/23 0800 96 room air -- -- 29    12/05/23 0700 96 -- -- -- 30    12/05/23 0630 95 -- -- -- 30    12/05/23 0600 96 -- -- -- 30    12/05/23 0530 96 -- -- -- 33    12/05/23 0500 96 -- -- -- 32    12/05/23 0430 96 -- -- -- 32    12/05/23 0400 97 -- -- -- 32    12/05/23 0330 98 -- -- -- 31    12/05/23 0300 97 -- -- -- 30    12/05/23 0230 96 -- -- -- 33    12/05/23 0200 97 -- -- -- 31    12/05/23 0130 97 -- -- -- 31    12/05/23 0100 96 -- -- -- 30    12/05/23 0030 96 -- -- -- 31    12/05/23 0000 97 nasal cannula 2 -- 30          Intake & Output (last 2 days)         12/05 0701 12/06 0700 12/06 0701 12/07 0700 12/07 0701 12/08 0700    P.O. 120      I.V. (mL/kg)       IV Piggyback       Total Intake(mL/kg) 120 (1.6)      Urine (mL/kg/hr) 450 (0.3)      Total Output 450      Net -330             Urine Unmeasured Occurrence  1 x           Lines, Drains & Airways       Active LDAs       Name Placement date Placement time Site Days    Peripheral IV 12/04/23 1613 Left Antecubital 12/04/23 1613  Antecubital  3    Peripheral IV 12/04/23 2023 Anterior;Left Forearm 12/04/23 2023  Forearm  2    Single Lumen Implantable Port 10/15/21 Left Chest 10/15/21  0855  Chest  783             Medication Administration Report for Sierra Mao as of 12/07/23 7357     Legend:    Given Hold Not Given Due Canceled Entry Other Actions    Time Time (Time) Time  Time-Action         Discontinued     Completed     Future     MAR Hold     Linked             Medications 12/06/23 12/07/23      ALPRAZolam (XANAX) tablet 0.5 mg  Dose: 0.5 mg  Freq: 3 Times Daily PRN Route: PO  PRN Reason: Anxiety  Start: 12/06/23 1414   Admin Instructions:      Caution: Look alike/sound alike drug alert.   Avoid grapefruit juice    1729-Given            0835-Given     1718-Given              sennosides-docusate (PERICOLACE) 8.6-50 MG per tablet 2 tablet  Dose: 2 tablet  Freq: 2 Times Daily Route: PO  Start: 12/05/23 1245   Admin Instructions:   HOLD MEDICATION IF PATIENT HAS HAD BOWEL MOVEMENT. Start bowel management regimen if patient has not had a bowel movement after 12 hours.    (0001)-Not Given     0825-Given     2129-Given          0835-Given     2100             And  polyethylene glycol (MIRALAX) packet 17 g  Dose: 17 g  Freq: Daily PRN Route: PO  PRN Reason: Constipation  PRN Comment: Use if senna-docusate is ineffective  Start: 12/05/23 1148   Admin Instructions:   Use if no bowel movement after 12 hours. Mix in 6-8 ounces of water.  Use 4-8 ounces of water, tea, or juice for each 17 gram dose.        And  bisacodyl (DULCOLAX) EC tablet 5 mg  Dose: 5 mg  Freq: Daily PRN Route: PO  PRN Reason: Constipation  PRN Comment: Use if polyethylene glycol is ineffective  Start: 12/05/23 1148   Admin Instructions:   Use if no bowel movement after 12 hours.  Swallow whole. Do not crush, split, or chew tablet.        And  bisacodyl (DULCOLAX) suppository 10 mg  Dose: 10 mg  Freq: Daily PRN Route: RE  PRN Reason: Constipation  PRN Comment: Use if bisacodyl oral is ineffective  Start: 12/05/23 1148   Admin Instructions:   Use if no bowel movement after 12 hours.  Hold for diarrhea         cefTRIAXone (ROCEPHIN) 1,000 mg in sodium chloride 0.9 % 100 mL IVPB-VTB  Dose: 1,000 mg  Freq: Every 24 Hours Route: IV  Indications of Use: PNEUMONIA  Start: 12/05/23 1500   End: 12/10/23 4059   Admin Instructions:    LR should be paused and flushing of the line with NS is recommended prior to and after completion of ceftriaxone infusion due to incompatibility. Do not co-adminster with calcium-containing solutions.  Caution: Look alike/sound alike drug alert    1400-New Bag            1726-New Bag               dextrose (D50W) (25 g/50 mL) IV injection 25 g  Dose: 25 g  Freq: Every 15 Minutes PRN Route: IV  PRN Reason: Low Blood Sugar  PRN Comment: Blood Sugar Less Than 70  Start: 12/05/23 0518   Admin Instructions:   Blood sugar less than 70; patient has IV access - Unresponsive, NPO or Unable To Safely Swallow         dextrose (GLUTOSE) oral gel 15 g  Dose: 15 g  Freq: Every 15 Minutes PRN Route: PO  PRN Reason: Low Blood Sugar  PRN Comment: Blood sugar less than 70  Start: 12/05/23 0518   Admin Instructions:   BS<70, Patient Alert, Is not NPO, Can safely swallow.         Enoxaparin Sodium (LOVENOX) syringe 40 mg  Dose: 40 mg  Freq: Every 24 Hours Route: SC  Indications of Use: PROPHYLAXIS OF VENOUS THROMBOEMBOLISM  Start: 12/05/23 1245   Admin Instructions:   Give subcutaneous in abdomen only. Do not massage site after injection.    1358-Given            1300-Given               FLUoxetine (PROzac) capsule 40 mg  Dose: 40 mg  Freq: Daily Route: PO  Start: 12/05/23 1730   Admin Instructions:   Caution: Look alike/sound alike drug alert    0825-Given            0835-Given               glucagon (GLUCAGEN) injection 1 mg  Dose: 1 mg  Freq: Every 15 Minutes PRN Route: IM  PRN Reason: Low Blood Sugar  PRN Comment: Blood Glucose Less Than 70  Start: 12/05/23 0518   Admin Instructions:   Blood Glucose Less Than 70 - Patient Without IV Access - Unresponsive, NPO or Unable To Safely Swallow  Reconstitute powder for injection by adding 1 mL of -supplied sterile diluent or sterile water for injection to a vial containing 1 mg of the drug, to provide solutions containing 1 mg/mL. Shake vial gently to dissolve.          insulin glargine (LANTUS, SEMGLEE) injection 10 Units  Dose: 10 Units  Freq: Every 12 Hours Scheduled Route: SC  Start: 12/05/23 0900   Admin Instructions:   Do not hold basal insulin without an order. Consider requesting a dose edit, if needed.        0826-Given     2128-Given           0836-Given     2100              insulin lispro (HUMALOG/ADMELOG) injection 3-14 Units  Dose: 3-14 Units  Freq: 4 Times Daily Before Meals & Nightly Route: SC  Start: 12/05/23 0730   Admin Instructions:   Correction Insulin - Moderate-High Dose (Total Insulin Dose 60-80 units/day, Patient Taking Insulin at Home)    Blood Glucose 150-199 mg/dL - 3 units  Blood Glucose 200-249 mg/dL - 5 units  Blood Glucose 250-299 mg/dL - 8 units  Blood Glucose 300-349 mg/dL - 10 units  Blood Glucose 350-400 mg/dL - 12 units  Blood Glucose Greater Than 400 mg/dL - 14 units & Call Provider     Caution: Look alike/sound alike drug alert      0826-Given     (1232)-Not Given     1729-Given     2129-Given         0707-Given     1300-Given     1718-Given     2100            insulin lispro (HUMALOG/ADMELOG) injection 6 Units  Dose: 6 Units  Freq: 3 Times Daily With Meals Route: SC  Start: 12/05/23 0800   Admin Instructions:    Solution should be clear. If cloudy, contact pharmacy for a new vial. Scheduled mealtime doses of this medication should be held if patient NPO.     Caution: Look alike/sound alike drug alert      0826-Given     (1233)-Not Given     1729-Given          0835-Given     1259-Given     1718-Given             methadone (DOLOPHINE) 5 MG/5ML solution 20 mg  Dose: 20 mg  Freq: Every 12 Hours Route: PO  Indications of Use: PAIN  Start: 12/06/23 1319   End: 12/12/23 0214   Admin Instructions:   Based on patient request - if ordered for moderate or severe pain, provider allows for administration of a medication prescribed for a lower pain scale.        If given for pain, use the following pain scale:  Mild Pain = Pain Score of 1-3, CPOT  1-2  Moderate Pain = Pain Score of 4-6, CPOT 3-4  Severe Pain = Pain Score of 7-10, CPOT 5-8    1358-Given            0148-Given     1416-Given              metroNIDAZOLE (FLAGYL) tablet 500 mg  Dose: 500 mg  Freq: Every 12 Hours Scheduled Route: TOP  Indications of Use: SKIN AND SOFT TISSUE INFECTION  Start: 12/05/23 1045   End: 12/15/23 0859   Admin Instructions:   Crush medication and apply topically to fungating breast tumor with dressing change to help with odor control.   Caution: Look alike/sound alike drug alert    0008-Given     0825-Given     2050-Given by Other [C]          0835-Given     2100              morphine injection 4 mg  Dose: 4 mg  Freq: Every 2 Hours PRN Route: IV  PRN Reason: Severe Pain  Start: 12/05/23 1502   End: 12/12/23 1501   Admin Instructions:   Based on patient request - if ordered for moderate or severe pain, provider allows for administration of a medication prescribed for a lower pain scale.  If given for pain, use the following pain scale:  Mild Pain = Pain Score of 1-3, CPOT 1-2  Moderate Pain = Pain Score of 4-6, CPOT 3-4  Severe Pain = Pain Score of 7-10, CPOT 5-8    0825-Given     1358-Given           0147-Given     0835-Given     1718-Given             oxyCODONE (ROXICODONE) immediate release tablet 30 mg  Dose: 30 mg  Freq: Every 4 Hours PRN Route: PO  PRN Reason: Moderate Pain  Start: 12/06/23 1320   End: 12/11/23 1613   Admin Instructions:   Based on patient request - if ordered for moderate or severe pain, provider allows for administration of a medication prescribed for a lower pain scale.        If given for pain, use the following pain scale:  Mild Pain = Pain Score of 1-3, CPOT 1-2  Moderate Pain = Pain Score of 4-6, CPOT 3-4  Severe Pain = Pain Score of 7-10, CPOT 5-8    1359-Given     1729-Given     2129-Given          0148-Given     0835-Given     1259-Given             predniSONE (DELTASONE) tablet 40 mg  Dose: 40 mg  Freq: Daily Route: PO  Start: 12/06/23 1700    Admin Instructions:   Take with food.    1728-Given            0835-Given               silver sulfadiazine (SILVADENE, SSD) 1 % cream 1 application   Dose: 1 application   Freq: Every 12 Hours Scheduled Route: TOP  Start: 12/05/23 1130   Admin Instructions:   Cleanse fungating breast tumor with NS or pt may shower prior to dressing change. Apply crushed metronidazole to the tumor. Apply Silvadene cream to Vaseline guaze or adaptic and lay over the tumor. Cover with ABD pads and secure with tape. Change BID,   Caution: Look alike/sound alike drug alert.       (0002)-Not Given     (0900)-Not Given     2051-Given by Other [C]          0836-Given     2100              sodium chloride 0.9 % flush 10 mL  Dose: 10 mL  Freq: As Needed Route: IV  PRN Reason: Line Care  Start: 12/04/23 1327        Completed Medications  Medications 12/06/23 12/07/23       azithromycin (ZITHROMAX) 500 mg in sodium chloride 0.9 % 250 mL IVPB-VTB  Dose: 500 mg  Freq: Once Route: IV  Indications of Use: SEPSIS  Start: 12/04/23 1505   End: 12/05/23 0015   Admin Instructions:   Activate vial before use.         cefTRIAXone (ROCEPHIN) 2,000 mg in sodium chloride 0.9 % 100 mL IVPB-VTB  Dose: 2,000 mg  Freq: Once Route: IV  Indications of Use: SEPSIS  Start: 12/04/23 1505   End: 12/04/23 1647   Admin Instructions:   LR should be paused and flushing of the line with NS is recommended prior to and after completion of ceftriaxone infusion due to incompatibility. Do not co-adminster with calcium-containing solutions.  Caution: Look alike/sound alike drug alert         Morphine (MS CONTIN) 12 hr tablet 60 mg  Dose: 60 mg  Freq: Every 12 Hours Scheduled Route: PO  Start: 12/06/23 1800   End: 12/07/23 0835   Admin Instructions:   Do not crush or chew the capsules or tablets. The drug may not work as designed if the capsule or tablet is crushed or chewed. Swallow whole.        Caution: Look alike/sound alike drug alert    If given for pain, use the  "following pain scale:  Mild Pain = Pain Score of 1-3, CPOT 1-2  Moderate Pain = Pain Score of 4-6, CPOT 3-4  Severe Pain = Pain Score of 7-10, CPOT 5-8    1729-Given            0835-Given               morphine injection 2 mg  Dose: 2 mg  Freq: Once Route: IV  Start: 12/04/23 1900   End: 12/04/23 1821   Admin Instructions:   Based on patient request - if ordered for moderate or severe pain, provider allows for administration of a medication prescribed for a lower pain scale.  If given for pain, use the following pain scale:  Mild Pain = Pain Score of 1-3, CPOT 1-2  Moderate Pain = Pain Score of 4-6, CPOT 3-4  Severe Pain = Pain Score of 7-10, CPOT 5-8         sodium chloride 0.9 % bolus 1,000 mL  Dose: 1,000 mL  Freq: Once Route: IV  Start: 12/04/23 1500   End: 12/04/23 1609         sodium phosphates 15 mmol in 250 mL 0.9% sodium chloride IVPB  Dose: 15 mmol  Freq: Once Route: IV  Start: 12/05/23 0815   End: 12/05/23 1201        Discontinued Medications  Medications 12/06/23 12/07/23       Calcium Replacement - Follow Nurse / BPA Driven Protocol  Freq: As Needed Route: XX  PRN Reason: Other  Start: 12/04/23 1448   End: 12/06/23 1602   Admin Instructions:   Open Order & Select \"BHS Electrolyte Replacement Protocol Algorithm\" to View Details         dextrose (D50W) (25 g/50 mL) IV injection 10-50 mL  Dose: 10-50 mL  Freq: Every 15 Minutes PRN Route: IV  PRN Reason: Low Blood Sugar  PRN Comment: per Glucommander  Start: 12/04/23 1448   End: 12/05/23 0521   Admin Instructions:   Blood Glucose Less Than 70, Patient Has IV Access, Is Unresponsive, NPO, or Unable to Safely Swallow   Recheck Blood Glucose Per Glucommander         dextrose (GLUTOSE) oral gel 15 g  Dose: 15 g  Freq: Every 15 Minutes PRN Route: PO  PRN Reason: Low Blood Sugar  PRN Comment: per Glucommander  Start: 12/04/23 1448   End: 12/05/23 0521   Admin Instructions:   Blood Glucose Less Than 70, Patient Alert, Not NPO & Can Safely Swallow    Recheck Blood " Glucose Per Glucommander         dextrose 5 % and sodium chloride 0.45 % infusion  Rate: 150 mL/hr Dose: 150 mL/hr  Freq: Continuous PRN Route: IV  PRN Comment: For Corrected Sodium 135 or Higher, K > 5.3 & Glucose 250 or Lower  Start: 12/04/23 1448   End: 12/06/23 1602         dextrose 5 % and sodium chloride 0.45 % with KCl 20 mEq/L infusion  Rate: 150 mL/hr Dose: 150 mL/hr  Freq: Continuous PRN Route: IV  PRN Comment: For Corrected Sodium 135 or Higher, K 3.3 - 5.3 & Glucose 250 or Lower  Start: 12/04/23 1448   End: 12/06/23 1602         dextrose 5 % and sodium chloride 0.45 % with KCl 40 mEq/L infusion  Rate: 150 mL/hr Dose: 150 mL/hr  Freq: Continuous PRN Route: IV  PRN Comment: For Corrected Sodium 135 or Higher, K < 3.3 & Glucose 250 or Lower  Start: 12/04/23 1448   End: 12/06/23 1602         dextrose 5 % and sodium chloride 0.9 % infusion  Rate: 150 mL/hr Dose: 150 mL/hr  Freq: Continuous PRN Route: IV  PRN Comment: For Corrected Sodium < 135, K > 5.3 & Glucose 250 or Lower  Start: 12/04/23 1448   End: 12/06/23 1602         dextrose 5 % and sodium chloride 0.9 % with KCl 20 mEq/L infusion  Rate: 150 mL/hr Dose: 150 mL/hr  Freq: Continuous PRN Route: IV  PRN Comment: For Corrected Sodium < 135, K 3.3 - 5.3 & Glucose 250 or Lower  Start: 12/04/23 1448   End: 12/06/23 1602         dextrose 5 % and sodium chloride 0.9 % with KCl 40 mEq/L infusion  Rate: 150 mL/hr Dose: 150 mL/hr  Freq: Continuous PRN Route: IV  PRN Comment: For Corrected Sodium < 135, K < 3.3 & Glucose 250 or Lower  Start: 12/04/23 1448   End: 12/06/23 1602         fentaNYL citrate (PF) (SUBLIMAZE) injection 100 mcg  Dose: 100 mcg  Freq: Every 2 Hours PRN Route: IV  PRN Reason: Severe Pain  Start: 12/05/23 1149   End: 12/05/23 1502   Admin Instructions:   Use filter needle to withdraw dose from ampule. Based on patient request - if ordered for moderate or severe pain, provider allows for administration of a medication prescribed for a lower pain  scale.  If given for pain, use the following pain scale:  Mild Pain = Pain Score of 1-3, CPOT 1-2  Moderate Pain = Pain Score of 4-6, CPOT 3-4  Severe Pain = Pain Score of 7-10, CPOT 5-8         fentaNYL citrate (PF) (SUBLIMAZE) injection 50 mcg  Dose: 50 mcg  Freq: Every 2 Hours PRN Route: IV  PRN Reason: Severe Pain  Start: 12/04/23 2006   End: 12/05/23 1149   Admin Instructions:   Use filter needle to withdraw dose from ampule. Based on patient request - if ordered for moderate or severe pain, provider allows for administration of a medication prescribed for a lower pain scale.  If given for pain, use the following pain scale:  Mild Pain = Pain Score of 1-3, CPOT 1-2  Moderate Pain = Pain Score of 4-6, CPOT 3-4  Severe Pain = Pain Score of 7-10, CPOT 5-8         glucagon (GLUCAGEN) injection 1 mg  Dose: 1 mg  Freq: Every 15 Minutes PRN Route: IM  PRN Reason: Low Blood Sugar  PRN Comment: per Glucommander  Start: 12/04/23 1448   End: 12/05/23 0521   Admin Instructions:   Blood Glucose Less Than 70, Patient Does Not Have IV Access, Is Unresponsive, NPO, or Unable to Safely Swallow   Recheck Blood Glucose Per Glucommander  Reconstitute powder for injection by adding 1 mL of -supplied sterile diluent or sterile water for injection to a vial containing 1 mg of the drug, to provide solutions containing 1 mg/mL. Shake vial gently to dissolve.         insulin regular 1 unit/mL in 0.9% sodium chloride (Glucommander)  Rate: 0-100 mL/hr Dose: 0-100 Units/hr  Freq: Titrated Route: IV  Indications of Use: DIABETIC KETOACIDOSIS  Start: 12/04/23 1505   End: 12/05/23 0520   Admin Instructions:   Initiate Insulin Drip on Infusion Pump at Rate Determined By Glucommander   Order specific questions:   Initial starting multiplier: 0.01  Target Blood Glucose Range: 120 - 160 mg/dL           Magnesium Standard Dose Replacement - Follow Nurse / BPA Driven Protocol  Freq: As Needed Route: XX  PRN Reason: Other  Start:  "12/04/23 1448   End: 12/06/23 1602   Admin Instructions:   Open Order & Select \"BHS Electrolyte Replacement Protocol Algorithm\" to View Details         methadone (DOLOPHINE) 5 MG/5ML solution 15 mg  Dose: 15 mg  Freq: Every 12 Hours Route: PO  Indications of Use: PAIN  Start: 12/05/23 1245   End: 12/06/23 1321   Admin Instructions:   Based on patient request - if ordered for moderate or severe pain, provider allows for administration of a medication prescribed for a lower pain scale.        If given for pain, use the following pain scale:  Mild Pain = Pain Score of 1-3, CPOT 1-2  Moderate Pain = Pain Score of 4-6, CPOT 3-4  Severe Pain = Pain Score of 7-10, CPOT 5-8    0256-Given [C]     (1225)-Not Given               Morphine (MS CONTIN) 12 hr tablet 60 mg  Dose: 60 mg  Freq: Every 8 Hours Scheduled Route: PO  Start: 12/04/23 1630   End: 12/06/23 1321   Admin Instructions:   Do not crush or chew the capsules or tablets. The drug may not work as designed if the capsule or tablet is crushed or chewed. Swallow whole.        Caution: Look alike/sound alike drug alert    If given for pain, use the following pain scale:  Mild Pain = Pain Score of 1-3, CPOT 1-2  Moderate Pain = Pain Score of 4-6, CPOT 3-4  Severe Pain = Pain Score of 7-10, CPOT 5-8    0032-Given     (1104)-Not Given               oxyCODONE (ROXICODONE) immediate release tablet 20 mg  Dose: 20 mg  Freq: Every 4 Hours PRN Route: PO  PRN Reason: Moderate Pain  Start: 12/04/23 1614   End: 12/06/23 1321   Admin Instructions:   Based on patient request - if ordered for moderate or severe pain, provider allows for administration of a medication prescribed for a lower pain scale.        If given for pain, use the following pain scale:  Mild Pain = Pain Score of 1-3, CPOT 1-2  Moderate Pain = Pain Score of 4-6, CPOT 3-4  Severe Pain = Pain Score of 7-10, CPOT 5-8    0825-Given                Phosphorus Replacement - Follow Nurse / BPA Driven Protocol  Freq: As " "Needed Route: XX  PRN Reason: Other  Start: 12/04/23 1448   End: 12/06/23 1602   Admin Instructions:   Open Order & Select \"BHS Electrolyte Replacement Protocol Algorithm\" to View Details         Potassium Replacement - Follow Nurse / BPA Driven Protocol  Freq: As Needed Route: XX  PRN Reason: Other  Start: 12/04/23 1448   End: 12/06/23 1602   Admin Instructions:   Open Order & Select \"BHS Electrolyte Replacement Protocol Algorithm\" to View Details         pregabalin (LYRICA) capsule 50 mg  Dose: 50 mg  Freq: Every 12 Hours Scheduled Route: PO  Start: 12/06/23 2100   End: 12/07/23 1421   Admin Instructions:         2129-Given            0835-Given               silver sulfadiazine (SILVADENE, SSD) 1 % cream 1 application   Dose: 1 application   Freq: Nightly Route: TOP  Start: 12/04/23 2100   End: 12/05/23 0953   Admin Instructions:   Apply to right breast wound .  Caution: Look alike/sound alike drug alert.            sodium chloride 0.45 % 1,000 mL with potassium chloride 40 mEq infusion  Rate: 250 mL/hr Dose: 250 mL/hr  Freq: Continuous PRN Route: IV  PRN Comment: For Corrected Sodium 135 or Higher, K < 3.3 & Glucose > 250  Start: 12/04/23 1448   End: 12/06/23 1602         sodium chloride 0.45 % infusion  Rate: 250 mL/hr Dose: 250 mL/hr  Freq: Continuous PRN Route: IV  PRN Comment: For Corrected Sodium 135 or Higher, K > 5.3 & Glucose > 250  Start: 12/04/23 1448   End: 12/06/23 1602         sodium chloride 0.45 % with KCl 20 mEq/L infusion  Rate: 250 mL/hr Dose: 250 mL/hr  Freq: Continuous PRN Route: IV  PRN Comment: For Corrected Sodium 135 or Higher, K 3.3 - 5.3 & Glucose > 250  Start: 12/04/23 1448   End: 12/06/23 1602         sodium chloride 0.9 % bolus  Rate: 1,000 mL/hr Dose: 1000 mL/hr  Freq: Continuous Route: IV  Start: 12/04/23 1505   End: 12/04/23 1816         sodium chloride 0.9 % flush 10 mL  Dose: 10 mL  Freq: As Needed Route: IV  PRN Reason: Line Care  Start: 12/04/23 1448   End: 12/06/23 1602      "    sodium chloride 0.9 % flush 10 mL  Dose: 10 mL  Freq: Every 12 Hours Scheduled Route: IV  Start: 12/04/23 2100   End: 12/06/23 1602    0826-Given                sodium chloride 0.9 % infusion  Rate: 250 mL/hr Dose: 250 mL/hr  Freq: Continuous PRN Route: IV  PRN Comment: For Corrected Sodium < 135, K > 5.3 & Glucose > 250  Start: 12/04/23 1448   End: 12/06/23 1602         sodium chloride 0.9 % infusion 40 mL  Dose: 40 mL  Freq: As Needed Route: IV  PRN Reason: Line Care  Start: 12/04/23 1448   End: 12/06/23 1602   Admin Instructions:   Following administration of an IV intermittent medication, flush line with 40mL NS at 100mL/hr.         sodium chloride 0.9 % with KCl 20 mEq/L infusion  Rate: 250 mL/hr Dose: 250 mL/hr  Freq: Continuous PRN Route: IV  PRN Comment: For Corrected Sodium < 135, K 3.3 - 5.3 & Glucose > 250  Start: 12/04/23 1448   End: 12/06/23 1602         sodium chloride 0.9 % with KCl 40 mEq/L infusion  Rate: 250 mL/hr Dose: 250 mL/hr  Freq: Continuous PRN Route: IV  PRN Comment: For Corrected Sodium < 135, K < 3.3 & Glucose > 250  Start: 12/04/23 1448   End: 12/06/23 1602          Lab Results (last 48 hours)       Procedure Component Value Units Date/Time    Blood Culture - Blood, Arm, Right [[id number]]  (Normal) Collected: 12/04/23 1602    Specimen: Blood from Arm, Right Updated: 12/07/23 1715     Blood Culture No growth at 3 days    Blood Culture - Blood, Arm, Left [[id number]]  (Normal) Collected: 12/04/23 1602    Specimen: Blood from Arm, Left Updated: 12/07/23 1715     Blood Culture No growth at 3 days    POC Glucose Once [[id number]]  (Abnormal) Collected: 12/07/23 1557    Specimen: Blood Updated: 12/07/23 1559     Glucose 280 mg/dL     POC Glucose Once [[id number]]  (Abnormal) Collected: 12/07/23 1106    Specimen: Blood Updated: 12/07/23 1108     Glucose 286 mg/dL     POC Glucose Once [[id number]]  (Abnormal) Collected: 12/07/23 0630    Specimen: Blood Updated: 12/07/23 0631     Glucose 370 mg/dL      Wound Culture - Wound, Breast, Right [[id number]]  (Abnormal)  (Susceptibility) Collected: 12/04/23 1301    Specimen: Wound from Breast, Right Updated: 12/07/23 0623     Wound Culture Light growth (2+) Staphylococcus aureus      Heavy growth (4+) Pseudomonas aeruginosa      Light growth (2+) Normal Skin Martha     Gram Stain Many (4+) WBCs seen      Many (4+) Gram positive cocci in pairs and chains      Many (4+) Gram negative bacilli    Susceptibility        Staphylococcus aureus      ALESSANDRA      Clindamycin Susceptible      Erythromycin Susceptible      Inducible Clindamycin Resistance Negative      Oxacillin Susceptible      Rifampin Susceptible      Tetracycline Susceptible      Trimethoprim + Sulfamethoxazole Susceptible      Vancomycin Susceptible                       Susceptibility        Pseudomonas aeruginosa      ALESSANDRA      Cefepime Susceptible      Ceftazidime Susceptible      Ciprofloxacin Susceptible      Gentamicin Susceptible      Levofloxacin Susceptible      Piperacillin + Tazobactam Susceptible                       Susceptibility Comments       Staphylococcus aureus    This isolate does not demonstrate inducible clindamycin resistance in vitro.                 POC Glucose Once [[id number]]  (Abnormal) Collected: 12/06/23 2036    Specimen: Blood Updated: 12/06/23 2037     Glucose 200 mg/dL     POC Glucose Once [[id number]]  (Abnormal) Collected: 12/06/23 1556    Specimen: Blood Updated: 12/06/23 1557     Glucose 201 mg/dL     POC Glucose Once [[id number]]  (Normal) Collected: 12/06/23 1111    Specimen: Blood Updated: 12/06/23 1113     Glucose 104 mg/dL     Phosphorus [[id number]]  (Normal) Collected: 12/06/23 0634    Specimen: Blood Updated: 12/06/23 0707     Phosphorus 3.2 mg/dL     Basic Metabolic Panel [[id number]]  (Abnormal) Collected: 12/06/23 0634    Specimen: Blood Updated: 12/06/23 0706     Glucose 219 mg/dL      BUN 7 mg/dL      Creatinine 0.45 mg/dL      Sodium 132 mmol/L      Potassium 4.1  mmol/L      Chloride 100 mmol/L      CO2 20.0 mmol/L      Calcium 8.4 mg/dL      BUN/Creatinine Ratio 15.6     Anion Gap 12.0 mmol/L      eGFR 123.4 mL/min/1.73     Narrative:      GFR Normal >60  Chronic Kidney Disease <60  Kidney Failure <15      Magnesium [[id number]]  (Normal) Collected: 12/06/23 0634    Specimen: Blood Updated: 12/06/23 0706     Magnesium 1.6 mg/dL     POC Glucose Once [[id number]]  (Abnormal) Collected: 12/06/23 0629    Specimen: Blood Updated: 12/06/23 0630     Glucose 241 mg/dL     POC Glucose Once [[id number]]  (Normal) Collected: 12/05/23 2328    Specimen: Blood Updated: 12/05/23 2329     Glucose 129 mg/dL     POC Glucose Once [[id number]]  (Abnormal) Collected: 12/05/23 2015    Specimen: Blood Updated: 12/05/23 2017     Glucose 198 mg/dL           Imaging Results (Last 48 Hours)       Procedure Component Value Units Date/Time    XR Chest PA & Lateral [[id number]] Collected: 12/07/23 1743     Updated: 12/07/23 1814    Narrative:      CHEST: 2 VIEWS     HISTORY: Pneumonia. Hypoxemia.     COMPARISON: AP chest 12/05/2023, 12/04/2023, 11/26/2023.     FINDINGS: Left IJ Mediport tip is in the SVC. The heart and mediastinal  structures are not changed. There are calcified mediastinal and right  hilar lymph nodes. There is a dense opacity within the medial right lung  base that is increased in size and density when compared to series of 3  previous chest x-rays and is new when compared to CT angiogram chest  12/01/2023. This is consistent with area of dense  consolidation/infiltrate as well as a component of atelectasis. The hazy  ground-glass infiltrates demonstrated on the CT 6 days ago are not  evident radiographically and potentially have improved or resolved.  There is no evidence for pneumothorax or definite effusion.           This report was finalized on 12/7/2023 6:11 PM by Dr. Maynor Johnson M.D on Workstation: [id number]             ECG/EMG Results (last 48 hours)       Procedure Component  Value Units Date/Time    SCANNED - TELEMETRY   [[id number]] Resulted: 23     Updated: 23 1825    SCANNED - TELEMETRY   [[id number]] Resulted: 23     Updated: 23 2141    SCANNED - TELEMETRY   [[id number]] Resulted: 23     Updated: 23 0413    SCANNED - TELEMETRY   [[id number]] Resulted: 23     Updated: 23 0830    SCANNED - TELEMETRY   [[id number]] Resulted: 23     Updated: 23 1514    SCANNED - TELEMETRY   [[id number]] Resulted: 23     Updated: 23 1621    SCANNED - TELEMETRY   [[id number]] Resulted: 23     Updated: 23 2106    SCANNED - TELEMETRY   [[id number]] Resulted: 23     Updated: 23 0419    SCANNED - TELEMETRY   [[id number]] Resulted: 23     Updated: 23 0900    SCANNED - TELEMETRY   [[id number]] Resulted: 23     Updated: 23 1530             Physician Progress Notes (last 48 hours)        Yulia Savage MD at 23 1214            PROGRESS NOTE  Patient Name: Sierra Mao  Age/Sex: 43 y.o. female  : 1980  MRN: [id number]    Date of Admission: 2023  Date of Encounter Visit: 23   LOS: 3 days   Patient Care Team:  Stephania Swain MD as PCP - General (Internal Medicine)  Shaila Willard MD as Consulting Physician (Endocrinology)  Ricyh Walker MD as Consulting Physician (Hematology and Oncology)  Mariposa Price MD as Referring Physician (General Surgery)  Didi De La O MD as Consulting Physician (Hematology and Oncology)    Chief Complaint: DKA, metastatic breast CA     Hospital course: Patient feels better since we started the steroids, the left-sided pleuritic chest pain  Afebrile  Lung exam is even better  has decreased but she is still oxygen dependent on 3 L/min.         REVIEW OF SYSTEMS:   CONSTITUTIONAL: no fever or chills  CARDIOVASCULAR: Chest pain is less prominent but still present. No palpitations or edema.   RESPIRATORY: Shortness of breath still on  "oxygen  GASTROINTESTINAL: No anorexia, nausea, vomiting or diarrhea. No abdominal pain or blood.   HEMATOLOGIC: No bleeding or bruising.     Ventilator/Non-Invasive Ventilation Settings (From admission, onward)      None              Vital Signs  Temp:  [98.1 °F (36.7 °C)-98.6 °F (37 °C)] 98.4 °F (36.9 °C)  Heart Rate:  [] 94  Resp:  [16] 16  BP: (109-124)/(63-73) 109/63  SpO2:  [91 %-95 %] 91 %  on  Flow (L/min):  [2] 2 Device (Oxygen Therapy): nasal cannula  No intake or output data in the 24 hours ending 12/07/23 1214    Flowsheet Rows      Flowsheet Row First Filed Value   Admission Height 180.3 cm (71\") Documented at 12/04/2023 1302   Admission Weight 77.1 kg (170 lb) Documented at 12/04/2023 1302          Body mass index is 22.88 kg/m².      12/04/23  1302 12/05/23  0400 12/06/23  0500   Weight: 77.1 kg (170 lb) 74.9 kg (165 lb 2 oz) 74.4 kg (164 lb 0.4 oz)       Physical Exam:  GEN:  No acute distress, alert, cooperative, well developed, tearful in pain  EYES:   Sclerae clear. No icterus. PERRL. Normal EOM  ENT:   External ears/nose normal, no oral lesions, no thrush, mucous membranes moist  NECK:  Supple, midline trachea, no JVD  LUNGS: Extensive chest wall involvement with the metastatic cancer on the right side with tissue necrosis.  Crackles are less prominent today's, no wheezing. Respirations regular, even and unlabored.   CV:  Regular rhythm and rate. Normal S1/S2. No murmurs, gallops, or rubs noted.  ABD:  Soft, nontender and nondistended. Normal bowel sounds. No guarding  EXT:  Moves all extremities well. No cyanosis. No redness. No edema.   Skin: Extensive skin necrosis from the metastatically skin lesion on the right side of the chest    Results Review:    Results From Last 14 Days   Lab Units 12/04/23 2027 12/04/23  1602 12/04/23  1344   LACTATE mmol/L 1.9  1.9 2.4* 3.5*     Results from last 7 days   Lab Units 12/06/23  0634 12/05/23  0831 12/05/23  0411 12/05/23  0017 12/04/23 2027 " "12/04/23  1602 12/04/23  1344 12/01/23  1504   SODIUM mmol/L 132* 134* 133* 131* 129* 132* 130* 130*   POTASSIUM mmol/L 4.1 4.0 4.3 4.4 4.6 5.2 5.2 4.7   CHLORIDE mmol/L 100 106 107 103 101 99 94* 96*   CO2 mmol/L 20.0* 21.0* 18.6* 17.5* 16.4* 12.4* 10.3* 23.9   BUN mg/dL 7 6 6 7 8 10 11 11   CREATININE mg/dL 0.45* 0.49* 0.38* 0.44* 0.59 0.70 0.91 0.58   CALCIUM mg/dL 8.4* 8.7 8.7 8.4* 8.8 8.8 9.3 8.4*   AST (SGOT) U/L  --   --   --   --   --   --  25 74*   ALT (SGPT) U/L  --   --   --   --   --   --  27 29   ANION GAP mmol/L 12.0 7.0 7.4 10.5 11.6 20.6* 25.7* 10.1   ALBUMIN g/dL  --   --   --   --   --   --  3.3* 2.9*     Results from last 7 days   Lab Units 12/04/23  1344 12/01/23  1504   HSTROP T ng/L 22* 13         Results from last 7 days   Lab Units 12/04/23  1344 12/01/23  1504   PROBNP pg/mL 351.0 223.2     Results from last 7 days   Lab Units 12/05/23  0411 12/04/23  1344 12/01/23  1504   WBC 10*3/mm3 10.25 16.27* 9.05   HEMOGLOBIN g/dL 9.2* 11.1* 9.4*   HEMATOCRIT % 28.8* 37.1 30.1*   PLATELETS 10*3/mm3 306 500* 343   MCV fL 88.1 90.0 90.7   NEUTROPHIL % %  --  81.1* 80.9*   LYMPHOCYTE % %  --  4.9* 9.4*   MONOCYTES % %  --  8.3 8.3   EOSINOPHIL % %  --  0.1* 0.7   BASOPHIL % %  --  0.6 0.1   IMM GRAN % %  --  5.0* 0.6*         Results from last 7 days   Lab Units 12/06/23  0634 12/05/23  0831 12/05/23  0411 12/05/23  0017 12/04/23 2027 12/04/23  1602 12/04/23  1344   MAGNESIUM mg/dL 1.6 1.7 1.6 1.7 1.7 1.7 1.9           Invalid input(s): \"LDLCALC\"      Results from last 7 days   Lab Units 12/04/23  1344   HEMOGLOBIN A1C % 9.40*     Glucose   Date/Time Value Ref Range Status   12/07/2023 1106 286 (H) 70 - 130 mg/dL Final   12/07/2023 0630 370 (H) 70 - 130 mg/dL Final   12/06/2023 2036 200 (H) 70 - 130 mg/dL Final   12/06/2023 1556 201 (H) 70 - 130 mg/dL Final   12/06/2023 1111 104 70 - 130 mg/dL Final   12/06/2023 0629 241 (H) 70 - 130 mg/dL Final   12/05/2023 2328 129 70 - 130 mg/dL Final   12/05/2023 " 2015 198 (H) 70 - 130 mg/dL Final     Results from last 7 days   Lab Units 12/05/23  0411 12/04/23 2027 12/04/23  1602 12/04/23  1344   PROCALCITONIN ng/mL 1.29*  --   --   --    LACTATE mmol/L  --  1.9  1.9 2.4* 3.5*     Results from last 7 days   Lab Units 12/04/23  1602 12/04/23  1301   BLOODCX  No growth at 2 days  No growth at 2 days  --    WOUNDCX   --  Light growth (2+) Staphylococcus aureus*  Heavy growth (4+) Pseudomonas aeruginosa*  Light growth (2+) Normal Skin Martha         Results from last 7 days   Lab Units 12/01/23  1516   COVID19  Not Detected   INFLUENZA B PCR  Not Detected               Imaging:   Imaging Results (All)       Procedure Component Value Units Date/Time    XR Chest 1 View [[id number]] Collected: 12/05/23 0732     Updated: 12/05/23 0738    Narrative:      XR CHEST 1 VW-     Clinical: Respiratory failure     COMPARISON 12/4/2023     FINDINGS: Mediport catheter in position as before. Elevation of the  right hemidiaphragm is again demonstrated. The cardiomediastinal  silhouette is unremarkable. No gross vascular congestion or pleural  effusion seen. Bilateral areas of infiltrate and consolidation similar  to the previous examinations. Seen best on prior CT. No new area of  airspace disease has developed. The remainder is unremarkable.     This report was finalized on 12/5/2023 7:35 AM by Dr. Trenton Lyn M.D  on Workstation: [id number]       XR Chest 1 View [[id number]] Collected: 12/04/23 1252     Updated: 12/04/23 1301    Narrative:      EXAM: XR CHEST 1 VW-     INDICATION: Shortness of breath     COMPARISON: Chest radiographs dating back to 10/26/2019. CT chest  12/1/2023.          Impression:      Hazy left greater than right bibasilar predominant opacities  with more defined right lower lobe linear atelectasis have not  significantly changed from recent CT chest, accounting for differences  in imaging techniques. No pleural effusion or pneumothorax.     Left IJ port with tip at  the brachiocephalic-SVC confluence. Stable  normal size cardiomediastinal silhouette. No focal osseous abnormality.           This report was finalized on 12/4/2023 12:58 PM by Dr. Jose Hoffmann M.D on Workstation: [id number]               I reviewed the patient's new clinical results.  I personally viewed and interpreted the patient's imaging results:        Medication Review:   cefTRIAXone, 1,000 mg, Intravenous, Q24H  enoxaparin, 40 mg, Subcutaneous, Q24H  FLUoxetine, 40 mg, Oral, Daily  insulin glargine, 10 Units, Subcutaneous, Q12H  insulin lispro, 3-14 Units, Subcutaneous, 4x Daily AC & at Bedtime  insulin lispro, 6 Units, Subcutaneous, TID With Meals  methadone, 20 mg, Oral, Q12H  metroNIDAZOLE, 500 mg, Topical, Q12H  predniSONE, 40 mg, Oral, Daily  pregabalin, 50 mg, Oral, Q12H  senna-docusate sodium, 2 tablet, Oral, BID  silver sulfadiazine, 1 application , Topical, Q12H               ASSESSMENT:   Diabetic ketoacidosis  Lactic acidosis  Poorly controlled diabetes  Leukocytosis with possible sepsis  Metastatic breast cancer  Chronic opioid use with high opioid tolerance  Possible pneumonia  Metabolic encephalopathy    PLAN:  DKA has resolved, the blood sugar went up because of the steroids and she had a pie that she ate yesterday without adjusting preprandial insulin.  The pleurisy is better which is a good sign because that was a major source of distress however despite the improvement on the lung exam the patient is still oxygen dependent on 3 L/min  We will repeat the chest x-ray in a.m. with PA and lateral imaging  The Lyrica according to the patient was never started and we can try to stop it in order to simplify her regimen  Patient reported that she did try Cymbalta in the past and it had to be stopped because she could not tolerate the side effect.    Discussed with the patient in details  Discussed with the palliative care team, patient would like to continue with [facility] as her primary oncology  provider for the time being  Discussed with the  and the mother as well    Labs/Notes/films were independently reviewed and pertinent results are summarized above  The copied texts in this note were reviewed and they are accurate as of 23    Disposition: Home once her pain is under better control and her lactic acidosis optimally controlled    Yulia Savage MD  23  12:14 EST         Dictated utilizing Dragon dictation    Electronically signed by Yulia Savage MD at 23 1420       Yulia Savage MD at 23 1508            PROGRESS NOTE  Patient Name: Sierra Mao  Age/Sex: 43 y.o. female  : 1980  MRN: [id number]    Date of Admission: 2023  Date of Encounter Visit: 23   LOS: 2 days   Patient Care Team:  Stephania Swain MD as PCP - General (Internal Medicine)  Shaila Willard MD as Consulting Physician (Endocrinology)  Ricyh Walker MD as Consulting Physician (Hematology and Oncology)  Mariposa Price MD as Referring Physician (General Surgery)  Didi De La O MD as Consulting Physician (Hematology and Oncology)    Chief Complaint: DKA, metastatic breast CA     Hospital course: Patient came in with altered mental status, was treated for DKA and she is doing better, she had significant pain and she had chronic pain issues from the obvious finding on exam was torn out right-sided chest wall with metastatic cancer.  Patient is also complaining of new type of chest pain on the left side which is pleuritic in nature and is making her concerned  She does have tumor in the left lung apex but no involvement of the chest wall over the lower part of the chest where the pain is present and this might be due to the pleurisy related to the pneumonia itself  Her pain medication regimen is being modified for the plan is already set in place by [facility] basically by increasing the dose of the methadone and trying to get her off the extended release  "morphine and using the Lyrica in addition to the  the oxycodon for breakthrough allen  Mental status changes resolved after correcting the metabolic ketoacidosis and her oxygen requirements are currently at 2 L/min and expected to continue to improve         REVIEW OF SYSTEMS:   CONSTITUTIONAL: no fever or chills  CARDIOVASCULAR: Positive for chest pain and discomfort over the metastatic area and also present on the left side she still oxygen dependent and has pleuritic chest pain. No palpitations or edema.   RESPIRATORY: Shortness of breath still on oxygen  GASTROINTESTINAL: No anorexia, nausea, vomiting or diarrhea. No abdominal pain or blood.   HEMATOLOGIC: No bleeding or bruising.     Ventilator/Non-Invasive Ventilation Settings (From admission, onward)      None              Vital Signs  Temp:  [98.4 °F (36.9 °C)-99.7 °F (37.6 °C)] 98.4 °F (36.9 °C)  Heart Rate:  [100-108] 105  Resp:  [16-22] 16  BP: (124-134)/(68-89) 124/73  SpO2:  [92 %-95 %] 93 %  on  Flow (L/min):  [2] 2 Device (Oxygen Therapy): nasal cannula    Intake/Output Summary (Last 24 hours) at 12/6/2023 1508  Last data filed at 12/6/2023 0400  Gross per 24 hour   Intake 120 ml   Output 450 ml   Net -330 ml     Flowsheet Rows      Flowsheet Row First Filed Value   Admission Height 180.3 cm (71\") Documented at 12/04/2023 1302   Admission Weight 77.1 kg (170 lb) Documented at 12/04/2023 1302          Body mass index is 22.88 kg/m².      12/04/23  1302 12/05/23  0400 12/06/23  0500   Weight: 77.1 kg (170 lb) 74.9 kg (165 lb 2 oz) 74.4 kg (164 lb 0.4 oz)       Physical Exam:  GEN:  No acute distress, alert, cooperative, well developed, tearful in pain  EYES:   Sclerae clear. No icterus. PERRL. Normal EOM  ENT:   External ears/nose normal, no oral lesions, no thrush, mucous membranes moist  NECK:  Supple, midline trachea, no JVD  LUNGS: Extensive chest wall involvement with the metastatic cancer on the right side with tissue necrosis minimal crackles on " the left side posteriorly with good breath sounds otherwise no more rhonchi. No crackles. Respirations regular, even and unlabored.  Painful breathing  CV:  Regular rhythm and rate. Normal S1/S2. No murmurs, gallops, or rubs noted.  ABD:  Soft, nontender and nondistended. Normal bowel sounds. No guarding  EXT:  Moves all extremities well. No cyanosis. No redness. No edema.   Skin: Extensive skin necrosis from the metastatically skin lesion on the right side of the chest    Results Review:    Results From Last 14 Days   Lab Units 12/04/23 2027 12/04/23  1602 12/04/23  1344   LACTATE mmol/L 1.9  1.9 2.4* 3.5*     Results from last 7 days   Lab Units 12/06/23  0634 12/05/23  0831 12/05/23  0411 12/05/23  0017 12/04/23 2027 12/04/23  1602 12/04/23  1344 12/01/23  1504   SODIUM mmol/L 132* 134* 133* 131* 129* 132* 130* 130*   POTASSIUM mmol/L 4.1 4.0 4.3 4.4 4.6 5.2 5.2 4.7   CHLORIDE mmol/L 100 106 107 103 101 99 94* 96*   CO2 mmol/L 20.0* 21.0* 18.6* 17.5* 16.4* 12.4* 10.3* 23.9   BUN mg/dL 7 6 6 7 8 10 11 11   CREATININE mg/dL 0.45* 0.49* 0.38* 0.44* 0.59 0.70 0.91 0.58   CALCIUM mg/dL 8.4* 8.7 8.7 8.4* 8.8 8.8 9.3 8.4*   AST (SGOT) U/L  --   --   --   --   --   --  25 74*   ALT (SGPT) U/L  --   --   --   --   --   --  27 29   ANION GAP mmol/L 12.0 7.0 7.4 10.5 11.6 20.6* 25.7* 10.1   ALBUMIN g/dL  --   --   --   --   --   --  3.3* 2.9*     Results from last 7 days   Lab Units 12/04/23  1344 12/01/23  1504   HSTROP T ng/L 22* 13         Results from last 7 days   Lab Units 12/04/23  1344 12/01/23  1504   PROBNP pg/mL 351.0 223.2     Results from last 7 days   Lab Units 12/05/23  0411 12/04/23  1344 12/01/23  1504   WBC 10*3/mm3 10.25 16.27* 9.05   HEMOGLOBIN g/dL 9.2* 11.1* 9.4*   HEMATOCRIT % 28.8* 37.1 30.1*   PLATELETS 10*3/mm3 306 500* 343   MCV fL 88.1 90.0 90.7   NEUTROPHIL % %  --  81.1* 80.9*   LYMPHOCYTE % %  --  4.9* 9.4*   MONOCYTES % %  --  8.3 8.3   EOSINOPHIL % %  --  0.1* 0.7   BASOPHIL % %  --  0.6  "0.1   IMM GRAN % %  --  5.0* 0.6*         Results from last 7 days   Lab Units 12/06/23  0634 12/05/23  0831 12/05/23  0411 12/05/23  0017 12/04/23 2027 12/04/23  1602 12/04/23  1344   MAGNESIUM mg/dL 1.6 1.7 1.6 1.7 1.7 1.7 1.9           Invalid input(s): \"LDLCALC\"      Results from last 7 days   Lab Units 12/04/23  1344   HEMOGLOBIN A1C % 9.40*     Glucose   Date/Time Value Ref Range Status   12/06/2023 1111 104 70 - 130 mg/dL Final   12/06/2023 0629 241 (H) 70 - 130 mg/dL Final   12/05/2023 2328 129 70 - 130 mg/dL Final   12/05/2023 2015 198 (H) 70 - 130 mg/dL Final   12/05/2023 1640 146 (H) 70 - 130 mg/dL Final   12/05/2023 1132 83 70 - 130 mg/dL Final   12/05/2023 0805 112 70 - 130 mg/dL Final   12/05/2023 0659 122 70 - 130 mg/dL Final     Results from last 7 days   Lab Units 12/05/23  0411 12/04/23 2027 12/04/23  1602 12/04/23  1344   PROCALCITONIN ng/mL 1.29*  --   --   --    LACTATE mmol/L  --  1.9  1.9 2.4* 3.5*     Results from last 7 days   Lab Units 12/04/23  1602 12/04/23  1301   BLOODCX  No growth at 24 hours  No growth at 24 hours  --    WOUNDCX   --  Light growth (2+) Staphylococcus aureus*  Heavy growth (4+) Gram Negative Bacilli*  Light growth (2+) Normal Skin Martha         Results from last 7 days   Lab Units 12/01/23  1516   COVID19  Not Detected   INFLUENZA B PCR  Not Detected               Imaging:   Imaging Results (All)       Procedure Component Value Units Date/Time    XR Chest 1 View [[id number]] Collected: 12/05/23 0732     Updated: 12/05/23 0738    Narrative:      XR CHEST 1 VW-     Clinical: Respiratory failure     COMPARISON 12/4/2023     FINDINGS: Mediport catheter in position as before. Elevation of the  right hemidiaphragm is again demonstrated. The cardiomediastinal  silhouette is unremarkable. No gross vascular congestion or pleural  effusion seen. Bilateral areas of infiltrate and consolidation similar  to the previous examinations. Seen best on prior CT. No new area " of  airspace disease has developed. The remainder is unremarkable.     This report was finalized on 12/5/2023 7:35 AM by Dr. Trenton Lyn M.D  on Workstation: [id number]       XR Chest 1 View [[id number]] Collected: 12/04/23 1252     Updated: 12/04/23 1301    Narrative:      EXAM: XR CHEST 1 VW-     INDICATION: Shortness of breath     COMPARISON: Chest radiographs dating back to 10/26/2019. CT chest  12/1/2023.          Impression:      Hazy left greater than right bibasilar predominant opacities  with more defined right lower lobe linear atelectasis have not  significantly changed from recent CT chest, accounting for differences  in imaging techniques. No pleural effusion or pneumothorax.     Left IJ port with tip at the brachiocephalic-SVC confluence. Stable  normal size cardiomediastinal silhouette. No focal osseous abnormality.           This report was finalized on 12/4/2023 12:58 PM by Dr. Jose Hoffmann M.D on Workstation: [id number]               I reviewed the patient's new clinical results.  I personally viewed and interpreted the patient's imaging results:        Medication Review:   cefTRIAXone, 1,000 mg, Intravenous, Q24H  enoxaparin, 40 mg, Subcutaneous, Q24H  FLUoxetine, 40 mg, Oral, Daily  insulin glargine, 10 Units, Subcutaneous, Q12H  insulin lispro, 3-14 Units, Subcutaneous, 4x Daily AC & at Bedtime  insulin lispro, 6 Units, Subcutaneous, TID With Meals  methadone, 20 mg, Oral, Q12H  metroNIDAZOLE, 500 mg, Topical, Q12H  Morphine, 60 mg, Oral, Q12H  senna-docusate sodium, 2 tablet, Oral, BID  silver sulfadiazine, 1 application , Topical, Q12H  sodium chloride, 10 mL, Intravenous, Q12H        dextrose 5 % and sodium chloride 0.45 %, 150 mL/hr  dextrose 5 % and sodium chloride 0.45 % with KCl 20 mEq/L, 150 mL/hr  dextrose 5 % and sodium chloride 0.45 % with KCl 40 mEq/L, 150 mL/hr  dextrose 5 % and sodium chloride 0.9 %, 150 mL/hr, Last Rate: 150 mL/hr (12/04/23 9151)  dextrose 5 % and sodium  chloride 0.9 % with KCl 20 mEq, 150 mL/hr, Last Rate: Stopped (12/05/23 0800)  dextrose 5% and sodium chloride 0.9% with KCl 40 mEq/L, 150 mL/hr  sodium chloride 0.45 % 1,000 mL with potassium chloride 40 mEq infusion, 250 mL/hr  sodium chloride, 250 mL/hr  sodium chloride 0.45 % with KCl 20 mEq, 250 mL/hr, Last Rate: 250 mL/hr (12/04/23 1933)  sodium chloride, 250 mL/hr  sodium chloride 0.9 % with KCl 20 mEq, 250 mL/hr  sodium chloride 0.9 % with KCl 40 mEq/L, 250 mL/hr        ASSESSMENT:   Diabetic ketoacidosis  Lactic acidosis  Poorly controlled diabetes  Leukocytosis with possible sepsis  Metastatic breast cancer  Chronic opioid use with high opioid tolerance  Possible pneumonia  Metabolic encephalopathy    PLAN:  DKA has resolved  As far as the pleurisy, it is on the left side and unrelated to the metastatic cancer on the right and it is far away from the left apical metastatic lesion.  This can be pneumonia related pleurisy due to the inflammation and patient will be given prednisone.  Otherwise the plan is to gradually wean off the long-acting morphine and substitute with the methadone while using Lyrica scheduled and will increase the dose on the oxycodone while trying to adjust her regimen  Expect the glycemic control to be slightly off while on the prednisone but hopefully that should help with the  pleurisy pain which seems to be her major concern  Patient was resumed back on her antianxiety medication as well  Discussed with the patient in details  Discussed with the palliative care team, patient would like to continue with [facility] as her primary oncology provider for the time being  Discussed with the  and the mother as well    Labs/Notes/films were independently reviewed and pertinent results are summarized above  The copied texts in this note were reviewed and they are accurate as of 12/06/23    Disposition: Home once her pain is under better control and her lactic acidosis optimally  controlled    Yulia Savage MD  12/06/23  15:08 EST         Dictated utilizing Dragon dictation    Electronically signed by Yulia Savage MD at 12/06/23 1600       Consult Notes (last 48 hours)  Notes from 12/05/23 1818 through 12/07/23 1818   No notes of this type exist for this encounter.

## 2023-12-08 LAB
GLUCOSE BLDC GLUCOMTR-MCNC: 117 MG/DL (ref 70–130)
GLUCOSE BLDC GLUCOMTR-MCNC: 145 MG/DL (ref 70–130)
GLUCOSE BLDC GLUCOMTR-MCNC: 180 MG/DL (ref 70–130)
GLUCOSE BLDC GLUCOMTR-MCNC: 234 MG/DL (ref 70–130)
GLUCOSE BLDC GLUCOMTR-MCNC: 259 MG/DL (ref 70–130)
GLUCOSE BLDC GLUCOMTR-MCNC: 381 MG/DL (ref 70–130)

## 2023-12-08 PROCEDURE — 25010000002 MORPHINE PER 10 MG: Performed by: INTERNAL MEDICINE

## 2023-12-08 PROCEDURE — 25010000002 ENOXAPARIN PER 10 MG: Performed by: INTERNAL MEDICINE

## 2023-12-08 PROCEDURE — 25010000002 CEFTRIAXONE PER 250 MG: Performed by: INTERNAL MEDICINE

## 2023-12-08 PROCEDURE — 82948 REAGENT STRIP/BLOOD GLUCOSE: CPT

## 2023-12-08 PROCEDURE — 63710000001 INSULIN LISPRO (HUMAN) PER 5 UNITS: Performed by: INTERNAL MEDICINE

## 2023-12-08 PROCEDURE — 63710000001 PREDNISONE PER 1 MG: Performed by: INTERNAL MEDICINE

## 2023-12-08 PROCEDURE — 63710000001 INSULIN GLARGINE PER 5 UNITS: Performed by: INTERNAL MEDICINE

## 2023-12-08 RX ORDER — PREGABALIN 50 MG/1
50 CAPSULE ORAL EVERY 12 HOURS SCHEDULED
Status: DISCONTINUED | OUTPATIENT
Start: 2023-12-08 | End: 2023-12-10 | Stop reason: HOSPADM

## 2023-12-08 RX ADMIN — METHADONE HYDROCHLORIDE 20 MG: 5 SOLUTION ORAL at 13:15

## 2023-12-08 RX ADMIN — MORPHINE SULFATE 4 MG: 2 INJECTION, SOLUTION INTRAMUSCULAR; INTRAVENOUS at 16:18

## 2023-12-08 RX ADMIN — METRONIDAZOLE 500 MG: 500 TABLET, FILM COATED ORAL at 09:22

## 2023-12-08 RX ADMIN — SILVER SULFADIAZINE 1 APPLICATION: 10 CREAM TOPICAL at 09:23

## 2023-12-08 RX ADMIN — ALPRAZOLAM 0.5 MG: 0.5 TABLET ORAL at 13:15

## 2023-12-08 RX ADMIN — OXYCODONE HYDROCHLORIDE 30 MG: 15 TABLET ORAL at 18:47

## 2023-12-08 RX ADMIN — OXYCODONE HYDROCHLORIDE 30 MG: 15 TABLET ORAL at 23:14

## 2023-12-08 RX ADMIN — INSULIN LISPRO 6 UNITS: 100 INJECTION, SOLUTION INTRAVENOUS; SUBCUTANEOUS at 13:16

## 2023-12-08 RX ADMIN — MORPHINE SULFATE 4 MG: 2 INJECTION, SOLUTION INTRAMUSCULAR; INTRAVENOUS at 13:15

## 2023-12-08 RX ADMIN — INSULIN GLARGINE 20 UNITS: 100 INJECTION, SOLUTION SUBCUTANEOUS at 09:22

## 2023-12-08 RX ADMIN — FLUOXETINE HYDROCHLORIDE 40 MG: 20 CAPSULE ORAL at 09:22

## 2023-12-08 RX ADMIN — INSULIN GLARGINE 20 UNITS: 100 INJECTION, SOLUTION SUBCUTANEOUS at 21:16

## 2023-12-08 RX ADMIN — INSULIN LISPRO 8 UNITS: 100 INJECTION, SOLUTION INTRAVENOUS; SUBCUTANEOUS at 13:16

## 2023-12-08 RX ADMIN — MORPHINE SULFATE 4 MG: 2 INJECTION, SOLUTION INTRAMUSCULAR; INTRAVENOUS at 19:47

## 2023-12-08 RX ADMIN — OXYCODONE HYDROCHLORIDE 30 MG: 15 TABLET ORAL at 09:22

## 2023-12-08 RX ADMIN — OXYCODONE HYDROCHLORIDE 30 MG: 15 TABLET ORAL at 05:05

## 2023-12-08 RX ADMIN — PREGABALIN 50 MG: 50 CAPSULE ORAL at 16:26

## 2023-12-08 RX ADMIN — ALPRAZOLAM 0.5 MG: 0.5 TABLET ORAL at 02:14

## 2023-12-08 RX ADMIN — CEFTRIAXONE SODIUM 1000 MG: 1 INJECTION, POWDER, FOR SOLUTION INTRAMUSCULAR; INTRAVENOUS at 16:18

## 2023-12-08 RX ADMIN — MORPHINE SULFATE 4 MG: 2 INJECTION, SOLUTION INTRAMUSCULAR; INTRAVENOUS at 02:08

## 2023-12-08 RX ADMIN — DOCUSATE SODIUM 50MG AND SENNOSIDES 8.6MG 2 TABLET: 8.6; 5 TABLET, FILM COATED ORAL at 21:18

## 2023-12-08 RX ADMIN — ENOXAPARIN SODIUM 40 MG: 100 INJECTION SUBCUTANEOUS at 13:15

## 2023-12-08 RX ADMIN — METHADONE HYDROCHLORIDE 20 MG: 5 SOLUTION ORAL at 02:07

## 2023-12-08 RX ADMIN — MORPHINE SULFATE 4 MG: 2 INJECTION, SOLUTION INTRAMUSCULAR; INTRAVENOUS at 06:53

## 2023-12-08 RX ADMIN — PREGABALIN 50 MG: 50 CAPSULE ORAL at 21:18

## 2023-12-08 RX ADMIN — INSULIN LISPRO 20 UNITS: 100 INJECTION, SOLUTION INTRAVENOUS; SUBCUTANEOUS at 21:16

## 2023-12-08 RX ADMIN — PREDNISONE 40 MG: 20 TABLET ORAL at 09:22

## 2023-12-08 RX ADMIN — DOCUSATE SODIUM 50MG AND SENNOSIDES 8.6MG 2 TABLET: 8.6; 5 TABLET, FILM COATED ORAL at 09:22

## 2023-12-08 RX ADMIN — MORPHINE SULFATE 4 MG: 2 INJECTION, SOLUTION INTRAMUSCULAR; INTRAVENOUS at 05:05

## 2023-12-08 RX ADMIN — OXYCODONE HYDROCHLORIDE 30 MG: 15 TABLET ORAL at 13:16

## 2023-12-08 RX ADMIN — MORPHINE SULFATE 4 MG: 2 INJECTION, SOLUTION INTRAMUSCULAR; INTRAVENOUS at 09:22

## 2023-12-08 RX ADMIN — MORPHINE SULFATE 4 MG: 2 INJECTION, SOLUTION INTRAMUSCULAR; INTRAVENOUS at 23:15

## 2023-12-08 NOTE — SIGNIFICANT NOTE
12/08/23 1351   OTHER   Discipline physical therapist   Rehab Time/Intention   Session Not Performed other (see comments)  (Per RN pt is up ad roseline and up in shower ind as well. No acute PT needs.)   Therapy Assessment/Plan (PT)   Criteria for Skilled Interventions Met (PT) no problems identified which require skilled intervention

## 2023-12-08 NOTE — NURSING NOTE
Spoke with patient, her mother and family friend at bedside. Patient just received her pain medication and was starting to drift off to sleep but was able to participate in conversation. States last night pain was intense. She clarified that this is her cancer pain from her right breast tumor, not the pleurisy pain she was feeling in her left side previously. No other symptom management needs today. We discussed transfer to either oncology or palliative care unit, patient is interested in transfer to oncology floor. She also requested oncology consult. She intends to keep her treatment team at [facility] but would appreciate another opinion and insight from that specialty while she is here. Dr. Savage and RN Abner morris. Also discussed with Pharmacist Atiya Diaz.

## 2023-12-08 NOTE — NURSING NOTE
Spoke with Dr. Savage regarding patient's blood sugar of 389 (down from 424) one hour after receiving 14 units of Humalog and 10 units of Lantus. New orders for:     -10 units of additional Lantus now   -Change scheduled Lantus to 20 units going forward  -20 additional units of Humalog now   -Change short acting insulin scale to High Dose SS       12/8/2023 @ 0208: Blood sugar on recheck is 117. Patient states she is worried about dropping lower, and requests x2 orange juices.

## 2023-12-08 NOTE — PROGRESS NOTES
PROGRESS NOTE  Patient Name: Sierra Mao  Age/Sex: 43 y.o. female  : 1980  MRN: [id number]    Date of Admission: 2023  Date of Encounter Visit: 23   LOS: 4 days   Patient Care Team:  Stephania Swain MD as PCP - General (Internal Medicine)  Shaila Willard MD as Consulting Physician (Endocrinology)  Richy Walker MD as Consulting Physician (Hematology and Oncology)  Mariposa Price MD as Referring Physician (General Surgery)  Didi De La O MD as Consulting Physician (Hematology and Oncology)    Chief Complaint: DKA, metastatic breast CA, extensive chest metastatic disease with chronic severe pain    Hospital course: Patient  has different type of pain, there is the pleuritic pain on the left side that is most likely related to pneumonia which did not improve on the prednisone (plan to treat for 3 days)  The right-sided chest pain which is much more complex and is caused by the extensive disease eating up most of the chest wall, for which she is on chronic pain medication.  We did discontinue the Lyrica because patient was told that it was not supposed to be on her regimen and her pain got slightly worse since so we will try to put it back on board  From the pneumonia standpoint she is doing better she had a chest x-ray today that showed an improvement in the left-sided infiltrate but there is some subsegmental atelectasis on the right side and new onset but trace effusion with a chronic elevation of the right hemidiaphragm.  Patient is supposed to have 1 more day of the Rocephin.  Glycemic control is slightly better over the last 24 hours    REVIEW OF SYSTEMS:   CONSTITUTIONAL: no fever or chills  CARDIOVASCULAR: Chest pain with improvement in the pleuritic component and worsening chronic right-sided chest pain component. No palpitations or edema.   RESPIRATORY: Improvement in the shortness of breath with decreased oxygen requirement but still oxygen dependent  GASTROINTESTINAL:  "No anorexia, nausea, vomiting or diarrhea. No abdominal pain or blood.   HEMATOLOGIC: No bleeding or bruising.     Ventilator/Non-Invasive Ventilation Settings (From admission, onward)      None              Vital Signs  Temp:  [97.9 °F (36.6 °C)-99 °F (37.2 °C)] 99 °F (37.2 °C)  Heart Rate:  [92-96] 93  Resp:  [16] 16  BP: (108-121)/(66-77) 121/77  SpO2:  [91 %-95 %] 95 %  on  Flow (L/min):  [2] 2 Device (Oxygen Therapy): nasal cannula  No intake or output data in the 24 hours ending 12/08/23 1304    Flowsheet Rows      Flowsheet Row First Filed Value   Admission Height 180.3 cm (71\") Documented at 12/04/2023 1302   Admission Weight 77.1 kg (170 lb) Documented at 12/04/2023 1302          Body mass index is 22.88 kg/m².      12/04/23  1302 12/05/23  0400 12/06/23  0500   Weight: 77.1 kg (170 lb) 74.9 kg (165 lb 2 oz) 74.4 kg (164 lb 0.4 oz)       Physical Exam:  GEN:  No acute distress, alert, cooperative, well developed, breast  EYES:   Sclerae clear. No icterus. PERRL. Normal EOM  ENT:   External ears/nose normal, no oral lesions, no thrush, mucous membranes moist  NECK:  Supple, midline trachea, no JVD  LUNGS: Extensive chest wall involvement with the metastatic cancer on the right side with tissue necrosis.  No more crackles, no wheezing. Respirations regular, even and unlabored.   CV:  Regular rhythm and rate. Normal S1/S2. No murmurs, gallops, or rubs noted.  ABD:  Soft, nontender and nondistended. Normal bowel sounds. No guarding  EXT:  Moves all extremities well. No cyanosis. No redness. No edema.   Skin: Extensive skin necrosis from the metastatically skin lesion on the right side of the chest    Results Review:    Results From Last 14 Days   Lab Units 12/04/23 2027 12/04/23  1602 12/04/23  1344   LACTATE mmol/L 1.9  1.9 2.4* 3.5*     Results from last 7 days   Lab Units 12/06/23  0634 12/05/23  0831 12/05/23  0411 12/05/23  0017 12/04/23  2027 12/04/23  1602 12/04/23  1344 12/01/23  1504   SODIUM mmol/L " "132* 134* 133* 131* 129* 132* 130* 130*   POTASSIUM mmol/L 4.1 4.0 4.3 4.4 4.6 5.2 5.2 4.7   CHLORIDE mmol/L 100 106 107 103 101 99 94* 96*   CO2 mmol/L 20.0* 21.0* 18.6* 17.5* 16.4* 12.4* 10.3* 23.9   BUN mg/dL 7 6 6 7 8 10 11 11   CREATININE mg/dL 0.45* 0.49* 0.38* 0.44* 0.59 0.70 0.91 0.58   CALCIUM mg/dL 8.4* 8.7 8.7 8.4* 8.8 8.8 9.3 8.4*   AST (SGOT) U/L  --   --   --   --   --   --  25 74*   ALT (SGPT) U/L  --   --   --   --   --   --  27 29   ANION GAP mmol/L 12.0 7.0 7.4 10.5 11.6 20.6* 25.7* 10.1   ALBUMIN g/dL  --   --   --   --   --   --  3.3* 2.9*     Results from last 7 days   Lab Units 12/04/23  1344 12/01/23  1504   HSTROP T ng/L 22* 13         Results from last 7 days   Lab Units 12/04/23  1344 12/01/23  1504   PROBNP pg/mL 351.0 223.2     Results from last 7 days   Lab Units 12/05/23  0411 12/04/23  1344 12/01/23  1504   WBC 10*3/mm3 10.25 16.27* 9.05   HEMOGLOBIN g/dL 9.2* 11.1* 9.4*   HEMATOCRIT % 28.8* 37.1 30.1*   PLATELETS 10*3/mm3 306 500* 343   MCV fL 88.1 90.0 90.7   NEUTROPHIL % %  --  81.1* 80.9*   LYMPHOCYTE % %  --  4.9* 9.4*   MONOCYTES % %  --  8.3 8.3   EOSINOPHIL % %  --  0.1* 0.7   BASOPHIL % %  --  0.6 0.1   IMM GRAN % %  --  5.0* 0.6*         Results from last 7 days   Lab Units 12/06/23  0634 12/05/23  0831 12/05/23  0411 12/05/23  0017 12/04/23  2027 12/04/23  1602 12/04/23  1344   MAGNESIUM mg/dL 1.6 1.7 1.6 1.7 1.7 1.7 1.9           Invalid input(s): \"LDLCALC\"      Results from last 7 days   Lab Units 12/04/23  1344   HEMOGLOBIN A1C % 9.40*     Glucose   Date/Time Value Ref Range Status   12/08/2023 1134 234 (H) 70 - 130 mg/dL Final   12/08/2023 0646 180 (H) 70 - 130 mg/dL Final   12/08/2023 0208 117 70 - 130 mg/dL Final   12/07/2023 2209 389 (H) 70 - 130 mg/dL Final   12/07/2023 2055 424 (C) 70 - 130 mg/dL Final   12/07/2023 1557 280 (H) 70 - 130 mg/dL Final   12/07/2023 1106 286 (H) 70 - 130 mg/dL Final   12/07/2023 0630 370 (H) 70 - 130 mg/dL Final     Results from last 7 " days   Lab Units 12/05/23  0411 12/04/23 2027 12/04/23  1602 12/04/23  1344   PROCALCITONIN ng/mL 1.29*  --   --   --    LACTATE mmol/L  --  1.9  1.9 2.4* 3.5*     Results from last 7 days   Lab Units 12/04/23  1602 12/04/23  1301   BLOODCX  No growth at 3 days  No growth at 3 days  --    WOUNDCX   --  Light growth (2+) Staphylococcus aureus*  Heavy growth (4+) Pseudomonas aeruginosa*  Light growth (2+) Normal Skin Martha         Results from last 7 days   Lab Units 12/01/23  1516   COVID19  Not Detected   INFLUENZA B PCR  Not Detected               Imaging:   Imaging Results (All)       Procedure Component Value Units Date/Time    XR Chest 1 View [[id number]] Collected: 12/05/23 0732     Updated: 12/05/23 0738    Narrative:      XR CHEST 1 VW-     Clinical: Respiratory failure     COMPARISON 12/4/2023     FINDINGS: Mediport catheter in position as before. Elevation of the  right hemidiaphragm is again demonstrated. The cardiomediastinal  silhouette is unremarkable. No gross vascular congestion or pleural  effusion seen. Bilateral areas of infiltrate and consolidation similar  to the previous examinations. Seen best on prior CT. No new area of  airspace disease has developed. The remainder is unremarkable.     This report was finalized on 12/5/2023 7:35 AM by Dr. Trenton Lyn M.D  on Workstation: [id number]       XR Chest 1 View [[id number]] Collected: 12/04/23 1252     Updated: 12/04/23 1301    Narrative:      EXAM: XR CHEST 1 VW-     INDICATION: Shortness of breath     COMPARISON: Chest radiographs dating back to 10/26/2019. CT chest  12/1/2023.          Impression:      Hazy left greater than right bibasilar predominant opacities  with more defined right lower lobe linear atelectasis have not  significantly changed from recent CT chest, accounting for differences  in imaging techniques. No pleural effusion or pneumothorax.     Left IJ port with tip at the brachiocephalic-SVC confluence. Stable  normal size  cardiomediastinal silhouette. No focal osseous abnormality.           This report was finalized on 12/4/2023 12:58 PM by Dr. Jose Hoffmann M.D on Workstation: [id number]               I reviewed the patient's new clinical results.  I personally viewed and interpreted the patient's imaging results:Chest x-ray showed some increase in the subsegmental linear atelectasis over the right lower lobe medially, the infiltrate on the left side is slightly more clear, there is evidence of some possible trace effusion on the right side which was not noted on the previous CAT scan.   the right hemidiaphragm was significantly elevated on the CAT scan and on the prior imaging.  Overall improved left-sided pulmonary infiltrate        Medication Review:   cefTRIAXone, 1,000 mg, Intravenous, Q24H  enoxaparin, 40 mg, Subcutaneous, Q24H  FLUoxetine, 40 mg, Oral, Daily  insulin glargine, 20 Units, Subcutaneous, Q12H  insulin lispro, 4-24 Units, Subcutaneous, 4x Daily AC & at Bedtime  insulin lispro, 6 Units, Subcutaneous, TID With Meals  methadone, 20 mg, Oral, Q12H  metroNIDAZOLE, 500 mg, Topical, Q12H  predniSONE, 40 mg, Oral, Daily  senna-docusate sodium, 2 tablet, Oral, BID  silver sulfadiazine, 1 application , Topical, Q12H               ASSESSMENT:   Diabetic ketoacidosis, resolved  Lactic acidosis  Poorly controlled diabetes  Leukocytosis with possible sepsis  Metastatic breast cancer with significant right chest wall involvement causing significant pain  Chronic opioid use with high opioid tolerance  Possible pneumonia  Metabolic encephalopathy, improved by correcting the DKA    PLAN:  DKA has resolved, the current  situation is really to control her distressing pain which is significant enough that needs to be controlled before she can be safely discharged home otherwise she will be readmitted for that reason  Patient is currently on the methadone and she is on sustained-release morphine, and on the immediate release  morphine.  Her pain is slightly worse today and we did discontinue the Lyrica and may have been providing some additional neuropathy control and we will resume it  The prednisone did help with the left-sided pleurisy which near completely gone  Most of the pain at this point is from the right side of the chest from the extensive tumor burden involving most of the chest wall  We will go ahead and transfer the patient to the third Park or fourth [facility] floor where they have more experience with oncology patient and pain control  Will finish the course of the antibiotic as ordered  Oxygen requirements are improving continue to wean as tolerated    Discussed with the patient, discussed with the palliative care team  Labs/Notes/films were independently reviewed and pertinent results are summarized above  The copied texts in this note were reviewed and they are accurate as of 12/08/23    Disposition: Home once her pain is under better control and her lactic acidosis optimally controlled    Yulia Savage MD  12/08/23  13:04 EST         Dictated utilizing Dragon dictation

## 2023-12-08 NOTE — PLAN OF CARE
Problem: Adult Inpatient Plan of Care  Goal: Plan of Care Review  Outcome: Ongoing, Progressing  Flowsheets  Taken 12/8/2023 1558  Plan of Care Reviewed With: patient  Taken 12/6/2023 1705  Outcome Evaluation: Pt AxOx4, medications given as scheduled, pains PRN given, 2L NC, VSS  Goal: Patient-Specific Goal (Individualized)  Outcome: Ongoing, Progressing  Goal: Absence of Hospital-Acquired Illness or Injury  Outcome: Ongoing, Progressing  Intervention: Identify and Manage Fall Risk  Recent Flowsheet Documentation  Taken 12/8/2023 1218 by Abner Choi RN  Safety Promotion/Fall Prevention:   activity supervised   assistive device/personal items within reach   clutter free environment maintained   fall prevention program maintained   nonskid shoes/slippers when out of bed   safety round/check completed  Taken 12/8/2023 1014 by Abner Choi RN  Safety Promotion/Fall Prevention:   activity supervised   assistive device/personal items within reach   clutter free environment maintained   fall prevention program maintained   nonskid shoes/slippers when out of bed   safety round/check completed  Taken 12/8/2023 0809 by Abner Choi RN  Safety Promotion/Fall Prevention: activity supervised  Intervention: Prevent Skin Injury  Recent Flowsheet Documentation  Taken 12/8/2023 1014 by Abner Choi RN  Body Position: supine  Taken 12/8/2023 0809 by Abner Choi RN  Body Position: sitting up in bed  Intervention: Prevent and Manage VTE (Venous Thromboembolism) Risk  Recent Flowsheet Documentation  Taken 12/8/2023 1218 by Abner Choi RN  Activity Management: activity encouraged  Taken 12/8/2023 1014 by Abner Choi RN  Activity Management: activity encouraged  Taken 12/8/2023 0809 by Abner Choi RN  Activity Management: activity encouraged  Goal: Optimal Comfort and Wellbeing  Outcome: Ongoing, Progressing  Intervention: Provide Person-Centered Care  Recent Flowsheet Documentation  Taken 12/8/2023 0809 by  Jimbo, Lativia, RN  Trust Relationship/Rapport:   care explained   choices provided   emotional support provided   empathic listening provided   questions answered   questions encouraged   reassurance provided   thoughts/feelings acknowledged  Goal: Readiness for Transition of Care  Outcome: Ongoing, Progressing     Problem: Skin Injury Risk Increased  Goal: Skin Health and Integrity  Outcome: Ongoing, Progressing  Intervention: Optimize Skin Protection  Recent Flowsheet Documentation  Taken 12/8/2023 1014 by Abner Choi RN  Head of Bed (HOB) Positioning: HOB at 30-45 degrees     Problem: Adjustment to Illness (Sepsis/Septic Shock)  Goal: Optimal Coping  Outcome: Ongoing, Progressing  Intervention: Optimize Psychosocial Adjustment to Illness  Recent Flowsheet Documentation  Taken 12/8/2023 0809 by Abner Choi RN  Supportive Measures: active listening utilized  Family/Support System Care:   self-care encouraged   support provided     Problem: Bleeding (Sepsis/Septic Shock)  Goal: Absence of Bleeding  Outcome: Ongoing, Progressing     Problem: Glycemic Control Impaired (Sepsis/Septic Shock)  Goal: Blood Glucose Level Within Desired Range  Outcome: Ongoing, Progressing  Intervention: Optimize Glycemic Control  Recent Flowsheet Documentation  Taken 12/8/2023 0809 by Abner Choi RN  Glycemic Management: blood glucose monitored     Problem: Infection Progression (Sepsis/Septic Shock)  Goal: Absence of Infection Signs and Symptoms  Outcome: Ongoing, Progressing  Intervention: Promote Recovery  Recent Flowsheet Documentation  Taken 12/8/2023 1218 by Abner Choi RN  Activity Management: activity encouraged  Taken 12/8/2023 1014 by Abner Choi RN  Activity Management: activity encouraged  Taken 12/8/2023 0809 by Abner Choi RN  Activity Management: activity encouraged     Problem: Nutrition Impaired (Sepsis/Septic Shock)  Goal: Optimal Nutrition Intake  Outcome: Ongoing, Progressing     Problem:  Diabetic Ketoacidosis  Goal: Fluid and Electrolyte Balance with Absence of Ketosis  Outcome: Ongoing, Progressing  Intervention: Monitor and Manage Ketoacidosis  Recent Flowsheet Documentation  Taken 12/8/2023 0809 by Abner Choi RN  Glycemic Management: blood glucose monitored     Problem: Fall Injury Risk  Goal: Absence of Fall and Fall-Related Injury  Outcome: Ongoing, Progressing  Intervention: Identify and Manage Contributors  Recent Flowsheet Documentation  Taken 12/8/2023 1014 by Abner Choi RN  Medication Review/Management: medications reviewed  Taken 12/8/2023 0809 by Abner Choi RN  Medication Review/Management: medications reviewed  Intervention: Promote Injury-Free Environment  Recent Flowsheet Documentation  Taken 12/8/2023 1218 by Abner Choi RN  Safety Promotion/Fall Prevention:   activity supervised   assistive device/personal items within reach   clutter free environment maintained   fall prevention program maintained   nonskid shoes/slippers when out of bed   safety round/check completed  Taken 12/8/2023 1014 by Abner Choi RN  Safety Promotion/Fall Prevention:   activity supervised   assistive device/personal items within reach   clutter free environment maintained   fall prevention program maintained   nonskid shoes/slippers when out of bed   safety round/check completed  Taken 12/8/2023 0809 by Abner Choi RN  Safety Promotion/Fall Prevention: activity supervised   Goal Outcome Evaluation:  Plan of Care Reviewed With: patient           Outcome Evaluation: Pt AxOx4, medications given as scheduled, pains PRN given, 2L NC, VSS

## 2023-12-08 NOTE — NURSING NOTE
CWON note: spoke with pt's RN regarding new consult for increased drainage from right fungating breast tumor. RN reports that there is not increased drainage, but the silvadene cream is what the family is calling drainage. I instructed the RN to use minimal amount of silvadene cream on the dressings and to change BID and prn for excessive drainage. There is no additional changes needed to the wound care at this time.

## 2023-12-09 PROBLEM — T14.8XXA WOUND INFECTION: Status: ACTIVE | Noted: 2023-12-09

## 2023-12-09 PROBLEM — L08.9 WOUND INFECTION: Status: ACTIVE | Noted: 2023-12-09

## 2023-12-09 PROBLEM — G89.3 CANCER-RELATED PAIN: Status: ACTIVE | Noted: 2023-12-09

## 2023-12-09 PROBLEM — E10.9 TYPE 1 DIABETES MELLITUS: Status: ACTIVE | Noted: 2023-12-09

## 2023-12-09 PROBLEM — D64.9 ANEMIA: Status: ACTIVE | Noted: 2023-12-09

## 2023-12-09 LAB
ALBUMIN SERPL-MCNC: 2.5 G/DL (ref 3.5–5.2)
ALBUMIN/GLOB SERPL: 0.8 G/DL
ALP SERPL-CCNC: 237 U/L (ref 39–117)
ALT SERPL W P-5'-P-CCNC: 22 U/L (ref 1–33)
ANION GAP SERPL CALCULATED.3IONS-SCNC: 12.6 MMOL/L (ref 5–15)
AST SERPL-CCNC: 43 U/L (ref 1–32)
BACTERIA SPEC AEROBE CULT: NORMAL
BACTERIA SPEC AEROBE CULT: NORMAL
BASOPHILS # BLD AUTO: 0.06 10*3/MM3 (ref 0–0.2)
BASOPHILS NFR BLD AUTO: 0.5 % (ref 0–1.5)
BILIRUB SERPL-MCNC: <0.2 MG/DL (ref 0–1.2)
BUN SERPL-MCNC: 12 MG/DL (ref 6–20)
BUN/CREAT SERPL: 28.6 (ref 7–25)
CALCIUM SPEC-SCNC: 8.4 MG/DL (ref 8.6–10.5)
CHLORIDE SERPL-SCNC: 95 MMOL/L (ref 98–107)
CO2 SERPL-SCNC: 26.4 MMOL/L (ref 22–29)
CREAT SERPL-MCNC: 0.42 MG/DL (ref 0.57–1)
DEPRECATED RDW RBC AUTO: 48.6 FL (ref 37–54)
EGFRCR SERPLBLD CKD-EPI 2021: 125.4 ML/MIN/1.73
EOSINOPHIL # BLD AUTO: 0.03 10*3/MM3 (ref 0–0.4)
EOSINOPHIL NFR BLD AUTO: 0.2 % (ref 0.3–6.2)
ERYTHROCYTE [DISTWIDTH] IN BLOOD BY AUTOMATED COUNT: 15.9 % (ref 12.3–15.4)
GLOBULIN UR ELPH-MCNC: 3 GM/DL
GLUCOSE BLDC GLUCOMTR-MCNC: 118 MG/DL (ref 70–130)
GLUCOSE BLDC GLUCOMTR-MCNC: 203 MG/DL (ref 70–130)
GLUCOSE BLDC GLUCOMTR-MCNC: 255 MG/DL (ref 70–130)
GLUCOSE BLDC GLUCOMTR-MCNC: 286 MG/DL (ref 70–130)
GLUCOSE SERPL-MCNC: 261 MG/DL (ref 65–99)
HCT VFR BLD AUTO: 29.6 % (ref 34–46.6)
HGB BLD-MCNC: 9.6 G/DL (ref 12–15.9)
IMM GRANULOCYTES # BLD AUTO: 0.21 10*3/MM3 (ref 0–0.05)
IMM GRANULOCYTES NFR BLD AUTO: 1.6 % (ref 0–0.5)
LYMPHOCYTES # BLD AUTO: 1.04 10*3/MM3 (ref 0.7–3.1)
LYMPHOCYTES NFR BLD AUTO: 7.9 % (ref 19.6–45.3)
MCH RBC QN AUTO: 27.7 PG (ref 26.6–33)
MCHC RBC AUTO-ENTMCNC: 32.4 G/DL (ref 31.5–35.7)
MCV RBC AUTO: 85.5 FL (ref 79–97)
MONOCYTES # BLD AUTO: 1.01 10*3/MM3 (ref 0.1–0.9)
MONOCYTES NFR BLD AUTO: 7.7 % (ref 5–12)
NEUTROPHILS NFR BLD AUTO: 10.75 10*3/MM3 (ref 1.7–7)
NEUTROPHILS NFR BLD AUTO: 82.1 % (ref 42.7–76)
NRBC BLD AUTO-RTO: 0.1 /100 WBC (ref 0–0.2)
PLATELET # BLD AUTO: 307 10*3/MM3 (ref 140–450)
PMV BLD AUTO: 9.4 FL (ref 6–12)
POTASSIUM SERPL-SCNC: 4.2 MMOL/L (ref 3.5–5.2)
PROT SERPL-MCNC: 5.5 G/DL (ref 6–8.5)
RBC # BLD AUTO: 3.46 10*6/MM3 (ref 3.77–5.28)
SODIUM SERPL-SCNC: 134 MMOL/L (ref 136–145)
WBC NRBC COR # BLD AUTO: 13.1 10*3/MM3 (ref 3.4–10.8)

## 2023-12-09 PROCEDURE — 82948 REAGENT STRIP/BLOOD GLUCOSE: CPT

## 2023-12-09 PROCEDURE — 63710000001 INSULIN LISPRO (HUMAN) PER 5 UNITS: Performed by: STUDENT IN AN ORGANIZED HEALTH CARE EDUCATION/TRAINING PROGRAM

## 2023-12-09 PROCEDURE — 99223 1ST HOSP IP/OBS HIGH 75: CPT | Performed by: STUDENT IN AN ORGANIZED HEALTH CARE EDUCATION/TRAINING PROGRAM

## 2023-12-09 PROCEDURE — 63710000001 INSULIN LISPRO (HUMAN) PER 5 UNITS: Performed by: INTERNAL MEDICINE

## 2023-12-09 PROCEDURE — 80053 COMPREHEN METABOLIC PANEL: CPT | Performed by: INTERNAL MEDICINE

## 2023-12-09 PROCEDURE — 25010000002 ENOXAPARIN PER 10 MG: Performed by: INTERNAL MEDICINE

## 2023-12-09 PROCEDURE — 63710000001 PREDNISONE PER 1 MG: Performed by: INTERNAL MEDICINE

## 2023-12-09 PROCEDURE — 63710000001 INSULIN GLARGINE PER 5 UNITS: Performed by: INTERNAL MEDICINE

## 2023-12-09 PROCEDURE — 25010000002 MORPHINE PER 10 MG: Performed by: INTERNAL MEDICINE

## 2023-12-09 PROCEDURE — 85025 COMPLETE CBC W/AUTO DIFF WBC: CPT | Performed by: INTERNAL MEDICINE

## 2023-12-09 RX ORDER — DOXYCYCLINE 100 MG/1
100 CAPSULE ORAL EVERY 12 HOURS SCHEDULED
Status: DISCONTINUED | OUTPATIENT
Start: 2023-12-09 | End: 2023-12-10 | Stop reason: HOSPADM

## 2023-12-09 RX ORDER — LEVOFLOXACIN 750 MG/1
750 TABLET, FILM COATED ORAL EVERY 24 HOURS
Status: DISCONTINUED | OUTPATIENT
Start: 2023-12-09 | End: 2023-12-10 | Stop reason: HOSPADM

## 2023-12-09 RX ORDER — INSULIN LISPRO 100 [IU]/ML
10 INJECTION, SOLUTION INTRAVENOUS; SUBCUTANEOUS
Status: DISCONTINUED | OUTPATIENT
Start: 2023-12-09 | End: 2023-12-10 | Stop reason: HOSPADM

## 2023-12-09 RX ORDER — PREDNISONE 20 MG/1
40 TABLET ORAL DAILY
Status: DISCONTINUED | OUTPATIENT
Start: 2023-12-10 | End: 2023-12-10 | Stop reason: HOSPADM

## 2023-12-09 RX ORDER — PREDNISONE 20 MG/1
20 TABLET ORAL DAILY
Status: DISCONTINUED | OUTPATIENT
Start: 2023-12-10 | End: 2023-12-09

## 2023-12-09 RX ADMIN — METRONIDAZOLE 500 MG: 500 TABLET, FILM COATED ORAL at 10:00

## 2023-12-09 RX ADMIN — OXYCODONE HYDROCHLORIDE 30 MG: 15 TABLET ORAL at 10:15

## 2023-12-09 RX ADMIN — PREGABALIN 50 MG: 50 CAPSULE ORAL at 10:08

## 2023-12-09 RX ADMIN — INSULIN LISPRO 12 UNITS: 100 INJECTION, SOLUTION INTRAVENOUS; SUBCUTANEOUS at 12:09

## 2023-12-09 RX ADMIN — PREGABALIN 50 MG: 50 CAPSULE ORAL at 20:05

## 2023-12-09 RX ADMIN — FLUOXETINE HYDROCHLORIDE 40 MG: 20 CAPSULE ORAL at 09:59

## 2023-12-09 RX ADMIN — ENOXAPARIN SODIUM 40 MG: 100 INJECTION SUBCUTANEOUS at 12:10

## 2023-12-09 RX ADMIN — PREDNISONE 40 MG: 20 TABLET ORAL at 10:00

## 2023-12-09 RX ADMIN — LEVOFLOXACIN 750 MG: 750 TABLET, FILM COATED ORAL at 13:41

## 2023-12-09 RX ADMIN — INSULIN GLARGINE 20 UNITS: 100 INJECTION, SOLUTION SUBCUTANEOUS at 09:58

## 2023-12-09 RX ADMIN — DOCUSATE SODIUM 50MG AND SENNOSIDES 8.6MG 2 TABLET: 8.6; 5 TABLET, FILM COATED ORAL at 10:01

## 2023-12-09 RX ADMIN — MORPHINE SULFATE 4 MG: 2 INJECTION, SOLUTION INTRAMUSCULAR; INTRAVENOUS at 01:09

## 2023-12-09 RX ADMIN — MORPHINE SULFATE 4 MG: 2 INJECTION, SOLUTION INTRAMUSCULAR; INTRAVENOUS at 04:56

## 2023-12-09 RX ADMIN — INSULIN LISPRO 10 UNITS: 100 INJECTION, SOLUTION INTRAVENOUS; SUBCUTANEOUS at 20:12

## 2023-12-09 RX ADMIN — ALPRAZOLAM 0.5 MG: 0.5 TABLET ORAL at 01:00

## 2023-12-09 RX ADMIN — OXYCODONE HYDROCHLORIDE 30 MG: 15 TABLET ORAL at 20:05

## 2023-12-09 RX ADMIN — MORPHINE SULFATE 4 MG: 2 INJECTION, SOLUTION INTRAMUSCULAR; INTRAVENOUS at 20:05

## 2023-12-09 RX ADMIN — METHADONE HYDROCHLORIDE 20 MG: 5 SOLUTION ORAL at 05:38

## 2023-12-09 RX ADMIN — DOXYCYCLINE 100 MG: 100 CAPSULE ORAL at 13:44

## 2023-12-09 RX ADMIN — METRONIDAZOLE 500 MG: 500 TABLET, FILM COATED ORAL at 02:35

## 2023-12-09 RX ADMIN — DOXYCYCLINE 100 MG: 100 CAPSULE ORAL at 20:06

## 2023-12-09 RX ADMIN — DOCUSATE SODIUM 50MG AND SENNOSIDES 8.6MG 2 TABLET: 8.6; 5 TABLET, FILM COATED ORAL at 20:05

## 2023-12-09 RX ADMIN — MORPHINE SULFATE 4 MG: 2 INJECTION, SOLUTION INTRAMUSCULAR; INTRAVENOUS at 12:09

## 2023-12-09 RX ADMIN — ALPRAZOLAM 0.5 MG: 0.5 TABLET ORAL at 20:05

## 2023-12-09 RX ADMIN — MORPHINE SULFATE 4 MG: 2 INJECTION, SOLUTION INTRAMUSCULAR; INTRAVENOUS at 09:58

## 2023-12-09 RX ADMIN — SILVER SULFADIAZINE 1 APPLICATION: 10 CREAM TOPICAL at 02:35

## 2023-12-09 RX ADMIN — SILVER SULFADIAZINE 1 APPLICATION: 10 CREAM TOPICAL at 10:00

## 2023-12-09 RX ADMIN — METHADONE HYDROCHLORIDE 20 MG: 5 SOLUTION ORAL at 18:00

## 2023-12-09 RX ADMIN — INSULIN LISPRO 6 UNITS: 100 INJECTION, SOLUTION INTRAVENOUS; SUBCUTANEOUS at 09:59

## 2023-12-09 RX ADMIN — OXYCODONE HYDROCHLORIDE 30 MG: 15 TABLET ORAL at 13:47

## 2023-12-09 RX ADMIN — INSULIN LISPRO 6 UNITS: 100 INJECTION, SOLUTION INTRAVENOUS; SUBCUTANEOUS at 12:10

## 2023-12-09 RX ADMIN — INSULIN LISPRO 12 UNITS: 100 INJECTION, SOLUTION INTRAVENOUS; SUBCUTANEOUS at 09:58

## 2023-12-09 RX ADMIN — INSULIN GLARGINE 20 UNITS: 100 INJECTION, SOLUTION SUBCUTANEOUS at 22:12

## 2023-12-09 RX ADMIN — ALPRAZOLAM 0.5 MG: 0.5 TABLET ORAL at 09:57

## 2023-12-09 RX ADMIN — INSULIN LISPRO 8 UNITS: 100 INJECTION, SOLUTION INTRAVENOUS; SUBCUTANEOUS at 22:12

## 2023-12-09 RX ADMIN — OXYCODONE HYDROCHLORIDE 30 MG: 15 TABLET ORAL at 05:38

## 2023-12-09 NOTE — PROGRESS NOTES
[facility]     Mar/chart reviewed  Follow up DKA, pneumonia, encephalopathy in a patient with metastatic breast cancer  She is having pain on her left side again now.  Her pain still not optimally controlled. Says her breast wound is awful. Blood sugars have been very hard to control and now she is on steroids    Vital Sign Min/Max for last 24 hours  Temp  Min: 98.3 °F (36.8 °C)  Max: 99 °F (37.2 °C)   BP  Min: 110/65  Max: 135/78   Pulse  Min: 93  Max: 99   Resp  Min: 16  Max: 18   SpO2  Min: 91 %  Max: 93 %   Flow (L/min)  Min: 2  Max: 2   No data recorded     Nad, axox3,   perrl, eomi, no icterus,  mmm, no jvd, trachea midline, neck supple,  chest cta bilaterally decreased on the right, no crackles, no wheezes,   rrr,   soft, nt, nd +bs,  no c/c/ 1+ edema  Skin warm, dry no rashes    Labs: 12/9: reviewed:  Glucose 261  Bun 12  Cr 0.42  Na 134  Bicarb 26  Wbc 13.1  Hgb 9.6  Plts 307    Reviewed ct chest and CXR from this admission  Note wound swab with psuedomonas    A/P:  AHRF -- still needing 2lpm; ddx is broad, would complete 7 days of antibiotics, continue steroids, not convinced this is bacterial pneumonia  Pneumonia -- as per 1  Metastatic breast cancer -- got recent keytruda; oncology here going to see  Breast wound -- was told no surgical options -- has psuedomonal growth on swab, will ask for ID consult  DMI -- poorly controlled will ask for IM consult  Pain control -- ongoing issues, adjust as best we can  Encephalopathy -- better  Anemia    Patient is new to me today  D/w  at bedside

## 2023-12-09 NOTE — CONSULTS
Patient Name:  Sierra Mao  YOB: 1980  Patient Care Team:  Stephania Swain MD as PCP - General (Internal Medicine)  Shaila Willard MD as Consulting Physician (Endocrinology)  Richy Walker MD as Consulting Physician (Hematology and Oncology)  Mariposa Price MD as Referring Physician (General Surgery)  Didi De La O MD as Consulting Physician (Hematology and Oncology)    Date of Admission:  12/4/2023  Date of Consult:  12/9/2023    Inpatient Internal Medicine Consult  Consult performed by: Barron Luciano MD  Consult ordered by: Jayson Sunshine MD        Reason for consult: Evaluate status and make recommendations regarding treatment for DM1      Subjective   History of Present Illness  Ms. Mao is a 43 y.o. female w/ metastatic breast CA, DM 1 admitted for DKA.  Originally admitted to the ICU.  Has been seen in consultation with oncology and infectious disease.  Medicine consulted for management of her diabetes.    Being currently treated for right-sided pneumonia, right necrotic breast mass with superimposed soft tissue infection.  Wound cultures growing Pseudomonas and staph, Rocephin and Flagyl have been transition to Levaquin and doxycycline.    Today she reports that her main issue is pain.  She is on a small amount of oxygen though denies dyspnea.  She has diffuse pain from her metastasis.  Otherwise no fevers, chills, shortness of breath, nausea vomiting diarrhea.      Past Medical History:   Diagnosis Date    Anxiety     Arthritis     Breast cancer 09/07/2021    Right breast invasive ductal carcinoma ER low positive, NH negative, EPG6sms negative, mercedes to lymph node (biopsy positive)    Chronic fatigue     Diabetes mellitus with stage 3 chronic kidney disease     Encounter for fitting and adjustment of vascular catheter 10/21/2021    Hyperlipidemia     Leukocytosis     Menorrhagia with regular cycle     Seasonal allergies     Type I diabetes mellitus      Vitamin D deficiency      Past Surgical History:   Procedure Laterality Date    APPENDECTOMY N/A     BREAST BIOPSY Right 2018    BREAST BIOPSY Bilateral 2021    US GUIDED LYMPH NODE BIOPSY  2021    Dr. Carol Terrazas, [facility]    US GUIDED LYMPH NODE BIOPSY  2021    VENOUS ACCESS DEVICE (PORT) INSERTION N/A 10/15/2021    Procedure: INSERTION VENOUS ACCESS DEVICE;  Surgeon: Mariposa Price MD;  Location: [facility] MAIN OR;  Service: General;  Laterality: N/A;     Family History   Problem Relation Age of Onset    Diabetes Mother     Cervical cancer Maternal Grandmother         cervical/uterine     Diabetes Maternal Grandfather     Cancer Father     Lymphoma Paternal Aunt 60    Breast cancer Neg Hx     Malig Hyperthermia Neg Hx      Social History     Tobacco Use    Smoking status: Former     Packs/day: 0.50     Years: 8.00     Additional pack years: 0.00     Total pack years: 4.00     Types: Cigarettes     Start date: 1995     Quit date: 2003     Years since quittin.9    Smokeless tobacco: Former    Tobacco comments:     teen / young adult   Vaping Use    Vaping Use: Never used   Substance Use Topics    Alcohol use: Yes     Comment: social    Drug use: No     Medications Prior to Admission   Medication Sig Dispense Refill Last Dose    ALPRAZolam (XANAX) 0.5 MG tablet Take 1 tablet by mouth 3 (Three) Times a Day As Needed. for anxiety       amphetamine-dextroamphetamine (ADDERALL) 20 MG tablet Take 1 tablet by mouth 3 (Three) Times a Day.       docusate sodium (COLACE) 100 MG capsule Take 1 capsule by mouth 2 (Two) Times a Day. 20 capsule 0     FLUoxetine (PROzac) 20 MG capsule Take 2 capsules by mouth Daily.       Insulin Glargine (BASAGLAR KWIKPEN) 100 UNIT/ML injection pen Inject 30 Units under the skin into the appropriate area as directed Every Night. (Patient taking differently: Inject 40 Units under the skin into the appropriate area as directed Every Night.) 9 mL 11     methadone  (DOLOPHINE) 10 MG tablet 1 tablet,       metroNIDAZOLE (FLAGYL) 500 MG tablet 1 tablet. Crushes tablet and adds to wound once daily       NovoLOG FlexPen 100 UNIT/ML solution pen-injector sc pen Amount based on carb ratio up to 60 units daily (Patient taking differently: Amount based on carb ratio up to 60 units daily  1 unit per 5 g carbs) 15 mL 11     oxybutynin (DITROPAN) 5 MG tablet Take 1 tablet by mouth Every Night.       oxyCODONE (ROXICODONE) 5 MG immediate release tablet Take 6 tablets by mouth Every 6 (Six) Hours As Needed for Severe Pain. for pain       silver sulfadiazine (SILVADENE, SSD) 1 % cream Apply 1 application  topically to the appropriate area as directed Daily.       [] amoxicillin-clavulanate (AUGMENTIN) 875-125 MG per tablet Take 1 tablet by mouth 2 (Two) Times a Day for 7 days. 14 tablet 0     [] doxycycline (MONODOX) 100 MG capsule Take 1 capsule by mouth 2 (Two) Times a Day for 7 days. 14 capsule 0     Gemcitabine HCl (GEMZAR IV) Infuse  into a venous catheter.       Glucagon 3 MG/DOSE powder   0 Refill(s) (Patient not taking: Reported on 2023)       Insulin Pen Needle (BD Pen Needle Kaley U/F) 32G X 4 MM misc Inject 1 each under the skin into the appropriate area as directed 4 (Four) Times a Day. 120 each 11     lidocaine-prilocaine (EMLA) 2.5-2.5 % cream Apply 1 application topically to the appropriate area as directed As Needed for Mild Pain . (Patient not taking: Reported on 2023) 5 g 2     ondansetron (ZOFRAN) 8 MG tablet Take 1 tablet by mouth Every 8 (Eight) Hours As Needed for Nausea or Vomiting. 30 tablet 3     ondansetron ODT (ZOFRAN-ODT) 4 MG disintegrating tablet Place 1 tablet on the tongue Every 8 (Eight) Hours As Needed for Nausea or Vomiting. 6 tablet 0     OneTouch Verio test strip 1 each by Other route 4 (Four) Times a Day. 120 each 11     [] polyethylene glycol (MIRALAX) 17 g packet Take 17 g by mouth Daily for 7 days. 7 packet 0       Allergies:  Patient has no known allergies.    Review of Systems   Constitutional:  Positive for fatigue. Negative for activity change, appetite change and diaphoresis.   HENT:  Negative for congestion, ear discharge, mouth sores and tinnitus.    Eyes:  Negative for pain, discharge and redness.   Respiratory:  Positive for chest tightness. Negative for cough, choking, shortness of breath and wheezing.    Cardiovascular:  Positive for chest pain. Negative for palpitations.   Gastrointestinal:  Negative for abdominal pain, constipation, diarrhea and nausea.   Endocrine: Negative for cold intolerance and heat intolerance.   Genitourinary:  Negative for flank pain, frequency, hematuria, urgency and vaginal discharge.   Musculoskeletal:  Negative for back pain, joint swelling and neck pain.   Skin:  Negative for color change and rash.   Neurological:  Negative for syncope, speech difficulty and headaches.   Hematological:  Positive for adenopathy.   Psychiatric/Behavioral:  Negative for agitation, behavioral problems and hallucinations.        Objective      Vital Signs  Temp:  [98.3 °F (36.8 °C)-99 °F (37.2 °C)] 98.8 °F (37.1 °C)  Heart Rate:  [89-99] 89  Resp:  [16] 16  BP: (110-135)/(63-78) 118/63  Body mass index is 22.88 kg/m².    Physical Exam  Constitutional:       Appearance: Normal appearance.   Cardiovascular:      Rate and Rhythm: Normal rate.      Pulses: Normal pulses.   Pulmonary:      Effort: Pulmonary effort is normal. No respiratory distress.      Breath sounds: Normal breath sounds. No stridor. No wheezing or rhonchi.   Abdominal:      Palpations: Abdomen is soft.   Musculoskeletal:      Right lower leg: No edema.      Left lower leg: No edema.   Skin:     General: Skin is warm.      Comments: Necrotic right breast mass   Neurological:      General: No focal deficit present.      Mental Status: She is alert and oriented to person, place, and time.   Psychiatric:         Mood and Affect: Mood normal.          Behavior: Behavior normal.         Results Review:   I have personally reviewed:  [x]  Laboratory  [x]  Old records  [x]  Radiology   [x]  EKG/Telemetry   [x]  Microbiology    [x]  Cardiology/Vascular   [x]  Pathology          Active Hospital Problems    Diagnosis  POA    **DKA (diabetic ketoacidosis) [E11.10]  Yes    Type 1 diabetes mellitus [E10.9]  Unknown    Anemia [D64.9]  Unknown    Wound infection [T14.8XXA, L08.9]  Unknown    Cancer-related pain [G89.3]  Unknown    Malignant neoplasm metastatic to bone [C79.51]  Yes    Malignant neoplasm of central portion of right breast in female, estrogen receptor negative [C50.111, Z17.1]  Not Applicable     Added automatically from request for surgery [id number]      Type 1 diabetes mellitus with hyperglycemia [E10.65]  Yes     Ms. Mao is a 43 y.o. female w/ metastatic breast CA, DM 1 admitted for DKA.     T1DM  DKA, resolved  -Glargine 20 units twice daily, increase lispro to 10 units 3 times daily AC, SSI  -Expect greater needs while on prednisone    Pneumonia  Right necrotic breast mass with superimposed soft tissue infection  -ID following  -Wound culture with Pseudomonas and staph  -Rocephin and Flagyl transition to Levaquin plus doxycycline    Pain of malignancy  Metastatic breast CA  -Methadone 20 mg twice daily, Roxicodone 30 mg every 4 hours as needed, morphine 4 mg every 2 hours as needed  -Pain management consulted  -Oncology following    Thank you very much for asking [facility] to be involved in this patient's care. We will follow along with you.      Barron Luciano MD  [facility]  12/09/23  14:24 EST

## 2023-12-09 NOTE — PLAN OF CARE
Goal Outcome Evaluation:    A&OX4, calm and cooperative. Up with stand by assistance. Complained of pain, requiring PRN pain medication. Insulin given as ordered. Dressing to R breast. Oral abx administered. ID and Pain management consulted. VSS on 3L NC ctm.

## 2023-12-09 NOTE — CONSULTS
Referring Provider: Yulia Savage MD  Reason for Consultation:     infected wound, pneumonia antibiotics     Chief Complaint   Patient presents with    Shortness of Breath    Wound Check         Subjective   History of present illness: Is a 43-year-old female with past medical history of type I diabetes, right breast invasive ductal carcinoma, and CKD who presents in the setting of DKA and pneumonia.  ID consulted for infected wound, pneumonia antibiotics.    Patient reports she continues to have significant pain over the right breast.  Denies any fevers or chills.  States she is short of breath but oxygen has been helping.  Blood sugars have become under better control.    On admission patient has been afebrile and most recent WBC of 13.  Procalcitonin on admission was 1.29 and lactate improved with therapy.  Blood cultures were obtained and are no growth to date however wound culture from the right breast is growing Pseudomonas and MSSA.  She was started on ceftriaxone and metronidazole for pneumonia.  Currently on 2 L nasal cannula.    Past Medical History:   Diagnosis Date    Anxiety     Arthritis     Breast cancer 09/07/2021    Right breast invasive ductal carcinoma ER low positive, OR negative, [id number] negative, mercedes to lymph node (biopsy positive)    Chronic fatigue     Diabetes mellitus with stage 3 chronic kidney disease     Encounter for fitting and adjustment of vascular catheter 10/21/2021    Hyperlipidemia     Leukocytosis     Menorrhagia with regular cycle     Seasonal allergies     Type I diabetes mellitus     Vitamin D deficiency        Past Surgical History:   Procedure Laterality Date    APPENDECTOMY N/A 1995    BREAST BIOPSY Right 2018    BREAST BIOPSY Bilateral 09/07/2021    US GUIDED LYMPH NODE BIOPSY  09/07/2021    Dr. Carol Terrazas, [facility]    US GUIDED LYMPH NODE BIOPSY  09/28/2021    VENOUS ACCESS DEVICE (PORT) INSERTION N/A 10/15/2021    Procedure: INSERTION VENOUS ACCESS DEVICE;  Surgeon:  Mariposa Price MD;  Location: [facility] OR;  Service: General;  Laterality: N/A;       family history includes Cancer in her father; Cervical cancer in her maternal grandmother; Diabetes in her maternal grandfather and mother; Lymphoma (age of onset: 60) in her paternal aunt.     reports that she quit smoking about 19 years ago. Her smoking use included cigarettes. She started smoking about 28 years ago. She has a 4.00 pack-year smoking history. She has quit using smokeless tobacco. She reports current alcohol use. She reports that she does not use drugs.     No Known Allergies    Medication:  Antibiotics:  Anti-Infectives (From admission, onward)      Ordered     Dose/Rate Route Frequency Start Stop    12/04/23 1612  cefTRIAXone (ROCEPHIN) 1,000 mg in sodium chloride 0.9 % 100 mL IVPB-VTB        Ordering Provider: Yulia Savage MD    1,000 mg  200 mL/hr over 30 Minutes Intravenous Every 24 Hours 12/05/23 1500 12/10/23 1459    12/05/23 0953  metroNIDAZOLE (FLAGYL) tablet 500 mg        Ordering Provider: Yulia Savage MD    500 mg Topical Every 12 Hours Scheduled 12/05/23 1045 12/15/23 0859    12/04/23 1449  cefTRIAXone (ROCEPHIN) 2,000 mg in sodium chloride 0.9 % 100 mL IVPB-VTB        Ordering Provider: Jose Carter MD    2,000 mg  200 mL/hr over 30 Minutes Intravenous Once 12/04/23 1505 12/04/23 1647    12/04/23 1449  azithromycin (ZITHROMAX) 500 mg in sodium chloride 0.9 % 250 mL IVPB-VTB        Ordering Provider: Yulia Savage MD    500 mg  over 60 Minutes Intravenous Once 12/04/23 1505 12/05/23 0015              Objective     Physical Exam:   Vital Signs   Temp:  [98.3 °F (36.8 °C)-99 °F (37.2 °C)] 98.8 °F (37.1 °C)  Heart Rate:  [89-99] 89  Resp:  [16-18] 16  BP: (110-135)/(63-78) 118/63    GENERAL: Awake and alert, in no acute distress.   HEENT: Oropharynx is clear. Hearing is grossly normal.   EYES: PERRL. No conjunctival injection. No lid lag.   LUNGS: Normal work of breathing on 2 L nasal  "cannula  GI: Soft, nontender, nondistended.   SKIN: Large right breast necrotic mass overlying the entire breast.  Numerous areas of drainage and erythema with necrotic tissue and breakdown.  PSYCHIATRIC: Appropriate mood, affect, insight, and judgment.     Results Review:   I reviewed the patient's new clinical results.  I reviewed the patient's new imaging results and agree with the interpretation.  I reviewed the patient's other test results and agree with the interpretation    Lab Results   Component Value Date    WBC 13.10 (H) 12/09/2023    HGB 9.6 (L) 12/09/2023    HCT 29.6 (L) 12/09/2023    MCV 85.5 12/09/2023     12/09/2023       No results found for: \"VANCOPEAK\", \"VANCOTROUGH\", \"VANCORANDOM\"    Lab Results   Component Value Date    GLUCOSE 261 (H) 12/09/2023    BUN 12 12/09/2023    CREATININE 0.42 (L) 12/09/2023    EGFRIFNONA 117 02/08/2022    EGFRIFAFRI 99 08/05/2021    BCR 28.6 (H) 12/09/2023    CO2 26.4 12/09/2023    CALCIUM 8.4 (L) 12/09/2023    PROTENTOTREF 6.6 08/05/2021    ALBUMIN 2.5 (L) 12/09/2023    LABIL2 2.0 08/05/2021    AST 43 (H) 12/09/2023    ALT 22 12/09/2023         Estimated Creatinine Clearance: 204.9 mL/min (A) (by C-G formula based on SCr of 0.42 mg/dL (L)).      Microbiology:  12/1 COVID and flu negative  12/4 right breast wound culture with Staph aureus and Pseudomonas aeruginosa  12/4 blood cultures no growth to date    Radiology:  12/7 chest x-ray reviewed by me with chest port in place.  Right-sided infiltrate in the mid lung.    Assessment     #Right-sided pneumonia  #Right necrotic breast mass with superimposed soft tissue infection  #Metastatic right breast cancer  #Type 1 diabetes with DKA, improved     Wound cultures from the right breast isolating Pseudomonas and staph.  Recommend stopping ceftriaxone and Flagyl with transition to levofloxacin 750 mg daily plus doxycycline 100 mg twice daily for 5 more days to cover both pneumonia and skin and soft tissue " infection.    Unfortunately patient's right necrotic breast mass is going to be an ongoing risk of infection given skin breakdown and necrotic tissue.  This will be an attempt to knock down bacterial burden however there will be no definitive way to completely resolve this issue.  The only available option will be decreasing bacterial burden if skin and soft tissue infections arise based on culture results.    Thank you for allowing me to be involved in the care of this patient. Infectious diseases will sign off at this time with antibiotics plan in place, but please call me at [phone] if any further ID questions or new ID concerns.

## 2023-12-09 NOTE — CONSULTS
.     REASON FOR CONSULTATION:     Provide an opinion on any further workup or treatment                             REQUESTING PHYSICIAN: Yulia Savage MD  RECORDS OBTAINED:  Records of the patient's history including those obtained from the referring provider were reviewed and summarized in detail.    HISTORY OF PRESENT ILLNESS:  The patient is a 43 y.o. year old female With metastatic triple negative breast cancer, invading the entire right breast/chest wall, metastatic brain disease post radiation therapy, type I diabetes, recurrent ketoacidosis, hyperlipidemia, who was admitted this time on 12/4/2023 for diabetic ketoacidosis.  Patient was also found to having left lower lobe pneumonia.  Patient was admitted to the hospital for further management.  Her diabetic ketoacidosis has resolved.  Patient is being antibiotics for pneumonia.    We are consulted to help with management of metastatic breast cancer.    I reviewed medical records, including the EMR from [facility].  I also reviewed the medical records from our clinic in 2022 when she was seen by my colleague Dr. De La O.  In summary this patient had a triple negative metastatic breast cancer, failed multiple lines of therapy including a recent clinical trial at the [facility] in [facility].  Most recently patient was started on single agent immunotherapy with pembrolizumab/Keytruda, she only received 1 dose 12/1/2023.     This patient has metastatic cancer invading the entire right breast and the chest wall which causes significant pain.  She has been on methadone for pain management initiated at the [facility].  She also reports was given local injection of her chest wall for pain control.  Patient reports her pain is severe, 10/10 right before she was given IV morphine when I was talking to her.     Because of the tumor invasion, she has open wound on her right chest, which  is covered.  Patient is not having sweating or chills.  Tmax is 99 Fahrenheit.  BP stable.     At the time of the ER visit, 12/4/2023, wound culture obtained from her chest wound with heavy growth of Pseudomonas aeruginosa, and light growth of Staphylococcus aureus (not MRSA).  Her blood culture has been no growth to date.        Past Medical History:   Diagnosis Date    Anxiety     Arthritis     Breast cancer 09/07/2021    Right breast invasive ductal carcinoma ER low positive, FL negative, LMM2mpa negative, mercedes to lymph node (biopsy positive)    Chronic fatigue     Diabetes mellitus with stage 3 chronic kidney disease     Encounter for fitting and adjustment of vascular catheter 10/21/2021    Hyperlipidemia     Leukocytosis     Menorrhagia with regular cycle     Seasonal allergies     Type I diabetes mellitus     Vitamin D deficiency      Past Surgical History:   Procedure Laterality Date    APPENDECTOMY N/A 1995    BREAST BIOPSY Right 2018    BREAST BIOPSY Bilateral 09/07/2021    US GUIDED LYMPH NODE BIOPSY  09/07/2021    Dr. Carol Terrazas, [facility]    US GUIDED LYMPH NODE BIOPSY  09/28/2021    VENOUS ACCESS DEVICE (PORT) INSERTION N/A 10/15/2021    Procedure: INSERTION VENOUS ACCESS DEVICE;  Surgeon: Mariposa Price MD;  Location: [facility];  Service: General;  Laterality: N/A;       MEDICATIONS    Current Facility-Administered Medications:     ALPRAZolam (XANAX) tablet 0.5 mg, 0.5 mg, Oral, TID PRN, Yulia Savage MD, 0.5 mg at 12/09/23 0957    sennosides-docusate (PERICOLACE) 8.6-50 MG per tablet 2 tablet, 2 tablet, Oral, BID, 2 tablet at 12/09/23 1001 **AND** polyethylene glycol (MIRALAX) packet 17 g, 17 g, Oral, Daily PRN **AND** bisacodyl (DULCOLAX) EC tablet 5 mg, 5 mg, Oral, Daily PRN **AND** bisacodyl (DULCOLAX) suppository 10 mg, 10 mg, Rectal, Daily PRN, Yulia Savage MD    cefTRIAXone (ROCEPHIN) 1,000 mg in sodium chloride 0.9 % 100 mL IVPB-VTB, 1,000 mg, Intravenous, Q24H, Yulia Savage MD,  Last Rate: 200 mL/hr at 12/08/23 1618, 1,000 mg at 12/08/23 1618    dextrose (D50W) (25 g/50 mL) IV injection 25 g, 25 g, Intravenous, Q15 Min PRN, Wilma Ayon MD    dextrose (D50W) (25 g/50 mL) IV injection 25 g, 25 g, Intravenous, Q15 Min PRN, Yulia Savage MD    dextrose (GLUTOSE) oral gel 15 g, 15 g, Oral, Q15 Min PRN, Wilma Ayon MD    dextrose (GLUTOSE) oral gel 15 g, 15 g, Oral, Q15 Min PRN, Yulia Savage MD    Enoxaparin Sodium (LOVENOX) syringe 40 mg, 40 mg, Subcutaneous, Q24H, Yulia Savage MD, 40 mg at 12/08/23 1315    FLUoxetine (PROzac) capsule 40 mg, 40 mg, Oral, Daily, Yulia Savage MD, 40 mg at 12/09/23 0959    glucagon (GLUCAGEN) injection 1 mg, 1 mg, Intramuscular, Q15 Min PRN, Wilma Ayon MD    glucagon (GLUCAGEN) injection 1 mg, 1 mg, Intramuscular, Q15 Min PRN, Yulia Savage MD    insulin glargine (LANTUS, SEMGLEE) injection 20 Units, 20 Units, Subcutaneous, Q12H, Yulia Savage MD, 20 Units at 12/09/23 0958    insulin lispro (HUMALOG/ADMELOG) injection 4-24 Units, 4-24 Units, Subcutaneous, 4x Daily AC & at Bedtime, Yulia Savage MD, 12 Units at 12/09/23 0958    insulin lispro (HUMALOG/ADMELOG) injection 6 Units, 6 Units, Subcutaneous, TID With Meals, Wilma Ayon MD, 6 Units at 12/09/23 0959    methadone (DOLOPHINE) 5 MG/5ML solution 20 mg, 20 mg, Oral, Q12H, Yulia Savage MD, 20 mg at 12/09/23 0538    metroNIDAZOLE (FLAGYL) tablet 500 mg, 500 mg, Topical, Q12H, Yulia Savage MD, 500 mg at 12/09/23 1000    morphine injection 4 mg, 4 mg, Intravenous, Q2H PRN, Yulia Savage MD, 4 mg at 12/09/23 0958    oxyCODONE (ROXICODONE) immediate release tablet 30 mg, 30 mg, Oral, Q4H PRN, Yulia Savage MD, 30 mg at 12/09/23 1015    [START ON 12/10/2023] predniSONE (DELTASONE) tablet 20 mg, 20 mg, Oral, Daily, Jayson Sunshine MD    pregabalin (LYRICA) capsule 50 mg, 50 mg, Oral, Q12H, Yulia Savage MD, 50 mg at 12/09/23 1008    silver sulfadiazine (SILVADENE, SSD) 1 %  cream 1 application , 1 application , Topical, Q12H, Yulia Savage MD, 1 application  at 23 1000    Access VAD, , , Once **AND** sodium chloride 0.9 % flush 10 mL, 10 mL, Intravenous, PRN, Yulia Savage MD    ALLERGIES:   No Known Allergies    SOCIAL HISTORY:       Social History     Socioeconomic History    Marital status:    Tobacco Use    Smoking status: Former     Packs/day: 0.50     Years: 8.00     Additional pack years: 0.00     Total pack years: 4.00     Types: Cigarettes     Start date: 1995     Quit date: 2003     Years since quittin.9    Smokeless tobacco: Former    Tobacco comments:     teen / young adult   Vaping Use    Vaping Use: Never used   Substance and Sexual Activity    Alcohol use: Yes     Comment: social    Drug use: No    Sexual activity: Defer     Partners: Male     Birth control/protection: None     Comment:          FAMILY HISTORY:  Family History   Problem Relation Age of Onset    Diabetes Mother     Cervical cancer Maternal Grandmother         cervical/uterine     Diabetes Maternal Grandfather     Cancer Father     Lymphoma Paternal Aunt 60    Breast cancer Neg Hx     Malig Hyperthermia Neg Hx        REVIEW OF SYSTEMS:  Review of Systems   Constitutional:  Positive for fatigue. Negative for chills and fever.   HENT:  Negative for hearing loss and sore throat.    Respiratory:  Negative for cough and shortness of breath.    Cardiovascular:  Positive for chest pain (Due to infiltrative breast cancer in the right chest, and also pleuritic chest pain in the left upper back). Negative for leg swelling.   Gastrointestinal:  Negative for abdominal pain, blood in stool and nausea.   Genitourinary:  Negative for dysuria.   Musculoskeletal:  Negative for joint swelling.   Skin:  Positive for wound.   Allergic/Immunologic: Positive for immunocompromised state (Metastatic breast cancer chemotherapy).   Neurological:  Negative for headaches.   Hematological:   Positive for adenopathy. Does not bruise/bleed easily.   Psychiatric/Behavioral:  Negative for confusion.                 Vitals:    12/08/23 1315 12/08/23 1835 12/09/23 0443 12/09/23 0729   BP: 119/73 110/65 122/73 135/78   BP Location: Left arm Left arm Left arm Left arm   Patient Position: Lying Sitting Lying Lying   Pulse: 93 98 96 99   Resp: 18 16 16 16   Temp: 98.4 °F (36.9 °C) 98.6 °F (37 °C) 98.3 °F (36.8 °C) 99 °F (37.2 °C)   TempSrc: Oral Oral Oral Oral   SpO2: 93% 92% 91%    Weight:       Height:             12/30/2021    11:09 AM   Current Status   ECOG score 0      PHYSICAL EXAM:      CONSTITUTIONAL:  Vital signs reviewed.  Well-developed well-nourished female.  Sitting in bed.  No distress.  EYES:  Conjunctivae and lids unremarkable.    EARS,NOSE,MOUTH,THROAT:  Ears and nose appear unremarkable.    RESPIRATORY:  Normal respiratory effort.  Decreased breathing sounds in the left lung base.  No wheezing.  Right lung breathing sounds normal.    CARDIOVASCULAR:  Normal S1, S2.   No significant lower extremity edema.  GASTROINTESTINAL: Abdomen appears unremarkable.  Nontender.  No hepatomegaly.  No splenomegaly.  LYMPHATIC: Right neck/supraclavicular has enlarged lymphadenopathy lymphadenopathy.  MUSCULOSKELETAL: Unremarkable digits/nails.  No cyanosis or clubbing.  SKIN:  Warm.  Right chest wall/breast with wound from infiltrative breast cancer.   PSYCHIATRIC:  Normal judgment and insight.  Normal mood and affect.      RECENT LABS:  CBC:      Lab 12/09/23  0633 12/05/23  0411 12/04/23  1344   WBC 13.10* 10.25 16.27*   HEMOGLOBIN 9.6* 9.2* 11.1*   HEMATOCRIT 29.6* 28.8* 37.1   PLATELETS 307 306 500*   NEUTROS ABS 10.75*  --  13.21*   IMMATURE GRANS (ABS) 0.21*  --  0.81*   LYMPHS ABS 1.04  --  0.80   MONOS ABS 1.01*  --  1.35*   EOS ABS 0.03  --  0.01   MCV 85.5 88.1 90.0     CMP:        Lab 12/09/23  0633 12/06/23  0634 12/05/23  0831 12/05/23  0411 12/05/23  0017 12/04/23  2027 12/04/23  1602  12/04/23  1344   SODIUM 134* 132* 134* 133* 131* 129*   < > 130*   POTASSIUM 4.2 4.1 4.0 4.3 4.4 4.6   < > 5.2   CHLORIDE 95* 100 106 107 103 101   < > 94*   CO2 26.4 20.0* 21.0* 18.6* 17.5* 16.4*   < > 10.3*   ANION GAP 12.6 12.0 7.0 7.4 10.5 11.6   < > 25.7*   BUN 12 7 6 6 7 8   < > 11   CREATININE 0.42* 0.45* 0.49* 0.38* 0.44* 0.59   < > 0.91   EGFR 125.4 123.4 120.9 128.5 124.0 115.6   < > 80.9   GLUCOSE 261* 219* 101* 104* 193* 266*   < > 449*   CALCIUM 8.4* 8.4* 8.7 8.7 8.4* 8.8   < > 9.3   MAGNESIUM  --  1.6 1.7 1.6 1.7 1.7   < > 1.9   PHOSPHORUS  --  3.2 2.0* 2.1* 2.4* 2.4*   < > 4.2   TOTAL PROTEIN 5.5*  --   --   --   --   --   --  6.6   ALBUMIN 2.5*  --   --   --   --   --   --  3.3*   GLOBULIN 3.0  --   --   --   --   --   --  3.3   ALT (SGPT) 22  --   --   --   --   --   --  27   AST (SGOT) 43*  --   --   --   --   --   --  25   BILIRUBIN <0.2  --   --   --   --   --   --  0.3   ALK PHOS 237*  --   --   --   --   --   --  359*    < > = values in this interval not displayed.       IMAGINIG:  XR Chest PA & Lateral  CHEST: 2 VIEWS     HISTORY: Pneumonia. Hypoxemia.     COMPARISON: AP chest 12/05/2023, 12/04/2023, 11/26/2023.     FINDINGS: Left IJ Mediport tip is in the SVC. The heart and mediastinal  structures are not changed. There are calcified mediastinal and right  hilar lymph nodes. There is a dense opacity within the medial right lung  base that is increased in size and density when compared to series of 3  previous chest x-rays and is new when compared to CT angiogram chest  12/01/2023. This is consistent with area of dense  consolidation/infiltrate as well as a component of atelectasis. The hazy  ground-glass infiltrates demonstrated on the CT 6 days ago are not  evident radiographically and potentially have improved or resolved.  There is no evidence for pneumothorax or definite effusion.           This report was finalized on 12/7/2023 6:11 PM by Dr. Maynor Johnsno M.D on Workstation:  [id number]           Assessment & Plan     *.  Type 1 diabetes with ketoacidosis.   This has resolved and now for her ketoacidosis, patient is continued insulin for her type 1 diabetes.  According to her , this is the third time patient had diabetic ketoacidosis required hospitalization.  It seems each time it was associated with some chronic infection.     *.  Metastatic breast cancer, triple negative, failed multiple lines of therapy.  Most recent treatment was with single agent Keytruda 12/1/2023, which is her first dose.    Patient has been followed by Dr. Humphries at the [facility].  She will continue follow-up with Dr. Humphries for ongoing treatment with Keytruda.   I did mention to patient, Dr. Roe at the [facility] also runs a clinical trial for tomorrow infiltrating T-cell therapy.  She may discuss with Dr. Humphries in the future to see if she will qualify.  Patient voiced understanding.     *.  Significant pain involving the right chest due to metastatic breast cancer invading the right breast/chest wall.  Also has a growing right supraclavicular lymphadenopathy.  Patient currently is on methadone 20 mg every 12 hours.   Patient reports previously she was given injection into the tumor site for pain control at the [facility].  Patient also has been given morphine as needed intravenous.    I recommended to consult anesthesia pain management service to help with pain management.  However because its weekend, they probably will come Monday.    *.  Pneumonia.  Patient finished a course of IV Rocephin.     *.  Wound infection with Pseudomonas and staph coccus aureus as tested on 12/4/2023.   I recommended to consult ID service to see if she needs extended treatment for that.  Patient is having metastatic breast cancer invading the wound, and also is diabetic, she may need extended treatment.    *.  Anemia.   This is probably related to her metastatic breast cancer and previous  chemotherapy.  However there was no laboratory studies for iron B12 folate as reviewed EMR is from [facility] as well has that the health system.  I recommended to check ferritin iron profile B12 and folate to help with assessment.    *.  Leukocytosis.    This is likely related to her pneumonia and also right chest wound infection.  Continue to monitor.       PLAN:  Consult ID service for evaluation of wound infection with Pseudomonas aeruginosa.  That she needs extended antibiotic treatment?  Consult anesthesia pain management service.   Continue current pain management with methadone, and as needed morphine.  Continue management of type 1 diabetes with insulin.  Management per primary team.  Laboratory study for ferritin iron profile B12 folate for assessment of anemia.   Monitor CBC in the morning.   Upon discharge from [facility], patient will continue to follow-up with her primary oncologist Dr. Humphries at [facility].     Discussed with the patient and her  at bedside.  Nursing staff present in the room.     I spent 70 minutes caring for Sierra on this date of service. This time includes time spent by me in the following activities: preparing for the visit, reviewing tests, obtaining and/or reviewing a separately obtained history, performing a medically appropriate examination and/or evaluation, counseling and educating the patient/family/caregiver, ordering medications, tests, or procedures, referring and communicating with other health care professionals, documenting information in the medical record, independently interpreting results and communicating that information with the patient/family/caregiver and care coordination     Thanks for letting us participate in the care of this patient!       MARYJANE BRAVO M.D., Ph.D.

## 2023-12-10 ENCOUNTER — READMISSION MANAGEMENT (OUTPATIENT)
Dept: CALL CENTER | Facility: HOSPITAL | Age: 43
End: 2023-12-10
Payer: COMMERCIAL

## 2023-12-10 VITALS
HEIGHT: 71 IN | TEMPERATURE: 98.8 F | SYSTOLIC BLOOD PRESSURE: 116 MMHG | RESPIRATION RATE: 18 BRPM | WEIGHT: 164.02 LBS | BODY MASS INDEX: 22.96 KG/M2 | DIASTOLIC BLOOD PRESSURE: 77 MMHG | OXYGEN SATURATION: 92 % | HEART RATE: 91 BPM

## 2023-12-10 PROBLEM — E11.10 DKA (DIABETIC KETOACIDOSIS): Status: RESOLVED | Noted: 2023-12-04 | Resolved: 2023-12-10

## 2023-12-10 LAB
ALBUMIN SERPL-MCNC: 2.8 G/DL (ref 3.5–5.2)
ALBUMIN/GLOB SERPL: 1.1 G/DL
ALP SERPL-CCNC: 246 U/L (ref 39–117)
ALT SERPL W P-5'-P-CCNC: 30 U/L (ref 1–33)
ANION GAP SERPL CALCULATED.3IONS-SCNC: 14 MMOL/L (ref 5–15)
AST SERPL-CCNC: 68 U/L (ref 1–32)
BASOPHILS # BLD AUTO: 0.04 10*3/MM3 (ref 0–0.2)
BASOPHILS NFR BLD AUTO: 0.3 % (ref 0–1.5)
BILIRUB SERPL-MCNC: 0.2 MG/DL (ref 0–1.2)
BUN SERPL-MCNC: 10 MG/DL (ref 6–20)
BUN/CREAT SERPL: 18.9 (ref 7–25)
CALCIUM SPEC-SCNC: 8.8 MG/DL (ref 8.6–10.5)
CHLORIDE SERPL-SCNC: 94 MMOL/L (ref 98–107)
CO2 SERPL-SCNC: 27 MMOL/L (ref 22–29)
CREAT SERPL-MCNC: 0.53 MG/DL (ref 0.57–1)
DEPRECATED RDW RBC AUTO: 50 FL (ref 37–54)
EGFRCR SERPLBLD CKD-EPI 2021: 118.6 ML/MIN/1.73
EOSINOPHIL # BLD AUTO: 0.03 10*3/MM3 (ref 0–0.4)
EOSINOPHIL NFR BLD AUTO: 0.3 % (ref 0.3–6.2)
ERYTHROCYTE [DISTWIDTH] IN BLOOD BY AUTOMATED COUNT: 16 % (ref 12.3–15.4)
FERRITIN SERPL-MCNC: 365 NG/ML (ref 13–150)
FOLATE SERPL-MCNC: 6.26 NG/ML (ref 4.78–24.2)
GLOBULIN UR ELPH-MCNC: 2.6 GM/DL
GLUCOSE BLDC GLUCOMTR-MCNC: 197 MG/DL (ref 70–130)
GLUCOSE BLDC GLUCOMTR-MCNC: 82 MG/DL (ref 70–130)
GLUCOSE SERPL-MCNC: 248 MG/DL (ref 65–99)
HCT VFR BLD AUTO: 31.6 % (ref 34–46.6)
HGB BLD-MCNC: 9.6 G/DL (ref 12–15.9)
IMM GRANULOCYTES # BLD AUTO: 0.21 10*3/MM3 (ref 0–0.05)
IMM GRANULOCYTES NFR BLD AUTO: 1.8 % (ref 0–0.5)
IRON 24H UR-MRATE: 37 MCG/DL (ref 37–145)
IRON SATN MFR SERPL: 18 % (ref 20–50)
LYMPHOCYTES # BLD AUTO: 1.09 10*3/MM3 (ref 0.7–3.1)
LYMPHOCYTES NFR BLD AUTO: 9.3 % (ref 19.6–45.3)
MCH RBC QN AUTO: 26.4 PG (ref 26.6–33)
MCHC RBC AUTO-ENTMCNC: 30.4 G/DL (ref 31.5–35.7)
MCV RBC AUTO: 86.8 FL (ref 79–97)
MONOCYTES # BLD AUTO: 1.11 10*3/MM3 (ref 0.1–0.9)
MONOCYTES NFR BLD AUTO: 9.5 % (ref 5–12)
NEUTROPHILS NFR BLD AUTO: 78.8 % (ref 42.7–76)
NEUTROPHILS NFR BLD AUTO: 9.22 10*3/MM3 (ref 1.7–7)
NRBC BLD AUTO-RTO: 0.1 /100 WBC (ref 0–0.2)
PLATELET # BLD AUTO: 325 10*3/MM3 (ref 140–450)
PMV BLD AUTO: 9.5 FL (ref 6–12)
POTASSIUM SERPL-SCNC: 4 MMOL/L (ref 3.5–5.2)
PROT SERPL-MCNC: 5.4 G/DL (ref 6–8.5)
RBC # BLD AUTO: 3.64 10*6/MM3 (ref 3.77–5.28)
SODIUM SERPL-SCNC: 135 MMOL/L (ref 136–145)
TIBC SERPL-MCNC: 207 MCG/DL (ref 298–536)
TRANSFERRIN SERPL-MCNC: 139 MG/DL (ref 200–360)
VIT B12 BLD-MCNC: >2000 PG/ML (ref 211–946)
WBC NRBC COR # BLD AUTO: 11.7 10*3/MM3 (ref 3.4–10.8)

## 2023-12-10 PROCEDURE — 83540 ASSAY OF IRON: CPT | Performed by: INTERNAL MEDICINE

## 2023-12-10 PROCEDURE — 63710000001 INSULIN GLARGINE PER 5 UNITS: Performed by: INTERNAL MEDICINE

## 2023-12-10 PROCEDURE — 82746 ASSAY OF FOLIC ACID SERUM: CPT | Performed by: INTERNAL MEDICINE

## 2023-12-10 PROCEDURE — 94618 PULMONARY STRESS TESTING: CPT

## 2023-12-10 PROCEDURE — 94799 UNLISTED PULMONARY SVC/PX: CPT

## 2023-12-10 PROCEDURE — 80053 COMPREHEN METABOLIC PANEL: CPT | Performed by: INTERNAL MEDICINE

## 2023-12-10 PROCEDURE — 84466 ASSAY OF TRANSFERRIN: CPT | Performed by: INTERNAL MEDICINE

## 2023-12-10 PROCEDURE — 82607 VITAMIN B-12: CPT | Performed by: INTERNAL MEDICINE

## 2023-12-10 PROCEDURE — 82728 ASSAY OF FERRITIN: CPT | Performed by: INTERNAL MEDICINE

## 2023-12-10 PROCEDURE — 63710000001 PREDNISONE PER 1 MG: Performed by: INTERNAL MEDICINE

## 2023-12-10 PROCEDURE — 63710000001 INSULIN LISPRO (HUMAN) PER 5 UNITS: Performed by: STUDENT IN AN ORGANIZED HEALTH CARE EDUCATION/TRAINING PROGRAM

## 2023-12-10 PROCEDURE — 25010000002 MORPHINE PER 10 MG: Performed by: INTERNAL MEDICINE

## 2023-12-10 PROCEDURE — 82948 REAGENT STRIP/BLOOD GLUCOSE: CPT

## 2023-12-10 PROCEDURE — 63710000001 INSULIN LISPRO (HUMAN) PER 5 UNITS: Performed by: INTERNAL MEDICINE

## 2023-12-10 PROCEDURE — 85025 COMPLETE CBC W/AUTO DIFF WBC: CPT | Performed by: INTERNAL MEDICINE

## 2023-12-10 RX ORDER — URINE ACETONE TEST STRIPS
STRIP MISCELLANEOUS AS NEEDED
Qty: 100 EACH | Refills: 0 | Status: SHIPPED | OUTPATIENT
Start: 2023-12-10

## 2023-12-10 RX ORDER — DOXYCYCLINE 100 MG/1
100 CAPSULE ORAL EVERY 12 HOURS SCHEDULED
Qty: 7 CAPSULE | Refills: 0 | Status: SHIPPED | OUTPATIENT
Start: 2023-12-10 | End: 2023-12-14

## 2023-12-10 RX ORDER — PREGABALIN 50 MG/1
50 CAPSULE ORAL EVERY 12 HOURS SCHEDULED
Qty: 60 CAPSULE | Refills: 0 | Status: SHIPPED | OUTPATIENT
Start: 2023-12-10

## 2023-12-10 RX ORDER — PREDNISONE 20 MG/1
TABLET ORAL
Qty: 5 TABLET | Refills: 0 | Status: SHIPPED | OUTPATIENT
Start: 2023-12-11 | End: 2023-12-17

## 2023-12-10 RX ORDER — INSULIN GLARGINE 100 [IU]/ML
40 INJECTION, SOLUTION SUBCUTANEOUS NIGHTLY
Start: 2023-12-10

## 2023-12-10 RX ORDER — LEVOFLOXACIN 750 MG/1
750 TABLET, FILM COATED ORAL EVERY 24 HOURS
Qty: 4 TABLET | Refills: 0 | Status: SHIPPED | OUTPATIENT
Start: 2023-12-10 | End: 2023-12-14

## 2023-12-10 RX ADMIN — OXYCODONE HYDROCHLORIDE 30 MG: 15 TABLET ORAL at 11:35

## 2023-12-10 RX ADMIN — MORPHINE SULFATE 4 MG: 2 INJECTION, SOLUTION INTRAMUSCULAR; INTRAVENOUS at 00:28

## 2023-12-10 RX ADMIN — MORPHINE SULFATE 4 MG: 2 INJECTION, SOLUTION INTRAMUSCULAR; INTRAVENOUS at 11:34

## 2023-12-10 RX ADMIN — OXYCODONE HYDROCHLORIDE 30 MG: 15 TABLET ORAL at 00:28

## 2023-12-10 RX ADMIN — INSULIN GLARGINE 20 UNITS: 100 INJECTION, SOLUTION SUBCUTANEOUS at 08:24

## 2023-12-10 RX ADMIN — SILVER SULFADIAZINE 1 APPLICATION: 10 CREAM TOPICAL at 06:51

## 2023-12-10 RX ADMIN — DOXYCYCLINE 100 MG: 100 CAPSULE ORAL at 08:23

## 2023-12-10 RX ADMIN — PREDNISONE 40 MG: 20 TABLET ORAL at 08:23

## 2023-12-10 RX ADMIN — METHADONE HYDROCHLORIDE 20 MG: 5 SOLUTION ORAL at 13:43

## 2023-12-10 RX ADMIN — DOCUSATE SODIUM 50MG AND SENNOSIDES 8.6MG 2 TABLET: 8.6; 5 TABLET, FILM COATED ORAL at 08:23

## 2023-12-10 RX ADMIN — FLUOXETINE HYDROCHLORIDE 40 MG: 20 CAPSULE ORAL at 08:23

## 2023-12-10 RX ADMIN — OXYCODONE HYDROCHLORIDE 30 MG: 15 TABLET ORAL at 06:42

## 2023-12-10 RX ADMIN — SILVER SULFADIAZINE 1 APPLICATION: 10 CREAM TOPICAL at 08:28

## 2023-12-10 RX ADMIN — INSULIN LISPRO 4 UNITS: 100 INJECTION, SOLUTION INTRAVENOUS; SUBCUTANEOUS at 08:23

## 2023-12-10 RX ADMIN — MORPHINE SULFATE 4 MG: 2 INJECTION, SOLUTION INTRAMUSCULAR; INTRAVENOUS at 07:53

## 2023-12-10 RX ADMIN — ALPRAZOLAM 0.5 MG: 0.5 TABLET ORAL at 00:27

## 2023-12-10 RX ADMIN — INSULIN LISPRO 10 UNITS: 100 INJECTION, SOLUTION INTRAVENOUS; SUBCUTANEOUS at 08:23

## 2023-12-10 RX ADMIN — PREGABALIN 50 MG: 50 CAPSULE ORAL at 08:23

## 2023-12-10 RX ADMIN — ALPRAZOLAM 0.5 MG: 0.5 TABLET ORAL at 06:46

## 2023-12-10 RX ADMIN — LEVOFLOXACIN 750 MG: 750 TABLET, FILM COATED ORAL at 11:35

## 2023-12-10 NOTE — CASE MANAGEMENT/SOCIAL WORK
Continued Stay Note  [facility]     Patient Name: Sierra Mao  MRN: [id number]  Today's Date: 12/10/2023    Admit Date: 12/4/2023    Plan: Home with oxygen/[employer]   Discharge Plan       Row Name 12/10/23 1221       Plan    Plan Home with oxygen/[employer]    Patient/Family in Agreement with Plan yes    Plan Comments CCP noted order for dc home with home oxygen. Called into pt room, spoke with pt, introduced self and explained CCP role. Pt has no preference on DME provider. Called [facility] to arrange for home oxygen. Portable tank delivered to pt room and instructed to call [employer] as soon as she gets home to complete setup for home o2, pt verbalized understanding. No other needs voiced. RN notified. Dionisio ROQUE                   Discharge Codes    No documentation.                 Expected Discharge Date and Time       Expected Discharge Date Expected Discharge Time    Dec 10, 2023               Dionisio Bobo RN

## 2023-12-10 NOTE — PROGRESS NOTES
[facility]      Mar/chart reviewed  Follow up DKA, pneumonia, encephalopathy in a patient with metastatic breast cancer  Continued pain    Vital Sign Min/Max for last 24 hours  Temp  Min: 98.1 °F (36.7 °C)  Max: 98.8 °F (37.1 °C)   BP  Min: 114/71  Max: 124/78   Pulse  Min: 88  Max: 98   Resp  Min: 16  Max: 20   SpO2  Min: 92 %  Max: 94 %   Flow (L/min)  Min: 2  Max: 2   No data recorded     Nad, axox3,   perrl, eomi, no icterus,  mmm, no jvd, trachea midline, neck supple,  chest cta bilaterally decreased on the right, no crackles, no wheezes,   rrr,   soft, nt, nd +bs,  no c/c/ 1+ edema  Skin warm, dry no rashes    Labs: 12/10: reviewed:  Glucose 248bun 10  Cr 0.53  Na 135  Bicarb 27  Wbc 11.7  Hgb 9.6  Plts 325    A/P:  AHRF -- still needing 2lpm; ddx is broad, , not convinced this is bacterial pneumonia but I think at this point best to treat as such and monitor response I don't think more aggressive evalution in her best interest at this point.   Pneumonia -- as per 1  Metastatic breast cancer -- got recent keytruda; oncology here going to see  Breast wound -- was told no surgical options -- antibiotics per ID  DMI -- as per IM  Pain control -- ongoing issues, adjust as best we can  Encephalopathy -- better  Anemia    She would like to go home today  Taper steroids  Complete antibiotics  She will need oxygen  Follow up with oncology in 1 week  Call [facility] at [phone] for worsening pulmonary status

## 2023-12-10 NOTE — THERAPY EVALUATION
Exercise Oximetry    Patient Name:Sierra Mao   MRN: [id number]   Date: 12/10/23             ROOM AIR BASELINE   SpO2%      86   Heart Rate    Blood Pressure     EXERCISE ON ROOM AIR SpO2% EXERCISE ON O2  2 @ LPM SpO2%   1 MINUTE  1 MINUTE      91   2 MINUTES  2 MINUTES      92   3 MINUTES  3 MINUTES      92   4 MINUTES  4 MINUTES    5 MINUTES  5 MINUTES    6 MINUTES  6 MINUTES               Distance Walked   Distance Walked   Dyspnea (Isis Scale)   Dyspnea (Isis Scale)   Fatigue (Isis Scale)   Fatigue (Isis Scale)   SpO2% Post Exercise   SpO2% Post Exercise   HR Post Exercise   HR Post Exercise   Time to Recovery   Time to Recovery     Comments:     Patient tells me she is pretty weak and wanted to walk around room. She had a wound that was causing her some discomfort during our walk. Her room air sat was 86%. Placed on 2L and sats recovered into the low 90's during walk. While on 2L NC  Notifed rn about burning and results of this test.     Thank you   Francesca COONEY

## 2023-12-10 NOTE — CASE MANAGEMENT/SOCIAL WORK
Case Management Discharge Note      Final Note: home with oxygen/[employer]         Selected Continued Care - Discharged on 12/10/2023 Admission date: 12/4/2023 - Discharge disposition: Home or Self Care      Destination    No services have been selected for the patient.                Durable Medical Equipment Coordination complete.      Service Provider Selected Services Address Phone Fax Patient Preferred    [employer] [facility] Durable Medical Equipment [id number] [facility] [id number] [phone] --              Dialysis/Infusion    No services have been selected for the patient.                Home Medical Care    No services have been selected for the patient.                Therapy    No services have been selected for the patient.                Community Resources    No services have been selected for the patient.                Community & DME    No services have been selected for the patient.                         Final Discharge Disposition Code: 01 - home or self-care

## 2023-12-10 NOTE — OUTREACH NOTE
Prep Survey      Flowsheet Row Responses   [facility] facility patient discharged from? [place]   Is LACE score < 7 ? No   Eligibility [facility]   Date of Admission 12/04/23   Date of Discharge 12/10/23   Discharge Disposition Home or Self Care   Discharge diagnosis Pneumonia-DKA-Metastatic breast cancer   Does the patient have one of the following disease processes/diagnoses(primary or secondary)? Pneumonia   Does the patient have Home health ordered? No   Is there a DME ordered? Yes   What DME was ordered? Portable tank delivered to pt room and instructed to call [employer] as soon as she gets home to complete setup for home o2   Prep survey completed? Yes            ELLIE ASHLEY - Registered Nurse

## 2023-12-10 NOTE — DISCHARGE SUMMARY
[facility]    Admit date: 12/4/2023  Discharge date: 12/10/2023    Admission/discharge diagnosis:  1.   AHRF -- still needing 2lpm; ddx is broad, includes pneumonia, pneumonitis, lymphangytic carcinomatosis  2. Pneumonia -- complete antibiotics, steroid taper  3. Metastatic breast cancer -- got recent keytruda;   4.  Breast wound -- was told no surgical options -- antibiotics per ID  5.  DMI --  6.  Pain control   7. Encephalopathy -- resolved,   8.  Anemia  9. DKA -- resolved    HPI: reviewed as per Dr. Savage:  Sierra Mao is a 43 y.o. female with history of diabetes mellitus with prior history of DKA in the past who has end-stage metastatic  breast cancer and was given Keytruda just recently.  Patient has chronic pain from her extensive metastatic disease and she is on high-dose morphine for chronic pain control.  She was having some labored breathing over the last 24 hours and she was brought in for evaluation, she had a recent admission at [facility] for pain control.  She did not to have lactic acidosis with high anion gap with elevated Dr. Hydroxybutyrate at 6.46.  Hemoglobin A1c was 9.4 reflecting an adequate control of the diabetes with a blood sugar of 449.  Her lactate was only 3.5, high-sensitivity troponin was borderline at 22.  White count was elevated at 16.27  The chest x-ray was showing some possible artifact versus infiltrate  She was labored but this can be acidosis driven  Patient was started on IV fluid per the DKA protocol we were consulted for further management  The patient is lethargic but she does respond if properly stimulated and encouraged to respond.  History was obtained from talking to the family at the bedside  CODE STATUS up to this point is a full code    Hospital Course:   Mrs. Mao improved nicely with antibiotics and treatment of her DKA.  She does have ongoing issues with pain control and still is needing 2lpm of oxygen at this point. Hopefully  oxygen requirement will improve with increased mobility and completion of antibiotics.      Dispo: home    Activity: pre admission    Follow up:  With oncology in 1 week  Call [facility] at [phone] for any worsening pulmonary status follow up about 6 weeks    Diet: consistent carbs    Greater than 30 mins spent in discharging patient home       Discharge Medications        New Medications        Instructions Start Date   levoFLOXacin 750 MG tablet  Commonly known as: LEVAQUIN   750 mg, Oral, Every 24 Hours      predniSONE 20 MG tablet  Commonly known as: DELTASONE   Take 1 tablet by mouth Daily for 3 days, THEN 0.5 tablets Daily for 3 days.   Start Date: December 11, 2023     pregabalin 50 MG capsule  Commonly known as: LYRICA   50 mg, Oral, Every 12 Hours Scheduled      TRUEPLUS KETONE TEST STRIPS   Use as directed as needed             Changes to Medications        Instructions Start Date   doxycycline 100 MG capsule  Commonly known as: MONODOX  What changed: when to take this   100 mg, Oral, Every 12 Hours Scheduled      NovoLOG FlexPen 100 UNIT/ML solution pen-injector sc pen  Generic drug: insulin aspart  What changed: additional instructions   Amount based on carb ratio up to 60 units daily             Continue These Medications        Instructions Start Date   ALPRAZolam 0.5 MG tablet  Commonly known as: XANAX   0.5 mg, Oral, 3 Times Daily PRN, for anxiety      amphetamine-dextroamphetamine 20 MG tablet  Commonly known as: ADDERALL   Take 1 tablet by mouth 3 (Three) Times a Day.      BASAGLAR KWIKPEN 100 UNIT/ML injection pen   40 Units, Subcutaneous, Nightly      BD Pen Needle Kaley U/F 32G X 4 MM misc  Generic drug: Insulin Pen Needle   1 each, Subcutaneous, 4 Times Daily      docusate sodium 100 MG capsule  Commonly known as: COLACE   100 mg, Oral, 2 Times Daily      FLUoxetine 20 MG capsule  Commonly known as: PROzac   40 mg, Oral, Daily      methadone 10 MG tablet  Commonly known as:  DOLOPHINE   10 mg      ondansetron ODT 4 MG disintegrating tablet  Commonly known as: ZOFRAN-ODT   4 mg, Translingual, Every 8 Hours PRN      OneTouch Verio test strip  Generic drug: glucose blood   1 each, Other, 4 Times Daily      oxybutynin 5 MG tablet  Commonly known as: DITROPAN   5 mg, Oral, Nightly      oxyCODONE 5 MG immediate release tablet  Commonly known as: ROXICODONE   30 mg, Oral, Every 6 Hours PRN, for pain      silver sulfadiazine 1 % cream  Commonly known as: SILVADENE, SSD   1 application , Topical, Daily             Stop These Medications      amoxicillin-clavulanate 875-125 MG per tablet  Commonly known as: AUGMENTIN     GEMZAR IV     Glucagon 3 MG/DOSE powder     lidocaine-prilocaine 2.5-2.5 % cream  Commonly known as: EMLA     metroNIDAZOLE 500 MG tablet  Commonly known as: FLAGYL     ondansetron 8 MG tablet  Commonly known as: ZOFRAN     polyethylene glycol 17 g packet  Commonly known as: MIRALAX

## 2023-12-10 NOTE — DISCHARGE PLACEMENT REQUEST
"Sierra Mao (43 y.o. Female)       Date of Birth   1980    Social Security Number       Address   [address] [place] [address]    Home Phone   [phone]    MRN   [id number]       Catholic   None    Marital Status                               Admission Date   12/4/23    Admission Type   Emergency    Admitting Provider   Yulia Savage MD    Attending Provider   Yulia Savage MD    Department, Room/Bed   [address], [facility]/1       Discharge Date       Discharge Disposition   Home or Self Care    Discharge Destination                                 Attending Provider: Yulia Savage MD    Allergies: No Known Allergies    Isolation: None   Infection: None   Code Status: CPR    Ht: 180.3 cm (71\")   Wt: 74.4 kg (164 lb 0.4 oz)    Admission Cmt: None   Principal Problem: DKA (diabetic ketoacidosis) [E11.10]                   Active Insurance as of 12/4/2023       Primary Coverage       Payor Plan Insurance Group Employer/Plan Group    Counts include 234 beds at the [facility] BLUE [facility] EMPLOYEE [id number]       Payor Plan Address Payor Plan Phone Number Payor Plan Fax Number Effective Dates    [address] [phone]  1/1/2022 - None Entered    [facility] [id number]         Subscriber Name Subscriber Birth Date Member ID       JENNIFER RAO 11/10/1969 [id number]                     Emergency Contacts        (Rel.) Home Phone Work Phone Mobile Phone    Jennifer Rao (Spouse) [phone] -- --    MaycoNgoc (Mother) [phone] -- --                "

## 2023-12-10 NOTE — PLAN OF CARE
Goal Outcome Evaluation:  Plan of Care Reviewed With: patient              Patient discharging today with home oxygen. All questions answered and education completed.

## 2023-12-10 NOTE — PLAN OF CARE
Goal Outcome Evaluation:  Plan of Care Reviewed With: patient        Progress: no change  Outcome Evaluation: Pt with O2 at 2Lnc, pain of breast controlled with oxycodone, morphine, and methadone--pt takes xanax as needed for anxiety, VSS.

## 2023-12-10 NOTE — PROGRESS NOTES
Name: Sierra Mao ADMIT: 2023   : 1980  PCP: Stephania Swain MD    MRN: [id number] LOS: 6 days   AGE/SEX: 43 y.o. female  ROOM: [facility]     Subjective   Subjective   Feeling better today, no nausea. Has decreased appetite compared to baseline. No other acute issues.    Objective   Objective   Vital Signs  Temp:  [98.1 °F (36.7 °C)-98.8 °F (37.1 °C)] 98.4 °F (36.9 °C)  Heart Rate:  [88-98] 91  Resp:  [16-20] 18  BP: (114-124)/(63-78) 114/71  SpO2:  [92 %-94 %] 92 %  on  Flow (L/min):  [2] 2;   Device (Oxygen Therapy): nasal cannula  Body mass index is 22.88 kg/m².  Physical Exam  Constitutional:       Appearance: Normal appearance.   Cardiovascular:      Rate and Rhythm: Normal rate.      Pulses: Normal pulses.   Pulmonary:      Effort: Pulmonary effort is normal. No respiratory distress.      Breath sounds: Normal breath sounds. No stridor. No wheezing or rhonchi.   Abdominal:      Palpations: Abdomen is soft.   Musculoskeletal:      Right lower leg: No edema.      Left lower leg: No edema.   Skin:     General: Skin is warm.      Comments: Necrotic right breast mass   Neurological:      General: No focal deficit present.      Mental Status: She is alert and oriented to person, place, and time.   Psychiatric:         Mood and Affect: Mood normal.         Behavior: Behavior normal.     Results Review     I reviewed the patient's new clinical results.  Results from last 7 days   Lab Units 12/10/23  0543 23  0633 23  0411 23  1344   WBC 10*3/mm3 11.70* 13.10* 10.25 16.27*   HEMOGLOBIN g/dL 9.6* 9.6* 9.2* 11.1*   PLATELETS 10*3/mm3 325 307 306 500*     Results from last 7 days   Lab Units 12/10/23  0543 23  0633 23  0634 23  0831   SODIUM mmol/L 135* 134* 132* 134*   POTASSIUM mmol/L 4.0 4.2 4.1 4.0   CHLORIDE mmol/L 94* 95* 100 106   CO2 mmol/L 27.0 26.4 20.0* 21.0*   BUN mg/dL 10 12 7 6   CREATININE mg/dL 0.53* 0.42* 0.45* 0.49*   GLUCOSE mg/dL 248* 261* 219* 101*    EGFR mL/min/1.73 118.6 125.4 123.4 120.9     Results from last 7 days   Lab Units 12/10/23  0543 12/09/23  0633 12/04/23  1344   ALBUMIN g/dL 2.8* 2.5* 3.3*   BILIRUBIN mg/dL 0.2 <0.2 0.3   ALK PHOS U/L 246* 237* 359*   AST (SGOT) U/L 68* 43* 25   ALT (SGPT) U/L 30 22 27     Results from last 7 days   Lab Units 12/10/23  0543 12/09/23  0633 12/06/23  0634 12/05/23  0831 12/05/23 0411 12/05/23  0017 12/04/23  1602 12/04/23  1344   CALCIUM mg/dL 8.8 8.4* 8.4* 8.7 8.7 8.4*   < > 9.3   ALBUMIN g/dL 2.8* 2.5*  --   --   --   --   --  3.3*   MAGNESIUM mg/dL  --   --  1.6 1.7 1.6 1.7   < > 1.9   PHOSPHORUS mg/dL  --   --  3.2 2.0* 2.1* 2.4*   < > 4.2    < > = values in this interval not displayed.     Results from last 7 days   Lab Units 12/05/23 0411 12/04/23 2027 12/04/23  1602 12/04/23  1344   PROCALCITONIN ng/mL 1.29*  --   --   --    LACTATE mmol/L  --  1.9  1.9 2.4* 3.5*     Glucose   Date/Time Value Ref Range Status   12/10/2023 0715 197 (H) 70 - 130 mg/dL Final   12/09/2023 2122 203 (H) 70 - 130 mg/dL Final   12/09/2023 1526 118 70 - 130 mg/dL Final   12/09/2023 1110 286 (H) 70 - 130 mg/dL Final   12/09/2023 0731 255 (H) 70 - 130 mg/dL Final   12/08/2023 2006 381 (H) 70 - 130 mg/dL Final   12/08/2023 1611 145 (H) 70 - 130 mg/dL Final       No radiology results for the last day  Scheduled Medications  doxycycline, 100 mg, Oral, Q12H  enoxaparin, 40 mg, Subcutaneous, Q24H  FLUoxetine, 40 mg, Oral, Daily  insulin glargine, 20 Units, Subcutaneous, Q12H  insulin lispro, 10 Units, Subcutaneous, TID With Meals  insulin lispro, 4-24 Units, Subcutaneous, 4x Daily AC & at Bedtime  levoFLOXacin, 750 mg, Oral, Q24H  methadone, 20 mg, Oral, Q12H  predniSONE, 40 mg, Oral, Daily  pregabalin, 50 mg, Oral, Q12H  senna-docusate sodium, 2 tablet, Oral, BID  silver sulfadiazine, 1 application , Topical, Q12H    Infusions   Diet  Diet: Diabetic Diets; Consistent Carbohydrate; Texture: Regular Texture (IDDSI 7); Fluid Consistency:  Thin (IDDSI 0)       Assessment/Plan     Active Hospital Problems    Diagnosis  POA    Type 1 diabetes mellitus [E10.9]  Yes    Anemia [D64.9]  Unknown    Wound infection [T14.8XXA, L08.9]  Yes    Cancer-related pain [G89.3]  Unknown    Malignant neoplasm metastatic to bone [C79.51]  Yes    Malignant neoplasm of central portion of right breast in female, estrogen receptor negative [C50.111, Z17.1]  Not Applicable    Type 1 diabetes mellitus with hyperglycemia [E10.65]  Yes      Resolved Hospital Problems    Diagnosis Date Resolved POA    **DKA (diabetic ketoacidosis) [E11.10] 12/10/2023 Yes       43 y.o. female admitted with DKA (diabetic ketoacidosis).      12/10/23  Planning to discharge today per pulmonology.    T1DM  DKA, resolved  -resume Glargine 40 units on dc w/ SSI  -Expect greater needs while on prednisone     Pneumonia  Right necrotic breast mass with superimposed soft tissue infection  -ID following  -Wound culture with Pseudomonas and staph  -Rocephin and Flagyl transitioned to Levaquin plus doxycycline     Pain of malignancy  Metastatic breast CA  -Methadone 20 mg twice daily, Roxicodone 30 mg every 4 hours as needed, morphine 4 mg every 2 hours as needed  -Pain management consulted  -Oncology following    Flow (L/min):  [2] 2    DVT prophylaxis: SCDs  Discussed with patient.  Anticipated discharge home, today            Barron Luciano MD  [facility]  12/10/23  10:11 EST

## 2023-12-11 ENCOUNTER — TRANSITIONAL CARE MANAGEMENT TELEPHONE ENCOUNTER (OUTPATIENT)
Dept: CALL CENTER | Facility: HOSPITAL | Age: 43
End: 2023-12-11
Payer: COMMERCIAL

## 2023-12-11 NOTE — PAYOR COMM NOTE
"Sierra Mao (43 y.o. Female)     PLEASE SEE ATTACHED FOR DC NOTICE    REF #   [id number]     THANK YOU  CINDY MATTHEWS RN/ DEPT  Psychiatric   [phone]  [phone]        Date of Birth   1980    Social Security Number       Address   [address] [facility] [address]    Home Phone   [phone]    MRN   [id number]       Restorationist   None    Marital Status                               Admission Date   12/4/23    Admission Type   Emergency    Admitting Provider   Yulia Savage MD    Attending Provider       Department, Room/Bed   Psychiatric [address] [place], P377/1       Discharge Date   12/10/2023    Discharge Disposition   Home or Self Care    Discharge Destination                                 Attending Provider: (none)   Allergies: No Known Allergies    Isolation: None   Infection: None   Code Status: Prior    Ht: 180.3 cm (71\")   Wt: 74.4 kg (164 lb 0.4 oz)    Admission Cmt: None   Principal Problem: DKA (diabetic ketoacidosis) [E11.10]                   Active Insurance as of 12/4/2023       Primary Coverage       Payor Plan Insurance Group Employer/Plan Group    [employer] BLUE [employer] [facility] EMPLOYEE [id number]       Payor Plan Address Payor Plan Phone Number Payor Plan Fax Number Effective Dates     [address] [phone]  1/1/2022 - None Entered    [place] [address]         Subscriber Name Subscriber Birth Date Member ID       JENNIFER RAO 11/10/1969 [id number]                     Emergency Contacts        (Rel.) Home Phone Work Phone Mobile Phone    Jennifer Rao (Spouse) [phone] -- --    Ngoc Mao (Mother) [phone] -- --              [place]: [facility] [id number]  Tax ID [id number]     Discharge Summary        Jayson Sunshine MD at 12/10/23 [address] [place] [address] Pulmonary Care    Admit date: 12/4/2023  Discharge date: 12/10/2023    Admission/discharge diagnosis:  1.   AHRF -- still needing 2lpm; ddx is " broad, includes pneumonia, pneumonitis, lymphangytic carcinomatosis  2. Pneumonia -- complete antibiotics, steroid taper  3. Metastatic breast cancer -- got recent keytruda;   4.  Breast wound -- was told no surgical options -- antibiotics per ID  5.  DMI --  6.  Pain control   7. Encephalopathy -- resolved,   8.  Anemia  9. DKA -- resolved    HPI: reviewed as per Dr. Savage:  Sierra Mao is a 43 y.o. female with history of diabetes mellitus with prior history of DKA in the past who has end-stage metastatic  breast cancer and was given Keytruda just recently.  Patient has chronic pain from her extensive metastatic disease and she is on high-dose morphine for chronic pain control.  She was having some labored breathing over the last 24 hours and she was brought in for evaluation, she had a recent admission at [facility] for pain control.  She did not to have lactic acidosis with high anion gap with elevated Dr. Hydroxybutyrate at 6.46.  Hemoglobin A1c was 9.4 reflecting an adequate control of the diabetes with a blood sugar of 449.  Her lactate was only 3.5, high-sensitivity troponin was borderline at 22.  White count was elevated at 16.27  The chest x-ray was showing some possible artifact versus infiltrate  She was labored but this can be acidosis driven  Patient was started on IV fluid per the DKA protocol we were consulted for further management  The patient is lethargic but she does respond if properly stimulated and encouraged to respond.  History was obtained from talking to the family at the bedside  CODE STATUS up to this point is a full code    Hospital Course:   Mrs. Mao improved nicely with antibiotics and treatment of her DKA.  She does have ongoing issues with pain control and still is needing 2lpm of oxygen at this point. Hopefully oxygen requirement will improve with increased mobility and completion of antibiotics.      Dispo: home    Activity: pre admission    Follow up:  With  oncology in 1 week  Call [facility] at [phone] for any worsening pulmonary status follow up about 6 weeks    Diet: consistent carbs    Greater than 30 mins spent in discharging patient home       Discharge Medications        New Medications        Instructions Start Date   levoFLOXacin 750 MG tablet  Commonly known as: LEVAQUIN   750 mg, Oral, Every 24 Hours      predniSONE 20 MG tablet  Commonly known as: DELTASONE   Take 1 tablet by mouth Daily for 3 days, THEN 0.5 tablets Daily for 3 days.   Start Date: December 11, 2023     pregabalin 50 MG capsule  Commonly known as: LYRICA   50 mg, Oral, Every 12 Hours Scheduled      TRUEPLUS KETONE TEST STRIPS   Use as directed as needed             Changes to Medications        Instructions Start Date   doxycycline 100 MG capsule  Commonly known as: MONODOX  What changed: when to take this   100 mg, Oral, Every 12 Hours Scheduled      NovoLOG FlexPen 100 UNIT/ML solution pen-injector sc pen  Generic drug: insulin aspart  What changed: additional instructions   Amount based on carb ratio up to 60 units daily             Continue These Medications        Instructions Start Date   ALPRAZolam 0.5 MG tablet  Commonly known as: XANAX   0.5 mg, Oral, 3 Times Daily PRN, for anxiety      amphetamine-dextroamphetamine 20 MG tablet  Commonly known as: ADDERALL   Take 1 tablet by mouth 3 (Three) Times a Day.      BASAGLAR KWIKPEN 100 UNIT/ML injection pen   40 Units, Subcutaneous, Nightly      BD Pen Needle Kaley U/F 32G X 4 MM misc  Generic drug: Insulin Pen Needle   1 each, Subcutaneous, 4 Times Daily      docusate sodium 100 MG capsule  Commonly known as: COLACE   100 mg, Oral, 2 Times Daily      FLUoxetine 20 MG capsule  Commonly known as: PROzac   40 mg, Oral, Daily      methadone 10 MG tablet  Commonly known as: DOLOPHINE   10 mg      ondansetron ODT 4 MG disintegrating tablet  Commonly known as: ZOFRAN-ODT   4 mg, Translingual, Every 8 Hours PRN      OneTouch Verio  test strip  Generic drug: glucose blood   1 each, Other, 4 Times Daily      oxybutynin 5 MG tablet  Commonly known as: DITROPAN   5 mg, Oral, Nightly      oxyCODONE 5 MG immediate release tablet  Commonly known as: ROXICODONE   30 mg, Oral, Every 6 Hours PRN, for pain      silver sulfadiazine 1 % cream  Commonly known as: SILVADENE, SSD   1 application , Topical, Daily             Stop These Medications      amoxicillin-clavulanate 875-125 MG per tablet  Commonly known as: AUGMENTIN     GEMZAR IV     Glucagon 3 MG/DOSE powder     lidocaine-prilocaine 2.5-2.5 % cream  Commonly known as: EMLA     metroNIDAZOLE 500 MG tablet  Commonly known as: FLAGYL     ondansetron 8 MG tablet  Commonly known as: ZOFRAN     polyethylene glycol 17 g packet  Commonly known as: MIRALAX                 Electronically signed by Jayson Estrella MD at 12/10/23 1029       Discharge Order (From admission, onward)       Start     Ordered    12/10/23 1012  Discharge patient  Once        Expected Discharge Date: 12/10/23   Discharge Disposition: Home or Self Care   Physician of Record for Attribution - Please select from Treatment Team: JAYSON ESTRELLA [[id number]]   Review needed by CMO to determine Physician of Record: No      Question Answer Comment   Physician of Record for Attribution - Please select from Treatment Team JAYSON ESTRELLA    Review needed by CMO to determine Physician of Record No        12/10/23 1011

## 2023-12-11 NOTE — OUTREACH NOTE
Call Center TCM Note      Flowsheet Row Responses   [facility] patient discharged from? [place]   Does the patient have one of the following disease processes/diagnoses(primary or secondary)? Pneumonia   TCM attempt successful? Yes   Call start time 1055   Call end time 1059   Discharge diagnosis Pneumonia-DKA-Metastatic breast cancer   Person spoke with today (if not patient) and relationship Patient   Meds reviewed with patient/caregiver? Yes   Is the patient having any side effects they believe may be caused by any medication additions or changes? No   Does the patient have all medications ordered at discharge? Yes   Is the patient taking all medications as directed (includes completed medication regime)? Yes   Does the patient have an appointment with their PCP within 7-14 days of discharge? No appointments available   Nursing Interventions PCP office requested to make appointment - message sent   Has home health visited the patient within 72 hours of discharge? N/A   Has all DME been delivered? Yes   Psychosocial issues? No   Comments Pt states she is still having pain, not the side when she was in the hospital. Pt denies any shortness of breath. Remains on 2 LPM oxygen via nasal cannula. blood sugars are wnl.   Did the patient receive a copy of their discharge instructions? Yes   Nursing interventions Reviewed instructions with patient   What is the patient's perception of their health status since discharge? Improving   Nursing Interventions Nurse provided patient education   If the patient is a current smoker, are they able to teach back resources for cessation? Not a smoker   Is the patient/caregiver able to teach back the hierarchy of who to call/visit for symptoms/problems? PCP, Specialist, Home health nurse, Urgent Care, ED, 911 Yes   Is the patient/caregiver able to teach back signs and symptoms of worsening condition: Fever/chills, Shortness of breath, Chest pain   Is the patient/caregiver  able to teach back importance of completing antibiotic course of treatment? Yes   TCM call completed? Yes   Call end time 1059   Would this patient benefit from a Referral to [facility] Social Work? No   Is the patient interested in additional calls from an ambulatory ? No            Shara Mas RN    12/11/2023, 10:59 EST

## 2023-12-12 NOTE — PROGRESS NOTES
Called to schedule hospital fu. Phone connection kept going out. Will try again later     Hub to relay

## 2024-03-04 ENCOUNTER — TELEPHONE (OUTPATIENT)
Dept: ENDOCRINOLOGY | Age: 44
End: 2024-03-04
Payer: COMMERCIAL

## (undated) DEVICE — TRAP FLD MINIVAC MEGADYNE 100ML

## (undated) DEVICE — LOU MINOR PROCEDURE: Brand: MEDLINE INDUSTRIES, INC.

## (undated) DEVICE — PATIENT RETURN ELECTRODE, SINGLE-USE, CONTACT QUALITY MONITORING, ADULT, WITH 9FT CORD, FOR PATIENTS WEIGING OVER 33LBS. (15KG): Brand: MEGADYNE

## (undated) DEVICE — INTENDED FOR TISSUE SEPARATION, AND OTHER PROCEDURES THAT REQUIRE A SHARP SURGICAL BLADE TO PUNCTURE OR CUT.: Brand: BARD-PARKER ® CARBON RIB-BACK BLADES

## (undated) DEVICE — Device

## (undated) DEVICE — NDL HYPO PRECISIONGLIDE REG 25G 1 1/2

## (undated) DEVICE — PENCL E/S ULTRAVAC TELESCP NOSE HOLSTR 10FT

## (undated) DEVICE — SUT PROLN 2/0 SH 36IN 8523H

## (undated) DEVICE — ANTIBACTERIAL UNDYED BRAIDED (POLYGLACTIN 910), SYNTHETIC ABSORBABLE SUTURE: Brand: COATED VICRYL

## (undated) DEVICE — SOL NACL 0.9PCT 100ML SGL

## (undated) DEVICE — DRP C/ARM 41X74IN

## (undated) DEVICE — ADHS SKIN SURG TISS VISC PREMIERPRO EXOFIN HI/VISC FAST/DRY

## (undated) DEVICE — GLV SURG BIOGEL LTX PF 6 1/2